# Patient Record
Sex: FEMALE | Race: OTHER | HISPANIC OR LATINO | Employment: OTHER | URBAN - METROPOLITAN AREA
[De-identification: names, ages, dates, MRNs, and addresses within clinical notes are randomized per-mention and may not be internally consistent; named-entity substitution may affect disease eponyms.]

---

## 2023-06-09 ENCOUNTER — HOSPITAL ENCOUNTER (INPATIENT)
Facility: HOSPITAL | Age: 33
LOS: 39 days | Discharge: HOME | DRG: 750 | End: 2023-07-19
Attending: EMERGENCY MEDICINE | Admitting: PSYCHIATRY & NEUROLOGY
Payer: COMMERCIAL

## 2023-06-09 DIAGNOSIS — R00.0 TACHYCARDIA: ICD-10-CM

## 2023-06-09 DIAGNOSIS — K59.00 CONSTIPATION: ICD-10-CM

## 2023-06-09 DIAGNOSIS — F30.9 MANIC EPISODE (HCC): ICD-10-CM

## 2023-06-09 DIAGNOSIS — F29 PSYCHOSIS, UNSPECIFIED PSYCHOSIS TYPE (HCC): ICD-10-CM

## 2023-06-09 DIAGNOSIS — R45.1 AGITATION: ICD-10-CM

## 2023-06-09 DIAGNOSIS — G47.00 INSOMNIA: ICD-10-CM

## 2023-06-09 DIAGNOSIS — S30.814A VAGINAL ABRASION, INITIAL ENCOUNTER: ICD-10-CM

## 2023-06-09 DIAGNOSIS — Z00.8 MEDICAL CLEARANCE FOR PSYCHIATRIC ADMISSION: ICD-10-CM

## 2023-06-09 DIAGNOSIS — F39 UNSPECIFIED MOOD (AFFECTIVE) DISORDER (HCC): Primary | ICD-10-CM

## 2023-06-09 LAB
AMPHETAMINES SERPL QL SCN: NEGATIVE
BACTERIA UR QL AUTO: NORMAL /HPF
BARBITURATES UR QL: NEGATIVE
BENZODIAZ UR QL: POSITIVE
BILIRUB UR QL STRIP: NEGATIVE
CLARITY UR: CLEAR
COCAINE UR QL: NEGATIVE
COLOR UR: YELLOW
EXT PREGNANCY TEST URINE: NEGATIVE
EXT. CONTROL: NORMAL
GLUCOSE UR STRIP-MCNC: NEGATIVE MG/DL
HGB UR QL STRIP.AUTO: NEGATIVE
KETONES UR STRIP-MCNC: NEGATIVE MG/DL
LEUKOCYTE ESTERASE UR QL STRIP: 25
METHADONE UR QL: NEGATIVE
NITRITE UR QL STRIP: NEGATIVE
NON-SQ EPI CELLS URNS QL MICRO: NORMAL /HPF
OPIATES UR QL SCN: NEGATIVE
OXYCODONE+OXYMORPHONE UR QL SCN: NEGATIVE
PCP UR QL: NEGATIVE
PH UR STRIP.AUTO: 7 [PH]
PROT UR STRIP-MCNC: NEGATIVE MG/DL
RBC #/AREA URNS AUTO: NORMAL /HPF
SP GR UR STRIP.AUTO: 1 (ref 1–1.04)
THC UR QL: POSITIVE
UROBILINOGEN UA: NEGATIVE MG/DL
WBC #/AREA URNS AUTO: NORMAL /HPF

## 2023-06-09 PROCEDURE — 99285 EMERGENCY DEPT VISIT HI MDM: CPT | Performed by: EMERGENCY MEDICINE

## 2023-06-09 PROCEDURE — 81025 URINE PREGNANCY TEST: CPT | Performed by: EMERGENCY MEDICINE

## 2023-06-09 PROCEDURE — 80307 DRUG TEST PRSMV CHEM ANLYZR: CPT | Performed by: EMERGENCY MEDICINE

## 2023-06-09 PROCEDURE — 81001 URINALYSIS AUTO W/SCOPE: CPT | Performed by: EMERGENCY MEDICINE

## 2023-06-09 PROCEDURE — 99285 EMERGENCY DEPT VISIT HI MDM: CPT

## 2023-06-09 PROCEDURE — 96372 THER/PROPH/DIAG INJ SC/IM: CPT

## 2023-06-09 PROCEDURE — 82075 ASSAY OF BREATH ETHANOL: CPT | Performed by: EMERGENCY MEDICINE

## 2023-06-09 RX ORDER — IBUPROFEN 600 MG/1
600 TABLET ORAL ONCE
Status: COMPLETED | OUTPATIENT
Start: 2023-06-09 | End: 2023-06-09

## 2023-06-09 RX ORDER — BENZTROPINE MESYLATE 1 MG/ML
2 INJECTION INTRAMUSCULAR; INTRAVENOUS ONCE
Status: COMPLETED | OUTPATIENT
Start: 2023-06-09 | End: 2023-06-09

## 2023-06-09 RX ORDER — LORAZEPAM 2 MG/ML
2 INJECTION INTRAMUSCULAR ONCE
Status: COMPLETED | OUTPATIENT
Start: 2023-06-09 | End: 2023-06-09

## 2023-06-09 RX ORDER — MIDAZOLAM HYDROCHLORIDE 2 MG/2ML
2 INJECTION, SOLUTION INTRAMUSCULAR; INTRAVENOUS ONCE
Status: COMPLETED | OUTPATIENT
Start: 2023-06-09 | End: 2023-06-09

## 2023-06-09 RX ORDER — BENZTROPINE MESYLATE 1 MG/ML
1 INJECTION INTRAMUSCULAR; INTRAVENOUS ONCE
Status: COMPLETED | OUTPATIENT
Start: 2023-06-09 | End: 2023-06-09

## 2023-06-09 RX ORDER — DIVALPROEX SODIUM 500 MG/1
1000 TABLET, DELAYED RELEASE ORAL 2 TIMES DAILY
COMMUNITY
Start: 2023-06-08 | End: 2023-07-19

## 2023-06-09 RX ORDER — LITHIUM CARBONATE 300 MG/1
300 TABLET, FILM COATED, EXTENDED RELEASE ORAL 2 TIMES DAILY
COMMUNITY
Start: 2023-06-08 | End: 2023-07-19

## 2023-06-09 RX ORDER — HALOPERIDOL 5 MG/ML
5 INJECTION INTRAMUSCULAR ONCE
Status: COMPLETED | OUTPATIENT
Start: 2023-06-09 | End: 2023-06-09

## 2023-06-09 RX ORDER — RISPERIDONE 3 MG/1
3 TABLET ORAL 2 TIMES DAILY
COMMUNITY
Start: 2023-06-08 | End: 2023-07-19

## 2023-06-09 RX ORDER — CLONAZEPAM 0.5 MG/1
0.5 TABLET ORAL 3 TIMES DAILY
COMMUNITY
End: 2023-07-19

## 2023-06-09 RX ADMIN — MIDAZOLAM 2 MG: 1 INJECTION INTRAMUSCULAR; INTRAVENOUS at 15:10

## 2023-06-09 RX ADMIN — HALOPERIDOL LACTATE 5 MG: 5 INJECTION, SOLUTION INTRAMUSCULAR at 22:10

## 2023-06-09 RX ADMIN — LORAZEPAM 2 MG: 2 INJECTION INTRAMUSCULAR; INTRAVENOUS at 14:24

## 2023-06-09 RX ADMIN — HALOPERIDOL LACTATE 5 MG: 5 INJECTION, SOLUTION INTRAMUSCULAR at 14:24

## 2023-06-09 RX ADMIN — BENZTROPINE MESYLATE 1 MG: 1 INJECTION INTRAMUSCULAR; INTRAVENOUS at 14:24

## 2023-06-09 RX ADMIN — BENZTROPINE MESYLATE 2 MG: 1 INJECTION INTRAMUSCULAR; INTRAVENOUS at 22:10

## 2023-06-09 RX ADMIN — MIDAZOLAM 2 MG: 1 INJECTION INTRAMUSCULAR; INTRAVENOUS at 17:17

## 2023-06-09 RX ADMIN — LORAZEPAM 2 MG: 2 INJECTION INTRAMUSCULAR; INTRAVENOUS at 22:10

## 2023-06-09 RX ADMIN — IBUPROFEN 600 MG: 600 TABLET, FILM COATED ORAL at 17:14

## 2023-06-09 NOTE — ED NOTES
Insurance   EVS (Eligibility Verification System) called - [phone].   Automated system indicates:[employer] ID # [id number]

## 2023-06-09 NOTE — ED NOTES
Patient is a 28 yr old female brought to the ED by sister after she was increasingly agitated at home, not sleeping, screaming, and hearing voices believing that some one is choking her and wants to kill her. and seeing people that are under her bed. She is up all night, very restless, up and down the steps, flushing toilets and turning lights on and off and writing on walls (sometimes with feces). .   Patient was recently seen in the [facility] AT THE [facility] ED (was there 2 days) given medication changes , however it was not effective. In the ED, patient was screaming and agitated,  her sister was not able to calm her. Patient did require a 36, sister was the petitioner. Patient was hospitalized several times when she lived in [place] at [facility].  The family then moved to [place], and she was in treatment with [facility] Mariscal  at [phone] Joanne Sanders). She does not have a local provider, and still does virtual meetings with Elina. (she has been in [facility] for about 1 yr)    Zechariah SANDHU

## 2023-06-09 NOTE — ED NOTES
Pt ambulated to bathroom with one assist from sister and Andres fleming. Hat placed on toilet to catch urine specimen. Pt, DENA fleming, and her sister returned to pt room. Pt's sister remains at bedside. Pt under 1:1 observation with Poncho fleming observing pt. Pt remains restless and yells out in [facility] and [place]. Pt's sister reports that she keeps changing subjects. Pt given warm blankets and pillow. This nurse closed the panel that covers suction equipment, cardiac monitor, and cords. Pt's room prepared for care of [address] [place] [address] pt.       Gely Lal RN  06/09/23 0471

## 2023-06-09 NOTE — ED CARE HANDOFF
Emergency Department Sign Out Note        Sign out and transfer of care from Dr. Oel Centeno. See Separate Emergency Department note. The patient, Hilaria Olmos, was evaluated by the previous provider for [facility]. Workup Completed:  36    ED Course / Workup Pending (followup):  Pending placement. Procedures  MDM        Disposition  Final diagnoses:   Agitation   Manic episode ([address])     Time reflects when diagnosis was documented in both MDM as applicable and the Disposition within this note     Time User Action Codes Description Comment    6/9/2023  2:24 PM Svetlana Larkin Add [R45.1] Agitation     6/9/2023  3:08 PM Svetlana Larkin Add [F30.9] Manic episode [facility])       ED Disposition     ED Disposition   Transfer to [address] [place] [address]   --    Date/Time   Fri Jun 9, 2023  3:08 PM    Comment   Hilaria Olmos should be transferred out to D and has been medically cleared. MD Ari Stack Most Recent Value   Sending MD Dr. Anupama Russell    None       Patient's Medications   Discharge Prescriptions    No medications on file     No discharge procedures on file.        ED Provider  Electronically Signed by     Milo Fenton MD  06/09/23 7495

## 2023-06-09 NOTE — ED PROVIDER NOTES
History  Chief Complaint   Patient presents with   • Psychiatric Evaluation     Pt arrives with sister who states " she has bipolar schizophrenia and ID and I took her to [facility] and they kept her in the ER for 2 day and adjusted some medications and then discharged her yesterday and she is not better, she is hearing voices that someone is going to kill her, she is trying to hurt herself "  pt yelling and kicking at times on arrival        27-year-old female presented emerged department with sister for agitation, manic episode likely. Patient has intellectual disability as well as schizoaffective disorder. Patient has not slept in 5 days. Was taken to outside hospital where medication changes were made and discharged from the emergency department. She has been kicking and screaming, hearing voices, difficult to redirect. She has been paranoid saying hearing voices that someone is going to kill her. In the ED patient making sexual gestures and having pressured speech and talking in [place] of [place] quickly. None       Past Medical History:   Diagnosis Date   • Psychiatric disorder        History reviewed. No pertinent surgical history. History reviewed. No pertinent family history. I have reviewed and agree with the history as documented. E-Cigarette/Vaping     E-Cigarette/Vaping Substances     Social History     Tobacco Use   • Smoking status: Never   • Smokeless tobacco: Never   Substance Use Topics   • Alcohol use: Not Currently   • Drug use: Not Currently       Review of Systems   Constitutional: Negative for chills and fever. HENT: Negative for ear pain and sore throat. Eyes: Negative for pain and visual disturbance. Respiratory: Negative for cough and shortness of breath. Cardiovascular: Negative for chest pain and palpitations. Gastrointestinal: Negative for abdominal pain and vomiting. Genitourinary: Negative for dysuria and hematuria.    Musculoskeletal: Negative for arthralgias and back pain. Skin: Negative for color change and rash. Neurological: Negative for seizures and syncope. Psychiatric/Behavioral: Positive for confusion and hallucinations. The patient is nervous/anxious and is hyperactive. All other systems reviewed and are negative. Physical Exam  Physical Exam  Vitals and nursing note reviewed. Constitutional:       General: She is not in acute distress. Appearance: She is well-developed. HENT:      Head: Normocephalic and atraumatic. Eyes:      Conjunctiva/sclera: Conjunctivae normal.   Cardiovascular:      Rate and Rhythm: Normal rate and regular rhythm. Heart sounds: No murmur heard. Pulmonary:      Effort: Pulmonary effort is normal. No respiratory distress. Breath sounds: Normal breath sounds. Abdominal:      Palpations: Abdomen is soft. Tenderness: There is no abdominal tenderness. Musculoskeletal:         General: No swelling. Cervical back: Neck supple. Skin:     General: Skin is warm and dry. Capillary Refill: Capillary refill takes less than 2 seconds. Neurological:      Mental Status: She is alert. Psychiatric:      Comments: Nervous, pressured speech, having hallucinations, hyperactive.          Vital Signs  ED Triage Vitals [06/09/23 1425]   Temp Pulse Respirations Blood Pressure SpO2   -- -- 20 130/92 --      Temp src Heart Rate Source Patient Position - Orthostatic VS BP Location FiO2 (%)   -- -- Sitting Left arm --      Pain Score       --           Vitals:    06/09/23 1425   BP: 130/92   Patient Position - Orthostatic VS: Sitting         Visual Acuity      ED Medications  Medications   haloperidol lactate (HALDOL) injection 5 mg (5 mg Intramuscular Given 6/9/23 1424)   LORazepam (ATIVAN) injection 2 mg (2 mg Intramuscular Given 6/9/23 1424)   benztropine (COGENTIN) injection 1 mg (1 mg Intramuscular Given 6/9/23 1424)   midazolam (VERSED) injection 2 mg (2 mg Intramuscular Given 6/9/23 1510) ibuprofen (MOTRIN) tablet 600 mg (600 mg Oral Given 6/9/23 1714)   midazolam (VERSED) injection 2 mg (2 mg Intramuscular Given 6/9/23 1717)       Diagnostic Studies  Results Reviewed     Procedure Component Value Units Date/Time    Rapid drug screen, urine [[id number]]  (Abnormal) Collected: 06/09/23 1607    Lab Status: Final result Specimen: Urine, Clean Catch Updated: 06/09/23 1628     Amph/Meth UR Negative     Barbiturate Ur Negative     Benzodiazepine Urine Positive     Cocaine Urine Negative     Methadone Urine Negative     Opiate Urine Negative     PCP Ur Negative     THC Urine Positive     Oxycodone Urine Negative    Narrative:      Presumptive report. If requested, specimen will be sent to reference lab for confirmation. FOR MEDICAL PURPOSES ONLY. IF CONFIRMATION NEEDED PLEASE CONTACT THE LAB WITHIN 5 DAYS.     Drug Screen Cutoff Levels:  AMPHETAMINE/METHAMPHETAMINES  1000 ng/mL  BARBITURATES     200 ng/mL  BENZODIAZEPINES     200 ng/mL  COCAINE      300 ng/mL  METHADONE      300 ng/mL  OPIATES      300 ng/mL  PHENCYCLIDINE     25 ng/mL  THC       50 ng/mL  OXYCODONE      100 ng/mL    Urine Microscopic [[id number]]  (Normal) Collected: 06/09/23 1607    Lab Status: Final result Specimen: Urine, Clean Catch Updated: 06/09/23 1625     RBC, UA None Seen /hpf      WBC, UA 2-4 /hpf      Epithelial Cells Occasional /hpf      Bacteria, UA Occasional /hpf     UA (URINE) with reflex to Scope [[id number]]  (Abnormal) Collected: 06/09/23 1607    Lab Status: Final result Specimen: Urine, Clean Catch Updated: 06/09/23 1616     Color, UA Yellow     Clarity, UA Clear     Specific Gravity, UA 1.005     pH, UA 7.0     Leukocytes, UA 25.0     Nitrite, UA Negative     Protein, UA Negative mg/dl      Glucose, UA Negative mg/dl      Ketones, UA Negative mg/dl      Bilirubin, UA Negative     Occult Blood, UA Negative     UROBILINOGEN UA Negative mg/dL     POCT pregnancy, urine [[id number]]  (Normal) Resulted: 06/09/23 1610    Lab Status: Final result Updated: 06/09/23 1610     EXT Preg Test, Ur Negative     Control Valid    POCT alcohol breath test [[id number]]     Lab Status: No result                  No orders to display              Procedures  Procedures         ED Course  ED Course as of 06/09/23 1742   Fri Jun 09, 2023   1707 302 petitioned by sister, upheld. SBIRT 20yo+    Flowsheet Row Most Recent Value   Initial Alcohol Screen: US AUDIT-C     1. How often do you have a drink containing alcohol? 0 Filed at: 06/09/2023 1427   2. How many drinks containing alcohol do you have on a typical day you are drinking? 0 Filed at: 06/09/2023 1427   3b. FEMALE Any Age, or MALE 65+: How often do you have 4 or more drinks on one occassion? 0 Filed at: 06/09/2023 1427   Audit-C Score 0 Filed at: 06/09/2023 1427   NISA: How many times in the past year have you. .. Used an illegal drug or used a prescription medication for non-medical reasons? Never Filed at: 06/09/2023 1427                    Medical Decision Making  29 yo female with hyperactivity, confusion, has not slept in 5 days, likely having a manic episode. Haldol Ativan Cogentin given on arrival due to agitation. Despite this patient did not have improvement of symptoms, 2 mg IM Versed given after which patient was more calm but still not able to fall asleep. 2 more milligrams of IM Versed was given after which patient sleeping comfortably. [id number] petition by sister, upheld by me. Medically clear for psych admission. Amount and/or Complexity of Data Reviewed  Labs: ordered. Risk  Prescription drug management. Decision regarding hospitalization.           Disposition  Final diagnoses:   Agitation   Manic episode ([address])     Time reflects when diagnosis was documented in both MDM as applicable and the Disposition within this note     Time User Action Codes Description Comment    6/9/2023  2:24 PM Svetlana Larkin Add [R45.1] Agitation     6/9/2023  3:08 PM Wenceslao Justice Add [F30.9] Manic episode [facility])       ED Disposition     ED Disposition   Transfer to Behavioral Health    Condition   --    Date/Time   Fri Jun 9, 2023  3:08 PM    Comment   Giovanny Gramajo should be transferred out to TBD and has been medically cleared. MD Nitza Art    None         Patient's Medications    No medications on file       No discharge procedures on file.     PDMP Review     None          ED Provider  Electronically Signed by           Amparo Zarate MD  06/09/23 7274

## 2023-06-09 NOTE — ED NOTES
This nurse called sister's phone and left voicemail message to call back so medications can be reconciled.       Josi Aguilar RN  06/09/23 6680

## 2023-06-10 LAB
ATRIAL RATE: 74 BPM
P AXIS: 39 DEGREES
PR INTERVAL: 150 MS
QRS AXIS: 7 DEGREES
QRSD INTERVAL: 78 MS
QT INTERVAL: 380 MS
QTC INTERVAL: 421 MS
T WAVE AXIS: 26 DEGREES
VENTRICULAR RATE: 74 BPM

## 2023-06-10 PROCEDURE — 93005 ELECTROCARDIOGRAM TRACING: CPT

## 2023-06-10 PROCEDURE — 99255 IP/OBS CONSLTJ NEW/EST HI 80: CPT | Performed by: PSYCHIATRY & NEUROLOGY

## 2023-06-10 PROCEDURE — 93010 ELECTROCARDIOGRAM REPORT: CPT

## 2023-06-10 RX ORDER — LORAZEPAM 2 MG/ML
1 INJECTION INTRAMUSCULAR
Status: DISCONTINUED | OUTPATIENT
Start: 2023-06-10 | End: 2023-06-21

## 2023-06-10 RX ORDER — BENZTROPINE MESYLATE 2 MG/1
2 TABLET ORAL 2 TIMES DAILY
COMMUNITY
Start: 2023-05-30 | End: 2023-07-19

## 2023-06-10 RX ORDER — CLONAZEPAM 0.5 MG/1
0.5 TABLET ORAL 2 TIMES DAILY
Status: DISCONTINUED | OUTPATIENT
Start: 2023-06-10 | End: 2023-06-13

## 2023-06-10 RX ORDER — RISPERIDONE 2 MG/1
2 TABLET ORAL
Status: DISCONTINUED | OUTPATIENT
Start: 2023-06-10 | End: 2023-06-22

## 2023-06-10 RX ORDER — HYDROXYZINE 50 MG/1
100 TABLET, FILM COATED ORAL
Status: DISCONTINUED | OUTPATIENT
Start: 2023-06-10 | End: 2023-07-19 | Stop reason: HOSPADM

## 2023-06-10 RX ORDER — BENZTROPINE MESYLATE 1 MG/ML
0.5 INJECTION INTRAMUSCULAR; INTRAVENOUS
Status: DISCONTINUED | OUTPATIENT
Start: 2023-06-10 | End: 2023-06-22

## 2023-06-10 RX ORDER — RISPERIDONE 0.5 MG/1
0.5 TABLET ORAL
Status: DISCONTINUED | OUTPATIENT
Start: 2023-06-10 | End: 2023-06-22

## 2023-06-10 RX ORDER — HALOPERIDOL 5 MG/ML
5 INJECTION INTRAMUSCULAR
Status: DISCONTINUED | OUTPATIENT
Start: 2023-06-10 | End: 2023-06-22

## 2023-06-10 RX ORDER — RISPERIDONE 0.5 MG/1
TABLET ORAL
COMMUNITY
Start: 2023-06-02 | End: 2023-07-19

## 2023-06-10 RX ORDER — ACETAMINOPHEN 325 MG/1
650 TABLET ORAL EVERY 6 HOURS PRN
Status: DISCONTINUED | OUTPATIENT
Start: 2023-06-10 | End: 2023-07-19 | Stop reason: HOSPADM

## 2023-06-10 RX ORDER — LORAZEPAM 1 MG/1
2 TABLET ORAL ONCE
Status: COMPLETED | OUTPATIENT
Start: 2023-06-10 | End: 2023-06-10

## 2023-06-10 RX ORDER — HYDROXYZINE 50 MG/1
50 TABLET, FILM COATED ORAL
Status: DISCONTINUED | OUTPATIENT
Start: 2023-06-10 | End: 2023-07-19 | Stop reason: HOSPADM

## 2023-06-10 RX ORDER — AMOXICILLIN 250 MG
1 CAPSULE ORAL DAILY PRN
Status: DISCONTINUED | OUTPATIENT
Start: 2023-06-10 | End: 2023-06-13

## 2023-06-10 RX ORDER — LITHIUM CARBONATE 300 MG/1
300 TABLET, FILM COATED, EXTENDED RELEASE ORAL EVERY 12 HOURS SCHEDULED
Status: DISCONTINUED | OUTPATIENT
Start: 2023-06-10 | End: 2023-06-10

## 2023-06-10 RX ORDER — LORAZEPAM 2 MG/ML
2 INJECTION INTRAMUSCULAR EVERY 6 HOURS PRN
Status: DISCONTINUED | OUTPATIENT
Start: 2023-06-10 | End: 2023-07-19 | Stop reason: HOSPADM

## 2023-06-10 RX ORDER — DIVALPROEX SODIUM 500 MG/1
1000 TABLET, DELAYED RELEASE ORAL EVERY 12 HOURS SCHEDULED
Status: DISCONTINUED | OUTPATIENT
Start: 2023-06-10 | End: 2023-07-19 | Stop reason: HOSPADM

## 2023-06-10 RX ORDER — RISPERIDONE 0.5 MG/1
0.5 TABLET, ORALLY DISINTEGRATING ORAL 2 TIMES DAILY PRN
Status: DISCONTINUED | OUTPATIENT
Start: 2023-06-10 | End: 2023-06-22

## 2023-06-10 RX ORDER — BENZTROPINE MESYLATE 1 MG/ML
1 INJECTION INTRAMUSCULAR; INTRAVENOUS
Status: DISCONTINUED | OUTPATIENT
Start: 2023-06-10 | End: 2023-07-19 | Stop reason: HOSPADM

## 2023-06-10 RX ORDER — BENZTROPINE MESYLATE 1 MG/ML
1 INJECTION INTRAMUSCULAR; INTRAVENOUS
Status: DISCONTINUED | OUTPATIENT
Start: 2023-06-10 | End: 2023-06-22

## 2023-06-10 RX ORDER — LORAZEPAM 2 MG/ML
2 INJECTION INTRAMUSCULAR
Status: DISCONTINUED | OUTPATIENT
Start: 2023-06-10 | End: 2023-06-21

## 2023-06-10 RX ORDER — MAGNESIUM HYDROXIDE/ALUMINUM HYDROXICE/SIMETHICONE 120; 1200; 1200 MG/30ML; MG/30ML; MG/30ML
30 SUSPENSION ORAL EVERY 4 HOURS PRN
Status: DISCONTINUED | OUTPATIENT
Start: 2023-06-10 | End: 2023-07-19 | Stop reason: HOSPADM

## 2023-06-10 RX ORDER — LANOLIN ALCOHOL/MO/W.PET/CERES
3 CREAM (GRAM) TOPICAL
Status: DISCONTINUED | OUTPATIENT
Start: 2023-06-10 | End: 2023-07-19 | Stop reason: HOSPADM

## 2023-06-10 RX ORDER — LITHIUM CARBONATE 300 MG/1
300 TABLET, FILM COATED, EXTENDED RELEASE ORAL 2 TIMES DAILY
Status: DISCONTINUED | OUTPATIENT
Start: 2023-06-10 | End: 2023-06-11

## 2023-06-10 RX ORDER — POLYETHYLENE GLYCOL 3350 17 G/17G
17 POWDER, FOR SOLUTION ORAL DAILY PRN
Status: DISCONTINUED | OUTPATIENT
Start: 2023-06-10 | End: 2023-07-19 | Stop reason: HOSPADM

## 2023-06-10 RX ORDER — RISPERIDONE 1 MG/1
1 TABLET ORAL
Status: DISCONTINUED | OUTPATIENT
Start: 2023-06-10 | End: 2023-06-22

## 2023-06-10 RX ORDER — BENZTROPINE MESYLATE 1 MG/1
1 TABLET ORAL 2 TIMES DAILY PRN
Status: DISCONTINUED | OUTPATIENT
Start: 2023-06-10 | End: 2023-07-19 | Stop reason: HOSPADM

## 2023-06-10 RX ORDER — ACETAMINOPHEN 325 MG/1
650 TABLET ORAL EVERY 4 HOURS PRN
Status: DISCONTINUED | OUTPATIENT
Start: 2023-06-10 | End: 2023-07-19 | Stop reason: HOSPADM

## 2023-06-10 RX ORDER — DIPHENHYDRAMINE HYDROCHLORIDE 50 MG/ML
50 INJECTION INTRAMUSCULAR; INTRAVENOUS EVERY 6 HOURS PRN
Status: DISCONTINUED | OUTPATIENT
Start: 2023-06-10 | End: 2023-07-19 | Stop reason: HOSPADM

## 2023-06-10 RX ORDER — PROPRANOLOL HYDROCHLORIDE 10 MG/1
10 TABLET ORAL EVERY 8 HOURS PRN
Status: DISCONTINUED | OUTPATIENT
Start: 2023-06-10 | End: 2023-07-19 | Stop reason: HOSPADM

## 2023-06-10 RX ORDER — CLONAZEPAM 0.5 MG/1
0.5 TABLET ORAL 2 TIMES DAILY PRN
Status: DISCONTINUED | OUTPATIENT
Start: 2023-06-10 | End: 2023-07-19 | Stop reason: HOSPADM

## 2023-06-10 RX ORDER — CLONIDINE HYDROCHLORIDE 0.1 MG/1
0.1 TABLET ORAL ONCE
Status: COMPLETED | OUTPATIENT
Start: 2023-06-10 | End: 2023-06-10

## 2023-06-10 RX ORDER — RISPERIDONE 2 MG/1
2 TABLET, ORALLY DISINTEGRATING ORAL
Status: DISCONTINUED | OUTPATIENT
Start: 2023-06-10 | End: 2023-06-11

## 2023-06-10 RX ORDER — BENZTROPINE MESYLATE 2 MG/1
2 TABLET ORAL 2 TIMES DAILY
Status: DISCONTINUED | OUTPATIENT
Start: 2023-06-10 | End: 2023-06-12

## 2023-06-10 RX ORDER — HYDROXYZINE HYDROCHLORIDE 25 MG/1
25 TABLET, FILM COATED ORAL
Status: DISCONTINUED | OUTPATIENT
Start: 2023-06-10 | End: 2023-07-19 | Stop reason: HOSPADM

## 2023-06-10 RX ORDER — TRAZODONE HYDROCHLORIDE 100 MG/1
100 TABLET ORAL
Status: DISCONTINUED | OUTPATIENT
Start: 2023-06-10 | End: 2023-06-14

## 2023-06-10 RX ORDER — HALOPERIDOL 5 MG/ML
2.5 INJECTION INTRAMUSCULAR
Status: DISCONTINUED | OUTPATIENT
Start: 2023-06-10 | End: 2023-06-22

## 2023-06-10 RX ORDER — RISPERIDONE 2 MG/1
2 TABLET, ORALLY DISINTEGRATING ORAL 2 TIMES DAILY
Status: DISCONTINUED | OUTPATIENT
Start: 2023-06-11 | End: 2023-06-11

## 2023-06-10 RX ORDER — BENZTROPINE MESYLATE 1 MG/1
1 TABLET ORAL
Status: DISCONTINUED | OUTPATIENT
Start: 2023-06-10 | End: 2023-07-14

## 2023-06-10 RX ORDER — TRAZODONE HYDROCHLORIDE 100 MG/1
100 TABLET ORAL
COMMUNITY
Start: 2023-05-28 | End: 2023-07-19

## 2023-06-10 RX ORDER — DIVALPROEX SODIUM 500 MG/1
1000 TABLET, DELAYED RELEASE ORAL EVERY 12 HOURS SCHEDULED
Status: DISCONTINUED | OUTPATIENT
Start: 2023-06-10 | End: 2023-06-10

## 2023-06-10 RX ORDER — SERTRALINE HYDROCHLORIDE 100 MG/1
TABLET, FILM COATED ORAL
COMMUNITY
Start: 2023-05-30 | End: 2023-07-19

## 2023-06-10 RX ORDER — ACETAMINOPHEN 325 MG/1
975 TABLET ORAL EVERY 6 HOURS PRN
Status: DISCONTINUED | OUTPATIENT
Start: 2023-06-10 | End: 2023-07-19 | Stop reason: HOSPADM

## 2023-06-10 RX ORDER — RISPERIDONE 1 MG/1
3 TABLET ORAL 2 TIMES DAILY
Status: DISCONTINUED | OUTPATIENT
Start: 2023-06-10 | End: 2023-06-10

## 2023-06-10 RX ADMIN — CLONAZEPAM 0.5 MG: 0.5 TABLET ORAL at 08:55

## 2023-06-10 RX ADMIN — LORAZEPAM 2 MG: 1 TABLET ORAL at 06:40

## 2023-06-10 RX ADMIN — CLONIDINE HYDROCHLORIDE 0.1 MG: 0.1 TABLET ORAL at 10:44

## 2023-06-10 RX ADMIN — RISPERIDONE 3 MG: 2 TABLET ORAL at 18:52

## 2023-06-10 RX ADMIN — CLONAZEPAM 0.5 MG: 0.5 TABLET ORAL at 18:52

## 2023-06-10 RX ADMIN — DIVALPROEX SODIUM 1000 MG: 500 TABLET, DELAYED RELEASE ORAL at 18:53

## 2023-06-10 RX ADMIN — TRAZODONE HYDROCHLORIDE 100 MG: 100 TABLET ORAL at 22:00

## 2023-06-10 RX ADMIN — RISPERIDONE 2 MG: 2 TABLET, ORALLY DISINTEGRATING ORAL at 21:00

## 2023-06-10 RX ADMIN — BENZTROPINE MESYLATE 1 MG: 1 TABLET ORAL at 12:39

## 2023-06-10 RX ADMIN — LITHIUM CARBONATE 300 MG: 300 TABLET, EXTENDED RELEASE ORAL at 08:55

## 2023-06-10 RX ADMIN — RISPERIDONE 3 MG: 1 TABLET ORAL at 08:55

## 2023-06-10 RX ADMIN — LITHIUM CARBONATE 300 MG: 300 TABLET, EXTENDED RELEASE ORAL at 18:53

## 2023-06-10 RX ADMIN — DIVALPROEX SODIUM 1000 MG: 500 TABLET, DELAYED RELEASE ORAL at 08:54

## 2023-06-10 RX ADMIN — BENZTROPINE MESYLATE 2 MG: 2 TABLET ORAL at 18:52

## 2023-06-10 NOTE — ED NOTES
Patient is awake - slept until now, once she fell asleep - through [employer] and [place] interpretor, patient asking about a dog. Blew kisses to our male attendant when he was leaving the room.       Stanislaw Gonzalez RN  06/10/23 2640

## 2023-06-10 NOTE — PLAN OF CARE
Problem: Risk for Self Injury/Neglect  Goal: Treatment Goal: Remain safe during length of stay, learn and adopt new coping skills, and be free of self-injurious ideation, impulses and acts at the time of discharge  Outcome: Not Progressing  Goal: Verbalize thoughts and feelings  Description: Interventions:  - Assess and re-assess patient's lethality and potential for self-injury  - Engage patient in 1:1 interactions, daily, for a minimum of 15 minutes  - Encourage patient to express feelings, fears, frustrations, hopes  - Establish rapport/trust with patient   Outcome: Not Progressing  Goal: Refrain from harming self  Description: Interventions:  - Monitor patient closely, per order  - Develop a trusting relationship  - Supervise medication ingestion, monitor effects and side effects   Outcome: Not Progressing  Goal: Attend and participate in unit activities, including therapeutic, recreational, and educational groups  Description: Interventions:  - Provide therapeutic and educational activities daily, encourage attendance and participation, and document same in the medical record  - Obtain collateral information, encourage visitation and family involvement in care   Outcome: Not Progressing  Goal: Recognize maladaptive responses and adopt new coping mechanisms  Outcome: Not Progressing  Goal: Complete daily ADLs, including personal hygiene independently, as able  Description: Interventions:  - Observe, teach, and assist patient with ADLS  - Monitor and promote a balance of rest/activity, with adequate nutrition and elimination  Outcome: Not Progressing     Problem: Risk for Violence/Aggression Toward Others  Goal: Treatment Goal: Refrain from acts of violence/aggression during length of stay, and demonstrate improved impulse control at the time of discharge  Outcome: Not Progressing  Goal: Verbalize thoughts and feelings  Description: Interventions:  - Assess and re-assess patient's level of risk, every waking shift  - Engage patient in 1:1 interactions, daily, for a minimum of 15 minutes   - Allow patient to express feelings and frustrations in a safe and non-threatening manner   - Establish rapport/trust with patient   Outcome: Not Progressing  Goal: Refrain from harming others  Outcome: Not Progressing  Goal: Refrain from destructive acts on the environment or property  Outcome: Not Progressing  Goal: Control angry outbursts  Description: Interventions:  - Monitor patient closely, per order  - Ensure early verbal de-escalation  - Monitor prn medication needs  - Set reasonable/therapeutic limits, outline behavioral expectations, and consequences   - Provide a non-threatening milieu, utilizing the least restrictive interventions   Outcome: Not Progressing  Goal: Attend and participate in unit activities, including therapeutic, recreational, and educational groups  Description: Interventions:  - Provide therapeutic and educational activities daily, encourage attendance and participation, and document same in the medical record   Outcome: Not Progressing  Goal: Identify appropriate positive anger management techniques  Description: Interventions:  - Offer anger management and coping skills groups   - Staff will provide positive feedback for appropriate anger control  Outcome: Not Progressing     Problem: JENAE  Goal: Will exhibit normal sleep and speech and no impulsivity  Description: INTERVENTIONS:  - Administer medication as ordered  - Set limits on impulsive behavior  - Make attempts to decrease external stimuli as possible  Outcome: Not Progressing     Problem: INVOLUNTARY ADMIT  Goal: Will cooperate with staff recommendations and doctor's orders and will demonstrate appropriate behavior  Description: INTERVENTIONS:  - Treat underlying conditions and offer medication as ordered  - Educate regarding involuntary admission procedures and rules  - Utilize positive consistent limit setting strategies to support patient and staff safety  Outcome: Not Progressing     Problem: SELF CARE DEFICIT  Goal: Return ADL status to a safe level of function  Description: INTERVENTIONS:  - Administer medication as ordered  - Assess ADL deficits and provide assistive devices as needed  - Obtain PT/OT consults as needed  - Assist and instruct patient to increase activity and self care as tolerated  Outcome: Not Progressing

## 2023-06-10 NOTE — ED NOTES
Patient requesting to speak with her sister, attempted to contact her sister and phone went to voicemail. Will attempt later. 1:1 remains at the bedside.      Gabriele Oconnor RN  06/10/23 8818

## 2023-06-10 NOTE — NURSING NOTE
WON signed for the following:  Huey Churchill (sister) [phone]  Stromy Calabrese (mom) [phone]    Patient sister speaks fluent English and [place] is the first point of contact.

## 2023-06-10 NOTE — ED NOTES
Bed search was initiated for Pt. No appropriate out of network beds found at this time. Due to Pt diagnosis of MR, anticipate that she will require an in in-network bed.  Bed search to continue in AM.

## 2023-06-10 NOTE — CONSULTS
Consultation - [address] 28 y.o. female MRN: [id number]  Unit/Bed#: ED 10 Encounter: [id number]    Physician Requesting Consult: Becca Yepez MD  Reason for Consult / Principal Problem: acute agitation and psychosis    Assessment and Plan     Assessment/Plan   Jeanette Diaz is a 28 y.o. female with past psychiatric history of intellectual disability, schizoaffective disorder, no known past medical history, recently brought to ED by sister stating that she was in the ED two days ago at [facility] and they discharged her instead of admitting her after adjusting some medications. Her sister stated per records that she was having AVH, patient was yelling, was hard to redirect, and kicking. She was given chemical restraint x3 in the ED over the past 24 hours and is currently on a 1:1. Patient is disorganized in thought process and speech with loose association and not answering questions but instead talking about the "devil" and birthdays, and stating that various staff members are her "man" [place] [address]. She appears labile and acutely psychotic with sexual preocupation. Attempted to reach sister who submitted [phone] but went to voicemail - unable to leave voicemail. Patient continues to meet criteria for admission psychiatrically and 302 was upheld. Diagnosis: unspecified psychotic disorder with executive dysfunction. Recommended Treatment:   • Patient currently does meet criteria for inpatient psychiatric hospitalization: patient is currently Patient is currently at potential risk of harming self or others  • 302 Signed  • Recommend 1-1 continual observation for patient and staff safety  • At this time, defer new medication recommendations until patient is accepted to inpatient psychiatric unit as patient has demonstrated low potential for aggression  • Psychiatry will sign off.    • If contacting or consulting after hours, please call or TigerText the on-call team (ZHANG: [phone]) with any questions or concerns. Diagnoses were discussed with the patient. Available treatment options were discussed with the patient. Prior records were reviewed in [address] [place] [address]. The assessment and plan was discussed with the primary team.     Risks, benefits and possible side effects of Medications:   No new medications added        PI  History of Present Illness   Chief Complaint: patient 302'd by sister for aggressive and bizarre behavior    Enrrique Angelo is a 28 y.o. female with schizoaffective disorder bipolar type per records, admitted for acute agitation and psychosis. Prior records were reviewed. Today, patient is disorganized in speech, thought process, actively responding to internal stimuli,  is having a hard time understanding due to disorganization. Patient is talking about various birthdays, has fresh wounds on her knee and her head, labile and crying when asking about calling her sister, talking about the "devil", and stating that the 1:1 staff member is her "man" in a sexual sense. Patient is clearly a poor historian and displays continued need for inpatient psychiatric care. UDS was positive for THC and Benzos: patient is prescribed klonopin and received ativan in ED at [facility] approximately 4 days ago. There was discussion of IM haldol, none initiated. Per record on 6/8/23 there are the following contact points: Misha Ramires [phone] who states patient's mother, Sol speaks Slovenian and will need interpretor. Charo provides contact number for patient's mother, [phone].        Per records at [address] [place] [address] as well as coordination with nursing staff, sister was reached and medications were confirmed as listed:    Klonopin 0.5mg TID for agitation  Trazodone 100mg for sleep  Depakote 1000mg Q12 for mood stabilization  Risperidone 3mg BID for psychotic symptoms  Lithium 300mg BID for mood adjunct  Cogentin 2mg daily for EPS    See ED documentation per Bart Cole MD on 6/9/23:  Chief Complaint   Patient presents with   • Psychiatric Evaluation       Pt arrives with sister who states " she has bipolar schizophrenia and ID and I took her to [facility] and they kept her in the ER for 2 day and adjusted some medications and then discharged her yesterday and she is not better, she is hearing voices that someone is going to kill her, she is trying to hurt herself "  pt yelling and kicking at times on arrival         69-year-old female presented emerged department with sister for agitation, manic episode likely. Patient has intellectual disability as well as schizoaffective disorder. Patient has not slept in 5 days. Was taken to outside hospital where medication changes were made and discharged from the emergency department. She has been kicking and screaming, hearing voices, difficult to redirect. She has been paranoid saying hearing voices that someone is going to kill her. In the ED patient making sexual gestures and having pressured speech and talking in Estonian quickly. See note by crisis worker Sage Carlton on 6/9/23:  Patient is a 28 yr old female brought to the ED by sister after she was increasingly agitated at home, not sleeping, screaming, and hearing voices believing that some one is choking her and wants to kill her. and seeing people that are under her bed. She is up all night, very restless, up and down the steps, flushing toilets and turning lights on and off and writing on walls (sometimes with feces). .   Patient was recently seen in the [address] ED (was there 2 days) given medication changes , however it was not effective. In the ED, patient was screaming and agitated,  her sister was not able to calm her.   Patient did require a 36, sister was the petitioner.     Patient was hospitalized several times when she lived in [place] at [facility].  The family then moved to [place], and she was in treatment with [facility]  at [phone] Lucia Cheng). She does not have a local provider, and still does virtual meetings with Elina. (she has been in [facility] for about 1 yr)     See note from [address] 6/6/23 Yasmani Harrison during patient ED stay at [facility]:  History of the Present Illness:  Patient is a 28 y.o. female with established diagnosis of Schizophrenia, Mild ID, and Bipolar Affective Disorder referred to ED by family transported via private vehicle by her sister Johanne Frank due to decreased functioning. PES met with Pt and introduced self using a Estonian interpretor (Coy # [id number]). Pt was unable to actively participate during the interview due to being incoherent and being unable to follow instructions. Pt was tangential, screaming and euphoric during the evaluation. PES discontinued the interview and obtained Pt's clinical information from her sister. Collateral Information Kiley Bush Haider [phone]  PES met with Pt's sister Lyndsay Wilson introduced self identified role in the ED. Charo states Pt is "having a mental crisis". Charo stated Pt appears uncomfortable in her own body, not sleeping at all, when she does sleep Pt wakes up frequently throughout the night. Charo stated Pt has been experiencing these symptoms for the past three months, they brought her into the hospital today looking for help as they are unable to continue the way they have been. The family has been attempting to manage her symptoms at home due to problems that occurred during last AIP in 2011 where Pt was over medicated causing her to have a seizure. Pt is also reportedly writing on walls and playing with her feces. Charo stated Pt experiences symptoms related to depression where she cries and sleeps a lot, self isolates, is quick to anger and will pick a verbal altercation with those around her. Pt requires assistance with completing ADL's.  Charo endorses Pt expressing passive SI stating she cannot live like this and wants to die. Pt has never attempted suicide and has not identified a plan. Charo shared that when Pt is not well she engages in SIB where she picks her skin and hits herself on her head and body, she further experiences anxiety and basilio which is marked with racing, incoherent thoughts, fidgeting, crying, laughing and screaming, and sexually inappropriate behaviors. Charo admits that Pt can be verbally aggressive and will "pick fights" when she does not get her way. She stated Pt "does not currently have the strength to be physically aggressive, but she has been in the past. Charo denies Pt is destructive to property. Charo stated Pt has experienced symptoms related to paranoia and experiences rambling, intrusive thoughts, is incoherent, and, crying, stating "they're going to kill me". Johanne denies Pt expressing HI, she expressed knowing that the Pt experiences psychosis but is unable to say with certainty if the Pt experiences AH or VH    Following summary is provided from [facility] Stay 6/6/23:  Summary: Maria Eugenia Humphrey is a 28 year woman with established diagnoses of intellectual disability and schizoaffective disorder who was brought to the Emergency Department by family who was concerned about poor sleep and erratic behavior. She was agitated upon arriving to the Emergency Department and was medicated with 10 mg of Zyprexa, 15 mg of Versed and 5 mg of Haldol. She was medically cleared and psychiatry was consulted. Discussed care with ED provider who reports no identifiable medical cause for symptoms. Today Maria T Melo took oral medications. She is disorganized and restless during interview. Her thoughts were incoherent and at times unable to be translated. Maria T Melo spoke about eating dog food and asked  to take his penis out. She was able to be redirected but yells out at times. No suicidal or homicidal ideations are reported.  At times she speaks to herself as if internally preoccupied. Contact was made with Evelin's outpatient provider KATIE [phone]. Anna Morejon reports that Brandt's last visit was 5/1/23. A plan was made to start Thorazine however it was not covered so Zoloft was increased to 100 mg and Risperdal was increased to 3 mg daily and 3.5 mg at night to target "outbursts." Contact was made with sister Lalita Pradhan [phone]. Charo reports that Karla Nelson has been incoherent at times, rambling, pacing, digging in trash and smearing feces. Charo reports that Karla Nelson has not been sleeping, spends much of the day pacing and "looks uncomfortable." Charo does not recall all past medication trials but has been on Zyprexa, Risperdal and Haldol. No history of treatment with [facility]. [facility]    Psychiatric Review of Systems and PHx     Problems with sleep: unable to obtain due to patient factors. poor sleep per documentation  Appetite changes: unable to obtain due to patient factors. Weight changes: unable to obtain due to patient factors. Low energy/anergy: unable to obtain due to patient factors. Low interest/pleasure/anhedonia: unable to obtain due to patient factors. Somatic symptoms: unable to obtain due to patient factors. Anxiety/panic: unable to obtain due to patient factors. Kristi: unable to obtain due to patient factors. Appears labile, talkative but not pressured  Guilt/hopeless: unable to obtain due to patient factors. Self injurious behavior/risky behavior: unable to obtain due to patient factors.       Historical Information   Prior psychiatric diagnoses: per chart, schizophrenia, intellectual developmental disorder, mood disorder  Inpatient hospitalizations: per chart, multiple in [address] attempts: unknown  Self-harm behaviors: unknown  Outpatient treatment: per chart, Sees ana at [address] [facility]  Psychiatric medication trial: Multiple, patient is unsure which medications have been trialed previously    Substance Abuse History:  Social History     Tobacco Use   • Smoking status: Never   • Smokeless tobacco: Never   Substance Use Topics   • Alcohol use: Not Currently   • Drug use: Not Currently        I am unable to assess the patient for substance use within the past 12 months as they are unable or unwilling to answer    Family Psychiatric History:   Unable to obtain from reliable source    Social History  Marital history: Single  Children: unknown  Living arrangement: Lives in a home with her mother (primary caretaker), father, sister.   Functioning Relationships: good support system  Education: unknown  Occupational History: unable to obtain due to patient factors  Other Pertinent History:  unknown    Traumatic History:   Abuse: none reported  Other Traumatic Events: none reported    Past Medical History:   Diagnosis Date   • Psychiatric disorder        Medical Review of Systems and MSE   tion and Medical ROS  Medical Review Of Systems:  Review of Systems - Negative except as noted in HPI    Meds/Allergies   all current active meds have been reviewed and current meds:   Current Facility-Administered Medications   Medication Dose Route Frequency   • acetaminophen (TYLENOL) tablet 650 mg  650 mg Oral Q6H PRN   • acetaminophen (TYLENOL) tablet 650 mg  650 mg Oral Q4H PRN   • acetaminophen (TYLENOL) tablet 975 mg  975 mg Oral Q6H PRN   • aluminum-magnesium hydroxide-simethicone (MYLANTA) oral suspension 30 mL  30 mL Oral Q4H PRN   • haloperidol lactate (HALDOL) injection 2.5 mg  2.5 mg Intramuscular Q4H PRN Max 6/day    And   • LORazepam (ATIVAN) injection 1 mg  1 mg Intramuscular Q4H PRN Max 6/day    And   • benztropine (COGENTIN) injection 0.5 mg  0.5 mg Intramuscular Q4H PRN Max 6/day   • haloperidol lactate (HALDOL) injection 5 mg  5 mg Intramuscular Q4H PRN Max 4/day    And   • LORazepam (ATIVAN) injection 2 mg  2 mg Intramuscular Q4H PRN Max 4/day    And   • benztropine (COGENTIN) injection 1 mg  1 mg Intramuscular Q4H PRN Max 4/day   • benztropine (COGENTIN) injection 1 mg  1 mg Intramuscular Q4H PRN Max 6/day   • benztropine (COGENTIN) tablet 1 mg  1 mg Oral BID PRN   • benztropine (COGENTIN) tablet 1 mg  1 mg Oral Q4H PRN Max 6/day   • benztropine (COGENTIN) tablet 2 mg  2 mg Oral BID   • clonazePAM (KlonoPIN) tablet 0.5 mg  0.5 mg Oral BID   • clonazePAM (KlonoPIN) tablet 0.5 mg  0.5 mg Oral BID PRN   • hydrOXYzine HCL (ATARAX) tablet 50 mg  50 mg Oral Q6H PRN Max 4/day    Or   • diphenhydrAMINE (BENADRYL) injection 50 mg  50 mg Intramuscular Q6H PRN   • divalproex sodium (DEPAKOTE) DR tablet 1,000 mg  1,000 mg Oral Q12H COLT   • hydrOXYzine HCL (ATARAX) tablet 100 mg  100 mg Oral Q6H PRN Max 4/day    Or   • LORazepam (ATIVAN) injection 2 mg  2 mg Intramuscular Q6H PRN   • hydrOXYzine HCL (ATARAX) tablet 25 mg  25 mg Oral Q6H PRN Max 4/day   • lithium carbonate (LITHOBID) CR tablet 300 mg  300 mg Oral BID   • melatonin tablet 3 mg  3 mg Oral HS PRN   • polyethylene glycol (MIRALAX) packet 17 g  17 g Oral Daily PRN   • propranolol (INDERAL) tablet 10 mg  10 mg Oral Q8H PRN   • risperiDONE (RisperDAL M-TAB) disintegrating tablet 0.5 mg  0.5 mg Oral BID PRN   • risperiDONE (RisperDAL M-TAB) disintegrating tablet 2 mg  2 mg Oral HS   • [START ON 6/11/2023] risperiDONE (RisperDAL M-TAB) disintegrating tablet 2 mg  2 mg Oral BID   • risperiDONE (RisperDAL) tablet 0.5 mg  0.5 mg Oral Q4H PRN Max 6/day   • risperiDONE (RisperDAL) tablet 1 mg  1 mg Oral Q4H PRN Max 3/day   • risperiDONE (RisperDAL) tablet 2 mg  2 mg Oral Q4H PRN Max 3/day   • risperiDONE (RisperDAL) tablet 3 mg  3 mg Oral BID   • senna-docusate sodium (SENOKOT S) 8.6-50 mg per tablet 1 tablet  1 tablet Oral Daily PRN   • traZODone (DESYREL) tablet 100 mg  100 mg Oral HS     No Known Allergies    Objective   Vital signs in last 24 hours:  Temp:  [97.8 °F (36.6 °C)-98 °F (36.7 °C)] 98 °F (36.7 °C)  HR:  [] 110  Resp:  [18-20] 20  BP: (107-132)/(64-92) 131/91    Mental Status Exam:  Appearance:  alert, overtly appearing  female, in no acute distress, intermittent eye contact eye contact, disheveled, poor dentition and dressed in paper scrubs   Behavior:  limited cooperativity, guarded and sitting comfortably, child like at times, drawing stick figures with crayons   Motor: no abnormal movements and restless and fidgety   Speech:  spontaneous, increased rate and incoherent   Mood:  "Twan"   Affect:  labile, anxious and tearful at times   Thought Process:  disorganized   Thought Content: paranoid ideation, Mormon preoccupation, sexual preoccupation   Perceptual disturbances: appears to be responding to internal stimuli   Risk Potential: No active suicidal ideation, No active homicidal ideation   Cognition: oriented to self and situation, memory grossly intact, appears to be below average intelligence, cognitive deficit, impaired abstract reasoning, attention span appeared shorter than expected for age and cognition not formally tested   Insight:  Poor   Judgment: Poor     Laboratory results:  I have personally reviewed all pertinent laboratory/tests results. This note was not shared with the patient due to reasonable likelihood of causing patient harm  This note was created using dictation system. There may be translation, syntax,  or grammatical errors. If you have any questions, please contact the dictating provider.     Michoacano Clifton, DO [facility], PGY-II

## 2023-06-10 NOTE — ED NOTES
Patient remains awake, appears hyper vigilant - as soon as door opens, patient peers out and focuses on security guards and keeps staring. She has been coloring, she turned on the tv, was given apple juice. Is staying in bed, cross legged and sitting at the edge of the bed with the 1:1 sitter/observer sitting at the bottom of her bed. She does not seem to understand orientation questions when asked in [facility] and [place], but did ask for coffee and a phone. Patient was instructed, that hot liquids are not allowed and there is no phone use at this time but that perhaps in the morning she could use the phone. Patient has been encouraged to try and lay down and try to sleep but as of this time she remains awake. Provider was made aware and no further medications at this time given.      Joanne Nash RN  06/10/23 0001

## 2023-06-10 NOTE — ED NOTES
Patient noted to be sleeping with easy non labored respirations with 1:1  observer continuing at bedside. No apparent discomfort or distress noted.      Michael Romero RN  06/10/23 9429

## 2023-06-10 NOTE — ED NOTES
Pt stormed out of room yelling in Mauritian attempting to leave. Escorted back to room by staff and security.       Elle Ohara RN  06/09/23 9673

## 2023-06-10 NOTE — ED NOTES
Insurance Authorization for admission:   Phone call placed to [employer] number: [phone]  Spoke to [place]      5 days approved. Level of care: inpatient mental health  Review on 6/14 Wed   Authorization # [id number].

## 2023-06-10 NOTE — NURSING NOTE
Patient ordered on 1:1 continual observation as patient is making inappropriate sexual gestures to other patients and staff despite redirection. Patient only Malian speaking. Patient requiring frequent as she keeps trying to grab other belongings such as wanting another nurse's watch, and becomes agitated when told no. Patient became upset  The patient when introduced the milieu began attempting to hug staff and patients, calling patients cute, and blowing kisses to them. The patient was told a firmly strong no, which then required redirection almost immediately as patient began doing this again. The patient was given a magazine to occupy herself in her room with a sitter present, and began kissing the magazine as there was a pepe on the cover. The patient began referring to this writer as the "Protagonist in a soap opera" and referring to his physical appearance prior to the 1:1.      The patient when she entered her bathroom required a female to stand by as the patient began crying and screaming stating it was "too big."

## 2023-06-10 NOTE — ED NOTES
Patient was medicated with ativan as ordered - patient took it without difficulty - did state to sitter that the "nurse was crazy" as she was smiling.      Briana Rivera RN  06/10/23 7936

## 2023-06-10 NOTE — ED NOTES
Patient found sitting on the stretcher coloring with crayons, 1:1 remains at the bedside.      Lokesh Murray RN  06/10/23 6643

## 2023-06-10 NOTE — ED NOTES
Insurance Authorization for admission:   Phone call placed to [employer]. Phone number: [phone]. Spoke to [employer].     5 days approved. Level of care: Inpatient Psych 302. Review on TBD based on date of admission. Authorization # upon arrival to treating facility.

## 2023-06-10 NOTE — NURSING NOTE
Patient is a 36 from [facility] ED. Unable to obtain any history from patient as she is delusional and a poor historian. The patient does actively deny SI/HI/AH/VH and states she never has had tried to hurt herself. She is grabbing at objects in the exam room, and becomes tearful and angry when she cannot have objects. The patient has ID, with very pressured speech on exam.       Per sister, Bella Davila, who is pimary contact, although whole family is involved in patient's care. Family states that when patient has an "episode" IE. The patient is manic such as now the patient does not attend to her ADL's and will at times stop eating, is incontinent, wears a brief at home; of both bowel and bladder, and forgets to attend to ADL's. The patient at baseline, is able to have a conversation per sister and attends to ADL's. The patient is sexually inappropriate at baseline and has been per the sister. Bruising is from patients frequent falls. Charo states that the patient is "clumsy" and when she will not get her way she will throw herself on the ground in a tantrum. Patient requires strong boundary setting, and if someone promises her something, she will remember. The sister states she picks scabs, and bleeds. The patient has a scab on her forehead. The sister states the patient has never had a seizure. The sister said she can reiterate to mom as mom understands both Burund[place] and Tanzanian but will require an interperter for mom as her English is limited. Sister can speak [place]. Sister will reiterate to mom.

## 2023-06-10 NOTE — ED NOTES
The patient asked in English, where was the doll with the penis. The pt asked two more times. But when attempted to redirect the patient will then start with a new conversation. Pt will look at back wall and states "he is going to kill me". Pt began yelling out her sister's name saying " please come and get me, because "he is going to kill me". Pt affect can change quickly from smiling, to serious, to scared.         Gianna Morrissey RN  06/10/23 9243

## 2023-06-10 NOTE — ED NOTES
Charge nurse who speaks Cymraes has been attending and communicating with patient. Patient recently has started yelling out at times and according to the charge nurse, patient is seeing someone and feels that they are trying to choke her. Provider is aware of same and medication was ordered at this time.      Jaya Lagos RN  06/10/23 8779

## 2023-06-10 NOTE — ED NOTES
Patient asking for her tooth - Tuvaluan speaking charge nurse in to speak with patient to help encourage her to try to go to sleep.      Nasir Rodriguez RN  06/10/23 3596

## 2023-06-10 NOTE — ED NOTES
Patient was able to talk to her sister at this time. This RN was also able to obtain patients med list at this time.       Tyrone Saba RN  06/10/23 0931

## 2023-06-10 NOTE — ED NOTES
Patient noted to be sleeping with easy non labored respirations. No apparent discomfort or distress noted. Continual observation continues at patient's bedside. Patient not awakened for vital signs as patient had been sedated for sleep and apparently had not slept for 5 days at home as reported by her sister.      Olvin Gallegos RN  06/10/23 0793

## 2023-06-10 NOTE — ED NOTES
Patient is accepted at [facility]  Patient is accepted by Dr. Madden Even      Patient may go to the floor at 1500. Nurse report is to be called to [phone]  prior to patient transfer.

## 2023-06-11 PROBLEM — F79 INTELLECTUAL DISABILITY: Status: ACTIVE | Noted: 2023-06-11

## 2023-06-11 PROBLEM — F29 PSYCHOSIS (HCC): Status: ACTIVE | Noted: 2023-06-11

## 2023-06-11 PROBLEM — Z00.8 MEDICAL CLEARANCE FOR PSYCHIATRIC ADMISSION: Status: ACTIVE | Noted: 2023-06-11

## 2023-06-11 PROBLEM — F39 UNSPECIFIED MOOD (AFFECTIVE) DISORDER (HCC): Status: ACTIVE | Noted: 2023-06-11

## 2023-06-11 LAB
25(OH)D3 SERPL-MCNC: 45.7 NG/ML (ref 30–100)
ALBUMIN SERPL BCP-MCNC: 4.3 G/DL (ref 3.5–5)
ALP SERPL-CCNC: 35 U/L (ref 34–104)
ALT SERPL W P-5'-P-CCNC: 22 U/L (ref 7–52)
ANION GAP SERPL CALCULATED.3IONS-SCNC: 8 MMOL/L (ref 4–13)
AST SERPL W P-5'-P-CCNC: 34 U/L (ref 13–39)
BASOPHILS # BLD AUTO: 0.02 THOUSANDS/ÂΜL (ref 0–0.1)
BASOPHILS NFR BLD AUTO: 0 % (ref 0–1)
BILIRUB SERPL-MCNC: 0.43 MG/DL (ref 0.2–1)
BUN SERPL-MCNC: 10 MG/DL (ref 5–25)
CALCIUM SERPL-MCNC: 9.8 MG/DL (ref 8.4–10.2)
CHLORIDE SERPL-SCNC: 101 MMOL/L (ref 96–108)
CHOLEST SERPL-MCNC: 181 MG/DL
CO2 SERPL-SCNC: 31 MMOL/L (ref 21–32)
CREAT SERPL-MCNC: 0.36 MG/DL (ref 0.6–1.3)
EOSINOPHIL # BLD AUTO: 0.08 THOUSAND/ÂΜL (ref 0–0.61)
EOSINOPHIL NFR BLD AUTO: 2 % (ref 0–6)
ERYTHROCYTE [DISTWIDTH] IN BLOOD BY AUTOMATED COUNT: 13.4 % (ref 11.6–15.1)
GFR SERPL CREATININE-BSD FRML MDRD: 141 ML/MIN/1.73SQ M
GLUCOSE P FAST SERPL-MCNC: 77 MG/DL (ref 65–99)
GLUCOSE SERPL-MCNC: 77 MG/DL (ref 65–140)
HCG SERPL QL: NEGATIVE
HCT VFR BLD AUTO: 43 % (ref 34.8–46.1)
HDLC SERPL-MCNC: 64 MG/DL
HGB BLD-MCNC: 14 G/DL (ref 11.5–15.4)
IMM GRANULOCYTES # BLD AUTO: 0.01 THOUSAND/UL (ref 0–0.2)
IMM GRANULOCYTES NFR BLD AUTO: 0 % (ref 0–2)
LDLC SERPL CALC-MCNC: 96 MG/DL (ref 0–100)
LITHIUM SERPL-SCNC: 0.4 MMOL/L (ref 0.6–1.2)
LYMPHOCYTES # BLD AUTO: 2.03 THOUSANDS/ÂΜL (ref 0.6–4.47)
LYMPHOCYTES NFR BLD AUTO: 39 % (ref 14–44)
MCH RBC QN AUTO: 30 PG (ref 26.8–34.3)
MCHC RBC AUTO-ENTMCNC: 32.6 G/DL (ref 31.4–37.4)
MCV RBC AUTO: 92 FL (ref 82–98)
MONOCYTES # BLD AUTO: 0.53 THOUSAND/ÂΜL (ref 0.17–1.22)
MONOCYTES NFR BLD AUTO: 10 % (ref 4–12)
NEUTROPHILS # BLD AUTO: 2.58 THOUSANDS/ÂΜL (ref 1.85–7.62)
NEUTS SEG NFR BLD AUTO: 49 % (ref 43–75)
NONHDLC SERPL-MCNC: 117 MG/DL
NRBC BLD AUTO-RTO: 0 /100 WBCS
PLATELET # BLD AUTO: 156 THOUSANDS/UL (ref 149–390)
PMV BLD AUTO: 9.6 FL (ref 8.9–12.7)
POTASSIUM SERPL-SCNC: 3.7 MMOL/L (ref 3.5–5.3)
PROT SERPL-MCNC: 6.8 G/DL (ref 6.4–8.4)
RBC # BLD AUTO: 4.67 MILLION/UL (ref 3.81–5.12)
SODIUM SERPL-SCNC: 140 MMOL/L (ref 135–147)
TRIGL SERPL-MCNC: 106 MG/DL
TSH SERPL DL<=0.05 MIU/L-ACNC: 3.27 UIU/ML (ref 0.45–4.5)
VALPROATE SERPL-MCNC: 87 UG/ML (ref 50–100)
VIT B12 SERPL-MCNC: 555 PG/ML (ref 180–914)
WBC # BLD AUTO: 5.25 THOUSAND/UL (ref 4.31–10.16)

## 2023-06-11 PROCEDURE — 80061 LIPID PANEL: CPT

## 2023-06-11 PROCEDURE — 82306 VITAMIN D 25 HYDROXY: CPT

## 2023-06-11 PROCEDURE — 82607 VITAMIN B-12: CPT

## 2023-06-11 PROCEDURE — 85025 COMPLETE CBC W/AUTO DIFF WBC: CPT

## 2023-06-11 PROCEDURE — 84703 CHORIONIC GONADOTROPIN ASSAY: CPT

## 2023-06-11 PROCEDURE — 80053 COMPREHEN METABOLIC PANEL: CPT

## 2023-06-11 PROCEDURE — 99253 IP/OBS CNSLTJ NEW/EST LOW 45: CPT | Performed by: NURSE PRACTITIONER

## 2023-06-11 PROCEDURE — 99233 SBSQ HOSP IP/OBS HIGH 50: CPT | Performed by: PSYCHIATRY & NEUROLOGY

## 2023-06-11 PROCEDURE — 80178 ASSAY OF LITHIUM: CPT

## 2023-06-11 PROCEDURE — 80164 ASSAY DIPROPYLACETIC ACD TOT: CPT

## 2023-06-11 PROCEDURE — 84443 ASSAY THYROID STIM HORMONE: CPT

## 2023-06-11 RX ORDER — LITHIUM CARBONATE 450 MG
450 TABLET, EXTENDED RELEASE ORAL 2 TIMES DAILY
Status: DISCONTINUED | OUTPATIENT
Start: 2023-06-11 | End: 2023-06-16

## 2023-06-11 RX ADMIN — BENZTROPINE MESYLATE 2 MG: 2 TABLET ORAL at 17:48

## 2023-06-11 RX ADMIN — LITHIUM CARBONATE 300 MG: 300 TABLET, EXTENDED RELEASE ORAL at 08:08

## 2023-06-11 RX ADMIN — RISPERIDONE 3 MG: 2 TABLET ORAL at 08:09

## 2023-06-11 RX ADMIN — LITHIUM CARBONATE 450 MG: 450 TABLET, EXTENDED RELEASE ORAL at 17:48

## 2023-06-11 RX ADMIN — DIVALPROEX SODIUM 1000 MG: 500 TABLET, DELAYED RELEASE ORAL at 08:08

## 2023-06-11 RX ADMIN — CLONAZEPAM 0.5 MG: 0.5 TABLET ORAL at 17:48

## 2023-06-11 RX ADMIN — RISPERIDONE 3 MG: 2 TABLET ORAL at 17:48

## 2023-06-11 RX ADMIN — ACETAMINOPHEN 975 MG: 325 TABLET ORAL at 14:02

## 2023-06-11 RX ADMIN — TRAZODONE HYDROCHLORIDE 100 MG: 100 TABLET ORAL at 21:21

## 2023-06-11 RX ADMIN — BENZTROPINE MESYLATE 2 MG: 2 TABLET ORAL at 08:09

## 2023-06-11 RX ADMIN — RISPERIDONE 2 MG: 2 TABLET, ORALLY DISINTEGRATING ORAL at 08:08

## 2023-06-11 RX ADMIN — DIVALPROEX SODIUM 1000 MG: 500 TABLET, DELAYED RELEASE ORAL at 21:21

## 2023-06-11 RX ADMIN — CLONAZEPAM 0.5 MG: 0.5 TABLET ORAL at 08:08

## 2023-06-11 RX ADMIN — HYDROXYZINE HYDROCHLORIDE 100 MG: 50 TABLET, FILM COATED ORAL at 00:42

## 2023-06-11 NOTE — NURSING NOTE
Patient was given 975 mg of tylenol, PO PRN for 9/10 headache. Patient has been coloring in her room. She remains on constant observation. Patient remains impulsive, yelling, disorganized, and requires constant redirection. She has been medication compliant.

## 2023-06-11 NOTE — ASSESSMENT & PLAN NOTE
· Patient has ID  · A special amount of time and consideration will need to be taken with this patient

## 2023-06-11 NOTE — TREATMENT PLAN
TREATMENT PLAN REVIEW - [id number] 28 y.o. 1990 female MRN: [id number]    [address] Room / Bed: [facility] [address] [address] Encounter: [id number]        Admit Date/Time:  6/9/2023  2:08 PM    Treatment Team: Attending Provider: Sahil Bellamy MD; Resident: Josue Hernandez DO; Consulting Physician: Maia Kruger DO; Patient Care Assistant: Melissa Lamb; Registered Nurse: Ava Pretty RN; Patient Care Technician: Erika Daigle;  Patient Care Assistant: Jennifer Christian; Nursing Student: Destinee Leo    Diagnosis: Active Problems:    Medical clearance for psychiatric admission    Intellectual disability      Patient Strengths/Assets: compliant with medication, good past treatment response, negotiates basic needs      Patient Barriers/Limitations: difficulty adapting, intellectual disability, lack of financial means, limited education, poor insight, poor reasoning ability, poor self-care    Short Term Goals: decrease in manic symptoms, decrease in psychotic symptoms, ability to stay free from restraints, improvement in ability to express basic needs, improvement in reasoning ability, improvement in self care, improvement in impulse control, sleep improvement, improvement in appetite, mood stabilization    Long Term Goals: resolution of manic symptoms, stabilization of mood, improved impulse control, no agitation on the unit, no aggressive behavior on the unit, able to express basic needs, acceptance of need for psychiatric medications, acceptance of need for psychiatric treatment, acceptance of need for psychiatric follow up after discharge, adequate self care, adequate sleep, adequate appetite, adequate oral intake, appropriate interaction with peers, stable living arrangements upon discharge    Progress Towards Goals: continue psychiatric medications as prescribed, mood is stabilizing, less agitated, less anxious, more appropriate, reality testing remains poor, still has psychotic symptoms    Recommended Treatment: medication management, patient medication education, group therapy, milieu therapy, continued Behavioral Health psychiatric evaluation/assessment process     Treatment Frequency: daily medication monitoring, group and milieu therapy daily, monitoring through interdisciplinary rounds, monitoring through weekly patient care conferences    Expected Discharge Date: 7 days - 6/18/2023    Discharge Plan: discharge to family care, discharge to home, referral for case management services, referrals as indicated    Treatment Plan Created/Updated By: Toshia Browning DO

## 2023-06-11 NOTE — NURSING NOTE
Patient has been maintained on close proximity level of observation overnight. She went to sleep following prn medication at approx 1.25 am and was awake approx 2 hours later. She has been sporadically laughing loudly and shouting. Redirection needed often.

## 2023-06-11 NOTE — PLAN OF CARE
Problem: Risk for Self Injury/Neglect  Goal: Treatment Goal: Remain safe during length of stay, learn and adopt new coping skills, and be free of self-injurious ideation, impulses and acts at the time of discharge  6/10/2023 2008 by Zenaida Harvey  Outcome: Not Progressing  6/10/2023 1911 by Zenaida Harvey  Outcome: Not Progressing  Goal: Verbalize thoughts and feelings  Description: Interventions:  - Assess and re-assess patient's lethality and potential for self-injury  - Engage patient in 1:1 interactions, daily, for a minimum of 15 minutes  - Encourage patient to express feelings, fears, frustrations, hopes  - Establish rapport/trust with patient   6/10/2023 2008 by Zenaida Harvey  Outcome: Not Progressing  6/10/2023 1911 by Zenaida Harvey  Outcome: Not Progressing  Goal: Refrain from harming self  Description: Interventions:  - Monitor patient closely, per order  - Develop a trusting relationship  - Supervise medication ingestion, monitor effects and side effects   6/10/2023 2008 by Zenaida Harvey  Outcome: Not Progressing  6/10/2023 1911 by Zenaida Harvey  Outcome: Not Progressing  Goal: Attend and participate in unit activities, including therapeutic, recreational, and educational groups  Description: Interventions:  - Provide therapeutic and educational activities daily, encourage attendance and participation, and document same in the medical record  - Obtain collateral information, encourage visitation and family involvement in care   6/10/2023 2008 by Zenaida Harvey  Outcome: Not Progressing  6/10/2023 1911 by Zenaida Harvey  Outcome: Not Progressing  Goal: Recognize maladaptive responses and adopt new coping mechanisms  6/10/2023 2008 by Zenaida Harvey  Outcome: Not Progressing  6/10/2023 1911 by Zenaida Harvey  Outcome: Not Progressing  Goal: Complete daily ADLs, including personal hygiene independently, as able  Description: Interventions:  - Observe, teach, and assist patient with ADLS  - Monitor and promote a balance of rest/activity, with adequate nutrition and elimination  6/10/2023 2008 by Devi Castaneda  Outcome: Not Progressing  6/10/2023 1911 by Devi Castaneda  Outcome: Not Progressing     Problem: Risk for Violence/Aggression Toward Others  Goal: Treatment Goal: Refrain from acts of violence/aggression during length of stay, and demonstrate improved impulse control at the time of discharge  6/10/2023 2008 by Devi Castaneda  Outcome: Not Progressing  6/10/2023 1911 by Devi Castaneda  Outcome: Not Progressing  Goal: Verbalize thoughts and feelings  Description: Interventions:  - Assess and re-assess patient's level of risk, every waking shift  - Engage patient in 1:1 interactions, daily, for a minimum of 15 minutes   - Allow patient to express feelings and frustrations in a safe and non-threatening manner   - Establish rapport/trust with patient   6/10/2023 2008 by Devi Castaneda  Outcome: Not Progressing  6/10/2023 1911 by Devi Castaneda  Outcome: Not Progressing  Goal: Refrain from harming others  6/10/2023 2008 by Devi Castaneda  Outcome: Not Progressing  6/10/2023 1911 by Devi Castaneda  Outcome: Not Progressing  Goal: Refrain from destructive acts on the environment or property  6/10/2023 2008 by Devi Castaneda  Outcome: Not Progressing  6/10/2023 1911 by Devi Castaneda  Outcome: Not Progressing  Goal: Control angry outbursts  Description: Interventions:  - Monitor patient closely, per order  - Ensure early verbal de-escalation  - Monitor prn medication needs  - Set reasonable/therapeutic limits, outline behavioral expectations, and consequences   - Provide a non-threatening milieu, utilizing the least restrictive interventions   6/10/2023 2008 by Devi Castaneda  Outcome: Not Progressing  6/10/2023 1911 by Devi Castaneda  Outcome: Not Progressing  Goal: Attend and participate in unit activities, including therapeutic, recreational, and educational groups  Description: Interventions:  - Provide therapeutic and educational activities daily, encourage attendance and participation, and document same in the medical record   6/10/2023 2008 by Oscar Castro  Outcome: Not Progressing  6/10/2023 1911 by Oscar Castro  Outcome: Not Progressing  Goal: Identify appropriate positive anger management techniques  Description: Interventions:  - Offer anger management and coping skills groups   - Staff will provide positive feedback for appropriate anger control  6/10/2023 2008 by Oscar Castro  Outcome: Not Progressing  6/10/2023 1911 by Oscar Castro  Outcome: Not Progressing     Problem: JENAE  Goal: Will exhibit normal sleep and speech and no impulsivity  Description: INTERVENTIONS:  - Administer medication as ordered  - Set limits on impulsive behavior  - Make attempts to decrease external stimuli as possible  6/10/2023 2008 by Oscar Castro  Outcome: Not Progressing  6/10/2023 1911 by Oscar Castro  Outcome: Not Progressing     Problem: INVOLUNTARY ADMIT  Goal: Will cooperate with staff recommendations and doctor's orders and will demonstrate appropriate behavior  Description: INTERVENTIONS:  - Treat underlying conditions and offer medication as ordered  - Educate regarding involuntary admission procedures and rules  - Utilize positive consistent limit setting strategies to support patient and staff safety  6/10/2023 2008 by Oscar Castro  Outcome: Not Progressing  6/10/2023 1911 by Oscar Castro  Outcome: Not Progressing     Problem: SELF CARE DEFICIT  Goal: Return ADL status to a safe level of function  Description: INTERVENTIONS:  - Administer medication as ordered  - Assess ADL deficits and provide assistive devices as needed  - Obtain PT/OT consults as needed  - Assist and instruct patient to increase activity and self care as tolerated  6/10/2023 2008 by Oscar Castro  Outcome: Not Progressing  6/10/2023 1911 by Oscar Castro  Outcome: Not Progressing

## 2023-06-11 NOTE — ASSESSMENT & PLAN NOTE
· Vital signs stable afebrile patient seen and examined by myself Labs reviewed vitamin B12 and vitamin D still pending  · Patient cleared for admission  · SL IM will sign off please call with any questions or concerns

## 2023-06-11 NOTE — NURSING NOTE
Patient is now communicating in [facility] [employer] as well as [place] and she is following direction most of the time. She remains disorganized and impulsive.

## 2023-06-11 NOTE — H&P
Psychiatric Evaluation - [address] 28 y.o. female MRN: [id number]  Unit/Bed#: [facility] 349-01 Encounter: [id number]    Assessment   Active Problems:    Medical clearance for psychiatric admission    Intellectual disability    Plan    Admission labs evaluated, see detailed labs below. Collaborate with collaterals for baseline assessment and disposition. • See medication changes below  • Order prolactin level  • Awaiting depakote level  • Lithium 0.4  • EKG WNL QTC ~420   • Continue attempting to reach sister. Keep getting voicemail    Promote patient participation in therapeutic milieu, group therapy, and occupational therapy. Encourage Maria Eugenia Humphrey to participate in nonverbal forms of therapy including journaling and art/music therapy. Medical management by medical team.  Continue frequent safety checks and vitals per unit protocol. The following interventions are recommended: behavioral checks every 7 minutes, continued hospitalization on locked unit     Risks, benefits and possible side effects of Medications:   Risks, benefits, and possible side effects of medications explained to patient and patient verbalizes understanding. Counseling / Coordination of Care:     Patient's presentation on admission and proposed treatment plan discussed with treatment team.  Diagnosis, medication changes and treatment plan reviewed with patient. Recent stressors discussed with patient. Events leading to admission reviewed with patient. Importance of medication and treatment compliance reviewed with patient. Chief Complaint: incomprehensible    History of Present Illness     Maria Eugenia Humphrey is a 28 y.o. single overtly appering  female, domiciled with mom, dad, sister, with a PPHx of schizoaffective disorder, bipolar type, intellectual developmental disorder, and PMHx of no known significance who presented to [facility] due to agitation and bizarre behavior.  Patient was admitted to the [facility] Unit on a involuntary 302 commitment basis due to bizarre behavior and aggressive behavior. Please see consultation by Luisana Diaz DO on 6/10/23:  Annalise Fleming is a 28 y.o. female with schizoaffective disorder bipolar type per records, admitted for acute agitation and psychosis. Prior records were reviewed.      Today, patient is disorganized in speech, thought process, actively responding to internal stimuli,  is having a hard time understanding due to disorganization. Patient is talking about various birthdays, has fresh wounds on her knee and her head, labile and crying when asking about calling her sister, talking about the "devil", and stating that the 1:1 staff member is her "man" in a sexual sense. Patient is clearly a poor historian and displays continued need for inpatient psychiatric care.      UDS was positive for THC and Benzos: patient is prescribed klonopin and received ativan in ED at [facility] approximately 4 days ago. There was discussion of IM haldol, none initiated. Per record on 6/8/23 there are the following contact points: Vadim Romero [phone] who states patient's mother, Sol speaks British and will need interpretor.  Charo provides contact number for patient's mother, [phone].         Per records at [address] [place] [address] of [facility] as well as coordination with nursing staff, sister was reached and medications were confirmed as listed:     Klonopin 0.5mg TID for agitation  Trazodone 100mg for sleep  Depakote 1000mg Q12 for mood stabilization  Risperidone 3mg BID for psychotic symptoms  Lithium 300mg BID for mood adjunct  Cogentin 2mg daily for EPS     See ED documentation per Margarette Garcia MD on 6/9/23:       Chief Complaint   Patient presents with   • Psychiatric Evaluation       Pt arrives with sister who states " she has bipolar schizophrenia and ID and I took her to [facility] and they kept her in the ER for 2 day and adjusted some medications and then discharged her yesterday and she is not better, she is hearing voices that someone is going to kill her, she is trying to hurt herself "  pt yelling and kicking at times on arrival         58-year-old female presented emerged department with sister for agitation, manic episode likely. Christiano Duong has intellectual disability as well as schizoaffective disorder. Christiano Duong has not slept in 5 days.  Was taken to outside hospital where medication changes were made and discharged from the emergency department. Jay Casarez has been kicking and screaming, hearing voices, difficult to redirect. Jay Casarez has been paranoid saying hearing voices that someone is going to kill her.  In the ED patient making sexual gestures and having pressured speech and talking in [place]ar[place] quickly.     See note by crisis worker Angelica Carbajal on 6/9/23:  Patient is a 28 yr old female brought to the ED by sister after she was increasingly agitated at home, not sleeping, screaming, and hearing voices believing that some one is choking her and wants to kill her. and seeing people that are under her bed.  She is up all night, very restless, up and down the steps, flushing toilets and turning lights on and off and writing on walls (sometimes with feces). .   Patient was recently seen [facility] (was there 2 days) given medication changes , however it was not effective. In the ED, patient was screaming and agitated,  her sister was not able to calm her. Christiano Duong did require a 36, sister was the petitioner.     Patient was hospitalized several times when she lived in [place] at [address] [place] [address] family then moved to [place], and she was in treatment with [address] [facility] [phone] Leopoldo Butcher). Jay Casarez does not have a local provider, and still does virtual meetings with Elina. (she has been in [facility] for about 1 yr)     See note from [employer] 6/6/23 Doreen Maki during patient ED stay at [facility]:  History of the Present Illness:  Patient is a 28 y.o. female with established diagnosis of Schizophrenia, Mild ID, and Bipolar Affective Disorder referred to ED by family transported via private vehicle by her sister Johanne Cross due to decreased functioning. PES met with Pt and introduced self using a Kinyarwanda interpretor (Coy # [id number]). Pt was unable to actively participate during the interview due to being incoherent and being unable to follow instructions. Pt was tangential, screaming and euphoric during the evaluation. PES discontinued the interview and obtained Pt's clinical information from her sister. Collateral Information Nae Ricardo Haider [phone]  PES met with Pt's sister Olman Garay introduced self identified role in the ED. Charo states Pt is "having a mental crisis". Charo stated Pt appears uncomfortable in her own body, not sleeping at all, when she does sleep Pt wakes up frequently throughout the night. Charo stated Pt has been experiencing these symptoms for the past three months, they brought her into the hospital today looking for help as they are unable to continue the way they have been. The family has been attempting to manage her symptoms at home due to problems that occurred during last AIP in 2011 where Pt was over medicated causing her to have a seizure. Pt is also reportedly writing on walls and playing with her feces. Charo stated Pt experiences symptoms related to depression where she cries and sleeps a lot, self isolates, is quick to anger and will pick a verbal altercation with those around her. Pt requires assistance with completing ADL's. Charo endorses Pt expressing passive SI stating she cannot live like this and wants to die. Pt has never attempted suicide and has not identified a plan.  Charo shared that when Pt is not well she engages in SIB where she picks her skin and hits herself on her head and body, she further experiences anxiety and basilio which is marked with racing, incoherent thoughts, fidgeting, crying, laughing and screaming, and sexually inappropriate behaviors. Charo admits that Pt can be verbally aggressive and will "pick fights" when she does not get her way. She stated Pt "does not currently have the strength to be physically aggressive, but she has been in the past. Charo denies Pt is destructive to property. Charo stated Pt has experienced symptoms related to paranoia and experiences rambling, intrusive thoughts, is incoherent, and, crying, stating "they're going to kill me". Johanne denies Pt expressing HI, she expressed knowing that the Pt experiences psychosis but is unable to say with certainty if the Pt experiences AH or VH     Following summary is provided from [facility] Stay 6/6/23:  Summary: Marylu Ontiveros is a 28 year woman with established diagnoses of intellectual disability and schizoaffective disorder who was brought to the Emergency Department by family who was concerned about poor sleep and erratic behavior. She was agitated upon arriving to the Emergency Department and was medicated with 10 mg of Zyprexa, 15 mg of Versed and 5 mg of Haldol. She was medically cleared and psychiatry was consulted. Discussed care with ED provider who reports no identifiable medical cause for symptoms. Today Darren Avila took oral medications. She is disorganized and restless during interview. Her thoughts were incoherent and at times unable to be translated. Darren Avila spoke about eating dog food and asked  to take his penis out. She was able to be redirected but yells out at times. No suicidal or homicidal ideations are reported. At times she speaks to herself as if internally preoccupied. Contact was made with Evelin's outpatient provider KATIE [phone]. Zeenat reports that Brandt's last visit was 5/1/23.  A plan was made to start Thorazine however it was not covered so Zoloft was increased to 100 mg and Risperdal was increased to 3 mg daily and 3.5 mg at night to target "outbursts." Contact was made with sister Margarita Gray [phone]. Charo reports that Dannie Umanzor has been incoherent at times, rambling, pacing, digging in trash and smearing feces. Charo reports that Dannie Umanzor has not been sleeping, spends much of the day pacing and "looks uncomfortable." Charo does not recall all past medication trials but has been on Zyprexa, Risperdal and Haldol. No history of treatment with Lithium.            On initial evaluation after admission to the inpatient psychiatric unit, Dannie Umanzor was seen alongside  [id number].  unable understand patient, attempted to call sister again. Voicemail. Since being on the unit, patient has required the use of a 1:1 due to boundary difficulties attempting to blow kisses and touch patients as well as staff. Patient required IM Haldol x3 in ED, 100mg atarax overnight on the unit for anxiety, report states she did not sleep overnight and was screaming. Patient keeps talking in [address] [place] [address] [address] about a dog dying, is actively responding to stimuli and is visibly labile on evaluation.        Psychiatric Review Of Systems:  Unable to obtain due to acutity of patient condition      Historical Information     Past Psychiatric History:   See above     Substance Abuse History:  Social History     Tobacco History     Smoking Status  Every Day Smoking Frequency  0.50 packs/day for 10.00 years (5.00 ttl pk-yrs) Smoking Tobacco Type  Cigarettes    Smokeless Tobacco Use  Never          Alcohol History     Alcohol Use Status  Yes Drinks/Week  3 Glasses of wine per week Amount  3.0 standard drinks of alcohol/wk Comment  sips of wine          Drug Use     Drug Use Status  Yes Types  Marijuana Frequency   1 time/week          Sexual Activity     Sexually Active  Not Currently Partners  Female          Activities of Daily Living    Not Asked               Additional Substance Use Detail     Questions Responses    Problems Due to Past Use of Alcohol? No    Problems Due to Past Use of Substances? No    Alcohol Use Frequency 1 or 2 times/week    Cannabis frequency Never used    Comment:  Never used on 6/10/2023     Heroin Frequency Denies use in past 12 months    Cocaine frequency Never used    Comment:  Never used on 6/10/2023     Crack Cocaine Frequency Denies use in past 12 months    Methamphetamine Frequency Denies use in past 12 months    Narcotic Frequency Denies use in past 12 months    Benzodiazepine Frequency Denies use in past 12 months    Amphetamine frequency Denies use in past 12 months    Barbituate Frequency Denies use use in past 12 months    Inhalant frequency Never used    Comment:  Never used on 6/10/2023     Hallucinogen frequency Never used    Comment:  Never used on 6/10/2023     Ecstasy frequency Never used    Comment:  Never used on 6/10/2023     Other drug frequency Never used    Comment:  Never used on 6/10/2023     Opiate frequency Denies use in past 12 months    Last reviewed by Devi Castaneda on 6/10/2023        I am unable to assess the patient for substance use within the past 12 months as they are unable or unwilling to answer      Family Psychiatric History:   Unable to obtain, see above    Social History:  Please see my consult from yesterday      Past Medical History:   Diagnosis Date   • Psychiatric disorder        History reviewed. No pertinent surgical history. Medical Review Of Systems:  Pertinent items are noted in HPI.     Meds/Allergies   all current active meds have been reviewed and current meds:   Current Facility-Administered Medications   Medication Dose Route Frequency   • acetaminophen (TYLENOL) tablet 650 mg  650 mg Oral Q6H PRN   • acetaminophen (TYLENOL) tablet 650 mg  650 mg Oral Q4H PRN   • acetaminophen (TYLENOL) tablet 975 mg  975 mg Oral Q6H PRN   • aluminum-magnesium hydroxide-simethicone (MYLANTA) oral suspension 30 mL  30 mL Oral Q4H PRN   • haloperidol lactate (HALDOL) injection 2.5 mg  2.5 mg Intramuscular Q4H PRN Max 6/day    And   • LORazepam (ATIVAN) injection 1 mg  1 mg Intramuscular Q4H PRN Max 6/day    And   • benztropine (COGENTIN) injection 0.5 mg  0.5 mg Intramuscular Q4H PRN Max 6/day   • haloperidol lactate (HALDOL) injection 5 mg  5 mg Intramuscular Q4H PRN Max 4/day    And   • LORazepam (ATIVAN) injection 2 mg  2 mg Intramuscular Q4H PRN Max 4/day    And   • benztropine (COGENTIN) injection 1 mg  1 mg Intramuscular Q4H PRN Max 4/day   • benztropine (COGENTIN) injection 1 mg  1 mg Intramuscular Q4H PRN Max 6/day   • benztropine (COGENTIN) tablet 1 mg  1 mg Oral BID PRN   • benztropine (COGENTIN) tablet 1 mg  1 mg Oral Q4H PRN Max 6/day   • benztropine (COGENTIN) tablet 2 mg  2 mg Oral BID   • clonazePAM (KlonoPIN) tablet 0.5 mg  0.5 mg Oral BID   • clonazePAM (KlonoPIN) tablet 0.5 mg  0.5 mg Oral BID PRN   • hydrOXYzine HCL (ATARAX) tablet 50 mg  50 mg Oral Q6H PRN Max 4/day    Or   • diphenhydrAMINE (BENADRYL) injection 50 mg  50 mg Intramuscular Q6H PRN   • divalproex sodium (DEPAKOTE) DR tablet 1,000 mg  1,000 mg Oral Q12H COLT   • hydrOXYzine HCL (ATARAX) tablet 100 mg  100 mg Oral Q6H PRN Max 4/day    Or   • LORazepam (ATIVAN) injection 2 mg  2 mg Intramuscular Q6H PRN   • hydrOXYzine HCL (ATARAX) tablet 25 mg  25 mg Oral Q6H PRN Max 4/day   • lithium carbonate (LITHOBID) CR tablet 450 mg  450 mg Oral BID   • melatonin tablet 3 mg  3 mg Oral HS PRN   • polyethylene glycol (MIRALAX) packet 17 g  17 g Oral Daily PRN   • propranolol (INDERAL) tablet 10 mg  10 mg Oral Q8H PRN   • risperiDONE (RisperDAL M-TAB) disintegrating tablet 0.5 mg  0.5 mg Oral BID PRN   • risperiDONE (RisperDAL) tablet 0.5 mg  0.5 mg Oral Q4H PRN Max 6/day   • risperiDONE (RisperDAL) tablet 1 mg  1 mg Oral Q4H PRN Max 3/day   • risperiDONE (RisperDAL) tablet 2 mg  2 mg Oral Q4H PRN Max 3/day   • risperiDONE (RisperDAL) tablet 3 mg  3 mg Oral BID   • senna-docusate sodium (SENOKOT S) 8.6-50 mg per tablet 1 tablet  1 tablet Oral Daily PRN   • traZODone (DESYREL) tablet 100 mg  100 mg Oral HS     No Known Allergies    Objective   Vital signs in last 24 hours:  Temp:  [97.1 °F (36.2 °C)-97.8 °F (36.6 °C)] 97.8 °F (36.6 °C)  HR:  [] 108  Resp:  [15-20] 15  BP: (105-133)/(67-91) 133/84    No intake or output data in the 24 hours ending 06/11/23 0857    Mental Status Evaluation:  Appearance:  dressed appropriately, marginal hygiene, looks stated age, overtly appearing  female, intermitent eye contact   Behavior:  uncooperative   Speech:  hypertalkative, increased latency of response, disorganized   Mood:  "remi"   Affect:  labile   Language:  WNL   Thought Process:  disorganized, incoherent   Associations: loose associations   Thought Content:  Alevism preoccupation, preoccupation with death   Perceptual Disturbances: appears responding to internal stimuli   Risk Potential: Suicidal ideation - None at present  Homicidal ideation - None at present  Potential for aggression - Yes, due to agitation   Sensorium:  non-verbal   Memory:  recent and remote memory: unable to assess due to lack of cooperation   Consciousness:  alert and awake   Attention/Concentration: attention span and concentration appear shorter than expected for age   Intellect: below average   Fund of Knowledge: awareness of current events: no   Insight:  poor   Judgment: poor   Muscle Strength Muscle Tone: normal  normal   Gait/Station: normal gait/station   Motor Activity: no abnormal movements     Laboratory Results: I have personally reviewed all pertinent laboratory/tests results    Results from the past 24 hours:   Recent Results (from the past 24 hour(s))   Lithium level    Collection Time: 06/11/23  4:58 AM   Result Value Ref Range    Lithium Lvl 0.4 (L) 0.6 - 1.2 mmol/L   Valproic acid level, total    Collection Time: 06/11/23  4:58 AM   Result Value Ref Range    Valproic Acid, Total 87 50 - 100 ug/mL   TSH, 3rd generation with Free T4 reflex    Collection Time: 06/11/23  4:58 AM   Result Value Ref Range    TSH 3RD GENERATON 3.269 0.450 - 4.500 uIU/mL   Vitamin D 25 hydroxy    Collection Time: 06/11/23  4:58 AM   Result Value Ref Range    Vit D, 25-Hydroxy 45.7 30.0 - 100.0 ng/mL   hCG, serum, qualitative    Collection Time: 06/11/23  4:58 AM   Result Value Ref Range    Preg, Serum Negative Negative   Comprehensive metabolic panel    Collection Time: 06/11/23  4:58 AM   Result Value Ref Range    Sodium 140 135 - 147 mmol/L    Potassium 3.7 3.5 - 5.3 mmol/L    Chloride 101 96 - 108 mmol/L    CO2 31 21 - 32 mmol/L    ANION GAP 8 4 - 13 mmol/L    BUN 10 5 - 25 mg/dL    Creatinine 0.36 (L) 0.60 - 1.30 mg/dL    Glucose 77 65 - 140 mg/dL    Glucose, Fasting 77 65 - 99 mg/dL    Calcium 9.8 8.4 - 10.2 mg/dL    AST 34 13 - 39 U/L    ALT 22 7 - 52 U/L    Alkaline Phosphatase 35 34 - 104 U/L    Total Protein 6.8 6.4 - 8.4 g/dL    Albumin 4.3 3.5 - 5.0 g/dL    Total Bilirubin 0.43 0.20 - 1.00 mg/dL    eGFR 141 ml/min/1.73sq m   CBC and differential    Collection Time: 06/11/23  4:58 AM   Result Value Ref Range    WBC 5.25 4.31 - 10.16 Thousand/uL    RBC 4.67 3.81 - 5.12 Million/uL    Hemoglobin 14.0 11.5 - 15.4 g/dL    Hematocrit 43.0 34.8 - 46.1 %    MCV 92 82 - 98 fL    MCH 30.0 26.8 - 34.3 pg    MCHC 32.6 31.4 - 37.4 g/dL    RDW 13.4 11.6 - 15.1 %    MPV 9.6 8.9 - 12.7 fL    Platelets 187 202 - 509 Thousands/uL    nRBC 0 /100 WBCs    Neutrophils Relative 49 43 - 75 %    Immat GRANS % 0 0 - 2 %    Lymphocytes Relative 39 14 - 44 %    Monocytes Relative 10 4 - 12 %    Eosinophils Relative 2 0 - 6 %    Basophils Relative 0 0 - 1 %    Neutrophils Absolute 2.58 1.85 - 7.62 Thousands/µL    Immature Grans Absolute 0.01 0.00 - 0.20 Thousand/uL    Lymphocytes Absolute 2.03 0.60 - 4.47 Thousands/µL    Monocytes Absolute 0.53 0.17 - 1.22 Thousand/µL    Eosinophils Absolute 0.08 0.00 - 0.61 Thousand/µL Basophils Absolute 0.02 0.00 - 0.10 Thousands/µL   Lipid panel    Collection Time: 06/11/23  4:58 AM   Result Value Ref Range    Cholesterol 181 See Comment mg/dL    Triglycerides 106 See Comment mg/dL    HDL, Direct 64 >=50 mg/dL    LDL Calculated 96 0 - 100 mg/dL    Non-HDL-Chol (CHOL-HDL) 117 mg/dl     Most Recent Labs:   Lab Results   Component Value Date    ALB 4.3 06/11/2023    ALKPHOS 35 06/11/2023    ALT 22 06/11/2023    AST 34 06/11/2023    BUN 10 06/11/2023    CALCIUM 9.8 06/11/2023    CHOLESTEROL 181 06/11/2023     06/11/2023    CO2 31 06/11/2023    CREATININE 0.36 (L) 06/11/2023     03/20/2023    GLUC 77 06/11/2023    HCT 43.0 06/11/2023    HDL 64 06/11/2023    HGB 14.0 06/11/2023    HGBA1C 5.1 03/20/2023    K 3.7 06/11/2023    LDLCALC 96 06/11/2023    LITHIUM 0.4 (L) 06/11/2023    NEUTROABS 2.58 06/11/2023    NONHDLC 117 06/11/2023     06/11/2023    PREGSERUM Negative 06/11/2023    RBC 4.67 06/11/2023    RDW 13.4 06/11/2023    SODIUM 140 06/11/2023    TBILI 0.43 06/11/2023    TP 6.8 06/11/2023    TRIG 106 06/11/2023    [id number] 3.269 06/11/2023    VALPROICTOT 87 06/11/2023    WBC 5.25 06/11/2023       Imaging Studies: No results found.     Code Status: Level 1 - Full Code  Advance Directive and Living Will: <no information>    Suicide/Homicide Risk Assessment:  Risk of Harm to Self:   • The following ratings are based on assessment at the time of the interview  • Nursing Suicide Risk Assessment Last 24 hours: C-SSRS Risk (Since Last Contact)  • Calculated C-SSRS Risk Score (Since Last Contact): No Risk Indicated  • Demographic risk factors include: none  • Historical Risk Factors include: chronic psychiatric problems, chronic mood disorder, chronic psychotic symptoms  • Current Specific Risk Factors include: current psychotic symptoms, impaired cognition, poor self care, poor reasoning, poor impulse control  • Protective Factors: no current suicidal ideation, taking medications as ordered on the unit  • Weapons/Firearms: none. The following steps have been taken to ensure weapons are properly secured: not applicable  • Based on today's assessment, Deshaun Trejo presents the following risk of harm to self: high    Risk of Harm to Others:  • The following ratings are based on assessment at the time of the interview  • Nursing Homicide Risk Assessment: Violence Risk to Others: Denies within past 6 months  • Demographic Risk Factors include: under age 36, lower intelligence . • Historical Risk Factors include: history of aggressive behavior. • Current Specific Risk Factors include: poor insight, social difficulties  • Protective Factors: no current homicidal ideation, able to communicate with staff on the unit  • Based on today's assessment, Deshaun Trejo presents the following risk of harm to others: moderate      Treatment Plan:     Planned Treatment and Medication Changes: All current active medications have been reviewed  Encourage group therapy, milieu therapy and occupational therapy  Behavioral Health checks every 7 minutes  Increase lithium due to 0.4 level, and clinical lability. Increase to 450mg BID, repeat level in 5 days  Continue risperidone at 3mg BID for psychosis, check prolactin level, consider change due to seeming lack of efficacy  Continue Depakote 1000 mg BID for mood  Continue Klonopin 0.5mg BID for anxiety  Continue cogentin 2mg BID for EPS    Inpatient Psychiatric Certification:     Certification: Based upon physical, mental and social evaluations, I certify that inpatient psychiatric services are medically necessary for this patient for a duration of 7 midnights for the treatment of <principal problem not specified>    Available alternative community resources do not meet the patient's mental health care needs. I further attest that an established written individualized plan of care has been implemented and is outlined in the patient's medical records.     Meri Fisher DO 06/11/23  Psychiatry Resident, PGY-II    This note was completed in part utilizing Dragon dictation Software. Grammatical, translation, syntax errors, random word insertions, spelling mistakes, and incomplete sentences may be an occasional consequence of this system secondary to software limitations with voice recognition, ambient noise, and hardware issues. If you have any questions or concerns about the content, text, or information contained within the body of this dictation, please contact the provider for clarification.

## 2023-06-11 NOTE — NURSING NOTE
Patient has been screaming loudly from her room when she hears a roommate close by being loud- she is easily stimulated and agitated. She has received scheduled Risperdal and Trazadone.

## 2023-06-11 NOTE — CONSULTS
[address] [place] [address]  Consult  Name: Enrrique Angelo 28 y.o. female I MRN: [id number]  Unit/Bed#: [facility] 349-01 I Date of Admission: 6/9/2023   Date of Service: 6/11/2023 I Hospital Day: 1    Inpatient consult for Medical Clearance for [address] [facility] patient  Consult performed by: PERLA Logan  Consult ordered by: Rachana Cochran, DO          Assessment/Plan   Medical clearance for psychiatric admission  Assessment & Plan  · Vital signs stable afebrile patient seen and examined by myself Labs reviewed vitamin B12 and vitamin D still pending  · Patient cleared for admission  · [facility] will sign off please call with any questions or concerns    Intellectual disability  Assessment & Plan  · Patient has ID  · A special amount of time and consideration will need to be taken with this patient         Counseling / Coordination of Care Time: 30 minutes. Greater than 50% of total time spent on patient counseling and coordination of care. Collaboration of Care: Were Recommendations Directly Discussed with Primary Treatment Team? - No     History of Present Illness:    Enrrique Angelo is a 28 y.o. female who is originally admitted to the psychiatry service due to agitation, manic episode. We are consulted for medical clearance for admission to [address] [place] [address] and treatment of underlying psychiatric illness. On 6 9 patient presents to [address] [place] [address]. Patient has intellectual disability as well as schizoaffective disorder. Patient has not slept in 5 days. Was taken to outside hospital where medication changes were made and discharged from the emergency department. She has been kicking and screaming, hearing voices, difficult to redirect. She has been paranoid saying hearing voices that someone is going to kill her. In the ED patient making sexual gestures and having pressured speech and talking in [place]ic quickly.   On evaluation patient denies any physical complaints such as headache, dizziness, chest pain, shortness of breath, nausea/vomiting/diarrhea at this time. Inpatient admission for psychiatric evaluation. Labs ECG were reviewed. Review of Systems: pt very poor historian    Review of Systems   Constitutional: Negative for chills and fever. HENT: Negative for ear pain and sore throat. Eyes: Negative for pain and visual disturbance. Respiratory: Negative for cough and shortness of breath. Cardiovascular: Negative for chest pain and palpitations. Gastrointestinal: Negative for abdominal pain and vomiting. Genitourinary: Negative for dysuria and hematuria. Musculoskeletal: Negative for arthralgias and back pain. Skin: Negative for color change and rash. Neurological: Negative for seizures and syncope. All other systems reviewed and are negative. Past Medical and Surgical History:     Past Medical History:   Diagnosis Date   • Psychiatric disorder        History reviewed. No pertinent surgical history.     Meds/Allergies:    all medications and allergies reviewed    Allergies: No Known Allergies    Social History:     Marital Status: Single    Substance Use History:   Social History     Substance and Sexual Activity   Alcohol Use Yes   • Alcohol/week: 3.0 standard drinks of alcohol   • Types: 3 Glasses of wine per week    Comment: sips of wine     Social History     Tobacco Use   Smoking Status Every Day   • Packs/day: 0.50   • Years: 10.00   • Total pack years: 5.00   • Types: Cigarettes   Smokeless Tobacco Never     Social History     Substance and Sexual Activity   Drug Use Yes   • Frequency: 1.0 times per week   • Types: Marijuana       Family History:    non-contributory    Physical Exam:     Vitals:   Blood Pressure: 133/84 (06/11/23 0801)  Pulse: (!) 108 (06/11/23 0801)  Temperature: 97.8 °F (36.6 °C) (06/11/23 0801)  Temp Source: Temporal (06/11/23 0801)  Respirations: 15 (06/11/23 0801)  Height: 5' 4.5" (163.8 cm) (06/10/23 1534)  Weight - Scale: 54.3 kg (119 lb 9.6 oz) (06/10/23 1534)  SpO2: 96 % (06/11/23 0801)    Physical Exam  Constitutional:       General: She is not in acute distress. Appearance: Normal appearance. She is not ill-appearing. HENT:      Head: Normocephalic and atraumatic. Nose: Nose normal.      Mouth/Throat:      Mouth: Mucous membranes are moist.   Eyes:      Extraocular Movements: Extraocular movements intact. Cardiovascular:      Rate and Rhythm: Normal rate and regular rhythm. Heart sounds: Normal heart sounds. Pulmonary:      Breath sounds: Normal breath sounds. No wheezing. Abdominal:      General: Abdomen is flat. Bowel sounds are normal.      Palpations: Abdomen is soft. Skin:     General: Skin is warm and dry. Neurological:      Mental Status: She is alert. Comments: She is pleasant and ccoperative         Additional Data:     Lab Results: I have personally reviewed pertinent reports. Results from last 7 days   Lab Units 06/11/23  0458   EOS PCT % 2   HEMATOCRIT % 43.0   HEMOGLOBIN g/dL 14.0   LYMPHS PCT % 39   MONOS PCT % 10   NEUTROS PCT % 49   PLATELETS Thousands/uL 156   WBC Thousand/uL 5.25     Results from last 7 days   Lab Units 06/11/23  0458   ANION GAP mmol/L 8   ALBUMIN g/dL 4.3   ALK PHOS U/L 35   ALT U/L 22   AST U/L 34   BUN mg/dL 10   CALCIUM mg/dL 9.8   CHLORIDE mmol/L 101   CO2 mmol/L 31   CREATININE mg/dL 0.36*   GLUCOSE RANDOM mg/dL 77   POTASSIUM mmol/L 3.7   SODIUM mmol/L 140   TOTAL BILIRUBIN mg/dL 0.43             Lab Results   Component Value Date/Time    HGBA1C 5.1 03/20/2023 09:10 AM    HGBA1C 5.1 08/24/2022 12:20 PM    HGBA1C 5.3 12/08/2021 10:17 AM           EKG, Pathology, and Other Studies Reviewed on Admission:   · EKG 6/10/2023 ventricular rate 74 QTc 421 normal sinus rhythm normal EKG no new acute EKG findings    ** Please Note: This note has been constructed using a voice recognition system.  **

## 2023-06-12 LAB — PROLACTIN SERPL-MCNC: 22.66 NG/ML

## 2023-06-12 PROCEDURE — 84146 ASSAY OF PROLACTIN: CPT | Performed by: PSYCHIATRY & NEUROLOGY

## 2023-06-12 PROCEDURE — 99232 SBSQ HOSP IP/OBS MODERATE 35: CPT | Performed by: PSYCHIATRY & NEUROLOGY

## 2023-06-12 RX ORDER — BENZTROPINE MESYLATE 1 MG/1
1 TABLET ORAL 2 TIMES DAILY
Status: DISCONTINUED | OUTPATIENT
Start: 2023-06-12 | End: 2023-06-24

## 2023-06-12 RX ADMIN — HYDROXYZINE HYDROCHLORIDE 100 MG: 50 TABLET, FILM COATED ORAL at 21:36

## 2023-06-12 RX ADMIN — RISPERIDONE 1 MG: 1 TABLET ORAL at 15:34

## 2023-06-12 RX ADMIN — BENZTROPINE MESYLATE 1 MG: 1 TABLET ORAL at 17:25

## 2023-06-12 RX ADMIN — DIVALPROEX SODIUM 1000 MG: 500 TABLET, DELAYED RELEASE ORAL at 08:25

## 2023-06-12 RX ADMIN — LITHIUM CARBONATE 450 MG: 450 TABLET, EXTENDED RELEASE ORAL at 17:23

## 2023-06-12 RX ADMIN — CLONAZEPAM 0.5 MG: 0.5 TABLET ORAL at 17:22

## 2023-06-12 RX ADMIN — RISPERIDONE 1 MG: 1 TABLET ORAL at 06:11

## 2023-06-12 RX ADMIN — BENZTROPINE MESYLATE 2 MG: 2 TABLET ORAL at 08:27

## 2023-06-12 RX ADMIN — CLONAZEPAM 0.5 MG: 0.5 TABLET ORAL at 08:26

## 2023-06-12 RX ADMIN — SENNOSIDES AND DOCUSATE SODIUM 1 TABLET: 50; 8.6 TABLET ORAL at 22:29

## 2023-06-12 RX ADMIN — LITHIUM CARBONATE 450 MG: 450 TABLET, EXTENDED RELEASE ORAL at 08:25

## 2023-06-12 RX ADMIN — RISPERIDONE 3 MG: 2 TABLET ORAL at 17:22

## 2023-06-12 RX ADMIN — HYDROXYZINE HYDROCHLORIDE 100 MG: 50 TABLET, FILM COATED ORAL at 06:11

## 2023-06-12 RX ADMIN — ACETAMINOPHEN 975 MG: 325 TABLET ORAL at 15:33

## 2023-06-12 RX ADMIN — TRAZODONE HYDROCHLORIDE 100 MG: 100 TABLET ORAL at 21:37

## 2023-06-12 RX ADMIN — DIVALPROEX SODIUM 1000 MG: 500 TABLET, DELAYED RELEASE ORAL at 21:35

## 2023-06-12 RX ADMIN — Medication 3 MG: at 22:29

## 2023-06-12 RX ADMIN — RISPERIDONE 3 MG: 2 TABLET ORAL at 08:25

## 2023-06-12 NOTE — NURSING NOTE
Pt woke up multiple times throughout the night talking to themself and putting the light on. Pt redirected by assigned staff member observing them. Pt remained on continual observation throughout the night without any incidents.

## 2023-06-12 NOTE — PROGRESS NOTES
06/12/23 0843   Team Meeting   Meeting Type Daily Rounds   Team Members Present   Team Members Present Physician;Nurse;   Physician Team Member [facility]   Care Management Team Member Cathi   Patient/Family Present   Patient Present No   Patient's Family Present No   Readmit score 18.  Pt new 302 admission with hx of ID brought in by sister due to not tending to ADLs, increased agitation, AH, disorganization, tangential.

## 2023-06-12 NOTE — PROGRESS NOTES
Most information obtained through chart due to pt's disorganization. 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 CM will contact pt's sister for further information    Patient Intake   Living Arrangement With parents and sister   Can patient return home Yes   Address to discharge to [address] [place] [address], [address] [place] [address]   Patient's Telephone Number No phone   Access to firearms TBD   Type of work TBD   School grade/year TBD   Marital Status/Children Pt currently single, never .  Unknown if pt has children   Admission Status    Status of admission [address] [place] [address]   Patient History   Stressor/Trigger Decompensation   Treatment History Hx of inpatient psych admission in [facility] at [address]  Johnna Araiza [phone])   Suicide Attempts Unknown   Family History of Mental Health Unknown   ACT/ICM None   Legal Issues Unknown   Substance Abuse UDS + Benzos (prescribed and given in ED, [facility]   Trauma/Losses Unknown       Releases of Information

## 2023-06-12 NOTE — PLAN OF CARE
Problem: Risk for Self Injury/Neglect  Goal: Treatment Goal: Remain safe during length of stay, learn and adopt new coping skills, and be free of self-injurious ideation, impulses and acts at the time of discharge  Outcome: Progressing  Goal: Verbalize thoughts and feelings  Description: Interventions:  - Assess and re-assess patient's lethality and potential for self-injury  - Engage patient in 1:1 interactions, daily, for a minimum of 15 minutes  - Encourage patient to express feelings, fears, frustrations, hopes  - Establish rapport/trust with patient   Outcome: Progressing  Goal: Refrain from harming self  Description: Interventions:  - Monitor patient closely, per order  - Develop a trusting relationship  - Supervise medication ingestion, monitor effects and side effects   Outcome: Progressing  Goal: Attend and participate in unit activities, including therapeutic, recreational, and educational groups  Description: Interventions:  - Provide therapeutic and educational activities daily, encourage attendance and participation, and document same in the medical record  - Obtain collateral information, encourage visitation and family involvement in care   Outcome: Progressing  Goal: Recognize maladaptive responses and adopt new coping mechanisms  Outcome: Progressing  Goal: Complete daily ADLs, including personal hygiene independently, as able  Description: Interventions:  - Observe, teach, and assist patient with ADLS  - Monitor and promote a balance of rest/activity, with adequate nutrition and elimination  Outcome: Progressing     Problem: Risk for Violence/Aggression Toward Others  Goal: Treatment Goal: Refrain from acts of violence/aggression during length of stay, and demonstrate improved impulse control at the time of discharge  Outcome: Progressing  Goal: Verbalize thoughts and feelings  Description: Interventions:  - Assess and re-assess patient's level of risk, every waking shift  - Engage patient in 1:1 interactions, daily, for a minimum of 15 minutes   - Allow patient to express feelings and frustrations in a safe and non-threatening manner   - Establish rapport/trust with patient   Outcome: Progressing  Goal: Refrain from harming others  Outcome: Progressing  Goal: Refrain from destructive acts on the environment or property  Outcome: Progressing  Goal: Control angry outbursts  Description: Interventions:  - Monitor patient closely, per order  - Ensure early verbal de-escalation  - Monitor prn medication needs  - Set reasonable/therapeutic limits, outline behavioral expectations, and consequences   - Provide a non-threatening milieu, utilizing the least restrictive interventions   Outcome: Progressing  Goal: Attend and participate in unit activities, including therapeutic, recreational, and educational groups  Description: Interventions:  - Provide therapeutic and educational activities daily, encourage attendance and participation, and document same in the medical record   Outcome: Progressing  Goal: Identify appropriate positive anger management techniques  Description: Interventions:  - Offer anger management and coping skills groups   - Staff will provide positive feedback for appropriate anger control  Outcome: Progressing     Problem: JENAE  Goal: Will exhibit normal sleep and speech and no impulsivity  Description: INTERVENTIONS:  - Administer medication as ordered  - Set limits on impulsive behavior  - Make attempts to decrease external stimuli as possible  Outcome: Progressing     Problem: INVOLUNTARY ADMIT  Goal: Will cooperate with staff recommendations and doctor's orders and will demonstrate appropriate behavior  Description: INTERVENTIONS:  - Treat underlying conditions and offer medication as ordered  - Educate regarding involuntary admission procedures and rules  - Utilize positive consistent limit setting strategies to support patient and staff safety  Outcome: Progressing     Problem: SELF CARE DEFICIT  Goal: Return ADL status to a safe level of function  Description: INTERVENTIONS:  - Administer medication as ordered  - Assess ADL deficits and provide assistive devices as needed  - Obtain PT/OT consults as needed  - Assist and instruct patient to increase activity and self care as tolerated  Outcome: Progressing

## 2023-06-12 NOTE — NURSING NOTE
Pt no longer shouting in the hallway listening to music in bedroom. Risperdal intervention effective for moderate agitation. Pt says tylenol effective, no longer has headache.

## 2023-06-12 NOTE — NURSING NOTE
Patient attempted to elope. She ran off her 1-1 and away from 1-1 staff despite calling her to stop. She headed for a door she had seen a staff member leave by and when she reached the door she was pushing on it. She wants to go home. She was escorted back to her room by 1-1 staff. She was offered and accepted Risperdal 1mg po prn and Atarax 100mgs po prn.
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
## 2023-06-12 NOTE — NURSING NOTE
Pt remains on 1:1 for behaviors. Medication compliant with lots of encouragement. Pt very focused on going home. Remains sexually preoccupied with male staff. Yelling out loudly throughout the morning. Running from 1:1 staff at times. Denies any unmet needs or complaints.

## 2023-06-12 NOTE — PLAN OF CARE
303 completed and faxed to [facility]. 303 hearing will be tomorrow at 0800.
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
## 2023-06-12 NOTE — NURSING NOTE
Patient continued impulsive and calling out and screaming frequently. She has not however tried to leave her room or leave the 1-1.

## 2023-06-12 NOTE — PROGRESS NOTES
06/12/23 1536   Team Meeting   Meeting Type Tx Team Meeting   Team Members Present   Team Members Present Physician;Nurse;   Physician Team Member [id number] [facility] Team Member [facility] Team Member Cathi   Patient/Family Present   Patient Present Yes   Patient's Family Present No     Pt seen for tx team meeting. Pt educated on med management, case management and group therapy. Tx plan reviewed and signed.

## 2023-06-12 NOTE — NURSING NOTE
Pt given 1mg of risperdal for moderate agitation at 1534. Pt is confused shouting out in the andrew. 975mg ofTylenol also given for severe headache.

## 2023-06-13 PROCEDURE — 99232 SBSQ HOSP IP/OBS MODERATE 35: CPT | Performed by: PSYCHIATRY & NEUROLOGY

## 2023-06-13 RX ORDER — CLONAZEPAM 0.5 MG/1
0.5 TABLET ORAL DAILY
Status: COMPLETED | OUTPATIENT
Start: 2023-06-14 | End: 2023-06-16

## 2023-06-13 RX ORDER — CLONIDINE HYDROCHLORIDE 0.1 MG/1
0.1 TABLET ORAL EVERY 12 HOURS SCHEDULED
Status: DISCONTINUED | OUTPATIENT
Start: 2023-06-13 | End: 2023-07-18

## 2023-06-13 RX ORDER — AMOXICILLIN 250 MG
1 CAPSULE ORAL 2 TIMES DAILY
Status: DISCONTINUED | OUTPATIENT
Start: 2023-06-13 | End: 2023-07-10

## 2023-06-13 RX ADMIN — SENNOSIDES AND DOCUSATE SODIUM 1 TABLET: 50; 8.6 TABLET ORAL at 17:20

## 2023-06-13 RX ADMIN — DIVALPROEX SODIUM 1000 MG: 500 TABLET, DELAYED RELEASE ORAL at 09:26

## 2023-06-13 RX ADMIN — POLYETHYLENE GLYCOL 3350 17 G: 17 POWDER, FOR SOLUTION ORAL at 09:24

## 2023-06-13 RX ADMIN — BENZTROPINE MESYLATE 1 MG: 1 INJECTION INTRAMUSCULAR; INTRAVENOUS at 00:38

## 2023-06-13 RX ADMIN — LITHIUM CARBONATE 450 MG: 450 TABLET, EXTENDED RELEASE ORAL at 17:19

## 2023-06-13 RX ADMIN — BENZTROPINE MESYLATE 1 MG: 1 TABLET ORAL at 09:26

## 2023-06-13 RX ADMIN — LITHIUM CARBONATE 450 MG: 450 TABLET, EXTENDED RELEASE ORAL at 09:26

## 2023-06-13 RX ADMIN — CLONAZEPAM 0.5 MG: 0.5 TABLET ORAL at 09:26

## 2023-06-13 RX ADMIN — TRAZODONE HYDROCHLORIDE 100 MG: 100 TABLET ORAL at 21:25

## 2023-06-13 RX ADMIN — BENZTROPINE MESYLATE 1 MG: 1 TABLET ORAL at 17:20

## 2023-06-13 RX ADMIN — RISPERIDONE 3 MG: 2 TABLET ORAL at 09:26

## 2023-06-13 RX ADMIN — HALOPERIDOL LACTATE 5 MG: 5 INJECTION, SOLUTION INTRAMUSCULAR at 00:38

## 2023-06-13 RX ADMIN — LORAZEPAM 1 MG: 2 INJECTION INTRAMUSCULAR; INTRAVENOUS at 00:38

## 2023-06-13 RX ADMIN — CLONIDINE HYDROCHLORIDE 0.1 MG: 0.1 TABLET ORAL at 21:27

## 2023-06-13 RX ADMIN — RISPERIDONE 3 MG: 2 TABLET ORAL at 17:20

## 2023-06-13 RX ADMIN — CLONIDINE HYDROCHLORIDE 0.1 MG: 0.1 TABLET ORAL at 12:13

## 2023-06-13 RX ADMIN — DIVALPROEX SODIUM 1000 MG: 500 TABLET, DELAYED RELEASE ORAL at 21:25

## 2023-06-13 NOTE — NURSING NOTE
1:1 observations maintained. Patient remained visible on the unit throughout the evening. While on the phone, patient tearful, anxious. Atarax 100 mg po prn at 2136. Patient HS medication compliant. Patient reported constipation senokot prn given. Melatonin prn given. At 2230, patient continues to shout periodically, however redirectable.

## 2023-06-13 NOTE — NURSING NOTE
Pt remains on continual observation for safety and redirection. Pt is visible on the unit, appears child-like, speaking Green[place]ic only. Per pt's observer, content of speech is predominantly nonsensical and difficult to follow for a native speaker, but will often reference "soap opera characters" and used to express basic needs. Pt has also been reported to make sexually inappropriate comments, touching, and blowing kisses at people on the unit. directed at staff member, and   This writer, with assistance of [place] speaking staff member, is unable get objective confirmation from the pt on whether or not she is experiencing hallucinations, due to the disorganized nature of her speech, although derealization seems to be present, as the pt enjoys making comparisons between the "soap opera characters" she talks about and folks she meets on the unit. Pt has been medication and meal compliant, and is dressed in her own personal attire. Pt appears to be in good spirits at most times, but was also observed grimacing while making strange noises as she spoke on the phone, and will occasionally make short, loud, vocal outbursts while moving about the unit.

## 2023-06-13 NOTE — PROGRESS NOTES
06/13/23 0844   Team Meeting   Meeting Type Daily Rounds   Team Members Present   Team Members Present Physician;Nurse;   Physician Team Member [id number] [facility]   Care Management Team Member Cathi   Patient/Family Present   Patient Present No   Patient's Family Present No     1:1. PRN risperdal and atarax last evening due to anxiety, tearfulness, disorganized with some agitation. PRN IM haldol, ativan, cogentin for hitting self in the middle of the night. Med/meal compliant. 303 hearing this morning. Lithium level on Friday. Decrease cogentin. Prolactin level WNL. DC tbd.

## 2023-06-13 NOTE — PLAN OF CARE
Problem: Risk for Self Injury/Neglect  Goal: Refrain from harming self  Description: Interventions:  - Monitor patient closely, per order  - Develop a trusting relationship  - Supervise medication ingestion, monitor effects and side effects   Outcome: Progressing     Problem: Risk for Violence/Aggression Toward Others  Goal: Treatment Goal: Refrain from acts of violence/aggression during length of stay, and demonstrate improved impulse control at the time of discharge  Outcome: Progressing  Goal: Refrain from harming others  Outcome: Progressing  Goal: Refrain from destructive acts on the environment or property  Outcome: Progressing     Problem: JENAE  Goal: Will exhibit normal sleep and speech and no impulsivity  Description: INTERVENTIONS:  - Administer medication as ordered  - Set limits on impulsive behavior  - Make attempts to decrease external stimuli as possible  Outcome: Progressing     Problem: INVOLUNTARY ADMIT  Goal: Will cooperate with staff recommendations and doctor's orders and will demonstrate appropriate behavior  Description: INTERVENTIONS:  - Treat underlying conditions and offer medication as ordered  - Educate regarding involuntary admission procedures and rules  - Utilize positive consistent limit setting strategies to support patient and staff safety  Outcome: Progressing     Problem: Risk for Self Injury/Neglect  Goal: Treatment Goal: Remain safe during length of stay, learn and adopt new coping skills, and be free of self-injurious ideation, impulses and acts at the time of discharge  Outcome: Not Progressing  Goal: Verbalize thoughts and feelings  Description: Interventions:  - Assess and re-assess patient's lethality and potential for self-injury  - Engage patient in 1:1 interactions, daily, for a minimum of 15 minutes  - Encourage patient to express feelings, fears, frustrations, hopes  - Establish rapport/trust with patient   Outcome: Not Progressing  Goal: Recognize maladaptive responses and adopt new coping mechanisms  Outcome: Not Progressing  Goal: Complete daily ADLs, including personal hygiene independently, as able  Description: Interventions:  - Observe, teach, and assist patient with ADLS  - Monitor and promote a balance of rest/activity, with adequate nutrition and elimination  Outcome: Not Progressing     Problem: Risk for Violence/Aggression Toward Others  Goal: Verbalize thoughts and feelings  Description: Interventions:  - Assess and re-assess patient's level of risk, every waking shift  - Engage patient in 1:1 interactions, daily, for a minimum of 15 minutes   - Allow patient to express feelings and frustrations in a safe and non-threatening manner   - Establish rapport/trust with patient   Outcome: Not Progressing  Goal: Control angry outbursts  Description: Interventions:  - Monitor patient closely, per order  - Ensure early verbal de-escalation  - Monitor prn medication needs  - Set reasonable/therapeutic limits, outline behavioral expectations, and consequences   - Provide a non-threatening milieu, utilizing the least restrictive interventions   Outcome: Not Progressing  Goal: Identify appropriate positive anger management techniques  Description: Interventions:  - Offer anger management and coping skills groups   - Staff will provide positive feedback for appropriate anger control  Outcome: Not Progressing     Problem: SELF CARE DEFICIT  Goal: Return ADL status to a safe level of function  Description: INTERVENTIONS:  - Administer medication as ordered  - Assess ADL deficits and provide assistive devices as needed  - Obtain PT/OT consults as needed  - Assist and instruct patient to increase activity and self care as tolerated  Outcome: Not Progressing

## 2023-06-13 NOTE — NURSING NOTE
Patient remains awake. Hitting self, tearful. Haldol 5 mg IM, ativan 1 mg IM, and Cogentin 0.5 mg IM given without difficulty.

## 2023-06-13 NOTE — PROGRESS NOTES
Progress Note - Behavioral Health   Gustavo Gonzalez 28 y.o. female MRN: [id number]  Unit/Bed#: [facility] 349-01 Encounter: [id number]    Assessment/Plan   Principal Problem:    Unspecified mood (affective) disorder ([address])  Active Problems:    Medical clearance for psychiatric admission    Intellectual disability    Psychosis ([address])      Recommended Treatment:   Taper Klonopin as this may be reducing inhibition further in the setting of executive dysfunction: Will get Klonopin 0.5mg daily today for three more days, last dose 6/16  Start: Clonidine for impulse control at 0.1 mg BID. Hold parameter is systolic 294. Can be reduced to 105 if needed. Continue depakote 1000 BID for mood  Continue benztropine 1mg BID for EPS  Continue lithium 450mg BID for mood, level will be drawn with other labs 6/16  Continue Trazodone 100mg HS for sleep  Continue Risperidone 3mg BID for psychotic symptoms. Prolactin came back at 22 WNL   Consider changing as this does not seem to be working for patient and per records higher doses caused EPS    Attempted to call sister again, straight to voicemail. No voicemail left as it was not personalized. Continue with pharmacotherapy, group therapy, milieu therapy and occupational therapy. Continue to assess for adverse medication side effects. Encourage Gustavo Gonzalez to participate in nonverbal forms of therapy including journaling and art/music therapy. Continue frequent safety checks and vitals per unit protocol. Continue to engage CM/SW to assist with collateral, disposition planning, and the implementation of an individualized, patient-centered plan of care. Continue medical management by medical team.  Case discussed with treatment team.    Legal Status: 303  As of 6/13/23  ------------------------------------------------------------    Subjective: All documentation including nursing notes, medication history to ensure medication adherence on the unit, labs, and vitals were reviewed. Ryley Tobias was evaluated this morning for continuity of care and no acute distress noted throughout the evaluation. Over the past 24 hours per nursing report, Ryley Tobias has been cooperative on the unit and compliant with medications. She was hitting herself overnight requiring haldol IM, sleeping well after that. Today, Ryley Tobias is consenting for safety on the unit. Rylye Tobias reports feeling "remi." Ryley Tobias notes having good sleep. Ryley Tobias states having a good appetite. Ryley Tobias has been taking the medications as prescribed and reporting no side effects. Patient was seen and examined today at bedside. We discussed her hallucinations of people trying to poison her at [employer]. When asked if she has spoken to her sister she states she's at work, and then ignores all further questioning. She gets up and goes to used the restroom, begins straining on the toilet, terminates interview. Ryley Tobias refuses to answer regarding suicidal ideations. Ryley Tobias refuses to answer regarding homicidal ideations. Regarding hallucinations, Ryley Tobias is actively responding. PRNs overnight: sennakot for constipation, melatonin for sleep, tylenol for a headache, risperidol for agitation, all effective   VS: Reviewed, within normal limits    Progress Toward Goals: slow improvement    Psychiatric Review of Systems:  Behavior over the last 24 hours:  improved  Sleep: continues to be reduced, but is improving overall   Appetite: normal  Medication side effects: No   ROS: all other systems are negative    Vital signs in last 24 hours:  Temp:  [97.7 °F (36.5 °C)-97.9 °F (36.6 °C)] 97.7 °F (36.5 °C)  HR:  [119] 119  Resp:  [16] 16  BP: (128-139)/(78-94) 139/78    Laboratory results:  I have personally reviewed all pertinent laboratory/tests results.   Recent Results (from the past 48 hour(s))   Prolactin    Collection Time: 06/12/23  6:04 AM   Result Value Ref Range    Prolactin 22.66 See Comment ng/mL         Mental Status Evaluation:    Appearance:  disheveled, wearing hospital clothes, looks younger than stated age, overtly appearing  female, intermittent intense eye contact, in no apparent acute distress   Behavior:  limited cooperation, child like   Speech:  disorganized   Mood:  "remi"   Affect:  constricted, odd   Thought Process:  disorganized, concrete   Associations: concrete associations   Thought Content:  some paranoia, preoccupied with having boyfriends and girlfriends on the unit   Perceptual Disturbances: Appears to be responding to internal stimuli   Risk Potential: Suicidal ideation - does not answer  Homicidal ideation - does not answer  Potential for aggression - Not at present   Sensorium:  oriented to person and place   Memory:  recent and remote memory grossly intact   Consciousness:  alert and awake   Attention/Concentration: attention span and concentration appear shorter than expected for age   Insight:  poor   Judgment: poor   Gait/Station: normal gait/station   Motor Activity: no abnormal movements       Current Medications:  Current Facility-Administered Medications   Medication Dose Route Frequency Provider Last Rate   • acetaminophen  650 mg Oral Q6H PRN Philemon Decant, DO     • acetaminophen  650 mg Oral Q4H PRN Philemon Decant, DO     • acetaminophen  975 mg Oral Q6H PRN Philemon Decant, DO     • aluminum-magnesium hydroxide-simethicone  30 mL Oral Q4H PRN Philemon Decant, DO     • haloperidol lactate  2.5 mg Intramuscular Q4H PRN Max 6/day Philemon Decant, DO      And   • LORazepam  1 mg Intramuscular Q4H PRN Max 6/day Philemon Decant, DO      And   • benztropine  0.5 mg Intramuscular Q4H PRN Max 6/day Philemon Decant, DO     • haloperidol lactate  5 mg Intramuscular Q4H PRN Max 4/day Philemon Decant, DO      And   • LORazepam  2 mg Intramuscular Q4H PRN Max 4/day Philemon Decant, DO      And   • benztropine  1 mg Intramuscular Q4H PRN Max 4/day Philemon Decant, DO     • benztropine  1 mg Intramuscular Q4H PRN Max 6/day Shiva Sarver, DO     • benztropine  1 mg Oral BID PRN Shiva Sarver, DO     • benztropine  1 mg Oral Q4H PRN Max 6/day Shiva Sarver, DO     • benztropine  1 mg Oral BID Shiva Sarver, DO     • clonazePAM  0.5 mg Oral BID Yonis Coleman MD     • clonazePAM  0.5 mg Oral BID PRN Shiva Sarver, DO     • hydrOXYzine HCL  50 mg Oral Q6H PRN Max 4/day Shiva Sarver, DO      Or   • diphenhydrAMINE  50 mg Intramuscular Q6H PRN Shiva Sarver, DO     • divalproex sodium  1,000 mg Oral Q12H [address] Shiva Sarver, DO     • hydrOXYzine HCL  100 mg Oral Q6H PRN Max 4/day Shiva Sarver, DO      Or   • LORazepam  2 mg Intramuscular Q6H PRN Shiva Sarver, DO     • hydrOXYzine HCL  25 mg Oral Q6H PRN Max 4/day Shiva Sarver, DO     • lithium carbonate  450 mg Oral BID Shiva Sarver, DO     • melatonin  3 mg Oral HS PRN Shiva Sarver, DO     • polyethylene glycol  17 g Oral Daily PRN Shiva Sarver, DO     • propranolol  10 mg Oral Q8H PRN Shiva Sarver, DO     • risperiDONE  0.5 mg Oral BID PRN Shiva Sarver, DO     • risperiDONE  0.5 mg Oral Q4H PRN Max 6/day Shiva Sarver, DO     • risperiDONE  1 mg Oral Q4H PRN Max 3/day Shiva Sarver, DO     • risperiDONE  2 mg Oral Q4H PRN Max 3/day Shiva Sarver, DO     • risperiDONE  3 mg Oral BID Shiva Sarver, DO     • senna-docusate sodium  1 tablet Oral Daily PRN Shiva Sarver, DO     • traZODone  100 mg Oral HS Shiva Sarver, DO         Shiva Sarver, DO 06/13/23  Psychiatry Resident, PGY-II    This note was completed in part utilizing [employer] Software. Grammatical, translation, syntax errors, random word insertions, spelling mistakes, and incomplete sentences may be an occasional consequence of this system secondary to software limitations with voice recognition, ambient noise, and hardware issues.  If you have any questions or concerns about the content, text, or information contained within the body of this dictation, please contact the provider for clarification.

## 2023-06-13 NOTE — PLAN OF CARE
303 hearing held this morning with [facility]. Pt did not attend. 303 upheld for up to 20 days.  303 as of 6/13, exp 7/3

## 2023-06-14 PROCEDURE — 99232 SBSQ HOSP IP/OBS MODERATE 35: CPT

## 2023-06-14 RX ORDER — TRAZODONE HYDROCHLORIDE 100 MG/1
100 TABLET ORAL
Status: DISCONTINUED | OUTPATIENT
Start: 2023-06-14 | End: 2023-07-17

## 2023-06-14 RX ORDER — OLANZAPINE 5 MG/1
5 TABLET ORAL
Status: DISCONTINUED | OUTPATIENT
Start: 2023-06-14 | End: 2023-06-21

## 2023-06-14 RX ADMIN — LITHIUM CARBONATE 450 MG: 450 TABLET, EXTENDED RELEASE ORAL at 17:20

## 2023-06-14 RX ADMIN — BENZTROPINE MESYLATE 1 MG: 1 INJECTION INTRAMUSCULAR; INTRAVENOUS at 09:27

## 2023-06-14 RX ADMIN — DIVALPROEX SODIUM 1000 MG: 500 TABLET, DELAYED RELEASE ORAL at 21:26

## 2023-06-14 RX ADMIN — LITHIUM CARBONATE 450 MG: 450 TABLET, EXTENDED RELEASE ORAL at 08:13

## 2023-06-14 RX ADMIN — HALOPERIDOL LACTATE 5 MG: 5 INJECTION, SOLUTION INTRAMUSCULAR at 09:27

## 2023-06-14 RX ADMIN — LORAZEPAM 2 MG: 2 INJECTION INTRAMUSCULAR; INTRAVENOUS at 09:26

## 2023-06-14 RX ADMIN — RISPERIDONE 3 MG: 2 TABLET ORAL at 17:20

## 2023-06-14 RX ADMIN — RISPERIDONE 2 MG: 2 TABLET ORAL at 00:00

## 2023-06-14 RX ADMIN — OLANZAPINE 5 MG: 5 TABLET, FILM COATED ORAL at 21:26

## 2023-06-14 RX ADMIN — CLONAZEPAM 0.5 MG: 0.5 TABLET ORAL at 08:13

## 2023-06-14 RX ADMIN — HYDROXYZINE HYDROCHLORIDE 100 MG: 50 TABLET, FILM COATED ORAL at 00:00

## 2023-06-14 RX ADMIN — SENNOSIDES AND DOCUSATE SODIUM 1 TABLET: 50; 8.6 TABLET ORAL at 17:20

## 2023-06-14 RX ADMIN — SENNOSIDES AND DOCUSATE SODIUM 1 TABLET: 50; 8.6 TABLET ORAL at 08:13

## 2023-06-14 RX ADMIN — RISPERIDONE 3 MG: 2 TABLET ORAL at 08:12

## 2023-06-14 RX ADMIN — BENZTROPINE MESYLATE 1 MG: 1 TABLET ORAL at 17:42

## 2023-06-14 RX ADMIN — DIVALPROEX SODIUM 1000 MG: 500 TABLET, DELAYED RELEASE ORAL at 08:12

## 2023-06-14 RX ADMIN — CLONIDINE HYDROCHLORIDE 0.1 MG: 0.1 TABLET ORAL at 08:13

## 2023-06-14 RX ADMIN — TRAZODONE HYDROCHLORIDE 100 MG: 100 TABLET ORAL at 21:26

## 2023-06-14 RX ADMIN — Medication 3 MG: at 00:00

## 2023-06-14 RX ADMIN — BENZTROPINE MESYLATE 1 MG: 1 TABLET ORAL at 08:13

## 2023-06-14 RX ADMIN — CLONIDINE HYDROCHLORIDE 0.1 MG: 0.1 TABLET ORAL at 21:26

## 2023-06-14 NOTE — PLAN OF CARE
Problem: Risk for Self Injury/Neglect  Goal: Treatment Goal: Remain safe during length of stay, learn and adopt new coping skills, and be free of self-injurious ideation, impulses and acts at the time of discharge  Outcome: Progressing  Goal: Verbalize thoughts and feelings  Description: Interventions:  - Assess and re-assess patient's lethality and potential for self-injury  - Engage patient in 1:1 interactions, daily, for a minimum of 15 minutes  - Encourage patient to express feelings, fears, frustrations, hopes  - Establish rapport/trust with patient   Outcome: Progressing  Goal: Refrain from harming self  Description: Interventions:  - Monitor patient closely, per order  - Develop a trusting relationship  - Supervise medication ingestion, monitor effects and side effects   Outcome: Progressing  Goal: Attend and participate in unit activities, including therapeutic, recreational, and educational groups  Description: Interventions:  - Provide therapeutic and educational activities daily, encourage attendance and participation, and document same in the medical record  - Obtain collateral information, encourage visitation and family involvement in care   Outcome: Progressing  Goal: Recognize maladaptive responses and adopt new coping mechanisms  Outcome: Progressing  Goal: Complete daily ADLs, including personal hygiene independently, as able  Description: Interventions:  - Observe, teach, and assist patient with ADLS  - Monitor and promote a balance of rest/activity, with adequate nutrition and elimination  Outcome: Progressing     Problem: Risk for Violence/Aggression Toward Others  Goal: Treatment Goal: Refrain from acts of violence/aggression during length of stay, and demonstrate improved impulse control at the time of discharge  Outcome: Progressing  Goal: Verbalize thoughts and feelings  Description: Interventions:  - Assess and re-assess patient's level of risk, every waking shift  - Engage patient in 1:1 interactions, daily, for a minimum of 15 minutes   - Allow patient to express feelings and frustrations in a safe and non-threatening manner   - Establish rapport/trust with patient   Outcome: Progressing  Goal: Refrain from harming others  Outcome: Progressing  Goal: Refrain from destructive acts on the environment or property  Outcome: Progressing  Goal: Control angry outbursts  Description: Interventions:  - Monitor patient closely, per order  - Ensure early verbal de-escalation  - Monitor prn medication needs  - Set reasonable/therapeutic limits, outline behavioral expectations, and consequences   - Provide a non-threatening milieu, utilizing the least restrictive interventions   Outcome: Progressing  Goal: Attend and participate in unit activities, including therapeutic, recreational, and educational groups  Description: Interventions:  - Provide therapeutic and educational activities daily, encourage attendance and participation, and document same in the medical record   Outcome: Progressing  Goal: Identify appropriate positive anger management techniques  Description: Interventions:  - Offer anger management and coping skills groups   - Staff will provide positive feedback for appropriate anger control  Outcome: Progressing     Problem: JENAE  Goal: Will exhibit normal sleep and speech and no impulsivity  Description: INTERVENTIONS:  - Administer medication as ordered  - Set limits on impulsive behavior  - Make attempts to decrease external stimuli as possible  Outcome: Progressing     Problem: INVOLUNTARY ADMIT  Goal: Will cooperate with staff recommendations and doctor's orders and will demonstrate appropriate behavior  Description: INTERVENTIONS:  - Treat underlying conditions and offer medication as ordered  - Educate regarding involuntary admission procedures and rules  - Utilize positive consistent limit setting strategies to support patient and staff safety  Outcome: Progressing     Problem: SELF CARE DEFICIT  Goal: Return ADL status to a safe level of function  Description: INTERVENTIONS:  - Administer medication as ordered  - Assess ADL deficits and provide assistive devices as needed  - Obtain PT/OT consults as needed  - Assist and instruct patient to increase activity and self care as tolerated  Outcome: Progressing     Problem: DISCHARGE PLANNING - CARE MANAGEMENT  Goal: Discharge to post-acute care or home with appropriate resources  Description: INTERVENTIONS:  - Conduct assessment to determine patient/family and health care team treatment goals, and need for post-acute services based on payer coverage, community resources, and patient preferences, and barriers to discharge  - Address psychosocial, clinical, and financial barriers to discharge as identified in assessment in conjunction with the patient/family and health care team  - Arrange appropriate level of post-acute services according to patient’s   needs and preference and payer coverage in collaboration with the physician and health care team  - Communicate with and update the patient/family, physician, and health care team regarding progress on the discharge plan  - Arrange appropriate transportation to post-acute venues  Outcome: Progressing

## 2023-06-14 NOTE — NURSING NOTE
Pt is disorganized, bizarre, behaving inappropriately at times. Pt shredding papers all over bed, picking at walls, yelling out. Pt requires verbal redirection and reassurance. Pt appears paranoid about peers and stares inappropriately at people passing pt's bedroom. Pt is medication adherent. Pt remains on continual observation, close proximity.

## 2023-06-14 NOTE — PROGRESS NOTES
Progress Note - Behavioral Health   Moshe Whitman 28 y.o. female MRN: [id number]  Unit/Bed#: [facility] 349-01 Encounter: [id number]    Patient was seen today for continuation of care, records reviewed and patient was discussed with the morning case review team.  Toña Gibbons remains disorganized, inapropriate and is hard to redirect. She received Risperdal and Atarax last night after destroying materials in her room which were not effective. She received melatonin for insomnia which was also ineffective and patient was a awake for most of the night. Patient is on 18 as of 6/13, exp 7/3. Toña Gibbons was seen today for psychiatric follow-up. On assessment today, Toña Gibbons was seen in her room, security was present and Toña Gibbons was receiving Haldol, Ativan and Cogentin for severe agitation. Toña Gibbons was screaming throughout the morning and was tearing up materials in her room. After injections were given Evelin requested to use the phone and then fell asleep. Previous observation of this patient as well as discussion with nursing indicates that Toña Gibbons remains disorganized, bizarre, sexually inappropriate and has had minimal sleep since she has been on the unit. Records review shows that when patient was hospitalized in 2021 she required both Zyprexa 15mg BID and Risperdal 3mg BID to stabilize basilio/schizophrenia. Toña Gibbons was unable to answers regarding SI/HI but nursing has not reported any self harming behaviors. Patient also will remain on 1:1 for safety. Toña Gibbons remains highly agitated and anxious throughout the day. She does not answer questions related to [address] but appears paranoid. Toña Gibbons remains adherent to her current psychotropic medication regimen and no side effects observed at this time. I am going to initiate Zyprexa 5mg PO at HS for continued psychosis/mood lability and insomnia. Per records Toña Gibbons has required the use of 2 antipsychotics for basilio and schizophrenia.   We will continue Klonopin taper 0.5 mg daily for now for agitation until 6/16/2023 and then we will discontinue, continue Depakote DR tablet 1000 mg every 12 hours for mood (last level 6/11/2023 at 87). Continue Lithobid 450 mg twice daily for mood/agitation (last level 6/11/2023 0.4 and next draw scheduled for Friday 6/16/2023), continue Risperdal 3 mg twice daily for psychosis, continue Cogentin to 1 mg twice daily, we will continue trazodone 100 mg at bedtime for continued insomnia, and continue 1:1 monitoring for safety. Vitals:  Vitals:    06/14/23 0700   BP: 128/75   Pulse: (!) 125   Resp: 18   Temp: (!) 96.9 °F (36.1 °C)   SpO2: 98%       Laboratory Results:    I have personally reviewed all pertinent laboratory/tests results. Most Recent Labs:   Lab Results   Component Value Date    ALB 4.3 06/11/2023    ALKPHOS 35 06/11/2023    ALT 22 06/11/2023    AST 34 06/11/2023    BUN 10 06/11/2023    CALCIUM 9.8 06/11/2023    CHOLESTEROL 181 06/11/2023     06/11/2023    CO2 31 06/11/2023    CREATININE 0.36 (L) 06/11/2023     03/20/2023    GLUC 77 06/11/2023    GLUF 77 06/11/2023    HCT 43.0 06/11/2023    HDL 64 06/11/2023    HGB 14.0 06/11/2023    HGBA1C 5.1 03/20/2023    K 3.7 06/11/2023    LDLCALC 96 06/11/2023    LITHIUM 0.4 (L) 06/11/2023    NEUTROABS 2.58 06/11/2023    NONHDLC 117 06/11/2023     06/11/2023    PREGSERUM Negative 06/11/2023    RBC 4.67 06/11/2023    RDW 13.4 06/11/2023    SODIUM 140 06/11/2023    TBILI 0.43 06/11/2023    TP 6.8 06/11/2023    TRIG 106 06/11/2023    JRG7MEFYIPBR 3.269 06/11/2023    VALPROICTOT 87 06/11/2023    WBC 5.25 06/11/2023       Psychiatric Review of Systems:  Behavior over the last 24 hours:  unchanged.    Sleep:  poor  Appetite: fair  Medication side effects:   ROS: no complaints, denies shortness of breath or chest pain and all other systems are negative for acute changes    Mental Status Evaluation:    Appearance:  disheveled, marginal hygiene, looks older than stated age   Behavior:  agitated, bizarre, restless   Speech:  loud, disorganized   Mood:  labile, irritable, angry   Affect:  increased in intensity   Thought Process:  disorganized, illogical, increased rate of thoughts   Associations: tangential associations   Thought Content:  paranoid ideation   Perceptual Disturbances: appears distracted   Risk Potential: Suicidal ideation - does not answer  Homicidal ideation - does not answer  Potential for aggression - Yes, due to agitation   Sensorium:  does not answer   Memory:  patient does not answer   Consciousness:  alert and awake   Attention/Concentration: poor concentration and poor attention span   Insight:  significantly impaired   Judgment: significantly impaired   Gait/Station: normal gait/station   Motor Activity: no abnormal movements     Progress Toward Goals:   Karla Nelson has made minimal progress towards goals of inpatient psychiatric treatment. She continues medication compliance however is not attending therapeutic modalities on the milieu. She continues to require inpatient psychiatric hospitalization for continued medication management and titration to optimize symptom reduction, improve sleep hygiene, and demonstrate adequate self-care.     Risk of Harm to Self:   Nursing Suicide Risk Assessment Last 24 hours: C-SSRS Risk (Since Last Contact)  Calculated C-SSRS Risk Score (Since Last Contact): No Risk Indicated    Risk of Harm to Others:  Nursing Homicide Risk Assessment: Violence Risk to Others: Denies within past 6 months    The following interventions are recommended: continued hospitalization on locked unit, continual observation close proximity      Assessment/Plan   Principal Problem:    Unspecified mood (affective) disorder ([address])  Active Problems:    Medical clearance for psychiatric admission    Intellectual disability    Psychosis ([address])      Recommended Treatment: Treatment plan and medication changes discussed and per the attending physician the plan is: 1. Continue with group therapy, milieu therapy and occupational therapy  2. Behavioral Health checks every 7 minutes  3. Continue frequent safety checks and vitals per unit protocol  4. Continue with SLIM medical management as indicated  5. Continue with current medication regimen  6. Will review labs in the a.m. 7.Disposition Planning: Discharge planning and efforts remain ongoing    Behavioral Health Medications: planned medication changes: Initiate Zyprexa 5mg PO at HS.   Current Facility-Administered Medications   Medication Dose Route Frequency Provider Last Rate   • acetaminophen  650 mg Oral Q6H PRN Ahmad Mare, DO     • acetaminophen  650 mg Oral Q4H PRN Ahmad Mare, DO     • acetaminophen  975 mg Oral Q6H PRN Ahmad Mare, DO     • aluminum-magnesium hydroxide-simethicone  30 mL Oral Q4H PRN Ahmad Mare, DO     • haloperidol lactate  2.5 mg Intramuscular Q4H PRN Max 6/day Ahmad Mare, DO      And   • LORazepam  1 mg Intramuscular Q4H PRN Max 6/day Ahmad Mare, DO      And   • benztropine  0.5 mg Intramuscular Q4H PRN Max 6/day Ahmad Mare, DO     • haloperidol lactate  5 mg Intramuscular Q4H PRN Max 4/day Ahmad Mare, DO      And   • LORazepam  2 mg Intramuscular Q4H PRN Max 4/day Ahmad Mare, DO      And   • benztropine  1 mg Intramuscular Q4H PRN Max 4/day Ahmad Mare, DO     • benztropine  1 mg Intramuscular Q4H PRN Max 6/day Ahmad Mare, DO     • benztropine  1 mg Oral BID PRN Ahmad Mare, DO     • benztropine  1 mg Oral Q4H PRN Max 6/day Ahmad Mare, DO     • benztropine  1 mg Oral BID Ahmad Mare, DO     • clonazePAM  0.5 mg Oral BID PRN Ahmad Mare, DO     • clonazePAM  0.5 mg Oral Daily Ahmad Mare, DO     • cloNIDine  0.1 mg Oral Q12H [facility] Ahmad Mare, DO     • hydrOXYzine HCL  50 mg Oral Q6H PRN Max 4/day Ahmad Mare, DO      Or   • diphenhydrAMINE  50 mg Intramuscular Q6H PRN Ahmad Mare, DO     • divalproex sodium  1,000 mg Oral Q12H [address] Cleatus Roots, DO     • hydrOXYzine HCL  100 mg Oral Q6H PRN Max 4/day Cleatus Roots, DO      Or   • LORazepam  2 mg Intramuscular Q6H PRN Cleatus Roots, DO     • hydrOXYzine HCL  25 mg Oral Q6H PRN Max 4/day Cleatus Roots, DO     • lithium carbonate  450 mg Oral BID Cleatus Roots, DO     • melatonin  3 mg Oral HS PRN Cleatus Roots, DO     • polyethylene glycol  17 g Oral Daily PRN Cleatus Roots, DO     • propranolol  10 mg Oral Q8H PRN Cleatus Roots, DO     • risperiDONE  0.5 mg Oral BID PRN Cleatus Roots, DO     • risperiDONE  0.5 mg Oral Q4H PRN Max 6/day Cleatus Roots, DO     • risperiDONE  1 mg Oral Q4H PRN Max 3/day Cleatus Roots, DO     • risperiDONE  2 mg Oral Q4H PRN Max 3/day Cleatus Roots, DO     • risperiDONE  3 mg Oral BID Cleatus Roots, DO     • senna-docusate sodium  1 tablet Oral BID Cleatus Roots, DO     • traZODone  100 mg Oral HS Cleatus Roots, DO         Risks / Benefits of Treatment:  Risks, benefits, and possible side effects of medications explained to patient. Patient does not verbalize understanding of benefits or risks of treatment refusal at this time and needs ongoing explanation of treatment benefits and treatment plan. Counseling / Coordination of Care:  Patient's progress reviewed with nursing staff. Medications, treatment progress and treatment plan reviewed with patient. Supportive counseling provided to the patient. Total floor/unit time spent today 25 minutes. Greater than 50% of total time was spent with the patient and / or family counseling and / or coordination of care. A description of the counseling / coordination of care: medication education, treatment plan, supportive therapy.

## 2023-06-14 NOTE — PROGRESS NOTES
06/14/23 0844   Team Meeting   Meeting Type Daily Rounds   Team Members Present   Team Members Present Physician;Nurse;   Physician Team Member Sylvia   [facility]   Care Management Team Member Cathi   Patient/Family Present   Patient Present No   Patient's Family Present No   1:1. Pt remains disorganized, inappropriate at times and needs redirection. Pt destroying materials in room in the middle of night. PRN atarax, risperdal, melatonin-ineffective. Pt up most of the night. Titrate Klonpin to Clonidine. DC tbd.

## 2023-06-15 PROCEDURE — 99232 SBSQ HOSP IP/OBS MODERATE 35: CPT | Performed by: PSYCHIATRY & NEUROLOGY

## 2023-06-15 RX ADMIN — SENNOSIDES AND DOCUSATE SODIUM 1 TABLET: 50; 8.6 TABLET ORAL at 08:19

## 2023-06-15 RX ADMIN — BENZTROPINE MESYLATE 1 MG: 1 TABLET ORAL at 08:18

## 2023-06-15 RX ADMIN — RISPERIDONE 3 MG: 2 TABLET ORAL at 08:18

## 2023-06-15 RX ADMIN — CLONAZEPAM 0.5 MG: 0.5 TABLET ORAL at 08:19

## 2023-06-15 RX ADMIN — BENZTROPINE MESYLATE 1 MG: 1 TABLET ORAL at 17:05

## 2023-06-15 RX ADMIN — TRAZODONE HYDROCHLORIDE 100 MG: 100 TABLET ORAL at 21:32

## 2023-06-15 RX ADMIN — OLANZAPINE 5 MG: 5 TABLET, FILM COATED ORAL at 21:32

## 2023-06-15 RX ADMIN — DIVALPROEX SODIUM 1000 MG: 500 TABLET, DELAYED RELEASE ORAL at 21:32

## 2023-06-15 RX ADMIN — DIVALPROEX SODIUM 1000 MG: 500 TABLET, DELAYED RELEASE ORAL at 08:18

## 2023-06-15 RX ADMIN — LITHIUM CARBONATE 450 MG: 450 TABLET, EXTENDED RELEASE ORAL at 17:05

## 2023-06-15 RX ADMIN — RISPERIDONE 3 MG: 2 TABLET ORAL at 17:05

## 2023-06-15 RX ADMIN — RISPERIDONE 2 MG: 2 TABLET ORAL at 10:59

## 2023-06-15 RX ADMIN — SENNOSIDES AND DOCUSATE SODIUM 1 TABLET: 50; 8.6 TABLET ORAL at 17:05

## 2023-06-15 RX ADMIN — LITHIUM CARBONATE 450 MG: 450 TABLET, EXTENDED RELEASE ORAL at 08:19

## 2023-06-15 RX ADMIN — CLONIDINE HYDROCHLORIDE 0.1 MG: 0.1 TABLET ORAL at 21:32

## 2023-06-15 RX ADMIN — HYDROXYZINE HYDROCHLORIDE 100 MG: 50 TABLET, FILM COATED ORAL at 08:18

## 2023-06-15 NOTE — NURSING NOTE
Pt remains on continual observation for behaviors. Medication and meal compliant with encouragement. Continues to appear suspicious of medications, examining one at a time. Pt blowing on pills prior to taking them. Pt frequently washing her hands, sometimes using inappropriate items instead of soap such as lotion or toothpaste. Pt yelling out periodically calling her 1:1 staff inappropriate names in [address] [facility] [address]. Verbally redirectable. Pt received prn atarax 100mg @ 0818 for c/o severe anxiety, reassessed @ 8331 and somewhat effective.

## 2023-06-15 NOTE — PROGRESS NOTES
Progress Note - [facility]. 28 y.o. female MRN: [id number]   Unit/Bed#: [facility] 349-01 Encounter: [id number]    Behavior over the last 24 hours: unchanged. Hien Navarro is a 28 y.o. female diagnosed with unspecified mood disorder, psychosis, intellectual disability seen today in follow-up. Per staff, remains on 1:1 supervision still disorganized and requiring redirection. Did require haldol, cogentin and ativan IM for increased agitation yesterday. On presentation she is seen in room with one to one observer. She remains disorganized, bizarre and restless. She states that she is currently at [facility] and that today's date is her birthday. Has been bringing over items during interview and renaming them. States that the police stuck needles in her and that they are, "bastards." Boundaries still remain poor.      Sleep: slept better  Appetite: normal  Medication side effects: No   ROS: no complaints, all other systems are negative       Mental Status Evaluation:    Appearance:  disheveled   Behavior:  restless   Speech:  increased rate   Mood:  "good"   Affect:  labile   Thought Process:  disorganized, illogical   Associations: loose associations   Thought Content:  paranoid ideation   Perceptual Disturbances: appears preoccupied   Risk Potential: Suicidal ideation - None at present  Homicidal ideation - None at present  Potential for aggression - Not at present   Sensorium:  disoriented to place and year   Memory:  recent and remote memory: unable to assess due to lack of cooperation   Consciousness:  alert and awake   Attention/Concentration: decreased concentration and decreased attention span   Insight:  poor   Judgment: poor   Gait/Station: normal gait/station   Motor Activity: no abnormal movements     Vital signs in last 24 hours:    Temp:  [97.2 °F (36.2 °C)-97.6 °F (36.4 °C)] 97.6 °F (36.4 °C)  HR:  [] 89  Resp:  [16-18] 18  BP: ()/(68-79) 91/69    Laboratory results: I have personally reviewed all pertinent laboratory/tests results    Results from the past 24 hours: No results found for this or any previous visit (from the past 24 hour(s)). Most Recent Labs:   Lab Results   Component Value Date    ALB 4.3 06/11/2023    ALKPHOS 35 06/11/2023    ALT 22 06/11/2023    AST 34 06/11/2023    BUN 10 06/11/2023    CALCIUM 9.8 06/11/2023    CHOLESTEROL 181 06/11/2023     06/11/2023    CO2 31 06/11/2023    CREATININE 0.36 (L) 06/11/2023     03/20/2023    GLUC 77 06/11/2023    HCT 43.0 06/11/2023    HDL 64 06/11/2023    HGB 14.0 06/11/2023    HGBA1C 5.1 03/20/2023    K 3.7 06/11/2023    LDLCALC 96 06/11/2023    LITHIUM 0.4 (L) 06/11/2023    NEUTROABS 2.58 06/11/2023    NONHDLC 117 06/11/2023     06/11/2023    PREGSERUM Negative 06/11/2023    RBC 4.67 06/11/2023    RDW 13.4 06/11/2023    SODIUM 140 06/11/2023    TBILI 0.43 06/11/2023    TP 6.8 06/11/2023    TRIG 106 06/11/2023    UBY6HJBJPACU 3.269 06/11/2023    VALPROICTOT 87 06/11/2023    WBC 5.25 06/11/2023       Progress Toward Goals: making slow improvement    Assessment/Plan   Principal Problem:    Unspecified mood (affective) disorder (HCC)  Active Problems:    Medical clearance for psychiatric admission    Intellectual disability    Psychosis ([address])      Recommended Treatment:     Planned medication and treatment changes:     All current active medications have been reviewed  Encourage group therapy, milieu therapy and occupational therapy  Behavioral Health checks every 7 minutes  Lithium level for tomorrow  Continue current medications:    Current Facility-Administered Medications   Medication Dose Route Frequency Provider Last Rate   • acetaminophen  650 mg Oral Q6H PRN Stover Gutting, DO     • acetaminophen  650 mg Oral Q4H PRN Stover Gutting, DO     • acetaminophen  975 mg Oral Q6H PRN Stover Gutting, DO     • aluminum-magnesium hydroxide-simethicone  30 mL Oral Q4H PRN Stover Gutting, DO     • haloperidol lactate 2.5 mg Intramuscular Q4H PRN Max 6/day Luisana Scrape, DO      And   • LORazepam  1 mg Intramuscular Q4H PRN Max 6/day Luisana Scrape, DO      And   • benztropine  0.5 mg Intramuscular Q4H PRN Max 6/day Luisana Scrape, DO     • haloperidol lactate  5 mg Intramuscular Q4H PRN Max 4/day Luisana Scrape, DO      And   • LORazepam  2 mg Intramuscular Q4H PRN Max 4/day Luisana Scrape, DO      And   • benztropine  1 mg Intramuscular Q4H PRN Max 4/day Luisana Scrape, DO     • benztropine  1 mg Intramuscular Q4H PRN Max 6/day Luisana Scrape, DO     • benztropine  1 mg Oral BID PRN Luisana Scrape, DO     • benztropine  1 mg Oral Q4H PRN Max 6/day Luisana Scrape, DO     • benztropine  1 mg Oral BID Luisana Scrape, DO     • clonazePAM  0.5 mg Oral BID PRN Luisana Scrape, DO     • clonazePAM  0.5 mg Oral Daily Luisana Scrape, DO     • cloNIDine  0.1 mg Oral Q12H [address] Luisana Scrape, DO     • hydrOXYzine HCL  50 mg Oral Q6H PRN Max 4/day Luisana Scrape, DO      Or   • diphenhydrAMINE  50 mg Intramuscular Q6H PRN Luisana Scrape, DO     • divalproex sodium  1,000 mg Oral Q12H [address] Luisana Scrape, DO     • hydrOXYzine HCL  100 mg Oral Q6H PRN Max 4/day Luisana Scrape, DO      Or   • LORazepam  2 mg Intramuscular Q6H PRN Luisana Scrape, DO     • hydrOXYzine HCL  25 mg Oral Q6H PRN Max 4/day Luisana Scrape, DO     • lithium carbonate  450 mg Oral BID Luisana Scrape, DO     • melatonin  3 mg Oral HS PRN Liusana Scrape, DO     • OLANZapine  5 mg Oral HS Missy Ethelene Mate, CRNP     • polyethylene glycol  17 g Oral Daily PRN Luisana Scrape, DO     • propranolol  10 mg Oral Q8H PRN Luisana Scrape, DO     • risperiDONE  0.5 mg Oral BID PRN Luisana Scrape, DO     • risperiDONE  0.5 mg Oral Q4H PRN Max 6/day Luisana Scrape, DO     • risperiDONE  1 mg Oral Q4H PRN Max 3/day Luisana Scrape, DO     • risperiDONE  2 mg Oral Q4H PRN Max 3/day Luisana Scrape, DO     • risperiDONE  3 mg Oral BID Luisana Scrape, DO     • senna-docusate sodium  1 tablet Oral BID Sourav Patten DO     • traZODone  100 mg Oral HS PERLA Ashby       Risks / Benefits of Treatment:    Risks, benefits, and possible side effects of medications explained to patient and patient verbalizes understanding and agreement for treatment. Counseling / Coordination of Care: Total floor / unit time spent today 20 minutes. Greater than 50% of total time was spent with the patient and / or family counseling and / or coordination of care. A description of counseling / coordination of care:  Patient's progress discussed with staff in treatment team meeting. Medications, treatment progress and treatment plan reviewed with patient.     Claudia Escalera PA-C 06/15/23

## 2023-06-15 NOTE — NURSING NOTE
Pt reporting voices to harm kill herself visibly upset and agitated by them. Accepting of PRN risperdal 2mg po @ 3629.

## 2023-06-15 NOTE — PROGRESS NOTES
Progress Note - Behavioral Health   Eyad Stack 28 y.o. female MRN: [id number]  Unit/Bed#: [facility] 349-01 Encounter: [id number]    Assessment/Plan   Principal Problem:    Unspecified mood (affective) disorder ([address])  Active Problems:    Medical clearance for psychiatric admission    Intellectual disability    Psychosis ([address])      Behavior over the last 24 hours:  unchanged  Sleep: insomnia  Appetite: normal  Medication side effects: No  ROS: no complaints    Subjective: Pt crying in room per 1:1 due to wanted to call family, attempted to use  device for interview but  was unable to understand her and patient tried to turn off tablet. Per Rn report she was admitted after AMS at home, hx of intellectual disability. Very sexually inappropriate with staff and needs 1:1 for safety. Mental Status Evaluation:  Appearance:  casually dressed   Behavior:  needed redirection   Speech:  Greenlandic speaking, normal volume   Mood:  "good"   Affect:  labile   Thought Process:  disorganized   Associations: loose associations   Thought Content:  no overt delusions expressed   Perceptual Disturbances: internally preoccupied   Risk Potential: Suicidal Ideations not expressed     Sensorium:  person   Memory:  recent and remote memory: unable to assess due to lack of cooperation   Consciousness:  alert and awake    Attention: attention span appeared shorter than expected for age   Insight:  limited   Judgment: limited   Gait/Station: normal gait/station   Motor Activity: no abnormal movements     Progress Toward Goals: cont current medications, lithium level planned for 6/20    Recommended Treatment: Continue with group therapy, milieu therapy and occupational therapy. Risks, benefits and possible side effects of Medications:   Patient does not verbalize understanding at this time and will require further explanation.       Medications:   all current active meds have been reviewed and current meds: Current Facility-Administered Medications   Medication Dose Route Frequency   • acetaminophen (TYLENOL) tablet 650 mg  650 mg Oral Q6H PRN   • acetaminophen (TYLENOL) tablet 650 mg  650 mg Oral Q4H PRN   • acetaminophen (TYLENOL) tablet 975 mg  975 mg Oral Q6H PRN   • aluminum-magnesium hydroxide-simethicone (MYLANTA) oral suspension 30 mL  30 mL Oral Q4H PRN   • haloperidol lactate (HALDOL) injection 2.5 mg  2.5 mg Intramuscular Q4H PRN Max 6/day    And   • LORazepam (ATIVAN) injection 1 mg  1 mg Intramuscular Q4H PRN Max 6/day    And   • benztropine (COGENTIN) injection 0.5 mg  0.5 mg Intramuscular Q4H PRN Max 6/day   • haloperidol lactate (HALDOL) injection 5 mg  5 mg Intramuscular Q4H PRN Max 4/day    And   • LORazepam (ATIVAN) injection 2 mg  2 mg Intramuscular Q4H PRN Max 4/day    And   • benztropine (COGENTIN) injection 1 mg  1 mg Intramuscular Q4H PRN Max 4/day   • benztropine (COGENTIN) injection 1 mg  1 mg Intramuscular Q4H PRN Max 6/day   • benztropine (COGENTIN) tablet 1 mg  1 mg Oral BID PRN   • benztropine (COGENTIN) tablet 1 mg  1 mg Oral Q4H PRN Max 6/day   • benztropine (COGENTIN) tablet 1 mg  1 mg Oral BID   • clonazePAM (KlonoPIN) tablet 0.5 mg  0.5 mg Oral BID PRN   • cloNIDine (CATAPRES) tablet 0.1 mg  0.1 mg Oral Q12H COLT   • hydrOXYzine HCL (ATARAX) tablet 50 mg  50 mg Oral Q6H PRN Max 4/day    Or   • diphenhydrAMINE (BENADRYL) injection 50 mg  50 mg Intramuscular Q6H PRN   • divalproex sodium (DEPAKOTE) DR tablet 1,000 mg  1,000 mg Oral Q12H COLT   • hydrOXYzine HCL (ATARAX) tablet 100 mg  100 mg Oral Q6H PRN Max 4/day    Or   • LORazepam (ATIVAN) injection 2 mg  2 mg Intramuscular Q6H PRN   • hydrOXYzine HCL (ATARAX) tablet 25 mg  25 mg Oral Q6H PRN Max 4/day   • lithium carbonate (LITHOBID) CR tablet 600 mg  600 mg Oral BID   • melatonin tablet 3 mg  3 mg Oral HS PRN   • OLANZapine (ZyPREXA) tablet 5 mg  5 mg Oral HS   • polyethylene glycol (MIRALAX) packet 17 g  17 g Oral Daily PRN   • propranolol (INDERAL) tablet 10 mg  10 mg Oral Q8H PRN   • risperiDONE (RisperDAL M-TAB) disintegrating tablet 0.5 mg  0.5 mg Oral BID PRN   • risperiDONE (RisperDAL) tablet 0.5 mg  0.5 mg Oral Q4H PRN Max 6/day   • risperiDONE (RisperDAL) tablet 1 mg  1 mg Oral Q4H PRN Max 3/day   • risperiDONE (RisperDAL) tablet 2 mg  2 mg Oral Q4H PRN Max 3/day   • risperiDONE (RisperDAL) tablet 3 mg  3 mg Oral BID   • senna-docusate sodium (SENOKOT S) 8.6-50 mg per tablet 1 tablet  1 tablet Oral BID   • traZODone (DESYREL) tablet 100 mg  100 mg Oral HS   . Labs: I have personally reviewed all pertinent laboratory/tests results. Most Recent Labs:   Lab Results   Component Value Date    WBC 5.25 06/11/2023    RBC 4.67 06/11/2023    HGB 14.0 06/11/2023    HCT 43.0 06/11/2023     06/11/2023    RDW 13.4 06/11/2023    NEUTROABS 2.58 06/11/2023    SODIUM 141 06/16/2023    K 4.0 06/16/2023     06/16/2023    CO2 30 06/16/2023    BUN 11 06/16/2023    CREATININE 0.53 (L) 06/16/2023    GLUC 73 06/16/2023    GLUF 73 06/16/2023    CALCIUM 10.1 06/16/2023    AST 19 06/16/2023    ALT 13 06/16/2023    ALKPHOS 32 (L) 06/16/2023    TP 6.8 06/16/2023    ALB 4.3 06/16/2023    TBILI 0.43 06/16/2023    CHOLESTEROL 181 06/11/2023    HDL 64 06/11/2023    TRIG 106 06/11/2023    LDLCALC 96 06/11/2023    NONHDLC 117 06/11/2023    VALPROICTOT 87 06/11/2023    LITHIUM 0.7 06/16/2023    [id number] 3.269 06/11/2023    PREGSERUM Negative 06/11/2023    HGBA1C 5.1 03/20/2023     03/20/2023     Wt Readings from Last 3 Encounters:   06/10/23 54.3 kg (119 lb 9.6 oz)     Temp Readings from Last 3 Encounters:   06/17/23 98 °F (36.7 °C) (Temporal)     BP Readings from Last 3 Encounters:   06/17/23 125/87     Pulse Readings from Last 3 Encounters:   06/17/23 (!) 122       Counseling / Coordination of Care  Total floor / unit time spent today 15 minutes.  Greater than 50% of total time was spent with the patient and / or family counseling and / or coordination of care.  A description of the counseling / coordination of care: supportive care
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
## 2023-06-15 NOTE — PROGRESS NOTES
06/15/23 0839   Team Meeting   Meeting Type Daily Rounds   Team Members Present   Team Members Present Physician;Nurse;   Physician Team Member [id number] Member Cathi   Patient/Family Present   Patient Present No   Patient's Family Present No   1:1. Pt has been cooperative, still remains disorganized and needs redirection at times. DC tbd.

## 2023-06-15 NOTE — NURSING NOTE
Pt continues to have poor boundaries and is sexually inappropriate with staff. She continues to blow kisses and attempt to touch staff arms and hair. She requires frequent redirection. Pt observed to be loud and disruptive in the milieu. Pt endorses AH - when asked what she is hear pt gestures an object slitting her throat. Pt endorses VH of "people around the candle" but does not elaborate any further. Pt compliant with meals and medications. Pt remains on 1:1 at this time.

## 2023-06-15 NOTE — NURSING NOTE
Pt restless, bright, labile, tearful at times. Pt remains bizarre in behavior and requires redirection by staff. Pt is largely cooperative and follows direction. Pt denies SI/HI/AH/VH. Medication adherent. Pt has 1x episode of urinary incontinence; pt mild assist with pericare and changed into clean clothing. Pt remains on continual observation, close proximity for safety.

## 2023-06-16 LAB
ALBUMIN SERPL BCP-MCNC: 4.3 G/DL (ref 3.5–5)
ALP SERPL-CCNC: 32 U/L (ref 34–104)
ALT SERPL W P-5'-P-CCNC: 13 U/L (ref 7–52)
ANION GAP SERPL CALCULATED.3IONS-SCNC: 8 MMOL/L (ref 4–13)
AST SERPL W P-5'-P-CCNC: 19 U/L (ref 13–39)
BILIRUB SERPL-MCNC: 0.43 MG/DL (ref 0.2–1)
BUN SERPL-MCNC: 11 MG/DL (ref 5–25)
CALCIUM SERPL-MCNC: 10.1 MG/DL (ref 8.4–10.2)
CHLORIDE SERPL-SCNC: 103 MMOL/L (ref 96–108)
CO2 SERPL-SCNC: 30 MMOL/L (ref 21–32)
CREAT SERPL-MCNC: 0.53 MG/DL (ref 0.6–1.3)
GFR SERPL CREATININE-BSD FRML MDRD: 124 ML/MIN/1.73SQ M
GLUCOSE P FAST SERPL-MCNC: 73 MG/DL (ref 65–99)
GLUCOSE SERPL-MCNC: 73 MG/DL (ref 65–140)
LITHIUM SERPL-SCNC: 0.7 MMOL/L (ref 0.6–1.2)
POTASSIUM SERPL-SCNC: 4 MMOL/L (ref 3.5–5.3)
PROT SERPL-MCNC: 6.8 G/DL (ref 6.4–8.4)
SODIUM SERPL-SCNC: 141 MMOL/L (ref 135–147)

## 2023-06-16 PROCEDURE — 80053 COMPREHEN METABOLIC PANEL: CPT

## 2023-06-16 PROCEDURE — 99232 SBSQ HOSP IP/OBS MODERATE 35: CPT | Performed by: PSYCHIATRY & NEUROLOGY

## 2023-06-16 PROCEDURE — 80178 ASSAY OF LITHIUM: CPT

## 2023-06-16 RX ORDER — LITHIUM CARBONATE 300 MG/1
600 TABLET, FILM COATED, EXTENDED RELEASE ORAL 2 TIMES DAILY
Status: DISCONTINUED | OUTPATIENT
Start: 2023-06-16 | End: 2023-06-21

## 2023-06-16 RX ADMIN — RISPERIDONE 3 MG: 2 TABLET ORAL at 09:48

## 2023-06-16 RX ADMIN — LITHIUM CARBONATE 600 MG: 450 TABLET, EXTENDED RELEASE ORAL at 17:12

## 2023-06-16 RX ADMIN — TRAZODONE HYDROCHLORIDE 100 MG: 100 TABLET ORAL at 21:27

## 2023-06-16 RX ADMIN — SENNOSIDES AND DOCUSATE SODIUM 1 TABLET: 50; 8.6 TABLET ORAL at 17:12

## 2023-06-16 RX ADMIN — BENZTROPINE MESYLATE 1 MG: 1 TABLET ORAL at 17:12

## 2023-06-16 RX ADMIN — CLONIDINE HYDROCHLORIDE 0.1 MG: 0.1 TABLET ORAL at 09:49

## 2023-06-16 RX ADMIN — BENZTROPINE MESYLATE 1 MG: 1 TABLET ORAL at 09:49

## 2023-06-16 RX ADMIN — LITHIUM CARBONATE 600 MG: 450 TABLET, EXTENDED RELEASE ORAL at 09:48

## 2023-06-16 RX ADMIN — CLONAZEPAM 0.5 MG: 0.5 TABLET ORAL at 09:49

## 2023-06-16 RX ADMIN — RISPERIDONE 3 MG: 2 TABLET ORAL at 17:12

## 2023-06-16 RX ADMIN — DIVALPROEX SODIUM 1000 MG: 500 TABLET, DELAYED RELEASE ORAL at 21:27

## 2023-06-16 RX ADMIN — ACETAMINOPHEN 650 MG: 325 TABLET, FILM COATED ORAL at 06:11

## 2023-06-16 RX ADMIN — DIVALPROEX SODIUM 1000 MG: 500 TABLET, DELAYED RELEASE ORAL at 09:48

## 2023-06-16 RX ADMIN — SENNOSIDES AND DOCUSATE SODIUM 1 TABLET: 50; 8.6 TABLET ORAL at 09:48

## 2023-06-16 RX ADMIN — OLANZAPINE 5 MG: 5 TABLET, FILM COATED ORAL at 21:26

## 2023-06-16 NOTE — PROGRESS NOTES
Progress Note - Behavioral Health   Roby Clark 28 y.o. female MRN: [id number]  Unit/Bed#: -01 Encounter: [id number]    Assessment/Plan   Principal Problem:    Unspecified mood (affective) disorder ([address])  Active Problems:    Medical clearance for psychiatric admission    Intellectual disability    Psychosis ([address])      Recommended Treatment:   No psychopharmacologic changes necessary at this moment with the exception of increasing lithium to 600 BID for mood; Will continue to assess daily for further optimization. Lithium level for next week. 1:1 observation due to poor boundaries  Continue with pharmacotherapy, group therapy, milieu therapy and occupational therapy. Continue to assess for adverse medication side effects. Encourage Roby Clark to participate in nonverbal forms of therapy including journaling and art/music therapy. Continue frequent safety checks and vitals per unit protocol. Continue to engage CM/SW to assist with collateral, disposition planning, and the implementation of an individualized, patient-centered plan of care. Continue medical management by medical team.  Case discussed with treatment team.    Legal Status: 303 as of 6/13  ------------------------------------------------------------    Subjective: All documentation including nursing notes, medication history to ensure medication adherence on the unit, labs, and vitals were reviewed. Osman Snider was evaluated this morning for continuity of care and no acute distress noted throughout the evaluation. Over the past 24 hours per nursing report, Osman Snider has been cooperative on the unit and compliant with medications. Today, Osman Snider is consenting for safety on the unit. Osman Snider reports feeling "remi." Osman Snider notes having good sleep. Per report patient only slept a few hours but improved from admission. Osman Snider states having a decent appetite.  Osman Snider has been taking the medications as prescribed and reporting no side effects. Patient remains labile and tangential but is overall stabilizing in mood and does not appear to be internally stimulated to the degree she was when she first arrived. She is still discussing having boyfriends and girlfriends on the unit but this is her baseline per notes. Cynthia Brand denies suicidal ideations. Cynthia Brand denies homicidal ideations. Regarding hallucinations, Cynthia Brand denies and does not appear internally stimulated. PRNs overnight: haldol   VS: Reviewed, within normal limits    Progress Toward Goals: slow improvement    Psychiatric Review of Systems:  Behavior over the last 24 hours:  improved  Sleep: normal  Appetite: normal  Medication side effects: No   ROS: all other systems are negative    Vital signs in last 24 hours:  Temp:  [97.5 °F (36.4 °C)-98.3 °F (36.8 °C)] 97.5 °F (36.4 °C)  HR:  [101-119] 101  Resp:  [16-18] 16  BP: (127-141)/(60-74) 141/74    Laboratory results:  I have personally reviewed all pertinent laboratory/tests results. No results found for this or any previous visit (from the past 48 hour(s)). Mental Status Evaluation:    Appearance:  dressed appropriately, improved grooming, overtly appearing  female, intermittent improved eye contact, in no apparent acute distres   Behavior:  cooperative   Speech:  normal rate and volume   Mood:  "remi"   Affect:  brighter,.  Less labile than previous   Thought Process:  disorganized   Associations: loose associations   Thought Content:  no overt delusions   Perceptual Disturbances: Denies auditory or visual hallucinations and Does not appear to be responding to internal stimuli   Risk Potential: Suicidal ideation - None at present  Homicidal ideation - None at present  Potential for aggression - Not at present   Sensorium:  oriented to person and place   Memory:  recent and remote memory grossly intact   Consciousness:  alert and awake   Attention/Concentration: attention span and concentration appear shorter than expected for age   Insight:  poor   Judgment: poor   Gait/Station: normal gait/station   Motor Activity: no abnormal movements       Current Medications:  Current Facility-Administered Medications   Medication Dose Route Frequency Provider Last Rate   • acetaminophen  650 mg Oral Q6H PRN Meagan Villalobos, DO     • acetaminophen  650 mg Oral Q4H PRN Meagan Villalobos, DO     • acetaminophen  975 mg Oral Q6H PRN Meagan Villalobos, DO     • aluminum-magnesium hydroxide-simethicone  30 mL Oral Q4H PRN Meagan Villalobos, DO     • haloperidol lactate  2.5 mg Intramuscular Q4H PRN Max 6/day Meagan Villalobos, DO      And   • LORazepam  1 mg Intramuscular Q4H PRN Max 6/day Meagan Villalobos, DO      And   • benztropine  0.5 mg Intramuscular Q4H PRN Max 6/day Meagan Villalobos, DO     • haloperidol lactate  5 mg Intramuscular Q4H PRN Max 4/day Meagan Villalobos, DO      And   • LORazepam  2 mg Intramuscular Q4H PRN Max 4/day Meagan Villalobos, DO      And   • benztropine  1 mg Intramuscular Q4H PRN Max 4/day Meagan Villalobos, DO     • benztropine  1 mg Intramuscular Q4H PRN Max 6/day Meagan Villalobos, DO     • benztropine  1 mg Oral BID PRN Meagan Villalobos, DO     • benztropine  1 mg Oral Q4H PRN Max 6/day Meagan Villalobos, DO     • benztropine  1 mg Oral BID Meagan Villalobos, DO     • clonazePAM  0.5 mg Oral BID PRN Meagan Villalobos, DO     • clonazePAM  0.5 mg Oral Daily Meagan Villalobos, DO     • cloNIDine  0.1 mg Oral Q12H [address] Meagan Villalobos, DO     • hydrOXYzine HCL  50 mg Oral Q6H PRN Max 4/day Meagan Villalobos, DO      Or   • diphenhydrAMINE  50 mg Intramuscular Q6H PRN Meagan Villalobos, DO     • divalproex sodium  1,000 mg Oral Q12H [address] Meagan Villalobos, DO     • hydrOXYzine HCL  100 mg Oral Q6H PRN Max 4/day Meagan Villalobos, DO      Or   • LORazepam  2 mg Intramuscular Q6H PRN Meagan Villalobos, DO     • hydrOXYzine HCL  25 mg Oral Q6H PRN Max 4/day Meagan Villalobos, DO     • lithium carbonate  450 mg Oral BID Meagan Villalobos, DO     • melatonin  3 mg Oral HS PRN Cherie Perkins, DO     • OLANZapine  5 mg Oral HS Missyeulalia Villasenor, CRNP     • polyethylene glycol  17 g Oral Daily PRN Cherie Perkins, DO     • propranolol  10 mg Oral Q8H PRN Cherie Perkins, DO     • risperiDONE  0.5 mg Oral BID PRN Cherie Burnettn, DO     • risperiDONE  0.5 mg Oral Q4H PRN Max 6/day Cherie Skillselina, DO     • risperiDONE  1 mg Oral Q4H PRN Max 3/day Cherie Perkins, DO     • risperiDONE  2 mg Oral Q4H PRN Max 3/day Cherie Burnettn, DO     • risperiDONE  3 mg Oral BID Cherie Perkins, DO     • senna-docusate sodium  1 tablet Oral BID Cherie Perkins, DO     • traZODone  100 mg Oral HS Luana Ismael Ward, [address] [place] [address],  06/16/23  Psychiatry Resident, PGY-II    This note was completed in part utilizing [employer] Direct Software. Grammatical, translation, syntax errors, random word insertions, spelling mistakes, and incomplete sentences may be an occasional consequence of this system secondary to software limitations with voice recognition, ambient noise, and hardware issues. If you have any questions or concerns about the content, text, or information contained within the body of this dictation, please contact the provider for clarification.

## 2023-06-16 NOTE — NURSING NOTE
Pt has been maintained on continual observation, close proximity throughout the night without incident. Doxycycline Counseling:  Patient counseled regarding possible photosensitivity and increased risk for sunburn.  Patient instructed to avoid sunlight, if possible.  When exposed to sunlight, patients should wear protective clothing, sunglasses, and sunscreen.  The patient was instructed to call the office immediately if the following severe adverse effects occur:  hearing changes, easy bruising/bleeding, severe headache, or vision changes.  The patient verbalized understanding of the proper use and possible adverse effects of doxycycline.  All of the patient's questions and concerns were addressed.

## 2023-06-16 NOTE — NURSING NOTE
Patient is visible on unit and attended group. Patient remains loud and disruptive at times but is receptive to redirection. Patient observed responding to internal stimuli with labile mood. She is compliant with meal and medications. Patient denies any unmet needs at this time and remains on 1:1 safety observation.

## 2023-06-16 NOTE — NURSING NOTE
Patient continues requiring 1:1 for safety and redirection. Patient displaying inappropriate behavior such as calling this writer "Mi miguel" and other verbal statements, as well as other male staff/patients. Patient is able to be redirected from yelling, but requires redirection. Patient will occasionally get frustrated when taking medications and cry; slapping her head. When asked patient stated "Because they are drugs, and drugs are bad." Patient has been cooperative and with medication has also been compliant. She is pleasant, and conversational with staff. Patient is labile. Patient denies AVH/SI/HI.

## 2023-06-16 NOTE — QUICK NOTE
Was able to reach sister, sister works 8am-4:30 but states if we call her twice in a row she will know to , or if we leave VM she will call number back. She states that 3 months ago Carlee Bates stopped sleeping, and the family is usually able to manage her "episodes" but given that she regressed and she has been not oriented to the year etc the family was worried, and had attempted to change meds with the outpatient psychiatrist but this had not been working so it was determined they needed to take her to the hospital.     Approximately 4 or 5 months ago, Carlee Bates had learned that an aunt of hers she is close to was diagnosed with cancer.     Plan to call sister to get detailed medication list, WON for outpatient psychiatrist.

## 2023-06-16 NOTE — PROGRESS NOTES
06/16/23 0823   Team Meeting   Meeting Type Daily Rounds   Team Members Present   Team Members Present Physician;Nurse;   Phys[facility] Team [facility]   Care Management Team Member Truong Putnam   Patient/Family Present   Patient Present No   Patient's Family Present No   1:1. Lithium level 0.7. Pt sexually inappropriate with staff, needs redirection. Pt reports AH, gesturing slitting throat, reports VH of people around a candle. Pt slept slightly more. Lithium to increase. Dc tbd.

## 2023-06-16 NOTE — SOCIAL WORK
CM contacted ROSITA as pt's sister reports pt has services through [facility].    Pt has SC through [facility]. Pt's SC and his supervisor are out the of the office. Vaibhav Masters ([phone]) will not be in the office until the 26th, Flavio's supervisor [address] [place] [address] ([phone]) will be back in office on 6/20. CM to try back next week to speak with Mercedez.

## 2023-06-17 PROCEDURE — 99232 SBSQ HOSP IP/OBS MODERATE 35: CPT | Performed by: PSYCHIATRY & NEUROLOGY

## 2023-06-17 RX ADMIN — CLONIDINE HYDROCHLORIDE 0.1 MG: 0.1 TABLET ORAL at 21:33

## 2023-06-17 RX ADMIN — ACETAMINOPHEN 975 MG: 325 TABLET ORAL at 10:19

## 2023-06-17 RX ADMIN — SENNOSIDES AND DOCUSATE SODIUM 1 TABLET: 50; 8.6 TABLET ORAL at 10:00

## 2023-06-17 RX ADMIN — LITHIUM CARBONATE 600 MG: 450 TABLET, EXTENDED RELEASE ORAL at 17:54

## 2023-06-17 RX ADMIN — LITHIUM CARBONATE 600 MG: 450 TABLET, EXTENDED RELEASE ORAL at 09:56

## 2023-06-17 RX ADMIN — BENZTROPINE MESYLATE 1 MG: 1 TABLET ORAL at 17:55

## 2023-06-17 RX ADMIN — HALOPERIDOL LACTATE 5 MG: 5 INJECTION, SOLUTION INTRAMUSCULAR at 16:19

## 2023-06-17 RX ADMIN — SENNOSIDES AND DOCUSATE SODIUM 1 TABLET: 50; 8.6 TABLET ORAL at 17:55

## 2023-06-17 RX ADMIN — DIVALPROEX SODIUM 1000 MG: 500 TABLET, DELAYED RELEASE ORAL at 09:59

## 2023-06-17 RX ADMIN — LORAZEPAM 2 MG: 2 INJECTION INTRAMUSCULAR; INTRAVENOUS at 16:19

## 2023-06-17 RX ADMIN — CLONIDINE HYDROCHLORIDE 0.1 MG: 0.1 TABLET ORAL at 09:57

## 2023-06-17 RX ADMIN — BENZTROPINE MESYLATE 1 MG: 1 TABLET ORAL at 09:59

## 2023-06-17 RX ADMIN — BENZTROPINE MESYLATE 1 MG: 1 TABLET ORAL at 09:57

## 2023-06-17 RX ADMIN — Medication 3 MG: at 00:50

## 2023-06-17 RX ADMIN — OLANZAPINE 5 MG: 5 TABLET, FILM COATED ORAL at 21:32

## 2023-06-17 RX ADMIN — RISPERIDONE 3 MG: 2 TABLET ORAL at 09:56

## 2023-06-17 RX ADMIN — DIVALPROEX SODIUM 1000 MG: 500 TABLET, DELAYED RELEASE ORAL at 21:32

## 2023-06-17 RX ADMIN — RISPERIDONE 1 MG: 1 TABLET ORAL at 00:50

## 2023-06-17 RX ADMIN — TRAZODONE HYDROCHLORIDE 100 MG: 100 TABLET ORAL at 21:32

## 2023-06-17 RX ADMIN — BENZTROPINE MESYLATE 1 MG: 1 INJECTION INTRAMUSCULAR; INTRAVENOUS at 16:19
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
## 2023-06-17 NOTE — NURSING NOTE
Patient observed sleeping. Patient taken out of restraints, awoken, and  debriefed about why restraint event occurred and behavioral expectations of the unit and to not assault staff. Patient verbalized understanding.

## 2023-06-17 NOTE — RESTRAINT FACE TO FACE
Restraint Face to Face   Allison Chatterjee 28 y.o. female MRN: [id number]  Unit/Bed#: [facility] 349-01 Encounter: [id number]      Physical Evaluation : Patient is good physical health  Purpose for Restraints/ Seclusion : Harmful to others  Patient's reaction to the intervention - tolerated well   Patient's medical condition - no medical conditions  Patient's Behavioral condition - screaming, yelling, combative, assaulting, cursing  Restraints to be - continued

## 2023-06-17 NOTE — PLAN OF CARE
53M s/p alessio zapata POD#1    - regular diet  - d/c planning with outpt f/u Problem: Risk for Self Injury/Neglect  Goal: Treatment Goal: Remain safe during length of stay, learn and adopt new coping skills, and be free of self-injurious ideation, impulses and acts at the time of discharge  Outcome: Progressing  Goal: Verbalize thoughts and feelings  Description: Interventions:  - Assess and re-assess patient's lethality and potential for self-injury  - Engage patient in 1:1 interactions, daily, for a minimum of 15 minutes  - Encourage patient to express feelings, fears, frustrations, hopes  - Establish rapport/trust with patient   Outcome: Progressing  Goal: Refrain from harming self  Description: Interventions:  - Monitor patient closely, per order  - Develop a trusting relationship  - Supervise medication ingestion, monitor effects and side effects   Outcome: Progressing  Goal: Attend and participate in unit activities, including therapeutic, recreational, and educational groups  Description: Interventions:  - Provide therapeutic and educational activities daily, encourage attendance and participation, and document same in the medical record  - Obtain collateral information, encourage visitation and family involvement in care   Outcome: Progressing  Goal: Recognize maladaptive responses and adopt new coping mechanisms  Outcome: Progressing  Goal: Complete daily ADLs, including personal hygiene independently, as able  Description: Interventions:  - Observe, teach, and assist patient with ADLS  - Monitor and promote a balance of rest/activity, with adequate nutrition and elimination  Outcome: Progressing     Problem: Risk for Violence/Aggression Toward Others  Goal: Treatment Goal: Refrain from acts of violence/aggression during length of stay, and demonstrate improved impulse control at the time of discharge  Outcome: Progressing  Goal: Verbalize thoughts and feelings  Description: Interventions:  - Assess and re-assess patient's level of risk, every waking shift  - Engage patient in 1:1 interactions, daily, for a minimum of 15 minutes   - Allow patient to express feelings and frustrations in a safe and non-threatening manner   - Establish rapport/trust with patient   Outcome: Progressing  Goal: Refrain from harming others  Outcome: Progressing  Goal: Refrain from destructive acts on the environment or property  Outcome: Progressing  Goal: Control angry outbursts  Description: Interventions:  - Monitor patient closely, per order  - Ensure early verbal de-escalation  - Monitor prn medication needs  - Set reasonable/therapeutic limits, outline behavioral expectations, and consequences   - Provide a non-threatening milieu, utilizing the least restrictive interventions   Outcome: Progressing  Goal: Attend and participate in unit activities, including therapeutic, recreational, and educational groups  Description: Interventions:  - Provide therapeutic and educational activities daily, encourage attendance and participation, and document same in the medical record   Outcome: Progressing  Goal: Identify appropriate positive anger management techniques  Description: Interventions:  - Offer anger management and coping skills groups   - Staff will provide positive feedback for appropriate anger control  Outcome: Progressing     Problem: JENAE  Goal: Will exhibit normal sleep and speech and no impulsivity  Description: INTERVENTIONS:  - Administer medication as ordered  - Set limits on impulsive behavior  - Make attempts to decrease external stimuli as possible  Outcome: Progressing     Problem: INVOLUNTARY ADMIT  Goal: Will cooperate with staff recommendations and doctor's orders and will demonstrate appropriate behavior  Description: INTERVENTIONS:  - Treat underlying conditions and offer medication as ordered  - Educate regarding involuntary admission procedures and rules  - Utilize positive consistent limit setting strategies to support patient and staff safety  Outcome: Progressing     Problem: SELF CARE DEFICIT  Goal: Return ADL status to a safe level of function  Description: INTERVENTIONS:  - Administer medication as ordered  - Assess ADL deficits and provide assistive devices as needed  - Obtain PT/OT consults as needed  - Assist and instruct patient to increase activity and self care as tolerated  Outcome: Progressing

## 2023-06-17 NOTE — NURSING NOTE
Pt was out of their room during the evening shift. Visible in the dayroom coloring. Interested in brief conversation with RN. Compliant with scheduled night time medications. Did not received scheduled dose of catapres due to not meeting order parameters. Retreated to bed without any issues. Remained on continual observation throughout the evening without any issues.

## 2023-06-17 NOTE — NURSING NOTE
Patient is pleasant and cooperative, but is very labile, and requires constant redirection. Patient continues on 1:1 for safety with BHT without incident. Patient is sexually inappropriate to staff. Patient has pressured speech, and disorganized. She denies AVH/SI/HI. Patient complaining of 7/10 pain from menstrual cramps. Given 975mg of tylenol for severe pain. 11:19:  Patient no longer holding stomach, or complaining of pain. 975mg of tylenol effective for severe pain.

## 2023-06-17 NOTE — NURSING NOTE
No further agitation. Patient has followed staff direction and has remained with 1-1 staff in her room.

## 2023-06-17 NOTE — NURSING NOTE
1800: Patient became very agitated during medication administration, tearful. Patient refusing to be redirected but was staying in room. As this writer began to leave room to allow patient chance to deescalate. Patient ran into the bathroom upset and slammed door. This RN asked female 1:1 sitter to go in and check on her to prevent potential self-harm. When door was opened, patient began swinging at [facility] who came into maintain visual observation. [facility] reports no injury. Patient placed into 4 point locked restraints due to violence towards staff and refusing redirection. 1818: Provider made aware. Order obtained at 18:18. Patient explained criteria about why restraints were initiated, and appropriate behavioral expectations for release of restraints. Patient refused education and is tearful and screaming.

## 2023-06-17 NOTE — NURSING NOTE
Patient running out of her room and away from the 1-1 staff. With encouragement and redirection returned to her room but she remains impulsive, agitated and fully alert. Patient was offered and accepted Risperdal 1mg po prn for moderate agitation and Melatonin po prn insomnia.

## 2023-06-17 NOTE — PLAN OF CARE
Problem: Risk for Self Injury/Neglect  Goal: Treatment Goal: Remain safe during length of stay, learn and adopt new coping skills, and be free of self-injurious ideation, impulses and acts at the time of discharge  6/17/2023 1812 by Amaris Orta  Outcome: Not Progressing  6/17/2023 1158 by Amaris Orta  Outcome: Progressing  Goal: Verbalize thoughts and feelings  Description: Interventions:  - Assess and re-assess patient's lethality and potential for self-injury  - Engage patient in 1:1 interactions, daily, for a minimum of 15 minutes  - Encourage patient to express feelings, fears, frustrations, hopes  - Establish rapport/trust with patient   6/17/2023 1812 by Amaris Orta  Outcome: Not Progressing  6/17/2023 1158 by Amaris Orta  Outcome: Progressing  Goal: Refrain from harming self  Description: Interventions:  - Monitor patient closely, per order  - Develop a trusting relationship  - Supervise medication ingestion, monitor effects and side effects   6/17/2023 1812 by Amaris Orta  Outcome: Not Progressing  6/17/2023 1158 by Amaris Orta  Outcome: Progressing  Goal: Attend and participate in unit activities, including therapeutic, recreational, and educational groups  Description: Interventions:  - Provide therapeutic and educational activities daily, encourage attendance and participation, and document same in the medical record  - Obtain collateral information, encourage visitation and family involvement in care   6/17/2023 1812 by Amaris Orta  Outcome: Not Progressing  6/17/2023 1158 by Amaris Orta  Outcome: Progressing  Goal: Recognize maladaptive responses and adopt new coping mechanisms  6/17/2023 1812 by Amaris Orta  Outcome: Not Progressing  6/17/2023 1158 by Amaris Orta  Outcome: Progressing  Goal: Complete daily ADLs, including personal hygiene independently, as able  Description: Interventions:  - Observe, teach, and assist patient with ADLS  - Monitor and promote a balance of rest/activity, with adequate nutrition and elimination  6/17/2023 1812 by Gene Bottoms  Outcome: Not Progressing  6/17/2023 1158 by Gene Bottoms  Outcome: Progressing     Problem: Risk for Violence/Aggression Toward Others  Goal: Treatment Goal: Refrain from acts of violence/aggression during length of stay, and demonstrate improved impulse control at the time of discharge  6/17/2023 1812 by Gene Bottoms  Outcome: Not Progressing  6/17/2023 1811 by Gene Bottoms  Outcome: Not Progressing  6/17/2023 1158 by Gene Bottoms  Outcome: Progressing  Goal: Verbalize thoughts and feelings  Description: Interventions:  - Assess and re-assess patient's level of risk, every waking shift  - Engage patient in 1:1 interactions, daily, for a minimum of 15 minutes   - Allow patient to express feelings and frustrations in a safe and non-threatening manner   - Establish rapport/trust with patient   6/17/2023 1812 by Gene Bottoms  Outcome: Not Progressing  6/17/2023 1811 by Gene Bottoms  Outcome: Not Progressing  6/17/2023 1158 by Gene Bottoms  Outcome: Progressing  Goal: Refrain from harming others  6/17/2023 1812 by Gene Bottoms  Outcome: Not Progressing  6/17/2023 1811 by Gene Bottoms  Outcome: Not Progressing  6/17/2023 1158 by Gene Bottoms  Outcome: Progressing  Goal: Refrain from destructive acts on the environment or property  6/17/2023 1812 by Gene Bottoms  Outcome: Not Progressing  6/17/2023 1811 by Gene Bottoms  Outcome: Not Progressing  6/17/2023 1158 by Gene Bottoms  Outcome: Progressing  Goal: Control angry outbursts  Description: Interventions:  - Monitor patient closely, per order  - Ensure early verbal de-escalation  - Monitor prn medication needs  - Set reasonable/therapeutic limits, outline behavioral expectations, and consequences   - Provide a non-threatening milieu, utilizing the least restrictive interventions   6/17/2023 1812 by Gene Bottoms  Outcome: Not Progressing  6/17/2023 1811 by Omar Flores Cesar  Outcome: Not Progressing  6/17/2023 1158 by Brittany Christian  Outcome: Progressing  Goal: Attend and participate in unit activities, including therapeutic, recreational, and educational groups  Description: Interventions:  - Provide therapeutic and educational activities daily, encourage attendance and participation, and document same in the medical record   6/17/2023 1812 by Brittany Christian  Outcome: Not Progressing  6/17/2023 1811 by Brittany Christian  Outcome: Not Progressing  6/17/2023 1158 by Brittany Christian  Outcome: Progressing  Goal: Identify appropriate positive anger management techniques  Description: Interventions:  - Offer anger management and coping skills groups   - Staff will provide positive feedback for appropriate anger control  6/17/2023 1812 by Brittany Christian  Outcome: Not Progressing  6/17/2023 1811 by Brittany Christian  Outcome: Not Progressing  6/17/2023 1158 by Brittany Christian  Outcome: Progressing     Problem: JENAE  Goal: Will exhibit normal sleep and speech and no impulsivity  Description: INTERVENTIONS:  - Administer medication as ordered  - Set limits on impulsive behavior  - Make attempts to decrease external stimuli as possible  6/17/2023 1812 by Brittany Christian  Outcome: Not Progressing  6/17/2023 1158 by Brittany Christian  Outcome: Progressing     Problem: INVOLUNTARY ADMIT  Goal: Will cooperate with staff recommendations and doctor's orders and will demonstrate appropriate behavior  Description: INTERVENTIONS:  - Treat underlying conditions and offer medication as ordered  - Educate regarding involuntary admission procedures and rules  - Utilize positive consistent limit setting strategies to support patient and staff safety  6/17/2023 1812 by Brittany Christian  Outcome: Not Progressing  6/17/2023 1158 by Brittany Christian  Outcome: Progressing     Problem: SELF CARE DEFICIT  Goal: Return ADL status to a safe level of function  Description: INTERVENTIONS:  - Administer medication as ordered  - Assess ADL deficits and provide assistive devices as needed  - Obtain PT/OT consults as needed  - Assist and instruct patient to increase activity and self care as tolerated  6/17/2023 1812 by Oscar Castro  Outcome: Not Progressing  6/17/2023 1158 by Oscar Castro  Outcome: Progressing     Problem: SAFETY, RESTRAINT - VIOLENT/SELF-DESTRUCTIVE  Goal: Remains free of harm/injury from restraints (Restraint for Violent/Self-Destructive Behavior)  Description: INTERVENTIONS:  - Instruct patient/family regarding restraint use   - Assess and monitor physiologic and psychological status   - Provide interventions and comfort measures to meet assessed patient needs   - Ensure continuous in person monitoring is provided   - Identify and implement measures to help patient regain control  - Assess readiness for release of restraint  Outcome: Not Progressing  Goal: Returns to optimal restraint-free functioning  Description: INTERVENTIONS:  - Assess the patient's behavior and symptoms that indicate continued need for restraint  - Identify and implement measures to help patient regain control  - Assess readiness for release of restraint   Outcome: Not Progressing

## 2023-06-17 NOTE — NURSING NOTE
Patient has been unable to demonstrate appropriate phone use this evening. Patient becomes very triggered when talking to mother. Patient redirected to her room. Patient came running out of room screaming, attempting to push past 1:1 sitter insisting on using the phone. Patient tearful, severely agitated, and severely anxious. Patient given  5mg Haldol for severe agitation       2mg cogentin               1mg ativan for severe anxiety  IM route    17:19: Haldol, cogentin and ativan IM effective for severe agitation and anxiety. Patient is appearing drowsy and in behavioral control at meal time.

## 2023-06-18 PROCEDURE — 99232 SBSQ HOSP IP/OBS MODERATE 35: CPT | Performed by: PSYCHIATRY & NEUROLOGY

## 2023-06-18 RX ADMIN — OLANZAPINE 5 MG: 5 TABLET, FILM COATED ORAL at 21:04

## 2023-06-18 RX ADMIN — SENNOSIDES AND DOCUSATE SODIUM 1 TABLET: 50; 8.6 TABLET ORAL at 09:45

## 2023-06-18 RX ADMIN — CLONIDINE HYDROCHLORIDE 0.1 MG: 0.1 TABLET ORAL at 09:44

## 2023-06-18 RX ADMIN — SENNOSIDES AND DOCUSATE SODIUM 1 TABLET: 50; 8.6 TABLET ORAL at 18:30

## 2023-06-18 RX ADMIN — BENZTROPINE MESYLATE 1 MG: 1 TABLET ORAL at 09:43

## 2023-06-18 RX ADMIN — HALOPERIDOL LACTATE 5 MG: 5 INJECTION, SOLUTION INTRAMUSCULAR at 19:42

## 2023-06-18 RX ADMIN — RISPERIDONE 3 MG: 2 TABLET ORAL at 18:30

## 2023-06-18 RX ADMIN — ACETAMINOPHEN 975 MG: 325 TABLET ORAL at 13:02

## 2023-06-18 RX ADMIN — LITHIUM CARBONATE 600 MG: 450 TABLET, EXTENDED RELEASE ORAL at 18:30

## 2023-06-18 RX ADMIN — LITHIUM CARBONATE 600 MG: 450 TABLET, EXTENDED RELEASE ORAL at 09:43

## 2023-06-18 RX ADMIN — DIVALPROEX SODIUM 1000 MG: 500 TABLET, DELAYED RELEASE ORAL at 09:44

## 2023-06-18 RX ADMIN — DIVALPROEX SODIUM 1000 MG: 500 TABLET, DELAYED RELEASE ORAL at 21:04

## 2023-06-18 RX ADMIN — TRAZODONE HYDROCHLORIDE 100 MG: 100 TABLET ORAL at 21:05

## 2023-06-18 RX ADMIN — RISPERIDONE 3 MG: 2 TABLET ORAL at 09:44

## 2023-06-18 RX ADMIN — CLONIDINE HYDROCHLORIDE 0.1 MG: 0.1 TABLET ORAL at 21:04

## 2023-06-18 RX ADMIN — LORAZEPAM 2 MG: 2 INJECTION INTRAMUSCULAR; INTRAVENOUS at 19:42

## 2023-06-18 RX ADMIN — BENZTROPINE MESYLATE 1 MG: 1 INJECTION INTRAMUSCULAR; INTRAVENOUS at 19:42

## 2023-06-18 RX ADMIN — BENZTROPINE MESYLATE 1 MG: 1 TABLET ORAL at 18:30

## 2023-06-18 NOTE — NURSING NOTE
Patient was out of their room during the evening shift playing with dominos. Became tearful for a moment when scheduled night time medications were being administered, reassurance provided and patient became calm. Pt then stated that their favorite candy is chicklet gum. Compliant with scheduled night time medications. Needed a reminder to drink water after putting the pills into their mouth. Retreated to their room without any issues. Pt remained on continual observation close proximity throughout the evening without any incidents.

## 2023-06-18 NOTE — NURSING NOTE
BHT on close proximity assisting with general hygiene. 1:1 for safety/non-suicidal maintained without incident.

## 2023-06-18 NOTE — NURSING NOTE
13:02: Patient complaining of 7/10 severe pain in abdomen related to menstrual cramps. Given 975mg of tylenol. 14:02: Medication for severe abdomen pain related to menstrual cramps effective. Patient reports no pain.

## 2023-06-18 NOTE — PLAN OF CARE
Problem: Risk for Self Injury/Neglect  Goal: Treatment Goal: Remain safe during length of stay, learn and adopt new coping skills, and be free of self-injurious ideation, impulses and acts at the time of discharge  6/18/2023 0811 by Dania López  Outcome: Progressing  6/17/2023 1812 by Dania López  Outcome: Not Progressing  Goal: Verbalize thoughts and feelings  Description: Interventions:  - Assess and re-assess patient's lethality and potential for self-injury  - Engage patient in 1:1 interactions, daily, for a minimum of 15 minutes  - Encourage patient to express feelings, fears, frustrations, hopes  - Establish rapport/trust with patient   6/18/2023 0811 by Dania López  Outcome: Progressing  6/17/2023 1812 by Dania López  Outcome: Not Progressing  Goal: Refrain from harming self  Description: Interventions:  - Monitor patient closely, per order  - Develop a trusting relationship  - Supervise medication ingestion, monitor effects and side effects   6/18/2023 0811 by Dania López  Outcome: Progressing  6/17/2023 1812 by Dania López  Outcome: Not Progressing  Goal: Attend and participate in unit activities, including therapeutic, recreational, and educational groups  Description: Interventions:  - Provide therapeutic and educational activities daily, encourage attendance and participation, and document same in the medical record  - Obtain collateral information, encourage visitation and family involvement in care   6/18/2023 0811 by Dania López  Outcome: Progressing  6/17/2023 1812 by Dania López  Outcome: Not Progressing  Goal: Recognize maladaptive responses and adopt new coping mechanisms  6/18/2023 0811 by Dania López  Outcome: Progressing  6/17/2023 1812 by Dania López  Outcome: Not Progressing  Goal: Complete daily ADLs, including personal hygiene independently, as able  Description: Interventions:  - Observe, teach, and assist patient with ADLS  - Monitor and promote a balance of rest/activity, with adequate nutrition and elimination  6/18/2023 0811 by Edyta Butler  Outcome: Progressing  6/17/2023 1812 by Edyta Butler  Outcome: Not Progressing     Problem: JENAE  Goal: Will exhibit normal sleep and speech and no impulsivity  Description: INTERVENTIONS:  - Administer medication as ordered  - Set limits on impulsive behavior  - Make attempts to decrease external stimuli as possible  6/18/2023 0811 by Edyta Butler  Outcome: Progressing  6/17/2023 1812 by Edyta Butler  Outcome: Not Progressing     Problem: SELF CARE DEFICIT  Goal: Return ADL status to a safe level of function  Description: INTERVENTIONS:  - Administer medication as ordered  - Assess ADL deficits and provide assistive devices as needed  - Obtain PT/OT consults as needed  - Assist and instruct patient to increase activity and self care as tolerated  6/18/2023 0811 by Edyta Butler  Outcome: Progressing  6/17/2023 1812 by Edyta Butler  Outcome: Not Progressing

## 2023-06-19 PROBLEM — N89.8 VAGINAL LESION: Status: ACTIVE | Noted: 2023-06-19

## 2023-06-19 PROBLEM — R30.0 DYSURIA: Status: ACTIVE | Noted: 2023-06-19

## 2023-06-19 LAB
BACTERIA UR QL AUTO: ABNORMAL /HPF
BILIRUB UR QL STRIP: NEGATIVE
CLARITY UR: ABNORMAL
COLOR UR: ABNORMAL
GLUCOSE UR STRIP-MCNC: NEGATIVE MG/DL
HGB UR QL STRIP.AUTO: 250
KETONES UR STRIP-MCNC: NEGATIVE MG/DL
LEUKOCYTE ESTERASE UR QL STRIP: 25
MUCOUS THREADS UR QL AUTO: ABNORMAL
NITRITE UR QL STRIP: NEGATIVE
NON-SQ EPI CELLS URNS QL MICRO: ABNORMAL /HPF
PH UR STRIP.AUTO: 8 [PH]
PROT UR STRIP-MCNC: ABNORMAL MG/DL
RBC #/AREA URNS AUTO: ABNORMAL /HPF
SP GR UR STRIP.AUTO: 1.01 (ref 1–1.04)
UROBILINOGEN UA: NEGATIVE MG/DL
WBC #/AREA URNS AUTO: ABNORMAL /HPF

## 2023-06-19 PROCEDURE — 99232 SBSQ HOSP IP/OBS MODERATE 35: CPT | Performed by: PSYCHIATRY & NEUROLOGY

## 2023-06-19 PROCEDURE — 81003 URINALYSIS AUTO W/O SCOPE: CPT

## 2023-06-19 PROCEDURE — 86780 TREPONEMA PALLIDUM: CPT

## 2023-06-19 PROCEDURE — 87491 CHLMYD TRACH DNA AMP PROBE: CPT

## 2023-06-19 PROCEDURE — 87591 N.GONORRHOEAE DNA AMP PROB: CPT

## 2023-06-19 PROCEDURE — 81001 URINALYSIS AUTO W/SCOPE: CPT

## 2023-06-19 PROCEDURE — 87529 HSV DNA AMP PROBE: CPT

## 2023-06-19 RX ADMIN — SENNOSIDES AND DOCUSATE SODIUM 1 TABLET: 50; 8.6 TABLET ORAL at 09:02

## 2023-06-19 RX ADMIN — ACETAMINOPHEN 975 MG: 325 TABLET ORAL at 06:46

## 2023-06-19 RX ADMIN — RISPERIDONE 2 MG: 2 TABLET ORAL at 01:10

## 2023-06-19 RX ADMIN — HYDROXYZINE HYDROCHLORIDE 100 MG: 50 TABLET, FILM COATED ORAL at 01:10

## 2023-06-19 RX ADMIN — RISPERIDONE 3 MG: 2 TABLET ORAL at 17:11

## 2023-06-19 RX ADMIN — CLONIDINE HYDROCHLORIDE 0.1 MG: 0.1 TABLET ORAL at 21:06

## 2023-06-19 RX ADMIN — LITHIUM CARBONATE 600 MG: 450 TABLET, EXTENDED RELEASE ORAL at 09:02

## 2023-06-19 RX ADMIN — RISPERIDONE 3 MG: 2 TABLET ORAL at 09:02

## 2023-06-19 RX ADMIN — DIVALPROEX SODIUM 1000 MG: 500 TABLET, DELAYED RELEASE ORAL at 21:06

## 2023-06-19 RX ADMIN — LITHIUM CARBONATE 600 MG: 450 TABLET, EXTENDED RELEASE ORAL at 17:11

## 2023-06-19 RX ADMIN — OLANZAPINE 5 MG: 5 TABLET, FILM COATED ORAL at 21:06

## 2023-06-19 RX ADMIN — BENZTROPINE MESYLATE 1 MG: 1 TABLET ORAL at 09:02

## 2023-06-19 RX ADMIN — Medication 3 MG: at 01:10

## 2023-06-19 RX ADMIN — BENZTROPINE MESYLATE 1 MG: 1 TABLET ORAL at 17:11

## 2023-06-19 RX ADMIN — SENNOSIDES AND DOCUSATE SODIUM 1 TABLET: 50; 8.6 TABLET ORAL at 17:11

## 2023-06-19 RX ADMIN — DIVALPROEX SODIUM 1000 MG: 500 TABLET, DELAYED RELEASE ORAL at 09:02

## 2023-06-19 RX ADMIN — TRAZODONE HYDROCHLORIDE 100 MG: 100 TABLET ORAL at 21:06

## 2023-06-19 NOTE — PROGRESS NOTES
06/19/23 0821   Team Meeting   Meeting Type Daily Rounds   Team Members Present   Team Members Present Physician;Nurse;   Physician Team Member [id number] Team Member Lucia   Care Management Team Member Cathi   Patient/Family Present   Patient Present No   Patient's Family Present No   1:1. Saturday-needed constant redirection, sexually inappropriate with staff, running in andrew screaming, pushing past 1:1, tearful, agitated due to poor boundaries with phone use. PRN Haldol, ativan, cogentin-effective. Saturday night pt in restraints due to swinging at staff. Sunday night- agitated, running through halls, screaming due to poor boundaries with phone. Pt reports vaginal itching and pt showed nurse and appears to have a scratch on labia. Poor sleep last night, PRN atarax, risperdal, melatonin-effective. DC tbd.

## 2023-06-19 NOTE — NURSING NOTE
Patient remains awake. Restless, agitated, and anxious. Unable to redirect or relax. Atarax 100 mg po prn given. Risperdal 2 mg po prn and melatonin 3 mg po prn. Will monitor.

## 2023-06-19 NOTE — NURSING NOTE
Patient maintained on close proximity observation overnight. PRN medication received earlier was effective with decreased agitation and patient slept from 2.15 am and is still asleep at this time.

## 2023-06-19 NOTE — NURSING NOTE
1:1 observations maintained. At start of shift, patient observed trying to use phone and getting very worked up. Increased agitation noted. Patient began running through andrew, unable to redirect, screaming, tearful. Haldol 5 mg IM, Ativan 2 mg IM and Cogentin 2 mg IM given at 1942 without issue. Patient reported her vagina is itchy. Patient proceeded to pull her pants down and showed this writer a small  abrasion on her labia. Patient encouraged not to touch with her fingers. Will report to SLIM. HS medication compliant at 2100. Haldol 5 mg IM, Ativan 2 mg IM, and Cogentin 2 mg IM effective when reassessed at 2045.

## 2023-06-19 NOTE — NUTRITION
Patient remains on 1:1 continual observation. Is labile and impulsive. Intrusive and redirectable at times. Medication compliant with multiple redirection attempts. Denies SI, HI, A/V hallucinations on assessment. Disorganized and rapid speech noted. This writer updated mom and sister on patient progress. Order received for patient consult for c/o vaginal itching and abrasion/lesion. Family medicine notified and patient seen. Specimen swab collected and sent to lab.

## 2023-06-19 NOTE — ASSESSMENT & PLAN NOTE
- Noted dysuria per patient report.    - Denied other  symptoms but history limited by disorganization and patient been tangential.   - Pelvic exam prematurely interrupted by patient     PLAN:  - UA  - GC/Chlamydia

## 2023-06-19 NOTE — CONSULTS
Inpatient Consultation - [address] [place] [address]    Patient Information: Janett Perdue 28 y.o. female MRN: [id number]  Unit/Bed#: [facility] 349-01 Encounter: [id number]  PCP: No primary care provider on file. Date of Admission:  6/9/2023  Date of Consultation: 06/19/23  Requesting Physician: Jose Cruz Jon MD    Inpatient consult to [facility]- Resident Service  Consult performed by: Anna Salazar MD  Consult ordered by: Jose Cruz Jon MD          Reason For Consultation:   Vaginal Itching and Abrasion/lesion    Assessment/Plan:    Vaginal lesion  Assessment & Plan  -Vaginal lesion noted next to right labial fold. Initially noticed on 6/18 by floor team.   - Patient had been scratching this area on multiple occassions per nursing team.   - Unsure if lesion was present on admission   - Pelvic exam prematurely interrupted by patient     Possible differentials include HSV, Syphilis. Possible abrasion from constant itching. HIV test form 8/2022 negative    PLAN:  - RPR  - HSV test    Dysuria  Assessment & Plan  - Noted dysuria per patient report. - Denied other  symptoms but history limited by disorganization and patient been tangential.   - Pelvic exam prematurely interrupted by patient     PLAN:  - UA  - GC/Chlamydia      VTE Prophylaxis: Reason for no pharmacologic prophylaxis Patient Ambulating  / reason for no mechanical VTE prophylaxis Patient ambulating     Recommendations for Discharge:  · Per Psych team    Counseling / Coordination of Care Time: 30 minutes. Greater than 50% of total time spent on patient counseling and coordination of care. Collaboration of Care: Were Recommendations Directly Discussed with Primary Treatment Team? - Yes     History of Present Illness:    Janett Perdue is a 28 y.o. female who is originally admitted to the Psychiatry service on 6/9/2023 due to a involuntary 302. We are consulted for Vaginal itching and lesion.  Patient noted Dysuria that began 1 day ago. Previously non-itchy but now pt rubbing on vaginal area due to itchiness. No discharge noted. History difficult to obtain as patient very disorganized and tangential.   During pelvic exam, patient moved too quickly up the exam bed, likely due to discomfort from the speculum. Speculum was still in place when patient forcefully pulled her self away causing speculum to come out of vaginal orifice. Examination was immediately discontinued and patient noted to have some bleeding after. Nurse and tech in room during examination. Review of Systems:    Review of Systems   Constitutional: Negative for fever. Genitourinary: Positive for dysuria. Negative for difficulty urinating, flank pain, frequency, hematuria, menstrual problem, pelvic pain, vaginal bleeding, vaginal discharge and vaginal pain. Past Medical and Surgical History:   Past Medical History:   Diagnosis Date   • Psychiatric disorder      History reviewed. No pertinent surgical history. Meds/Allergies: Allergies: No Known Allergies  Prior to Admission Medications   Prescriptions Last Dose Informant Patient Reported? Taking? Diclofenac Sodium (VOLTAREN) 1 % Not Taking  Yes No   Sig: Apply 1 Application topically Three times daily as needed   Patient not taking: Reported on 6/10/2023   benztropine (COGENTIN) 2 mg tablet Past Week  Yes Yes   Sig: Take 2 mg by mouth 2 (two) times a day   clonazePAM (KlonoPIN) 0.5 mg tablet Not Taking  Yes No   Sig: Take 0.5 mg by mouth Three times a day   Patient not taking: Reported on 6/10/2023   divalproex sodium (DEPAKOTE) 500 mg DR tablet Past Week  Yes Yes   Sig: Take 1,000 mg by mouth 2 (two) times a day   lithium carbonate (LITHOBID) 300 mg CR tablet Past Week  Yes Yes   Sig: Take 300 mg by mouth 2 (two) times a day   risperiDONE (RisperDAL) 0.5 mg tablet Not Taking  Yes No   Si (EACH) TWICE A DAY GIVE ONE IN THE MORNING AND ONE AT BEDTIME.    Patient not taking: Reported on 6/10/2023 risperiDONE (RisperDAL) 3 mg tablet Past Week  Yes Yes   Sig: Take 3 mg by mouth 2 (two) times a day   sertraline (ZOLOFT) 100 mg tablet Not Taking  Yes No   Sig: TAKE 1 TABLET BY MOUTH DAILY TO HELP WITH ANXIETY, DEPRESSION AND SOME OF THE OBSESSIONS. Patient not taking: Reported on 6/10/2023   sertraline (ZOLOFT) 50 mg tablet Not Taking  Yes No   Sig: TAKE 1 TABLET BY MOUTH DAILY TO HELP WITH ANXIETY, DEPRESSION AND SOME OF THE OBSESSIONS. Patient not taking: Reported on 6/10/2023   traZODone (DESYREL) 100 mg tablet Past Week  Yes Yes   Sig: Take 100 mg by mouth daily at bedtime      Facility-Administered Medications: None     Social History:     Social History     Socioeconomic History   • Marital status: Single     Spouse name: Not on file   • Number of children: Not on file   • Years of education: Not on file   • Highest education level: Not on file   Occupational History   • Not on file   Tobacco Use   • Smoking status: Every Day     Packs/day: 0.50     Years: 10.00     Total pack years: 5.00     Types: Cigarettes   • Smokeless tobacco: Never   Vaping Use   • Vaping Use: Never used   Substance and Sexual Activity   • Alcohol use: Yes     Alcohol/week: 3.0 standard drinks of alcohol     Types: 3 Glasses of wine per week     Comment: sips of wine   • Drug use: Yes     Frequency: 1.0 times per week     Types: Marijuana   • Sexual activity: Not Currently     Partners: Female   Other Topics Concern   • Not on file   Social History Narrative   • Not on file     Social Determinants of Health     Financial Resource Strain: Not on file   Food Insecurity: Not on file   Transportation Needs: Not on file   Physical Activity: Not on file   Stress: Not on file   Social Connections: Not on file   Intimate Partner Violence: Not on file   Housing Stability: Not on file       Family History:  History reviewed. No pertinent family history.     Physical Exam:     Vitals:   Blood Pressure: 102/60 (06/19/23 0747)  Pulse: 86 (06/19/23 0747)  Temperature: (!) 97.3 °F (36.3 °C) (06/19/23 0747)  Temp Source: Temporal (06/19/23 0747)  Respirations: 16 (06/19/23 0747)  Height: 5' 4.5" (163.8 cm) (06/10/23 1534)  Weight - Scale: 54.3 kg (119 lb 9.6 oz) (06/10/23 1534)  SpO2: 98 % (06/19/23 0747)    Physical Exam  Vitals and nursing note reviewed. Exam conducted with a chaperone present. Constitutional:       General: She is not in acute distress. Appearance: She is not ill-appearing. HENT:      Head: Normocephalic and atraumatic. Eyes:      Extraocular Movements: Extraocular movements intact. Cardiovascular:      Rate and Rhythm: Normal rate and regular rhythm. Pulmonary:      Effort: Pulmonary effort is normal. No respiratory distress. Breath sounds: No wheezing. Genitourinary:     General: Normal vulva. Exam position: Lithotomy position. Vagina: No vaginal discharge. Musculoskeletal:         General: Normal range of motion. Neurological:      Mental Status: She is alert. Psychiatric:         Attention and Perception: She is inattentive. Speech: Speech is rapid and pressured and tangential.         Behavior: Behavior is hyperactive. Additional Data:     Lab Results: I have personally reviewed pertinent reports. Results from last 7 days   Lab Units 06/16/23  0643   POTASSIUM mmol/L 4.0   CHLORIDE mmol/L 103   CO2 mmol/L 30   BUN mg/dL 11   CREATININE mg/dL 0.53*   CALCIUM mg/dL 10.1   ALK PHOS U/L 32*   ALT U/L 13   AST U/L 19           Imaging: I have personally reviewed pertinent reports. No results found. ** Please Note: This note has been constructed using a voice recognition system.  **    Leigh Ann Carlson MD  06/19/23  3:06 PM

## 2023-06-19 NOTE — PROGRESS NOTES
Problem: Patient Care Overview  Goal: Plan of Care Review  Outcome: Ongoing (interventions implemented as appropriate)  Plan of care discussed with patient. Patient is free of fall/trauma/injury. Denies CP, SOB, or pain/discomfort. All questions addressed. Will continue to monitor.       Progress Note - [address] 28 y.o. female MRN: [id number]   Unit/Bed#: [facility] 349-01 Encounter: [id number]    Patient was seen and evaluated for continuity of care on 6/19/2023. Patient per nursing report on 6/17/2023 became agitated during medication administration and reportedly went into no her bathroom upset and slammed the door. When one-to-one staff member checked on the patient, patient began sleeping tach. She was placed into four-point locked restraints for violence and refusing redirection at approximately 6:18 PM.  She was taken out of restraints approximately 1 hour later after improvement in being able to stay in behavioral control. Per week and psychiatry team on 6/18/2023, patient remained labile in her mood and affect but was able to follow redirection by staff. Per nursing report yesterday afternoon, patient received Tylenol for 7 out of 10 pain due to menstrual cramps which was noted to be effective. Per nighttime nursing report last night, patient was observed trying to use the phone and became agitated. She began running through the hallway and was unable to be redirected. She was given Haldol 5 mg IM, Ativan 2 mg IM, and Cogentin 1 mg IM at 7:42 PM.  She reported to nurse that her vagina was itchy and nursing noted that there is a small abrasion on her labia and patient was encouraged not to touch with fingers. This writer placed a formal consult to family medicine for evaluation of abrasion. Overnight, patient remained restless and agitated and was given Risperdal 2 mg p.o. for agitation and melatonin 3 mg for insomnia. During the interview today, patient describes her mood as "good."  She remains restless and fidgety during the interview and required multiple instances to follow requests of this writer to sit down in her bed. She was noted to be labile in affect and mood and was noted to be crying to herself abruptly at times in interview.   She states that she has been doing better with her sleep and states that she slept 10 hours last night. Staff reported that patient slept approximately 4 hours per documentation she notes doing well with her energy level and appetite. She denies SI/HI/AVH and denies any plan or intent to harm herself or others. She was able to hold some of the conversation in [facility] and was interviewed with Tunisian-speaking staff. She denies any medication side effects. Patient last had a bowel movement on 6/18/2023 per staff report. Patient was noted to be sexually inappropriate with staff per treatment team report.     Sleep: slept 4 hours per staff report; per patient report, reports sleeping 10 hours  Appetite: normal  Medication side effects: No   ROS: reports Vaginal itching and vaginal abrasion, all other systems are negative    Mental Status Evaluation:    Appearance:   female, unkempt, dressed in T-shirt and scrub pants, poor dentition, does not appear to be in acute distress, age-appropriate   Behavior:  minimally cooperative, restless and fidgety, mild psychomotorr agitation, brief eye contact   Speech:  increased rate, articulation error, at times loud   Mood:  "good"   Affect:  labile, abruptly begins crying in session   Thought Process:  disorganized, tangential   Associations: tangential associations   Thought Content:  no overt delusions, negative thinking   Perceptual Disturbances: no auditory hallucinations, no visual hallucinations, appears distracted, appears preoccupied   Risk Potential: Suicidal ideation - None  Homicidal ideation - None  Potential for aggression - Yes, due to poor impulse control   Sensorium:  unable to assess   Memory:  recent and remote memory: unable to assess due to lack of cooperation   Consciousness:  alert and awake   Attention/Concentration: poor concentration and poor attention span   Insight:  poor   Judgment: poor   Gait/Station: normal gait/station   Motor Activity: no abnormal movements     Vital signs in last 24 hours:    Temp:  [97.3 °F (36.3 °C)-97.4 °F (36.3 °C)] 97.3 °F (36.3 °C)  HR:  [] 86  Resp:  [14-16] 16  BP: (101-111)/(53-60) 102/60    Laboratory results: I have personally reviewed all pertinent laboratory/tests results    Results from the past 24 hours: No results found for this or any previous visit (from the past 24 hour(s)). Most Recent Labs:   Lab Results   Component Value Date    WBC 5.25 06/11/2023    RBC 4.67 06/11/2023    HGB 14.0 06/11/2023    HCT 43.0 06/11/2023     06/11/2023    RDW 13.4 06/11/2023    NEUTROABS 2.58 06/11/2023    SODIUM 141 06/16/2023    K 4.0 06/16/2023     06/16/2023    CO2 30 06/16/2023    BUN 11 06/16/2023    CREATININE 0.53 (L) 06/16/2023    GLUC 73 06/16/2023    CALCIUM 10.1 06/16/2023    AST 19 06/16/2023    ALT 13 06/16/2023    ALKPHOS 32 (L) 06/16/2023    TP 6.8 06/16/2023    ALB 4.3 06/16/2023    TBILI 0.43 06/16/2023    CHOLESTEROL 181 06/11/2023    HDL 64 06/11/2023    TRIG 106 06/11/2023    LDLCALC 96 06/11/2023    NONHDLC 117 06/11/2023    VALPROICTOT 87 06/11/2023    LITHIUM 0.7 06/16/2023    SNF0KOGWNSVF 3.269 06/11/2023    PREGSERUM Negative 06/11/2023    HGBA1C 5.1 03/20/2023     03/20/2023     Progress Toward Goals: insight remains poor, reality testing remains poor, continues to be labile, mostly unchanged    Assessment/Plan   Principal Problem:    Unspecified mood (affective) disorder (HCC)  Active Problems:    Medical clearance for psychiatric admission    Intellectual disability    Psychosis ([address])      Recommended Treatment:     Planned medication and treatment changes:     All current active medications have been reviewed  Encourage group therapy, milieu therapy and occupational therapy  Continue close observation for safety    1) Continue Zyprexa 5mg daily at bedtime for psychosis/agitation (consider dose titration if patient continues to have episodes of agitation)  2) Continue Depakote DR tablet 1000 mg every 12 hours for mood/agitation  3) Continue Lithobid 600 mg twice daily for mood/agitation  4) Continue Risperdal 3 mg twice daily for mood/psychosis  5) Continue Cogentin 1 mg twice daily to reduce anticholinergic burden  6) Continue trazodone 100 mg at bedtime for insomnia  7) Continue patient and staff safety with continued one-to-one monitoring  8) CM to continue care coordination for patient   9) Continue SLIM medical management    Current Facility-Administered Medications   Medication Dose Route Frequency Provider Last Rate   • acetaminophen  650 mg Oral Q6H PRN Philemon Decant, DO     • acetaminophen  650 mg Oral Q4H PRN Philemon Decant, DO     • acetaminophen  975 mg Oral Q6H PRN Philemon Decant, DO     • aluminum-magnesium hydroxide-simethicone  30 mL Oral Q4H PRN Philemon Decant, DO     • haloperidol lactate  2.5 mg Intramuscular Q4H PRN Max 6/day Philemon Decant, DO      And   • LORazepam  1 mg Intramuscular Q4H PRN Max 6/day Philemon Decant, DO      And   • benztropine  0.5 mg Intramuscular Q4H PRN Max 6/day Philemon Decant, DO     • haloperidol lactate  5 mg Intramuscular Q4H PRN Max 4/day Philemon Decant, DO      And   • LORazepam  2 mg Intramuscular Q4H PRN Max 4/day Philemon Decant, DO      And   • benztropine  1 mg Intramuscular Q4H PRN Max 4/day Philemon Decant, DO     • benztropine  1 mg Intramuscular Q4H PRN Max 6/day Philemon Decant, DO     • benztropine  1 mg Oral BID PRN Philemon Decant, DO     • benztropine  1 mg Oral Q4H PRN Max 6/day Philemon Decant, DO     • benztropine  1 mg Oral BID Philemon Decant, DO     • clonazePAM  0.5 mg Oral BID PRN Philemon Decant, DO     • cloNIDine  0.1 mg Oral Q12H [address] Philemon Decant, DO     • hydrOXYzine HCL  50 mg Oral Q6H PRN Max 4/day Philemon Decant, DO      Or   • diphenhydrAMINE  50 mg Intramuscular Q6H PRN Philemon Decant, DO     • divalproex sodium  1,000 mg Oral Q12H [address] DO Rebecca     • hydrOXYzine HCL  100 mg Oral Q6H PRN Max 4/day Katty Kovacs Wilfredo Santos, DO      Or   • LORazepam  2 mg Intramuscular Q6H PRN Serena Quiver, DO     • hydrOXYzine HCL  25 mg Oral Q6H PRN Max 4/day Serena Quiver, DO     • lithium carbonate  600 mg Oral BID Serena Quiver, DO     • melatonin  3 mg Oral HS PRN Serena Quiver, DO     • OLANZapine  5 mg Oral HS Shelli [place] Sylvia, CRNP     • polyethylene glycol  17 g Oral Daily PRN Serena Quiver, DO     • propranolol  10 mg Oral Q8H PRN Serena Quiver, DO     • risperiDONE  0.5 mg Oral BID PRN Serena Quiver, DO     • risperiDONE  0.5 mg Oral Q4H PRN Max 6/day Serena Quiver, DO     • risperiDONE  1 mg Oral Q4H PRN Max 3/day Serena Quiver, DO     • risperiDONE  2 mg Oral Q4H PRN Max 3/day Serena Quiver, DO     • risperiDONE  3 mg Oral BID Serena Quiver, DO     • senna-docusate sodium  1 tablet Oral BID Serena Quiver, DO     • traZODone  100 mg Oral HS [place], CRNP       Risks / Benefits of Treatment:    Risks, benefits, and possible side effects of medications explained to patient. Patient has limited understanding of risks and benefits of treatment at this time, but agrees to take medications as prescribed. Counseling / Coordination of Care: Total floor / unit time spent today 20 minutes. Greater than 50% of total time was spent with the patient and / or family counseling and / or coordination of care. A description of counseling / coordination of care:  Patient's progress discussed with staff in treatment team meeting. Medications, treatment progress and treatment plan reviewed with patient. Importance of medication and treatment compliance reviewed with patient. Reassurance and supportive therapy provided. Encouraged participation in milieu and group therapy on the unit.     Vadim Vegas MD 06/19/23

## 2023-06-19 NOTE — ASSESSMENT & PLAN NOTE
-Vaginal lesion noted next to right labial fold. Initially noticed on 6/18 by floor team.   - Patient had been scratching this area on multiple occassions per nursing team.   - Unsure if lesion was present on admission   - Pelvic exam prematurely interrupted by patient     Possible differentials include HSV, Syphilis. Possible abrasion from constant itching.    HIV test form 8/2022 negative    PLAN:  - RPR  - HSV test

## 2023-06-19 NOTE — PLAN OF CARE
Problem: Risk for Self Injury/Neglect  Goal: Treatment Goal: Remain safe during length of stay, learn and adopt new coping skills, and be free of self-injurious ideation, impulses and acts at the time of discharge  Outcome: Progressing  Goal: Verbalize thoughts and feelings  Description: Interventions:  - Assess and re-assess patient's lethality and potential for self-injury  - Engage patient in 1:1 interactions, daily, for a minimum of 15 minutes  - Encourage patient to express feelings, fears, frustrations, hopes  - Establish rapport/trust with patient   Outcome: Progressing  Goal: Refrain from harming self  Description: Interventions:  - Monitor patient closely, per order  - Develop a trusting relationship  - Supervise medication ingestion, monitor effects and side effects   Outcome: Progressing  Goal: Attend and participate in unit activities, including therapeutic, recreational, and educational groups  Description: Interventions:  - Provide therapeutic and educational activities daily, encourage attendance and participation, and document same in the medical record  - Obtain collateral information, encourage visitation and family involvement in care   Outcome: Progressing  Goal: Recognize maladaptive responses and adopt new coping mechanisms  Outcome: Progressing  Goal: Complete daily ADLs, including personal hygiene independently, as able  Description: Interventions:  - Observe, teach, and assist patient with ADLS  - Monitor and promote a balance of rest/activity, with adequate nutrition and elimination  Outcome: Progressing     Problem: Risk for Violence/Aggression Toward Others  Goal: Treatment Goal: Refrain from acts of violence/aggression during length of stay, and demonstrate improved impulse control at the time of discharge  Outcome: Progressing  Goal: Verbalize thoughts and feelings  Description: Interventions:  - Assess and re-assess patient's level of risk, every waking shift  - Engage patient in 1:1 interactions, daily, for a minimum of 15 minutes   - Allow patient to express feelings and frustrations in a safe and non-threatening manner   - Establish rapport/trust with patient   Outcome: Progressing  Goal: Refrain from harming others  Outcome: Progressing  Goal: Refrain from destructive acts on the environment or property  Outcome: Progressing  Goal: Control angry outbursts  Description: Interventions:  - Monitor patient closely, per order  - Ensure early verbal de-escalation  - Monitor prn medication needs  - Set reasonable/therapeutic limits, outline behavioral expectations, and consequences   - Provide a non-threatening milieu, utilizing the least restrictive interventions   Outcome: Progressing  Goal: Attend and participate in unit activities, including therapeutic, recreational, and educational groups  Description: Interventions:  - Provide therapeutic and educational activities daily, encourage attendance and participation, and document same in the medical record   Outcome: Progressing  Goal: Identify appropriate positive anger management techniques  Description: Interventions:  - Offer anger management and coping skills groups   - Staff will provide positive feedback for appropriate anger control  Outcome: Progressing     Problem: JENAE  Goal: Will exhibit normal sleep and speech and no impulsivity  Description: INTERVENTIONS:  - Administer medication as ordered  - Set limits on impulsive behavior  - Make attempts to decrease external stimuli as possible  Outcome: Progressing     Problem: INVOLUNTARY ADMIT  Goal: Will cooperate with staff recommendations and doctor's orders and will demonstrate appropriate behavior  Description: INTERVENTIONS:  - Treat underlying conditions and offer medication as ordered  - Educate regarding involuntary admission procedures and rules  - Utilize positive consistent limit setting strategies to support patient and staff safety  Outcome: Progressing     Problem: SELF CARE DEFICIT  Goal: Return ADL status to a safe level of function  Description: INTERVENTIONS:  - Administer medication as ordered  - Assess ADL deficits and provide assistive devices as needed  - Obtain PT/OT consults as needed  - Assist and instruct patient to increase activity and self care as tolerated  Outcome: Progressing     Problem: DISCHARGE PLANNING - CARE MANAGEMENT  Goal: Discharge to post-acute care or home with appropriate resources  Description: INTERVENTIONS:  - Conduct assessment to determine patient/family and health care team treatment goals, and need for post-acute services based on payer coverage, community resources, and patient preferences, and barriers to discharge  - Address psychosocial, clinical, and financial barriers to discharge as identified in assessment in conjunction with the patient/family and health care team  - Arrange appropriate level of post-acute services according to patient’s   needs and preference and payer coverage in collaboration with the physician and health care team  - Communicate with and update the patient/family, physician, and health care team regarding progress on the discharge plan  - Arrange appropriate transportation to post-acute venues  Outcome: Progressing     Problem: SAFETY, RESTRAINT - VIOLENT/SELF-DESTRUCTIVE  Goal: Remains free of harm/injury from restraints (Restraint for Violent/Self-Destructive Behavior)  Description: INTERVENTIONS:  - Instruct patient/family regarding restraint use   - Assess and monitor physiologic and psychological status   - Provide interventions and comfort measures to meet assessed patient needs   - Ensure continuous in person monitoring is provided   - Identify and implement measures to help patient regain control  - Assess readiness for release of restraint  Outcome: Progressing  Goal: Returns to optimal restraint-free functioning  Description: INTERVENTIONS:  - Assess the patient's behavior and symptoms that indicate continued need for restraint  - Identify and implement measures to help patient regain control  - Assess readiness for release of restraint   Outcome: Progressing

## 2023-06-19 NOTE — NURSING NOTE
Patient is c/o discomfort from cut on labia. She is tearful on and off seemingly about this. She was given 975mgs po prn Tylenol.

## 2023-06-20 LAB
C TRACH DNA SPEC QL NAA+PROBE: NEGATIVE
LITHIUM SERPL-SCNC: 0.8 MMOL/L (ref 0.6–1.2)
N GONORRHOEA DNA SPEC QL NAA+PROBE: NEGATIVE

## 2023-06-20 PROCEDURE — 99232 SBSQ HOSP IP/OBS MODERATE 35: CPT | Performed by: PSYCHIATRY & NEUROLOGY

## 2023-06-20 PROCEDURE — 99221 1ST HOSP IP/OBS SF/LOW 40: CPT | Performed by: FAMILY MEDICINE

## 2023-06-20 PROCEDURE — 80178 ASSAY OF LITHIUM: CPT

## 2023-06-20 RX ORDER — OLANZAPINE 10 MG/1
10 TABLET, ORALLY DISINTEGRATING ORAL ONCE
Status: COMPLETED | OUTPATIENT
Start: 2023-06-20 | End: 2023-06-20

## 2023-06-20 RX ADMIN — Medication 3 MG: at 00:10

## 2023-06-20 RX ADMIN — DIVALPROEX SODIUM 1000 MG: 500 TABLET, DELAYED RELEASE ORAL at 20:00

## 2023-06-20 RX ADMIN — RISPERIDONE 3 MG: 2 TABLET ORAL at 17:10

## 2023-06-20 RX ADMIN — OLANZAPINE 10 MG: 10 TABLET, ORALLY DISINTEGRATING ORAL at 20:00

## 2023-06-20 RX ADMIN — HYDROXYZINE HYDROCHLORIDE 100 MG: 50 TABLET, FILM COATED ORAL at 18:18

## 2023-06-20 RX ADMIN — RISPERIDONE 2 MG: 2 TABLET ORAL at 00:10

## 2023-06-20 RX ADMIN — LORAZEPAM 2 MG: 2 INJECTION INTRAMUSCULAR; INTRAVENOUS at 21:25

## 2023-06-20 RX ADMIN — SENNOSIDES AND DOCUSATE SODIUM 1 TABLET: 50; 8.6 TABLET ORAL at 17:10

## 2023-06-20 RX ADMIN — LITHIUM CARBONATE 600 MG: 450 TABLET, EXTENDED RELEASE ORAL at 08:40

## 2023-06-20 RX ADMIN — HYDROXYZINE HYDROCHLORIDE 100 MG: 50 TABLET, FILM COATED ORAL at 00:10

## 2023-06-20 RX ADMIN — BENZTROPINE MESYLATE 1 MG: 1 TABLET ORAL at 08:40

## 2023-06-20 RX ADMIN — ACETAMINOPHEN 975 MG: 325 TABLET ORAL at 20:00

## 2023-06-20 RX ADMIN — SENNOSIDES AND DOCUSATE SODIUM 1 TABLET: 50; 8.6 TABLET ORAL at 08:39

## 2023-06-20 RX ADMIN — BENZTROPINE MESYLATE 1 MG: 1 INJECTION INTRAMUSCULAR; INTRAVENOUS at 21:25

## 2023-06-20 RX ADMIN — HALOPERIDOL LACTATE 5 MG: 5 INJECTION, SOLUTION INTRAMUSCULAR at 21:25

## 2023-06-20 RX ADMIN — POLYETHYLENE GLYCOL 3350 17 G: 17 POWDER, FOR SOLUTION ORAL at 17:11

## 2023-06-20 RX ADMIN — CLONIDINE HYDROCHLORIDE 0.1 MG: 0.1 TABLET ORAL at 20:00

## 2023-06-20 RX ADMIN — CLONIDINE HYDROCHLORIDE 0.1 MG: 0.1 TABLET ORAL at 08:39

## 2023-06-20 RX ADMIN — DIVALPROEX SODIUM 1000 MG: 500 TABLET, DELAYED RELEASE ORAL at 08:40

## 2023-06-20 RX ADMIN — BENZTROPINE MESYLATE 1 MG: 1 TABLET ORAL at 17:10

## 2023-06-20 RX ADMIN — RISPERIDONE 1 MG: 1 TABLET ORAL at 18:18

## 2023-06-20 RX ADMIN — TRAZODONE HYDROCHLORIDE 100 MG: 100 TABLET ORAL at 21:00

## 2023-06-20 RX ADMIN — RISPERIDONE 3 MG: 2 TABLET ORAL at 08:39

## 2023-06-20 RX ADMIN — LITHIUM CARBONATE 600 MG: 450 TABLET, EXTENDED RELEASE ORAL at 17:10
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
## 2023-06-20 NOTE — NURSING NOTE
Pt is labile and disorganized with loud and rapid speech at times. No outbursts heard so far this morning. Attention and concentration is poor. Cooperative with scheduled meds, no paranoia observed. Pt appears internally distracted talking to herself frequently. Denies AVH, SI/HI/, depression or anxiety. Pt on close proximity observation.

## 2023-06-20 NOTE — NURSING NOTE
Pt is restless, labile, disorganized, tearful at times. Pt counts and blows on medications and water while taking medications, with time and encouragement pt compliant with all scheduled medications. Bloodwork attempted, will attempt later when pt calms a bit. Pt educated on bloodwork importance by nurse and Wolof-speaking tech. Pt does not appear to understand. Pt remains on continual observation, close proximity for safety.

## 2023-06-20 NOTE — QUICK NOTE
I talked to Kiley, Evelin's mom, over the phone. She clarified that her at home medications were Trazodone 100mg, Divalproex 500mg, Sertraline 100mg, Benztropine 2mg. She also stated Cynthia Brand took a medicine she referred as "Rispidal" 5mg, and "Respidol" 3mg, which probably refers to the medicine Risperidone, with 2 distinct doses for Am and PM. After her ER visit from [facility], she was prescribed Lithium, but the mom says Cynthia Brand was never given that medicine because she was quickly admitted to [facility]. She stated that when Cynthia Brand was 14years old and lived in [facility] [place] [facility]), she was seen by various psychiatrist who prescribed her different medication, but she was unable to name these medications. She was never hospitalized while living there. She was seen by the psychiatrist for symptoms of paranoia, and thought that other people were out to kill her and burn her at [employer]. Around 2004/2005 Cynthia Brand moved to [place], where she was hospitalized a few times. Her psychiatrist in [facility] started Cynthia Brand on the at home meds listed above, with the exception of trazodone and sertraline, which the mother says were medications she started after moving to [address] [place] [address]. She also reported that her psychiatrist in [place] started her on clonazepam of 4mg, but that was changed to 3mg and .5mg, once she moved to [place] due to problems with Medicaid. The mom is adamant that her symptoms worsened significantly after her ER visit at [facility] and noted significant decline.

## 2023-06-20 NOTE — NURSING NOTE
Pt loud, disruptive, disorganized, paranoid, anxious, restless. Pt has not slept. Pt minimally receptive to reassurance and verbal redirection by continual observation staff. Pt given PRN atarax 100mg PO, rispirdal 2mg PO, and melatonin 3mg PO @ 00:10.

## 2023-06-20 NOTE — PROGRESS NOTES
06/20/23 0813   Team Meeting   Meeting Type Daily Rounds   Team Members Present   Team Members Present Physician;Nurse;   Physician Team Member [address] [facility] Team [facility] Team Member Cathi   Patient/Family Present   Patient Present No   Patient's Family Present No   1:1. Pelvic exam completed yesterday and was yelling during exam. Lithium level 0.8. Pt labile, disorganized, blows on medications and water before taking. Pt restless and anxious overnight PRN atarax, Risperdal and melatonin-effective. Consider clozaril. DC tbd.

## 2023-06-20 NOTE — PLAN OF CARE
Problem: Risk for Self Injury/Neglect  Goal: Treatment Goal: Remain safe during length of stay, learn and adopt new coping skills, and be free of self-injurious ideation, impulses and acts at the time of discharge  Outcome: Progressing  Goal: Verbalize thoughts and feelings  Description: Interventions:  - Assess and re-assess patient's lethality and potential for self-injury  - Engage patient in 1:1 interactions, daily, for a minimum of 15 minutes  - Encourage patient to express feelings, fears, frustrations, hopes  - Establish rapport/trust with patient   Outcome: Progressing  Goal: Refrain from harming self  Description: Interventions:  - Monitor patient closely, per order  - Develop a trusting relationship  - Supervise medication ingestion, monitor effects and side effects   Outcome: Progressing  Goal: Recognize maladaptive responses and adopt new coping mechanisms  Outcome: Progressing  Goal: Complete daily ADLs, including personal hygiene independently, as able  Description: Interventions:  - Observe, teach, and assist patient with ADLS  - Monitor and promote a balance of rest/activity, with adequate nutrition and elimination  Outcome: Progressing     Problem: Risk for Violence/Aggression Toward Others  Goal: Treatment Goal: Refrain from acts of violence/aggression during length of stay, and demonstrate improved impulse control at the time of discharge  Outcome: Progressing  Goal: Verbalize thoughts and feelings  Description: Interventions:  - Assess and re-assess patient's level of risk, every waking shift  - Engage patient in 1:1 interactions, daily, for a minimum of 15 minutes   - Allow patient to express feelings and frustrations in a safe and non-threatening manner   - Establish rapport/trust with patient   Outcome: Progressing  Goal: Refrain from harming others  Outcome: Progressing  Goal: Refrain from destructive acts on the environment or property  Outcome: Progressing  Goal: Control angry outbursts  Description: Interventions:  - Monitor patient closely, per order  - Ensure early verbal de-escalation  - Monitor prn medication needs  - Set reasonable/therapeutic limits, outline behavioral expectations, and consequences   - Provide a non-threatening milieu, utilizing the least restrictive interventions   Outcome: Progressing  Goal: Attend and participate in unit activities, including therapeutic, recreational, and educational groups  Description: Interventions:  - Provide therapeutic and educational activities daily, encourage attendance and participation, and document same in the medical record   Outcome: Progressing  Goal: Identify appropriate positive anger management techniques  Description: Interventions:  - Offer anger management and coping skills groups   - Staff will provide positive feedback for appropriate anger control  Outcome: Progressing     Problem: JENAE  Goal: Will exhibit normal sleep and speech and no impulsivity  Description: INTERVENTIONS:  - Administer medication as ordered  - Set limits on impulsive behavior  - Make attempts to decrease external stimuli as possible  Outcome: Progressing     Problem: INVOLUNTARY ADMIT  Goal: Will cooperate with staff recommendations and doctor's orders and will demonstrate appropriate behavior  Description: INTERVENTIONS:  - Treat underlying conditions and offer medication as ordered  - Educate regarding involuntary admission procedures and rules  - Utilize positive consistent limit setting strategies to support patient and staff safety  Outcome: Progressing     Problem: SELF CARE DEFICIT  Goal: Return ADL status to a safe level of function  Description: INTERVENTIONS:  - Administer medication as ordered  - Assess ADL deficits and provide assistive devices as needed  - Obtain PT/OT consults as needed  - Assist and instruct patient to increase activity and self care as tolerated  Outcome: Progressing     Problem: DISCHARGE PLANNING - CARE MANAGEMENT  Goal: Discharge to post-acute care or home with appropriate resources  Description: INTERVENTIONS:  - Conduct assessment to determine patient/family and health care team treatment goals, and need for post-acute services based on payer coverage, community resources, and patient preferences, and barriers to discharge  - Address psychosocial, clinical, and financial barriers to discharge as identified in assessment in conjunction with the patient/family and health care team  - Arrange appropriate level of post-acute services according to patient’s   needs and preference and payer coverage in collaboration with the physician and health care team  - Communicate with and update the patient/family, physician, and health care team regarding progress on the discharge plan  - Arrange appropriate transportation to post-acute venues  Outcome: Progressing     Problem: SAFETY, RESTRAINT - VIOLENT/SELF-DESTRUCTIVE  Goal: Remains free of harm/injury from restraints (Restraint for Violent/Self-Destructive Behavior)  Description: INTERVENTIONS:  - Instruct patient/family regarding restraint use   - Assess and monitor physiologic and psychological status   - Provide interventions and comfort measures to meet assessed patient needs   - Ensure continuous in person monitoring is provided   - Identify and implement measures to help patient regain control  - Assess readiness for release of restraint  Outcome: Progressing  Goal: Returns to optimal restraint-free functioning  Description: INTERVENTIONS:  - Assess the patient's behavior and symptoms that indicate continued need for restraint  - Identify and implement measures to help patient regain control  - Assess readiness for release of restraint   Outcome: Progressing

## 2023-06-20 NOTE — PROGRESS NOTES
[facility] Group Note     06/18/23 1300   Activity/Group Checklist   Group Life Skills  (Teamwork and Communication)   Attendance Attended   Attendance Duration (min) 31-45  (arrived late to group)   Interactions Other (Comment)  (did not participate in activity; utilized individual coping techniques and only interacted with staff)   Affect/Mood Calm   Goals Achieved Able to listen to others; Able to engage in interactions; Able to recieve feedback; Able to give feedback to another  (benefited from social presence of the group)

## 2023-06-20 NOTE — NURSING NOTE
Pt has been maintained on continual observation, close proximity throughout the night without incident.

## 2023-06-20 NOTE — SOCIAL WORK
CM left  for [place], [facility] Supervisor to discuss current services and any further potential services.

## 2023-06-20 NOTE — NURSING NOTE
PRN Risperdal, atarax and melatonin effective, pt asleep with no apparent anxiety or agitation present.

## 2023-06-20 NOTE — PROGRESS NOTES
Progress Note - Behavioral Health   Annalise Fleming 28 y.o. female MRN: [id number]  Unit/Bed#: [facility] 349-01 Encounter: [id number]    Assessment/Plan   Principal Problem:    Unspecified mood (affective) disorder ([address])  Active Problems:    Medical clearance for psychiatric admission    Intellectual disability    Psychosis ([address])    Dysuria    Vaginal lesion      Recommended Treatment:   No psychopharmacologic changes necessary at this moment; will continue to assess daily for further optimization. Medical student speaks fluent Portuguese to call mother  CBC in am. Considering starting clozaril due to patient failing two antipsychotics  Lithium level today 0.8  Awaiting gyn STI exams    Continue with pharmacotherapy, group therapy, milieu therapy and occupational therapy. Continue to assess for adverse medication side effects. Encourage Annalise Fleming to participate in nonverbal forms of therapy including journaling and art/music therapy. Continue frequent safety checks and vitals per unit protocol. Continue to engage CM/SW to assist with collateral, disposition planning, and the implementation of an individualized, patient-centered plan of care. Continue medical management by medical team.  Case discussed with treatment team.    Legal Status: 303  ------------------------------------------------------------    Subjective: All documentation including nursing notes, medication history to ensure medication adherence on the unit, labs, and vitals were reviewed. Jay Cuevas was evaluated this morning for continuity of care and no acute distress noted throughout the evaluation. Over the past 24 hours per nursing report, Jay Cuevas has been cooperative on the unit and compliant with medications. Today, Jay Cuevas is consenting for safety on the unit. Jay Cuevas reports feeling "remi." Jay Cuevas notes having good sleep however nursing notes only a few hours. Jay Cuevas states having a good appetite.  Jay Cuevas has been taking the medications as prescribed and reporting no side effects. Patient was interviewed with medical student who speaks fluent Faroese. Patient states that she is seeing visions of people who want to kill her, white and yellow lights, states that no one should "take my boyfriend" but cannot discuss further what she means by this, remains labile, crying, attempting to take pants off at times but with much effort was able to be redirected. Ryley Tobias refuses to answer in regard to suicidal ideations. Ryley Tobias denies homicidal ideations. Regarding hallucinations, Ryley Tobias denies and is internally stimulated    PRNs overnight: risperidone 2mg for agitation, 100 hydroxyzine for anxiety, melatonin 3mg for sleep. VS: Reviewed, within normal limits    Progress Toward Goals: slow improvement    Psychiatric Review of Systems:  Behavior over the last 24 hours:  improved  Sleep: decreased  Appetite: normal  Medication side effects: No   ROS: all other systems are negative, pelvic pain continued in part due to period, in part due to gyn exam yesterday    Vital signs in last 24 hours:  Temp:  [97.2 °F (36.2 °C)-98 °F (36.7 °C)] 97.2 °F (36.2 °C)  HR:  [114-136] 114  Resp:  [16] 16  BP: (129-138)/(76-78) 129/78    Laboratory results:  I have personally reviewed all pertinent laboratory/tests results.   Recent Results (from the past 48 hour(s))   UA w Reflex to Microscopic w Reflex to Culture    Collection Time: 06/19/23  3:48 PM    Specimen: Urine, Other   Result Value Ref Range    Color, UA Straw Straw, Yellow, Pale Yellow    Clarity, UA Slightly Cloudy (A) Clear, Other    Specific Gravity, UA 1.015 1.003 - 1.040    pH, UA 8.0 4.5, 5.0, 5.5, 6.0, 6.5, 7.0, 7.5, 8.0    Leukocytes, UA 25.0 (A) Negative    Nitrite, UA Negative Negative    Protein, UA 30 (1+) (A) Negative mg/dl    Glucose, UA Negative Negative mg/dl    Ketones, UA Negative Negative mg/dl    Bilirubin, UA Negative Negative    Occult Blood, .0 (A) Negative UROBILINOGEN UA Negative 1.0, Negative mg/dL   Urine Microscopic    Collection Time: 06/19/23  3:48 PM   Result Value Ref Range    RBC, UA 10-20 (A) None Seen, 0-1, 1-2, 2-4, 0-5 /hpf    WBC, UA 2-4 None Seen, 0-1, 1-2, 0-5, 2-4 /hpf    Epithelial Cells Occasional None Seen, Occasional /hpf    Bacteria, UA Occasional None Seen, Occasional /hpf    MUCUS THREADS Occasional (A) None Seen         Mental Status Evaluation:    Appearance:  dressed appropriately, marginal hygiene, overtly appearing  female, intermittent brief eye contact   Behavior:  limited coopertivity, child-like, psychomotor agitation, restless, fidgety    Speech:  increased rate, loud at times   Mood:  "remi"   Affect:  labile   Thought Process:  disorganized, increased rate of thoughts, impaired abstract reasoning   Associations: loose associations   Thought Content:  no overt delusions   Perceptual Disturbances: Denies auditory or visual hallucinations and Does not appear to be responding to internal stimuli   Risk Potential: Suicidal ideation - does not answer  Homicidal ideation - None at present  Potential for aggression - Not at present   Sensorium:  oriented to person and place   Memory:  recent and remote memory grossly intact   Consciousness:  alert and awake   Attention/Concentration: attention span and concentration appear shorter than expected for age   Insight:  impaired   Judgment: impaired   Gait/Station: normal gait/station   Motor Activity: no abnormal movements       Current Medications:  Current Facility-Administered Medications   Medication Dose Route Frequency Provider Last Rate   • acetaminophen  650 mg Oral Q6H PRN Lucina Malloy, DO     • acetaminophen  650 mg Oral Q4H PRN Jackya Gama, DO     • acetaminophen  975 mg Oral Q6H PRN Lucina Malloy, DO     • aluminum-magnesium hydroxide-simethicone  30 mL Oral Q4H PRN Lucina Malloy, DO     • haloperidol lactate  2.5 mg Intramuscular Q4H PRN Max 6/day Lucina Malloy, DO And   • LORazepam  1 mg Intramuscular Q4H PRN Max 6/day Toshia Nam, DO      And   • benztropine  0.5 mg Intramuscular Q4H PRN Max 6/day Toshia Nam, DO     • haloperidol lactate  5 mg Intramuscular Q4H PRN Max 4/day Toshia Nam, DO      And   • LORazepam  2 mg Intramuscular Q4H PRN Max 4/day Toshia Nam, DO      And   • benztropine  1 mg Intramuscular Q4H PRN Max 4/day Toshia Nam, DO     • benztropine  1 mg Intramuscular Q4H PRN Max 6/day Toshia Nam, DO     • benztropine  1 mg Oral BID PRN Toshia Nam, DO     • benztropine  1 mg Oral Q4H PRN Max 6/day Toshia Nam, DO     • benztropine  1 mg Oral BID Toshia Nam, DO     • clonazePAM  0.5 mg Oral BID PRN Toshia Nam, DO     • cloNIDine  0.1 mg Oral Q12H [facility], [place] HOME Toshia Nam, DO     • hydrOXYzine HCL  50 mg Oral Q6H PRN Max 4/day Toshia Nam, DO      Or   • diphenhydrAMINE  50 mg Intramuscular Q6H PRN Toshia Nam, DO     • divalproex sodium  1,000 mg Oral Q12H [facility], [place] HOME Toshia Nam, DO     • hydrOXYzine HCL  100 mg Oral Q6H PRN Max 4/day Toshia Nam, DO      Or   • LORazepam  2 mg Intramuscular Q6H PRN Toshia Nam, DO     • hydrOXYzine HCL  25 mg Oral Q6H PRN Max 4/day Toshia Nam, DO     • lithium carbonate  600 mg Oral BID Toshia Nam, DO     • melatonin  3 mg Oral HS PRN Toshia Nam, DO     • OLANZapine  5 mg Oral HS Missyricardo Maresvis Nolberto, CRNP     • polyethylene glycol  17 g Oral Daily PRN Toshia Nam, DO     • propranolol  10 mg Oral Q8H PRN Toshia Nam, DO     • risperiDONE  0.5 mg Oral BID PRN Toshia Nam, DO     • risperiDONE  0.5 mg Oral Q4H PRN Max 6/day Toshia Nam, DO     • risperiDONE  1 mg Oral Q4H PRN Max 3/day Toshia Nam, DO     • risperiDONE  2 mg Oral Q4H PRN Max 3/day Toshia Nam, DO     • risperiDONE  3 mg Oral BID Toshia Nam, DO     • senna-docusate sodium  1 tablet Oral BID Toshia Browning DO     • traZODone  100 mg Oral HS PERLA Chavis, DO 06/20/23  Psychiatry Resident, PGY-II    This note was completed in part utilizing [employer] Direct Software. Grammatical, translation, syntax errors, random word insertions, spelling mistakes, and incomplete sentences may be an occasional consequence of this system secondary to software limitations with voice recognition, ambient noise, and hardware issues. If you have any questions or concerns about the content, text, or information contained within the body of this dictation, please contact the provider for clarification.

## 2023-06-21 LAB
BASOPHILS # BLD AUTO: 0.01 THOUSANDS/ÂΜL (ref 0–0.1)
BASOPHILS NFR BLD AUTO: 0 % (ref 0–1)
EOSINOPHIL # BLD AUTO: 0.09 THOUSAND/ÂΜL (ref 0–0.61)
EOSINOPHIL NFR BLD AUTO: 1 % (ref 0–6)
ERYTHROCYTE [DISTWIDTH] IN BLOOD BY AUTOMATED COUNT: 13.6 % (ref 11.6–15.1)
HCT VFR BLD AUTO: 43.1 % (ref 34.8–46.1)
HGB BLD-MCNC: 13.8 G/DL (ref 11.5–15.4)
IMM GRANULOCYTES # BLD AUTO: 0.02 THOUSAND/UL (ref 0–0.2)
IMM GRANULOCYTES NFR BLD AUTO: 0 % (ref 0–2)
LYMPHOCYTES # BLD AUTO: 2.32 THOUSANDS/ÂΜL (ref 0.6–4.47)
LYMPHOCYTES NFR BLD AUTO: 28 % (ref 14–44)
MCH RBC QN AUTO: 30.1 PG (ref 26.8–34.3)
MCHC RBC AUTO-ENTMCNC: 32 G/DL (ref 31.4–37.4)
MCV RBC AUTO: 94 FL (ref 82–98)
MONOCYTES # BLD AUTO: 0.65 THOUSAND/ÂΜL (ref 0.17–1.22)
MONOCYTES NFR BLD AUTO: 8 % (ref 4–12)
NEUTROPHILS # BLD AUTO: 5.16 THOUSANDS/ÂΜL (ref 1.85–7.62)
NEUTS SEG NFR BLD AUTO: 63 % (ref 43–75)
NRBC BLD AUTO-RTO: 0 /100 WBCS
PLATELET # BLD AUTO: 224 THOUSANDS/UL (ref 149–390)
PMV BLD AUTO: 9.4 FL (ref 8.9–12.7)
RBC # BLD AUTO: 4.59 MILLION/UL (ref 3.81–5.12)
T PALLIDUM AB SER QL IF: NON REACTIVE
WBC # BLD AUTO: 8.25 THOUSAND/UL (ref 4.31–10.16)

## 2023-06-21 PROCEDURE — 85025 COMPLETE CBC W/AUTO DIFF WBC: CPT

## 2023-06-21 PROCEDURE — 99232 SBSQ HOSP IP/OBS MODERATE 35: CPT | Performed by: PSYCHIATRY & NEUROLOGY

## 2023-06-21 RX ORDER — RISPERIDONE 2 MG/1
2 TABLET ORAL 2 TIMES DAILY
Status: DISCONTINUED | OUTPATIENT
Start: 2023-06-22 | End: 2023-06-22

## 2023-06-21 RX ORDER — CLOZAPINE 25 MG/1
25 TABLET ORAL
Status: DISCONTINUED | OUTPATIENT
Start: 2023-06-21 | End: 2023-06-22

## 2023-06-21 RX ORDER — RISPERIDONE 1 MG/1
1 TABLET ORAL 2 TIMES DAILY
Status: DISCONTINUED | OUTPATIENT
Start: 2023-06-23 | End: 2023-06-22

## 2023-06-21 RX ORDER — DIPHENHYDRAMINE HYDROCHLORIDE 50 MG/ML
25 INJECTION INTRAMUSCULAR; INTRAVENOUS EVERY 6 HOURS PRN
Status: DISCONTINUED | OUTPATIENT
Start: 2023-06-21 | End: 2023-07-19 | Stop reason: HOSPADM

## 2023-06-21 RX ADMIN — DIVALPROEX SODIUM 1000 MG: 500 TABLET, DELAYED RELEASE ORAL at 21:31

## 2023-06-21 RX ADMIN — CLOZAPINE 25 MG: 25 TABLET ORAL at 21:33

## 2023-06-21 RX ADMIN — TRAZODONE HYDROCHLORIDE 100 MG: 100 TABLET ORAL at 21:31

## 2023-06-21 RX ADMIN — BENZTROPINE MESYLATE 1 MG: 1 TABLET ORAL at 17:01

## 2023-06-21 RX ADMIN — LITHIUM CARBONATE 600 MG: 450 TABLET, EXTENDED RELEASE ORAL at 08:23

## 2023-06-21 RX ADMIN — HYDROXYZINE HYDROCHLORIDE 100 MG: 50 TABLET, FILM COATED ORAL at 14:16

## 2023-06-21 RX ADMIN — RISPERIDONE 3 MG: 2 TABLET ORAL at 08:23

## 2023-06-21 RX ADMIN — ACETAMINOPHEN 650 MG: 325 TABLET ORAL at 17:30

## 2023-06-21 RX ADMIN — DIVALPROEX SODIUM 1000 MG: 500 TABLET, DELAYED RELEASE ORAL at 08:23

## 2023-06-21 RX ADMIN — SENNOSIDES AND DOCUSATE SODIUM 1 TABLET: 50; 8.6 TABLET ORAL at 08:23

## 2023-06-21 RX ADMIN — RISPERIDONE 3 MG: 2 TABLET ORAL at 17:02

## 2023-06-21 RX ADMIN — LITHIUM CARBONATE 750 MG: 450 TABLET, EXTENDED RELEASE ORAL at 17:01

## 2023-06-21 RX ADMIN — SENNOSIDES AND DOCUSATE SODIUM 1 TABLET: 50; 8.6 TABLET ORAL at 17:01

## 2023-06-21 RX ADMIN — CLONIDINE HYDROCHLORIDE 0.1 MG: 0.1 TABLET ORAL at 21:31

## 2023-06-21 RX ADMIN — BENZTROPINE MESYLATE 1 MG: 1 TABLET ORAL at 08:23

## 2023-06-21 RX ADMIN — CLONIDINE HYDROCHLORIDE 0.1 MG: 0.1 TABLET ORAL at 08:23

## 2023-06-21 RX ADMIN — RISPERIDONE 2 MG: 2 TABLET ORAL at 14:17

## 2023-06-21 NOTE — NURSING NOTE
Pt is disorganized in thought rambling in an incoherent way. Attention span is poor and pt does not answer questions when asked. Pt is labile and fixated on the legions on her private area running back and forth to her bathroom frequently during the morning. At 1410 pt became agitated and difficult to console yelling loudly. Pt was upset because her mother did not answer the phone. Pt began spitting in her room and slamming her stress ball on the floor in the hallway. 100mg of atarax given for severe anxiety and 2 mg of risperdal given for severe agitation at 1416. Name band;

## 2023-06-21 NOTE — NURSING NOTE
Pt less agitated and anxious remaining in bedroom listening to music upon reassessment at 1515. Pt yelling less frequently and is more redirectable. Interventions effective.

## 2023-06-21 NOTE — NURSING NOTE
PRN haldol, ativan and cogentin effective. Pt is resting comfortably.      Pt remains on continual observation, close proximity

## 2023-06-21 NOTE — PROGRESS NOTES
06/21/23 0843   Team Meeting   Meeting Type Daily Rounds   Team Members Present   Team Members Present Physician;Nurse;   Physician Team Member [id number] [facility] Member Cathi   Patient/Family Present   Patient Present No   Patient's Family Present No   1:1. C/O constipation, PRN miralax. Pt agitated during dinner due to peer reporting her feet smell. Pt needed to be redirected back to room, PRN atarax and risperdal- not effective. Running in [place], grabbing her head. PRN zyprexa - not effective. Remained agitated, distraught. Pt attempting to hit self, reports AH. PRN haldol, cogentin, ativan IM- effective. DC tbd.

## 2023-06-21 NOTE — PLAN OF CARE
Problem: Risk for Self Injury/Neglect  Goal: Treatment Goal: Remain safe during length of stay, learn and adopt new coping skills, and be free of self-injurious ideation, impulses and acts at the time of discharge  Outcome: Progressing  Goal: Verbalize thoughts and feelings  Description: Interventions:  - Assess and re-assess patient's lethality and potential for self-injury  - Engage patient in 1:1 interactions, daily, for a minimum of 15 minutes  - Encourage patient to express feelings, fears, frustrations, hopes  - Establish rapport/trust with patient   Outcome: Progressing  Goal: Refrain from harming self  Description: Interventions:  - Monitor patient closely, per order  - Develop a trusting relationship  - Supervise medication ingestion, monitor effects and side effects   Outcome: Progressing  Goal: Recognize maladaptive responses and adopt new coping mechanisms  Outcome: Progressing  Goal: Complete daily ADLs, including personal hygiene independently, as able  Description: Interventions:  - Observe, teach, and assist patient with ADLS  - Monitor and promote a balance of rest/activity, with adequate nutrition and elimination  Outcome: Progressing     Problem: Risk for Violence/Aggression Toward Others  Goal: Treatment Goal: Refrain from acts of violence/aggression during length of stay, and demonstrate improved impulse control at the time of discharge  Outcome: Progressing  Goal: Verbalize thoughts and feelings  Description: Interventions:  - Assess and re-assess patient's level of risk, every waking shift  - Engage patient in 1:1 interactions, daily, for a minimum of 15 minutes   - Allow patient to express feelings and frustrations in a safe and non-threatening manner   - Establish rapport/trust with patient   Outcome: Progressing  Goal: Refrain from harming others  Outcome: Progressing  Goal: Refrain from destructive acts on the environment or property  Outcome: Progressing  Goal: Control angry outbursts  Description: Interventions:  - Monitor patient closely, per order  - Ensure early verbal de-escalation  - Monitor prn medication needs  - Set reasonable/therapeutic limits, outline behavioral expectations, and consequences   - Provide a non-threatening milieu, utilizing the least restrictive interventions   Outcome: Progressing  Goal: Identify appropriate positive anger management techniques  Description: Interventions:  - Offer anger management and coping skills groups   - Staff will provide positive feedback for appropriate anger control  Outcome: Progressing     Problem: JENAE  Goal: Will exhibit normal sleep and speech and no impulsivity  Description: INTERVENTIONS:  - Administer medication as ordered  - Set limits on impulsive behavior  - Make attempts to decrease external stimuli as possible  Outcome: Progressing     Problem: INVOLUNTARY ADMIT  Goal: Will cooperate with staff recommendations and doctor's orders and will demonstrate appropriate behavior  Description: INTERVENTIONS:  - Treat underlying conditions and offer medication as ordered  - Educate regarding involuntary admission procedures and rules  - Utilize positive consistent limit setting strategies to support patient and staff safety  Outcome: Progressing     Problem: SELF CARE DEFICIT  Goal: Return ADL status to a safe level of function  Description: INTERVENTIONS:  - Administer medication as ordered  - Assess ADL deficits and provide assistive devices as needed  - Obtain PT/OT consults as needed  - Assist and instruct patient to increase activity and self care as tolerated  Outcome: Progressing     Problem: DISCHARGE PLANNING - CARE MANAGEMENT  Goal: Discharge to post-acute care or home with appropriate resources  Description: INTERVENTIONS:  - Conduct assessment to determine patient/family and health care team treatment goals, and need for post-acute services based on payer coverage, community resources, and patient preferences, and barriers to discharge  - Address psychosocial, clinical, and financial barriers to discharge as identified in assessment in conjunction with the patient/family and health care team  - Arrange appropriate level of post-acute services according to patient’s   needs and preference and payer coverage in collaboration with the physician and health care team  - Communicate with and update the patient/family, physician, and health care team regarding progress on the discharge plan  - Arrange appropriate transportation to post-acute venues  Outcome: Progressing     Problem: SAFETY, RESTRAINT - VIOLENT/SELF-DESTRUCTIVE  Goal: Remains free of harm/injury from restraints (Restraint for Violent/Self-Destructive Behavior)  Description: INTERVENTIONS:  - Instruct patient/family regarding restraint use   - Assess and monitor physiologic and psychological status   - Provide interventions and comfort measures to meet assessed patient needs   - Ensure continuous in person monitoring is provided   - Identify and implement measures to help patient regain control  - Assess readiness for release of restraint  Outcome: Progressing  Goal: Returns to optimal restraint-free functioning  Description: INTERVENTIONS:  - Assess the patient's behavior and symptoms that indicate continued need for restraint  - Identify and implement measures to help patient regain control  - Assess readiness for release of restraint   Outcome: Progressing

## 2023-06-21 NOTE — NURSING NOTE
Pt began running in halls, RTIS, grabbing head, very labile, tearful. [employer] provider contacted, 1x zyprexa 10mg PO given @ 20:00 instead of scheduled qhs dose of PO 5mg zyprexa. Pt also given  975mg tylenol for 10?10 headache @ 20:00. Upon reassessment at 21:00, tylenol effective but Zyprexa 10mg PO ineffective. Pt is just as agitated, delusional, disorganized, emotionally distraught. Pt is grabbing her head, attempting to hit herself, and and endorsing AH of voices to staff. Pt given IM haldol 5mg, ativan 2mg, and cogentin 1mg @ 21:25. Pt visibly fearful of needles, but cooperative, hugged and thanked nurse after injections.

## 2023-06-21 NOTE — NURSING NOTE
Spoke with Pt's sister over the phone and gave her an update on pt disposition. Sister confirmed that the pt is not usually disorganized or having AVH and usually does not speak to herself as she has been doing. The sister says even though the pt usually speaks fast, you can have a conversation with her. The pt spoke with her family over the phone and was loud and agitated at times but cooperative with redirection. Pt was calm when the phone call ended and is now quiet in her room. Remains on continual observation.
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
## 2023-06-22 PROCEDURE — 99232 SBSQ HOSP IP/OBS MODERATE 35: CPT | Performed by: PSYCHIATRY & NEUROLOGY

## 2023-06-22 RX ORDER — OLANZAPINE 10 MG/1
10 INJECTION, POWDER, LYOPHILIZED, FOR SOLUTION INTRAMUSCULAR
Status: DISCONTINUED | OUTPATIENT
Start: 2023-06-22 | End: 2023-06-24

## 2023-06-22 RX ORDER — OLANZAPINE 10 MG/1
5 INJECTION, POWDER, LYOPHILIZED, FOR SOLUTION INTRAMUSCULAR
Status: DISCONTINUED | OUTPATIENT
Start: 2023-06-22 | End: 2023-06-24

## 2023-06-22 RX ORDER — OLANZAPINE 2.5 MG/1
2.5 TABLET ORAL
Status: DISCONTINUED | OUTPATIENT
Start: 2023-06-22 | End: 2023-06-24

## 2023-06-22 RX ORDER — OLANZAPINE 10 MG/1
10 TABLET ORAL
Status: DISCONTINUED | OUTPATIENT
Start: 2023-06-22 | End: 2023-06-24

## 2023-06-22 RX ORDER — CLOZAPINE 25 MG/1
25 TABLET ORAL 2 TIMES DAILY
Status: DISCONTINUED | OUTPATIENT
Start: 2023-06-22 | End: 2023-06-23

## 2023-06-22 RX ORDER — OLANZAPINE 5 MG/1
5 TABLET ORAL
Status: DISCONTINUED | OUTPATIENT
Start: 2023-06-22 | End: 2023-06-24

## 2023-06-22 RX ADMIN — LITHIUM CARBONATE 750 MG: 450 TABLET, EXTENDED RELEASE ORAL at 17:43

## 2023-06-22 RX ADMIN — SENNOSIDES AND DOCUSATE SODIUM 1 TABLET: 50; 8.6 TABLET ORAL at 08:12

## 2023-06-22 RX ADMIN — OLANZAPINE 5 MG: 5 TABLET, FILM COATED ORAL at 23:56

## 2023-06-22 RX ADMIN — Medication 3 MG: at 23:57

## 2023-06-22 RX ADMIN — DIVALPROEX SODIUM 1000 MG: 500 TABLET, DELAYED RELEASE ORAL at 08:12

## 2023-06-22 RX ADMIN — TRAZODONE HYDROCHLORIDE 100 MG: 100 TABLET ORAL at 21:29

## 2023-06-22 RX ADMIN — LITHIUM CARBONATE 750 MG: 450 TABLET, EXTENDED RELEASE ORAL at 08:12

## 2023-06-22 RX ADMIN — BENZTROPINE MESYLATE 1 MG: 1 TABLET ORAL at 08:12

## 2023-06-22 RX ADMIN — HYDROXYZINE HYDROCHLORIDE 100 MG: 50 TABLET, FILM COATED ORAL at 19:31

## 2023-06-22 RX ADMIN — RISPERIDONE 2 MG: 2 TABLET ORAL at 08:12

## 2023-06-22 RX ADMIN — SENNOSIDES AND DOCUSATE SODIUM 1 TABLET: 50; 8.6 TABLET ORAL at 17:43

## 2023-06-22 RX ADMIN — BENZTROPINE MESYLATE 1 MG: 1 TABLET ORAL at 17:43

## 2023-06-22 RX ADMIN — CLOZAPINE 25 MG: 25 TABLET ORAL at 11:29

## 2023-06-22 RX ADMIN — DIVALPROEX SODIUM 1000 MG: 500 TABLET, DELAYED RELEASE ORAL at 21:31

## 2023-06-22 RX ADMIN — CLONIDINE HYDROCHLORIDE 0.1 MG: 0.1 TABLET ORAL at 08:16

## 2023-06-22 RX ADMIN — CLOZAPINE 25 MG: 25 TABLET ORAL at 19:31

## 2023-06-22 RX ADMIN — OLANZAPINE 10 MG: 10 TABLET, FILM COATED ORAL at 12:30

## 2023-06-22 RX ADMIN — CLONIDINE HYDROCHLORIDE 0.1 MG: 0.1 TABLET ORAL at 21:31

## 2023-06-22 NOTE — NURSING NOTE
Pt was complaining to staff of hearing voices and was screaming and yelling while hitting herself. 10mg of po zyprexa given at 1230 for severe agitation.

## 2023-06-22 NOTE — TREATMENT TEAM
06/22/23 0968   Activity/Group Checklist   Group Community meeting   Attendance Attended   Attendance Duration (min) 16-30   Interactions Did not interact   Affect/Mood Wide   Goals Achieved Other (Comment)  (patient is responding to internal stimuli and continue to talk over everyone else.  She is not appropriate for groups at this time.)

## 2023-06-22 NOTE — NURSING NOTE
Pt is restless but redirectable coloring this morning in the dayroom. Affect is labile and thinking is disorganized with poor attention. Pt does not answer assessment questions as she is internally preoccupied. Pt not observed responding to any internal stimuli. Took all of meds without counting them. No agitation seen this morning but pt did become loud and anxious for a minute while in the when asked about AH. No SI/HI observed. Remains on continual observation.

## 2023-06-22 NOTE — PROGRESS NOTES
06/22/23 0880   Team Meeting   Meeting Type Daily Rounds   Team Members Present   Team Members Present Physician;Nurse;   Physician Team Member [id number] Cathi   Patient/Family Present   Patient Present No   Patient's Family Present No   1:1. Pt restless, labile, loud at times, but better redirected. Psychomotor agitation. Med/meal compliant. Lithium increased, with level check in 5 days. Consider propranolol. DC tbd.

## 2023-06-22 NOTE — NURSING NOTE
Patient yelling at 1:1 staff and attempted to push through staff. Patient unable to express why they are upset. PRN atarax 100mg was administered for severe anxiety. Will monitor for effectiveness.

## 2023-06-22 NOTE — NURSING NOTE
Pt is restless, labile, loud at times but largely cooperative with prompting and reinforcement. Pt became upset when she saw an ice cube in her water and told staff it was "saliva." Pt is medication adherent with encouragement and prompting. Pt remains on continual observation, close proximity for safety.

## 2023-06-22 NOTE — PROGRESS NOTES
Progress Note - Behavioral Health   Jose Due 28 y.o. female MRN: [id number]  Unit/Bed#: [facility] 349-01 Encounter: [id number]    Assessment/Plan   Principal Problem:    Unspecified mood (affective) disorder ([facility])  Active Problems:    Medical clearance for psychiatric admission    Intellectual disability    Psychosis ([address])    Dysuria    Vaginal lesion      Recommended Treatment:   Increase clozapine to 25mg BID  DC risperidone due to increased psychomotor agitation  Continue lithium 750 BID for mood stabilization  Continue clonidine 0.1mg BID for agitation  Continue benztropine 1mg BID for EPS   Will continue to assess daily for further optimization. Continue with pharmacotherapy, group therapy, milieu therapy and occupational therapy. Continue to assess for adverse medication side effects. Encourage Jose Redmond to participate in nonverbal forms of therapy including journaling and art/music therapy. Continue frequent safety checks and vitals per unit protocol. Continue to engage CM/SW to assist with collateral, disposition planning, and the implementation of an individualized, patient-centered plan of care. Continue medical management by medical team.  Case discussed with treatment team.    Legal Status: 303  ------------------------------------------------------------    Subjective: All documentation including nursing notes, medication history to ensure medication adherence on the unit, labs, and vitals were reviewed. Maria L Iyer was evaluated this morning for continuity of care and no acute distress noted throughout the evaluation. Over the past 24 hours per nursing report, Maria L Iyer has been cooperative on the unit and compliant with medications. Today, Maria L Iyer is consenting for safety on the unit. Maria L Iyer reports feeling "remi." Maria L Iyer notes having good sleep. Maria L Iyer states having a good appetite. Maria L Iyer has been taking the medications as prescribed and reporting no side effects.     Patient appears less pressured, more able to focus, less loose association, more circumstantial, less lability to affect, asked this writer's name, attending's name, and states that she slept between 2 and 50 hours and then starts counting. She states she is still hearing things but will not answer beyond that. She also states she had mac and cheese for breakfast but this is not something the hospital offers. Ciara Stauffer denies suicidal ideations. Ciara Stauffer denies homicidal ideations. Regarding hallucinations, Evelin endorses auditory hallucinations of "things" and appears internally preoccupied. PRNs overnight: atarax 100mg, risperidone 2mg for agitation, received 5mg zyprexa orally today for agitation and acute psychotic symptoms. VS: Reviewed, within normal limits    Progress Toward Goals: slow improvement    Psychiatric Review of Systems:  Behavior over the last 24 hours:  improved  Sleep: improved  Appetite: normal  Medication side effects: No   ROS: all other systems are negative    Vital signs in last 24 hours:  Temp:  [96.8 °F (36 °C)-97.2 °F (36.2 °C)] 96.8 °F (36 °C)  HR:  [114-123] 118  Resp:  [16] 16  BP: (126-148)/(68-77) 126/68    Laboratory results:  I have personally reviewed all pertinent laboratory/tests results.   Recent Results (from the past 48 hour(s))   CBC and differential    Collection Time: 06/21/23  5:43 AM   Result Value Ref Range    WBC 8.25 4.31 - 10.16 Thousand/uL    RBC 4.59 3.81 - 5.12 Million/uL    Hemoglobin 13.8 11.5 - 15.4 g/dL    Hematocrit 43.1 34.8 - 46.1 %    MCV 94 82 - 98 fL    MCH 30.1 26.8 - 34.3 pg    MCHC 32.0 31.4 - 37.4 g/dL    RDW 13.6 11.6 - 15.1 %    MPV 9.4 8.9 - 12.7 fL    Platelets 514 231 - 600 Thousands/uL    nRBC 0 /100 WBCs    Neutrophils Relative 63 43 - 75 %    Immat GRANS % 0 0 - 2 %    Lymphocytes Relative 28 14 - 44 %    Monocytes Relative 8 4 - 12 %    Eosinophils Relative 1 0 - 6 %    Basophils Relative 0 0 - 1 %    Neutrophils Absolute 5.16 1.85 - 7.62 Thousands/µL    Immature Grans Absolute 0.02 0.00 - 0.20 Thousand/uL    Lymphocytes Absolute 2.32 0.60 - 4.47 Thousands/µL    Monocytes Absolute 0.65 0.17 - 1.22 Thousand/µL    Eosinophils Absolute 0.09 0.00 - 0.61 Thousand/µL    Basophils Absolute 0.01 0.00 - 0.10 Thousands/µL         Mental Status Evaluation:    Appearance:  dressed appropriately, marginal hygiene, overtly appearing  female, in no apparent acute distress, reduced eye contact   Behavior:  limited cooperativity   Speech:  normal volume, increased rate   Mood:  "remi"   Affect:  less labile   Thought Process:  less disorganized than previous   Associations: perseverative   Thought Content:  no overt delusions   Perceptual Disturbances: Reports auditory hallucinations and Appears to be responding to internal stimuli   Risk Potential: Suicidal ideation - None at present  Homicidal ideation - None at present  Potential for aggression - Not at present   Sensorium:  oriented to person and place   Memory:  recent and remote memory grossly intact   Consciousness:  alert and awake   Attention/Concentration: attention span and concentration are age appropriate   Insight:  limited   Judgment: limited   Gait/Station: normal gait/station   Motor Activity: no abnormal movements       Current Medications:  Current Facility-Administered Medications   Medication Dose Route Frequency Provider Last Rate   • acetaminophen  650 mg Oral Q6H PRN Sharl Arm, DO     • acetaminophen  650 mg Oral Q4H PRN Sharl Arm, DO     • acetaminophen  975 mg Oral Q6H PRN Sharl Arm, DO     • aluminum-magnesium hydroxide-simethicone  30 mL Oral Q4H PRN Sharl Arm, DO     • benztropine  1 mg Intramuscular Q4H PRN Max 6/day Sharl Arm, DO     • benztropine  1 mg Oral BID PRN Sharl Arm, DO     • benztropine  1 mg Oral Q4H PRN Max 6/day Sharl Arm, DO     • benztropine  1 mg Oral BID Sharl Arm, DO     • clonazePAM  0.5 mg Oral BID PRN Sharl Arm, DO     • cloNIDine  0.1 mg Oral Q12H [facility] Luisana Scrape, DO     • cloZAPine  25 mg Oral BID Luisana Scrape, DO     • diphenhydrAMINE  25 mg Intramuscular Q6H PRN Luisana Scrape, DO     • hydrOXYzine HCL  50 mg Oral Q6H PRN Max 4/day Luisana Scrape, DO      Or   • diphenhydrAMINE  50 mg Intramuscular Q6H PRN Luisana Scrape, DO     • divalproex sodium  1,000 mg Oral Q12H [facility] Luisana Scrape, DO     • hydrOXYzine HCL  100 mg Oral Q6H PRN Max 4/day Luisana Scrape, DO      Or   • LORazepam  2 mg Intramuscular Q6H PRN Luisana Scrape, DO     • hydrOXYzine HCL  25 mg Oral Q6H PRN Max 4/day Luisana Scrape, DO     • lithium carbonate  750 mg Oral BID Luisana Scrape, DO     • melatonin  3 mg Oral HS PRN Luisana Scrape, DO     • OLANZapine  10 mg Oral Q3H PRN Max 3/day Luisana Scrape, DO      Or   • OLANZapine  10 mg Intramuscular Q3H PRN Max 3/day Luisana Scrape, DO     • OLANZapine  5 mg Oral Q3H PRN Max 6/day Luisana Scrape, DO      Or   • OLANZapine  5 mg Intramuscular Q3H PRN Max 6/day Luisana Scrape, DO     • OLANZapine  2.5 mg Oral Q3H PRN Max 8/day Luisana Scrape, DO     • polyethylene glycol  17 g Oral Daily PRN Luisana Scrape, DO     • propranolol  10 mg Oral Q8H PRN Luisana Scrape, DO     • senna-docusate sodium  1 tablet Oral BID Luisana Scrape, DO     • traZODone  100 mg Oral HS [facility] Sylvia, [address] [place] [address], DO 06/22/23  Psychiatry Resident, PGY-II    This note was completed in part utilizing [employer] Software. Grammatical, translation, syntax errors, random word insertions, spelling mistakes, and incomplete sentences may be an occasional consequence of this system secondary to software limitations with voice recognition, ambient noise, and hardware issues. If you have any questions or concerns about the content, text, or information contained within the body of this dictation, please contact the provider for clarification.

## 2023-06-23 LAB
HSV1 DNA SPEC QL NAA+PROBE: NEGATIVE
HSV2 DNA SPEC QL NAA+PROBE: NEGATIVE

## 2023-06-23 PROCEDURE — 99232 SBSQ HOSP IP/OBS MODERATE 35: CPT | Performed by: PSYCHIATRY & NEUROLOGY

## 2023-06-23 RX ORDER — CLOZAPINE 25 MG/1
25 TABLET ORAL 2 TIMES DAILY
Status: COMPLETED | OUTPATIENT
Start: 2023-06-23 | End: 2023-06-23

## 2023-06-23 RX ORDER — CLOZAPINE 25 MG/1
50 TABLET ORAL 2 TIMES DAILY
Status: DISCONTINUED | OUTPATIENT
Start: 2023-06-24 | End: 2023-06-25

## 2023-06-23 RX ADMIN — DIVALPROEX SODIUM 1000 MG: 500 TABLET, DELAYED RELEASE ORAL at 09:53

## 2023-06-23 RX ADMIN — CLONIDINE HYDROCHLORIDE 0.1 MG: 0.1 TABLET ORAL at 20:43

## 2023-06-23 RX ADMIN — LITHIUM CARBONATE 750 MG: 450 TABLET, EXTENDED RELEASE ORAL at 18:27

## 2023-06-23 RX ADMIN — TRAZODONE HYDROCHLORIDE 100 MG: 100 TABLET ORAL at 21:27

## 2023-06-23 RX ADMIN — SENNOSIDES AND DOCUSATE SODIUM 1 TABLET: 50; 8.6 TABLET ORAL at 09:51

## 2023-06-23 RX ADMIN — BENZTROPINE MESYLATE 1 MG: 1 TABLET ORAL at 18:30

## 2023-06-23 RX ADMIN — CLOZAPINE 25 MG: 25 TABLET ORAL at 20:42

## 2023-06-23 RX ADMIN — BENZTROPINE MESYLATE 1 MG: 1 TABLET ORAL at 09:51

## 2023-06-23 RX ADMIN — CLOZAPINE 25 MG: 25 TABLET ORAL at 09:51

## 2023-06-23 RX ADMIN — DIVALPROEX SODIUM 1000 MG: 500 TABLET, DELAYED RELEASE ORAL at 20:42

## 2023-06-23 RX ADMIN — CLONIDINE HYDROCHLORIDE 0.1 MG: 0.1 TABLET ORAL at 09:52

## 2023-06-23 RX ADMIN — SENNOSIDES AND DOCUSATE SODIUM 1 TABLET: 50; 8.6 TABLET ORAL at 18:28

## 2023-06-23 RX ADMIN — LITHIUM CARBONATE 750 MG: 450 TABLET, EXTENDED RELEASE ORAL at 09:51

## 2023-06-23 NOTE — PROGRESS NOTES
06/23/23 0837   Team Meeting   Meeting Type Daily Rounds   Team Members Present   Team Members Present Physician;Nurse;   Physician Team Member [id number] Member Cathi   Patient/Family Present   Patient Present No   Patient's Family Present No   1:1. Herpes test still in process. Pt yelling at 1:1 and pushing at staff. PRN atarax-effective. Pt with poor sleep and agitated. PRN zyprexa and melatonin-effective. DC tbd.

## 2023-06-23 NOTE — PLAN OF CARE
Problem: Risk for Self Injury/Neglect  Goal: Treatment Goal: Remain safe during length of stay, learn and adopt new coping skills, and be free of self-injurious ideation, impulses and acts at the time of discharge  Outcome: Progressing  Goal: Verbalize thoughts and feelings  Description: Interventions:  - Assess and re-assess patient's lethality and potential for self-injury  - Engage patient in 1:1 interactions, daily, for a minimum of 15 minutes  - Encourage patient to express feelings, fears, frustrations, hopes  - Establish rapport/trust with patient   Outcome: Progressing  Goal: Refrain from harming self  Description: Interventions:  - Monitor patient closely, per order  - Develop a trusting relationship  - Supervise medication ingestion, monitor effects and side effects   Outcome: Progressing  Goal: Attend and participate in unit activities, including therapeutic, recreational, and educational groups  Description: Interventions:  - Provide therapeutic and educational activities daily, encourage attendance and participation, and document same in the medical record  - Obtain collateral information, encourage visitation and family involvement in care   Outcome: Progressing  Goal: Recognize maladaptive responses and adopt new coping mechanisms  Outcome: Progressing  Goal: Complete daily ADLs, including personal hygiene independently, as able  Description: Interventions:  - Observe, teach, and assist patient with ADLS  - Monitor and promote a balance of rest/activity, with adequate nutrition and elimination  Outcome: Progressing     Problem: Risk for Violence/Aggression Toward Others  Goal: Treatment Goal: Refrain from acts of violence/aggression during length of stay, and demonstrate improved impulse control at the time of discharge  Outcome: Progressing  Goal: Verbalize thoughts and feelings  Description: Interventions:  - Assess and re-assess patient's level of risk, every waking shift  - Engage patient in 1:1 interactions, daily, for a minimum of 15 minutes   - Allow patient to express feelings and frustrations in a safe and non-threatening manner   - Establish rapport/trust with patient   Outcome: Progressing  Goal: Refrain from harming others  Outcome: Progressing  Goal: Refrain from destructive acts on the environment or property  Outcome: Progressing  Goal: Control angry outbursts  Description: Interventions:  - Monitor patient closely, per order  - Ensure early verbal de-escalation  - Monitor prn medication needs  - Set reasonable/therapeutic limits, outline behavioral expectations, and consequences   - Provide a non-threatening milieu, utilizing the least restrictive interventions   Outcome: Progressing  Goal: Attend and participate in unit activities, including therapeutic, recreational, and educational groups  Description: Interventions:  - Provide therapeutic and educational activities daily, encourage attendance and participation, and document same in the medical record   Outcome: Progressing  Goal: Identify appropriate positive anger management techniques  Description: Interventions:  - Offer anger management and coping skills groups   - Staff will provide positive feedback for appropriate anger control  Outcome: Progressing     Problem: JENAE  Goal: Will exhibit normal sleep and speech and no impulsivity  Description: INTERVENTIONS:  - Administer medication as ordered  - Set limits on impulsive behavior  - Make attempts to decrease external stimuli as possible  Outcome: Progressing     Problem: INVOLUNTARY ADMIT  Goal: Will cooperate with staff recommendations and doctor's orders and will demonstrate appropriate behavior  Description: INTERVENTIONS:  - Treat underlying conditions and offer medication as ordered  - Educate regarding involuntary admission procedures and rules  - Utilize positive consistent limit setting strategies to support patient and staff safety  Outcome: Progressing     Problem: SELF CARE DEFICIT  Goal: Return ADL status to a safe level of function  Description: INTERVENTIONS:  - Administer medication as ordered  - Assess ADL deficits and provide assistive devices as needed  - Obtain PT/OT consults as needed  - Assist and instruct patient to increase activity and self care as tolerated  Outcome: Progressing     Problem: SAFETY, RESTRAINT - VIOLENT/SELF-DESTRUCTIVE  Goal: Remains free of harm/injury from restraints (Restraint for Violent/Self-Destructive Behavior)  Description: INTERVENTIONS:  - Instruct patient/family regarding restraint use   - Assess and monitor physiologic and psychological status   - Provide interventions and comfort measures to meet assessed patient needs   - Ensure continuous in person monitoring is provided   - Identify and implement measures to help patient regain control  - Assess readiness for release of restraint  Outcome: Progressing  Goal: Returns to optimal restraint-free functioning  Description: INTERVENTIONS:  - Assess the patient's behavior and symptoms that indicate continued need for restraint  - Identify and implement measures to help patient regain control  - Assess readiness for release of restraint   Outcome: Progressing

## 2023-06-23 NOTE — NURSING NOTE
Patient unable to sleep and loud in the hallways. Patient appears agitated and packing up papers on desk. PRN zyprexa 5mg was administered at 2356 for moderate agitation. PRN melatonin 3mg was administered at 2357 for difficulty sleeping. Both PRN medications were effective as patient is resting in bed.

## 2023-06-23 NOTE — SOCIAL WORK
CM spoke with pt's sister. Sister keeps up to date through [address] [place] [address] and wanted to provide information as she has seen STD testing and pelvic exam testing. Sister reports pt has never had sex before. Sister reports pt does pick at various parts of her body and pt's private parts is one place she will pick at from time to time until there becomes a lesion. Sister reports pt's mother shaves her and the family encourages pt to not pick. Sister reports pt has not had her period in about two years. Sister reports the lack of period is due to medications. CM provided current medication changes and plans. CM to remain in contact. Sister supportive of pt and pt's care.

## 2023-06-23 NOTE — NURSING NOTE
PRN atarax 100mg was effective. Patient appears calmer and was able to follow redirection easier. Patient needs frequent redirection to maintain healthy boundaries with staff. Patient can be loud and call staff derogatory names. Patient was able to maintain in behavioral control for the most part. Patient was compliant with scheduled medications. Patient denied SI/AVH att his time. Staff to maintain continuous rounding for safety and support.

## 2023-06-23 NOTE — NURSING NOTE
Patient is medication compliant, persisting on 1:1 close proximity for safety. Patient continues to be very impulsive, loud, and internally responding. Patient is pleasant, and redirectable. Labile with emotions, but denies AVH/SI/HI. Patient has been in behavioral control, although the phone with mother continues to be a trigger.

## 2023-06-23 NOTE — PROGRESS NOTES
Progress Note - Behavioral Health   Aydin Bloom 28 y.o. female MRN: [id number]  Unit/Bed#: [facility] 349-01 Encounter: [id number]    Assessment/Plan   Principal Problem:    Unspecified mood (affective) disorder ([address])  Active Problems:    Medical clearance for psychiatric admission    Intellectual disability    Psychosis ([address])    Dysuria    Vaginal lesion      Recommended Treatment:   No changes today, however plan is to titrate Clozaril and possibly discontinue lithium due to tremors; will continue to assess daily for further optimization. Continue with pharmacotherapy, group therapy, milieu therapy and occupational therapy. Continue to assess for adverse medication side effects. Encourage Aydin Bloom to participate in nonverbal forms of therapy including journaling and art/music therapy. Continue frequent safety checks and vitals per unit protocol. Continue to engage CM/SW to assist with collateral, disposition planning, and the implementation of an individualized, patient-centered plan of care. Continue medical management by medical team.  Case discussed with treatment team.    Legal Status: 303  ------------------------------------------------------------    Subjective: All documentation including nursing notes, medication history to ensure medication adherence on the unit, labs, and vitals were reviewed. Tricia Jim was evaluated this morning for continuity of care and no acute distress noted throughout the evaluation. Over the past 24 hours per nursing report, Tricia Jim has been cooperative on the unit and compliant with medications. Today, Tricia Jim is consenting for safety on the unit. Tricia Jim reports feeling "remi." Tricia Jim notes having good sleep. Tricia Jim states having a good appetite. Tricia Jim has been taking the medications as prescribed and reporting no side effects.     Patient is stating she slept 10 hours, fell asleep before snack and woke up before vitals this morning, and then starts talking about the bloodpressure cuff readings she has had at vitals, when asked if she has a good appetite she states she has to use the bathroom, but is redirectable to answer the rest of the interview questions, and then becomes tangential again. Ruthy Boswell denies suicidal ideations. Ruthy Boswell denies homicidal ideations. Regarding hallucinations, Ruthy Boswell denies current but states she is still hearingthings at times. Does not appear to be responding to internal stimuli. PRNs overnight: zyprexa 5mg, atarax 100mg    VS: Reviewed, within normal limits    Progress Toward Goals: slow improvement    Psychiatric Review of Systems:  Behavior over the last 24 hours:  improved  Sleep: improved  Appetite: normal  Medication side effects: No   ROS: all other systems are negative    Vital signs in last 24 hours:  Temp:  [97 °F (36.1 °C)] 97 °F (36.1 °C)  HR:  [103-126] 103  Resp:  [16] 16  BP: (125-154)/(84-94) 154/94    Laboratory results:  I have personally reviewed all pertinent laboratory/tests results. No results found for this or any previous visit (from the past 48 hour(s)).       Mental Status Evaluation:    Appearance:  casually dressed, marginal hygiene, overtly appearing  female, in no apparent acute distress, intermittent eye contact   Behavior:  cooperative   Speech:  increased rate, tangential at times   Mood:  "remi"   Affect:  brighter   Thought Process:  less disorganized   Associations: concrete associations, tangential   Thought Content:  no overt delusions   Perceptual Disturbances: Denies auditory or visual hallucinations and Does not appear to be responding to internal stimuli   Risk Potential: Suicidal ideation - None at present  Homicidal ideation - None at present  Potential for aggression - Not at present   Sensorium:  oriented to person and place   Memory:  recent and remote memory grossly intact   Consciousness:  alert and awake   Attention/Concentration: attention span and concentration appear shorter than expected for age   Insight:  limited   Judgment: limited   Gait/Station: normal gait/station   Motor Activity: no abnormal movements       Current Medications:  Current Facility-Administered Medications   Medication Dose Route Frequency Provider Last Rate   • acetaminophen  650 mg Oral Q6H PRN Luisana Scrape, DO     • acetaminophen  650 mg Oral Q4H PRN Luisana Scrape, DO     • acetaminophen  975 mg Oral Q6H PRN Luisana Scrape, DO     • aluminum-magnesium hydroxide-simethicone  30 mL Oral Q4H PRN Luisana Scrape, DO     • benztropine  1 mg Intramuscular Q4H PRN Max 6/day Luisana Scrape, DO     • benztropine  1 mg Oral BID PRN Luisana Scrape, DO     • benztropine  1 mg Oral Q4H PRN Max 6/day Luisana Scrape, DO     • benztropine  1 mg Oral BID Luisana Scrape, DO     • clonazePAM  0.5 mg Oral BID PRN Luisana Scrape, DO     • cloNIDine  0.1 mg Oral Q12H [facility] Luisana Scrape, DO     • cloZAPine  25 mg Oral BID Luisana Scrape, DO     • diphenhydrAMINE  25 mg Intramuscular Q6H PRN Luisana Scrape, DO     • hydrOXYzine HCL  50 mg Oral Q6H PRN Max 4/day Luisana Scrape, DO      Or   • diphenhydrAMINE  50 mg Intramuscular Q6H PRN Luisana Scrape, DO     • divalproex sodium  1,000 mg Oral Q12H [facility] Luisana Scrape, DO     • hydrOXYzine HCL  100 mg Oral Q6H PRN Max 4/day Luisana Scrape, DO      Or   • LORazepam  2 mg Intramuscular Q6H PRN Luisana Scrape, DO     • hydrOXYzine HCL  25 mg Oral Q6H PRN Max 4/day Luisana Scrape, DO     • lithium carbonate  750 mg Oral BID Luisana Scrape, DO     • melatonin  3 mg Oral HS PRN Luisana Scrape, DO     • OLANZapine  10 mg Oral Q3H PRN Max 3/day Luisana Scrape, DO      Or   • OLANZapine  10 mg Intramuscular Q3H PRN Max 3/day Luisana Scrape, DO     • OLANZapine  5 mg Oral Q3H PRN Max 6/day Luisana Scrape, DO      Or   • OLANZapine  5 mg Intramuscular Q3H PRN Max 6/day Luisana Scrape, DO     • OLANZapine  2.5 mg Oral Q3H PRN Max 8/day Luisana Diaz DO     • polyethylene glycol  17 g Oral Daily PRN Luisana Scrape, DO     • propranolol  10 mg Oral Q8H PRN Luisana Scrape, DO     • senna-docusate sodium  1 tablet Oral BID Luisana Scrape, DO     • traZODone  100 mg Oral HS Mera Ward, [address] [place] [address] 06/23/23  Psychiatry Resident, PGY-II    This note was completed in part utilizing [employer] Software. Grammatical, translation, syntax errors, random word insertions, spelling mistakes, and incomplete sentences may be an occasional consequence of this system secondary to software limitations with voice recognition, ambient noise, and hardware issues. If you have any questions or concerns about the content, text, or information contained within the body of this dictation, please contact the provider for clarification.

## 2023-06-24 PROCEDURE — 99232 SBSQ HOSP IP/OBS MODERATE 35: CPT | Performed by: STUDENT IN AN ORGANIZED HEALTH CARE EDUCATION/TRAINING PROGRAM

## 2023-06-24 RX ORDER — CHLORPROMAZINE HYDROCHLORIDE 50 MG/1
50 TABLET, FILM COATED ORAL EVERY 6 HOURS PRN
Status: DISCONTINUED | OUTPATIENT
Start: 2023-06-24 | End: 2023-07-14

## 2023-06-24 RX ORDER — BENZTROPINE MESYLATE 0.5 MG/1
0.5 TABLET ORAL 2 TIMES DAILY
Status: DISCONTINUED | OUTPATIENT
Start: 2023-06-24 | End: 2023-07-17

## 2023-06-24 RX ORDER — CHLORPROMAZINE HYDROCHLORIDE 25 MG/1
25 TABLET, FILM COATED ORAL EVERY 6 HOURS PRN
Status: DISCONTINUED | OUTPATIENT
Start: 2023-06-24 | End: 2023-07-14

## 2023-06-24 RX ORDER — CHLORPROMAZINE HYDROCHLORIDE 25 MG/ML
50 INJECTION INTRAMUSCULAR EVERY 6 HOURS PRN
Status: DISCONTINUED | OUTPATIENT
Start: 2023-06-24 | End: 2023-07-14

## 2023-06-24 RX ORDER — CHLORPROMAZINE HYDROCHLORIDE 25 MG/ML
25 INJECTION INTRAMUSCULAR EVERY 6 HOURS PRN
Status: DISCONTINUED | OUTPATIENT
Start: 2023-06-24 | End: 2023-07-14

## 2023-06-24 RX ADMIN — CLONIDINE HYDROCHLORIDE 0.1 MG: 0.1 TABLET ORAL at 09:45

## 2023-06-24 RX ADMIN — LITHIUM CARBONATE 750 MG: 450 TABLET, EXTENDED RELEASE ORAL at 19:08

## 2023-06-24 RX ADMIN — CLONIDINE HYDROCHLORIDE 0.1 MG: 0.1 TABLET ORAL at 21:34

## 2023-06-24 RX ADMIN — LITHIUM CARBONATE 750 MG: 450 TABLET, EXTENDED RELEASE ORAL at 10:00

## 2023-06-24 RX ADMIN — BENZTROPINE MESYLATE 1 MG: 1 TABLET ORAL at 09:45

## 2023-06-24 RX ADMIN — CLOZAPINE 50 MG: 25 TABLET ORAL at 19:08

## 2023-06-24 RX ADMIN — TRAZODONE HYDROCHLORIDE 100 MG: 100 TABLET ORAL at 21:34

## 2023-06-24 RX ADMIN — SENNOSIDES AND DOCUSATE SODIUM 1 TABLET: 50; 8.6 TABLET ORAL at 19:07

## 2023-06-24 RX ADMIN — BENZTROPINE MESYLATE 0.5 MG: 0.5 TABLET ORAL at 19:08

## 2023-06-24 RX ADMIN — CLOZAPINE 50 MG: 25 TABLET ORAL at 09:45

## 2023-06-24 RX ADMIN — SENNOSIDES AND DOCUSATE SODIUM 1 TABLET: 50; 8.6 TABLET ORAL at 10:00

## 2023-06-24 RX ADMIN — LORAZEPAM 2 MG: 2 INJECTION INTRAMUSCULAR; INTRAVENOUS at 14:21

## 2023-06-24 RX ADMIN — DIVALPROEX SODIUM 1000 MG: 500 TABLET, DELAYED RELEASE ORAL at 21:34

## 2023-06-24 RX ADMIN — DIVALPROEX SODIUM 1000 MG: 500 TABLET, DELAYED RELEASE ORAL at 09:45

## 2023-06-24 NOTE — PLAN OF CARE
Problem: JENAE  Goal: Will exhibit normal sleep and speech and no impulsivity  Description: INTERVENTIONS:  - Administer medication as ordered  - Set limits on impulsive behavior  - Make attempts to decrease external stimuli as possible  6/24/2023 0849 by Zari Poe RN  Outcome: Progressing  6/24/2023 0842 by Zari Poe RN  Outcome: Progressing     Problem: INVOLUNTARY ADMIT  Goal: Will cooperate with staff recommendations and doctor's orders and will demonstrate appropriate behavior  Description: INTERVENTIONS:  - Treat underlying conditions and offer medication as ordered  - Educate regarding involuntary admission procedures and rules  - Utilize positive consistent limit setting strategies to support patient and staff safety  6/24/2023 0849 by Zari Poe RN  Outcome: Progressing  6/24/2023 0842 by Zari Poe RN  Outcome: Progressing     Problem: SELF CARE DEFICIT  Goal: Return ADL status to a safe level of function  Description: INTERVENTIONS:  - Administer medication as ordered  - Assess ADL deficits and provide assistive devices as needed  - Obtain PT/OT consults as needed  - Assist and instruct patient to increase activity and self care as tolerated  6/24/2023 0849 by Zari Poe RN  Outcome: Progressing  6/24/2023 0842 by Zari Poe RN  Outcome: Progressing     Problem: SAFETY, RESTRAINT - VIOLENT/SELF-DESTRUCTIVE  Goal: Remains free of harm/injury from restraints (Restraint for Violent/Self-Destructive Behavior)  Description: INTERVENTIONS:  - Instruct patient/family regarding restraint use   - Assess and monitor physiologic and psychological status   - Provide interventions and comfort measures to meet assessed patient needs   - Ensure continuous in person monitoring is provided   - Identify and implement measures to help patient regain control  - Assess readiness for release of restraint  6/24/2023 0849 by Zari Poe RN  Outcome: Progressing  6/24/2023 0842 by Zari Poe RN  Outcome: Progressing  Goal: Returns to optimal restraint-free functioning  Description: INTERVENTIONS:  - Assess the patient's behavior and symptoms that indicate continued need for restraint  - Identify and implement measures to help patient regain control  - Assess readiness for release of restraint   6/24/2023 0849 by Daniele Dyer RN  Outcome: Progressing  6/24/2023 0842 by Daniele Dyer RN  Outcome: Progressing

## 2023-06-24 NOTE — NURSING NOTE
Patient denies AVH/SI/HI. Continues on 1:1 close proximity for safety/non suicide. She is cooperative, pressured in speech and behaviors, and labile. She is sexually inappropriate at times but has been in behavioral control.

## 2023-06-24 NOTE — NURSING NOTE
Patient has been maintained on close proximity level of observation and while she needs redirection she has been cooperative with staff, with routine and with HS medication.

## 2023-06-24 NOTE — NURSING NOTE
Patient is screaming and not following direction at 1:1. Patient screaming in hallway, tearful, labile. Disruptive to milieu. Patient refusing to stay in room, and attempting to push past 1:1 sitter. Patient appears to be severely anxious over seeing 3 male patients walking the andrew together per sitter. 14:21 Patient given 2mg of ativan at 14:21 for severe anxiety. 15:21: medication effective for severe anxiety. Patient is observed resting.

## 2023-06-25 LAB
BACTERIA UR QL AUTO: NORMAL /HPF
BILIRUB UR QL STRIP: NEGATIVE
CLARITY UR: CLEAR
COLOR UR: ABNORMAL
GLUCOSE UR STRIP-MCNC: NEGATIVE MG/DL
HGB UR QL STRIP.AUTO: NEGATIVE
KETONES UR STRIP-MCNC: NEGATIVE MG/DL
LEUKOCYTE ESTERASE UR QL STRIP: 500
NITRITE UR QL STRIP: NEGATIVE
NON-SQ EPI CELLS URNS QL MICRO: NORMAL /HPF
PH UR STRIP.AUTO: 7 [PH]
PROT UR STRIP-MCNC: NEGATIVE MG/DL
RBC #/AREA URNS AUTO: NORMAL /HPF
SP GR UR STRIP.AUTO: 1.01 (ref 1–1.04)
UROBILINOGEN UA: NEGATIVE MG/DL
WBC #/AREA URNS AUTO: NORMAL /HPF

## 2023-06-25 PROCEDURE — 81003 URINALYSIS AUTO W/O SCOPE: CPT

## 2023-06-25 PROCEDURE — 81001 URINALYSIS AUTO W/SCOPE: CPT

## 2023-06-25 PROCEDURE — 99232 SBSQ HOSP IP/OBS MODERATE 35: CPT | Performed by: STUDENT IN AN ORGANIZED HEALTH CARE EDUCATION/TRAINING PROGRAM

## 2023-06-25 RX ORDER — CLOZAPINE 25 MG/1
75 TABLET ORAL 2 TIMES DAILY
Status: DISCONTINUED | OUTPATIENT
Start: 2023-06-25 | End: 2023-06-29

## 2023-06-25 RX ADMIN — TRAZODONE HYDROCHLORIDE 100 MG: 100 TABLET ORAL at 21:25

## 2023-06-25 RX ADMIN — LITHIUM CARBONATE 750 MG: 450 TABLET, EXTENDED RELEASE ORAL at 18:17

## 2023-06-25 RX ADMIN — LITHIUM CARBONATE 750 MG: 450 TABLET, EXTENDED RELEASE ORAL at 09:10

## 2023-06-25 RX ADMIN — SENNOSIDES AND DOCUSATE SODIUM 1 TABLET: 50; 8.6 TABLET ORAL at 09:10

## 2023-06-25 RX ADMIN — DIVALPROEX SODIUM 1000 MG: 500 TABLET, DELAYED RELEASE ORAL at 09:10

## 2023-06-25 RX ADMIN — CLOZAPINE 75 MG: 25 TABLET ORAL at 18:17

## 2023-06-25 RX ADMIN — BENZTROPINE MESYLATE 0.5 MG: 0.5 TABLET ORAL at 09:10

## 2023-06-25 RX ADMIN — CLOZAPINE 50 MG: 25 TABLET ORAL at 09:10

## 2023-06-25 RX ADMIN — BENZTROPINE MESYLATE 0.5 MG: 0.5 TABLET ORAL at 18:18

## 2023-06-25 RX ADMIN — SENNOSIDES AND DOCUSATE SODIUM 1 TABLET: 50; 8.6 TABLET ORAL at 18:17

## 2023-06-25 RX ADMIN — DIVALPROEX SODIUM 1000 MG: 500 TABLET, DELAYED RELEASE ORAL at 21:24

## 2023-06-25 RX ADMIN — CLONIDINE HYDROCHLORIDE 0.1 MG: 0.1 TABLET ORAL at 21:25

## 2023-06-25 RX ADMIN — CLONIDINE HYDROCHLORIDE 0.1 MG: 0.1 TABLET ORAL at 09:33

## 2023-06-25 NOTE — NURSING NOTE
Patient was interviewed with Dr. Corrina Ahuja, patient denies SI/HI but gets easily agitated when asked if she sees or hears other things that others don't. Patient makes a "I'm crazy" finger motion with her hand, and becomes visibly upset. Patient reassured. Patient is medication compliant, labile and visible. She continues on 1:1 safety/non-suicidal without issue with BHT.

## 2023-06-25 NOTE — PROGRESS NOTES
Progress Note - Behavioral Health   Gordon Emerson 28 y.o. female MRN: [id number]  Unit/Bed#: [facility] 349-01 Encounter: [id number]    Assessment/Plan   Principal Problem:    Unspecified mood (affective) disorder (HCC)  Active Problems:    Medical clearance for psychiatric admission    Intellectual disability    Psychosis ([address])    Dysuria    Vaginal lesion    Recommended Treatment:   Increased clozapine to 50 mg BID to better control agitation and ongoing symptoms of psychosis  Reduced cogentin to 0.5 mg BID to reduce anticholinergic burden  Continue clonidine 0.1 mg twice daily for anxiety  Continue Depakote 1000 mg twice daily for mood stability  Most recent valproic acid level 87 on 6/11/2023  Continue lithium 750 mg twice daily for mood stability  Most recently of lithium level 0.8 on 6/20/2023  Continue trazodone 100 mg at bedtime for sleep  Continue Senokot 1 tablet twice daily for bowel regimen    Continue with group therapy, milieu therapy and occupational therapy. Continue frequent safety checks and vitals per unit protocol. Case discussed with treatment team.  Risks, benefits and possible side effects of Medications: Risks, benefits, and possible side effects of medications have been explained to the patient, who verbalizes understanding    ------------------------------------------------------------    Subjective:     Per nursing report, on the inpatient psychiatric unit Ciara Stauffer has been making slow progress. She has continued to be maintained on continual observation close proximity for safety and support, as she continues to remain with labile mood and continues to respond to internal stimuli. Per nursing report, yesterday Ciara Stauffer was noted to be visible on the unit accompanied by her one-to-one. At times throughout the day she was noted to be impulsive, and loud, however was able to be redirected and maintain behavior control.   Per nursing reports she has remained medication and meal compliant. Deshaun Trejo was seen today at bedside for a comprehensive psychiatric interview. At the time of interview she was seen resting in her bed. Upon awakening she was noted to be irritable and became easily agitated with questioning. At the time of the interview this writer attempted to communicate with her in [facility] [place]. She remained with pressured speech and at times verbalized unintelligible words. Today, Erlin Paulson states that she feels "good". She originally reports that she slept 7 hours, then states that she slept 4 hours. At this point in the conversation she began writing on her hand and she began picking at her skin. She continued to state "I itch". She then began to rummage under her pillow and showed this writer a piece of paper with the name written on it. Today Deshaun Trejo reports that she continues to experience visual and auditory hallucinations, but would not elaborate. She began shouting at her 1:1 "tell him, tell him" and continued to require redirections. With multiple prompts she denied suicidal ideation and homicidal ideation She appeared to be responding to internal stimuli and was clapping her hands on her ears multiple times. As she continued to escalate the interview was terminated and she was later seen eating breakfast in no acute distress. Progress Toward Goals: limited improvement overall, continues to remain labile and impulsive, continues to have intermittent behavioral outbursts requiring prn medication    Psychiatric Review of Systems:  Behavior over the last 24 hours: unchanged  Sleep: improving  Appetite: normal compared to baseline  Medication side effects: none verbalized  ROS: Complete review of systems is negative except as noted above.     Vital signs in last 24 hours:  Temp:  [97.1 °F (36.2 °C)] 97.1 °F (36.2 °C)  HR:  [93-97] 97  Resp:  [16] 16  BP: (118-135)/(78-95) 135/95    Mental Status Exam:  Appearance:  Patient is a 28year old overtly  female with a shaved head.   Alert, casually dressed, marginal hygiene, limited eye contact   Behavior:  Limited cooperation, patient became increasingly agitated with questioning, is noted to have self-harm behaviors and is seen smacking her head   Motor: restless and fidgety and normal gait and balance   Speech:  Pressured, unintelligible at times, tangential at times   Mood:  "good"   Affect:  angry and irritable   Thought Process:  disorganized   Thought Content: no verbalized delusions or overt paranoia   Perceptual disturbances: appears to be responding to internal stimuli, reports that she continues to experience visual and auditory hallucinations, but would not elaborate   Risk Potential: No active suicidal ideation, No active homicidal ideation   Cognition: oriented to self and situation, memory grossly intact, appears to be below average intelligence, impaired abstract reasoning, attention span appeared shorter than expected for age and cognition not formally tested   Insight:  Poor   Judgment: Limited     Current Medications:  Current Facility-Administered Medications   Medication Dose Route Frequency Provider Last Rate   • acetaminophen  650 mg Oral Q6H PRN Bernice Cooler, DO     • acetaminophen  650 mg Oral Q4H PRN Bernice Cooler, DO     • acetaminophen  975 mg Oral Q6H PRN Bernice Cooler, DO     • aluminum-magnesium hydroxide-simethicone  30 mL Oral Q4H PRN Bernice Cooler, DO     • benztropine  1 mg Intramuscular Q4H PRN Max 6/day Bernice Cooler, DO     • benztropine  0.5 mg Oral BID Peri Lujan MD     • benztropine  1 mg Oral BID PRN Bernice Cooler, DO     • benztropine  1 mg Oral Q4H PRN Max 6/day Bernice Cooler, DO     • chlorproMAZINE  25 mg Intramuscular Q6H PRN Harry Andres MD      Or   • chlorproMAZINE  25 mg Oral Q6H PRN Harry Andres MD     • chlorproMAZINE  50 mg Intramuscular Q6H PRN Harry Andres MD      Or   • chlorproMAZINE  50 mg Oral Q6H PRN Harry Andres MD     • clonazePAM  0.5 mg Oral BID PRN Lafrances Gold, DO     • cloNIDine  0.1 mg Oral Q12H [facility] Lafrances Gold, DO     • cloZAPine  50 mg Oral BID Jorge Morton MD     • diphenhydrAMINE  25 mg Intramuscular Q6H PRN Lafrances Gold, DO     • hydrOXYzine HCL  50 mg Oral Q6H PRN Max 4/day Lafrances Gold, DO      Or   • diphenhydrAMINE  50 mg Intramuscular Q6H [facility]N [facility]     • divalproex sodium  1,000 mg Oral Q12H [facility] Lafrances Gold, DO     • hydrOXYzine HCL  100 mg Oral Q6H PRN Max 4/day Lafrances Gold, DO      Or   • LORazepam  2 mg Intramuscular Q6H PRN Lafrances Gold, DO     • hydrOXYzine HCL  25 mg Oral Q6H PRN Max 4/day Lafrances Gold, DO     • lithium carbonate  750 mg Oral BID Lafrances Gold, DO     • melatonin  3 mg Oral HS PRN [facility]     • polyethylene glycol  17 g Oral Daily PRN Lafrances Gold, DO     • propranolol  10 mg Oral Q8H PRN Lafrances Gold, DO     • senna-docusate sodium  1 tablet Oral BID Lafrances Gold, DO     • traZODone  100 mg Oral HS PERLA Singh         Behavioral Health Medications: all current active meds have been reviewed. Changes as in plan section above. Laboratory results:  I have personally reviewed all pertinent laboratory/tests results. No results found for this or any previous visit (from the past 48 hour(s)).      Elisabeth Hatch MD

## 2023-06-25 NOTE — NURSING NOTE
Patient has been napping on and off this evening but easily awakened for medications and snack. She has maintained behavioral control and she has been maintained on close proximity level of observation.

## 2023-06-25 NOTE — PROGRESS NOTES
Progress Note - Behavioral Health   Quynh Jules 28 y.o. female MRN: [id number]  Unit/Bed#: [facility] 349-01 Encounter: [id number]    Assessment/Plan   Principal Problem:    Unspecified mood (affective) disorder (HCC)  Active Problems:    Medical clearance for psychiatric admission    Intellectual disability    Psychosis ([address])    Dysuria    Vaginal lesion    Recommended Treatment:   Increased Clozapine to 75 mg twice daily to better control agitation and ongoing symptoms of psychosis  Continue Cogentin 0.5 mg twice daily for EPS prophylaxis  Continue clonidine 0.1 mg twice daily for anxiety  Continue Depakote 1000 mg twice daily for mood stability  Most recent valproic acid level 87 on 6/11/2023  Continue lithium 750 mg twice daily for mood stability  Most recent lithium level 0.8 on 6/20/2023  Continue trazodone 100 mg at bedtime for sleep  Continue Senokot 1 tablet twice daily for bowel regimen    Continue with group therapy, milieu therapy and occupational therapy. Continue frequent safety checks and vitals per unit protocol. Case discussed with treatment team.  Risks, benefits and possible side effects of Medications: Risks, benefits, and possible side effects of medications have been explained to the patient, who verbalizes understanding    ------------------------------------------------------------    Subjective:     Per nursing report, on the inpatient psychiatric unit Bernadette Keyes continues to make slow progress. She remains on continual observation close proximity for safety and support, as she continues to remain with labile mood and continues to respond to internal stimuli. Per nursing report, yesterday Fabio Phipps was noted to be visible on the unit, accompanied by her one-to-one. In the morning she was noted to be sexually inappropriate at times, but remained in behavior control. Yesterday at around 2:15 PM she was observed by nursing screaming loudly, tearful, and labile.   She was refusing to stay in her room and appeared to be severely anxious after seeing 3 male peers walking in the hallway together. She received as needed of Ativan 2 mg IM at 2:21 PM for severe anxiety, which was effective. In the evening she was observed by nursing napping on and off, awakening for medications and for a snack. Per nursing reports she slept throughout the night without issue. Anna Meza was seen in her room for comprehensive psychiatric interview. This writer communicated with Anna Meza in [place]. At the time of interview Anna Meza continues to remain tangential, with a poor attention span, and at times with pressured speech, but she was more appropriate in behavior today and was more cooperative with the interview. Today, upon entering the room, Anna Meza is noted to be counting the lights on the ceiling. She approaches this writer and opens her mouth widely, reporting that she is experiencing some dental pain. There was no apparent dental pathology or gum pathology upon inspection and she was encouraged to brush her teeth carefully (per staff report she often brushes her teeth vigorously). Anna Meza then promptly went into the bathroom to inspect her teeth. She the returned and showed this writer some of the coloring that she has been doing on the inpatient unit. Today, Anna Meza reports she is doing good ("remi"). She reports that she slept well last night and reports that she has been eating well in the hospital. When Anna Meza is asked about the events yesterday afternoon, she makes multiple pressured statements that people are going to kill her. She then makes multiple tangential comments about her skin being on fire and about the police. She was difficult to follow and understand. Per chart review the patient expressed similar fears in the past and has expressed fears that individuals in the [facility] were out to kill her and burn her at [facility].     Today, Anna Meza continues to report visual and auditory hallucinations. She does not elaborate and becomes agitated with questioning, but is able to be directed. She denies suicidal ideation and homicidal ideation and consents for safety on the inpatient unit. Progress Toward Goals: slow improvement, is more appropriate and cooperative with conversation, however continues to remain labile and disorganized    Psychiatric Review of Systems:  Behavior over the last 24 hours: improved  Sleep: improving  Appetite: normal compared to baseline  Medication side effects: none verbalized  ROS: Complete review of systems is negative except as noted above. Vital signs in last 24 hours:  Temp:  [97.2 °F (36.2 °C)-97.7 °F (36.5 °C)] 97.7 °F (36.5 °C)  HR:  [] 113  Resp:  [16] 16  BP: (122-142)/(67-95) 142/78    Mental Status Exam:  Appearance:  Patient is a 28year old overtly  female with a shaved head.   Alert, casually dressed, marginal hygiene, limited eye contact   Behavior:  More cooperative, however continues to be easily agitated with questioning, continues at times to smack her head in frustration   Motor: restless and fidgety and normal gait and balance   Speech:  Remains pressured and unintelligible at times, tangential at times   Mood:  "remi"   Affect:  anxious and irritable   Thought Process:  Remains disorganized   Thought Content: paranoid ideation that people are going to kill her   Perceptual disturbances: appears to be responding to internal stimuli and continues to experience visual and auditory hallucinations, but would not elaborate   Risk Potential: No active suicidal ideation, No active homicidal ideation   Cognition: oriented to self and situation, memory grossly intact, appears to be below average intelligence, impaired abstract reasoning, attention span appeared shorter than expected for age and cognition not formally tested   Insight:  Poor   Judgment: Limited     Current Medications:  Current Facility-Administered Medications Medication Dose Route Frequency Provider Last Rate   • acetaminophen  650 mg Oral Q6H PRN Eneida Force, DO     • acetaminophen  650 mg Oral Q4H PRN Eneida Force, DO     • acetaminophen  975 mg Oral Q6H PRN Eneida Force, DO     • aluminum-magnesium hydroxide-simethicone  30 mL Oral Q4H PRN Eneida Force, DO     • benztropine  1 mg Intramuscular Q4H PRN Max 6/day Eneida Force, DO     • benztropine  0.5 mg Oral BID Kaylie León MD     • benztropine  1 mg Oral BID PRN Eneida Force, DO     • benztropine  1 mg Oral Q4H PRN Max 6/day Eneida Force, DO     • chlorproMAZINE  25 mg Intramuscular Q6H PRN Paresh Franklin MD      Or   • chlorproMAZINE  25 mg Oral Q6H PRN Paresh Franklin MD     • chlorproMAZINE  50 mg Intramuscular Q6H PRN Paresh Franklin MD      Or   • chlorproMAZINE  50 mg Oral Q6H PRN Paresh Franklin MD     • clonazePAM  0.5 mg Oral BID PRN Eneida Force, DO     • cloNIDine  0.1 mg Oral Q12H [facility] Eneida Force, DO     • cloZAPine  75 mg Oral BID Kaylie León MD     • diphenhydrAMINE  25 mg Intramuscular Q6H PRN Eneida Force, DO     • hydrOXYzine HCL  50 mg Oral Q6H PRN Max 4/day Eneida Force, DO      Or   • diphenhydrAMINE  50 mg Intramuscular Q6H PRN Eneida Force, DO     • divalproex sodium  1,000 mg Oral Q12H [facility] Eneida Force, DO     • hydrOXYzine HCL  100 mg Oral Q6H PRN Max 4/day Eneida Force, DO      Or   • LORazepam  2 mg Intramuscular Q6H PRN Eneida Force, DO     • hydrOXYzine HCL  25 mg Oral Q6H PRN Max 4/day Eneida Force, DO     • lithium carbonate  750 mg Oral BID Eneida Force, DO     • melatonin  3 mg Oral HS PRN Eneida Force, DO     • polyethylene glycol  17 g Oral Daily PRN Eneida Force, DO     • propranolol  10 mg Oral Q8H PRN Eneida Force, DO     • senna-docusate sodium  1 tablet Oral BID Eneida Force, DO     • traZODone  100 mg Oral HS PERLA Benitez         Behavioral Health Medications: all current active meds have been reviewed. Changes as in plan section above. Laboratory results:  I have personally reviewed all pertinent laboratory/tests results. No results found for this or any previous visit (from the past 48 hour(s)).      Shay Finnegan MD

## 2023-06-26 PROCEDURE — 99232 SBSQ HOSP IP/OBS MODERATE 35: CPT | Performed by: PSYCHIATRY & NEUROLOGY

## 2023-06-26 RX ADMIN — LITHIUM CARBONATE 750 MG: 450 TABLET, EXTENDED RELEASE ORAL at 08:34

## 2023-06-26 RX ADMIN — CLOZAPINE 75 MG: 25 TABLET ORAL at 08:35

## 2023-06-26 RX ADMIN — CLOZAPINE 75 MG: 25 TABLET ORAL at 17:45

## 2023-06-26 RX ADMIN — SENNOSIDES AND DOCUSATE SODIUM 1 TABLET: 50; 8.6 TABLET ORAL at 08:34

## 2023-06-26 RX ADMIN — CHLORPROMAZINE HYDROCHLORIDE 50 MG: 25 INJECTION INTRAMUSCULAR at 12:08

## 2023-06-26 RX ADMIN — DIVALPROEX SODIUM 1000 MG: 500 TABLET, DELAYED RELEASE ORAL at 20:36

## 2023-06-26 RX ADMIN — TRAZODONE HYDROCHLORIDE 100 MG: 100 TABLET ORAL at 22:35

## 2023-06-26 RX ADMIN — DIVALPROEX SODIUM 1000 MG: 500 TABLET, DELAYED RELEASE ORAL at 08:34

## 2023-06-26 RX ADMIN — DIPHENHYDRAMINE HYDROCHLORIDE 50 MG: 50 INJECTION, SOLUTION INTRAMUSCULAR; INTRAVENOUS at 12:08

## 2023-06-26 RX ADMIN — BENZTROPINE MESYLATE 0.5 MG: 0.5 TABLET ORAL at 08:34

## 2023-06-26 RX ADMIN — BENZTROPINE MESYLATE 0.5 MG: 0.5 TABLET ORAL at 17:45

## 2023-06-26 RX ADMIN — CLONIDINE HYDROCHLORIDE 0.1 MG: 0.1 TABLET ORAL at 20:45

## 2023-06-26 RX ADMIN — SENNOSIDES AND DOCUSATE SODIUM 1 TABLET: 50; 8.6 TABLET ORAL at 17:45

## 2023-06-26 RX ADMIN — LITHIUM CARBONATE 750 MG: 450 TABLET, EXTENDED RELEASE ORAL at 17:45

## 2023-06-26 RX ADMIN — CLONIDINE HYDROCHLORIDE 0.1 MG: 0.1 TABLET ORAL at 09:06

## 2023-06-26 NOTE — QUICK NOTE
I attempted to called the patient's sister, Yudi Murphy twice at 2:00 PM. Patient's sister did not answer, and call went straight to voicemail twice.

## 2023-06-26 NOTE — NURSING NOTE
Patient is maintained on close proximity level of observation. She has required verbal redirection often as she is loud and excitable in the milieu. It was reported that she had been c/o earlier today of discomfort on urination. Medical provider has ordered u/a with microscopy and it has been obtained. She was cooperative with HS scheduled medications and has not required prn medication this evening.

## 2023-06-26 NOTE — PROGRESS NOTES
Progress Note - Behavioral Health   Kira Goldman 28 y.o. female MRN: [id number]  Unit/Bed#: -01 Encounter: [id number]    Assessment/Plan   Principal Problem:    Unspecified mood (affective) disorder ([address])  Active Problems:    Medical clearance for psychiatric admission    Intellectual disability    Psychosis ([address])    Dysuria    Vaginal lesion      Recommended Treatment:   No psychopharmacologic changes necessary at this moment; will continue to assess daily for further optimization. Continue the following medications:  Benztropine 0.5mg BID for EPS from medications  Clonidine 0.1mg BID for impulsivity due to executive dysfunction  Depakote 1000 mg BID for mood lability  Lithium 750mg BID for mood lability; considering tapering,   Sennakot 1 tablet BID for bowel regimen  Trazodone 100mg HS for sleep    Continue with pharmacotherapy, group therapy, milieu therapy and occupational therapy. Continue to assess for adverse medication side effects. Encourage Kira Goldman to participate in nonverbal forms of therapy including journaling and art/music therapy. Continue frequent safety checks and vitals per unit protocol. Continue to engage CM/SW to assist with collateral, disposition planning, and the implementation of an individualized, patient-centered plan of care. Continue medical management by medical team.  Case discussed with treatment team.    Legal Status: 201  ------------------------------------------------------------    Subjective: All documentation including nursing notes, medication history to ensure medication adherence on the unit, labs, and vitals were reviewed. Isamar Moses was evaluated this morning for continuity of care and no acute distress noted throughout the evaluation. Over the past 24 hours per nursing report, Isamar Moses has been cooperative on the unit and compliant with medications.   Over the weekend, patient had an episode of extreme agitation and needed 2mg Ativan due to inability to be redirected. Today, patient required 50mg Thorazine and 50mg benadryl due to agitation. Spent time in seclusion room. No restraints, no self injury. Today, Nima Rodney is consenting for safety on the unit. Nima Rodney reports feeling "remi." Nima Rodney notes having 5-6 hours of sleep. Nima Rodney states having a good appetite. Nima Rodney has been taking the medications as prescribed and reporting no side effects. She states she has intermittently heard 3 voices calling her crazy and they will assassinate her. She also states sometimes she hears music    Nima Rodney denies suicidal ideations at first and then states she will kill herself, states she feels safe in the hospital. Becomes agitated about follow up questions. Nima Rodney denies homicidal ideations. Regarding hallucinations, Nima Rodney denies currently and does not appear to be responding to internal stimuli but is easily distractible and appears     PRNs overnight: ativan over the weekend 2mg, Thorazine 50mg today, Im and Benadryl 50mg IM  VS: Reviewed, within normal limits    Progress Toward Goals: slow improvement    Psychiatric Review of Systems:  Behavior over the last 24 hours:  unchanged  Sleep: improved  Appetite: normal  Medication side effects: No   ROS: all other systems are negative    Vital signs in last 24 hours:  Temp:  [97.6 °F (36.4 °C)-97.7 °F (36.5 °C)] 97.6 °F (36.4 °C)  HR:  [100-115] 111  Resp:  [16] 16  BP: (109-146)/(71-85) 119/71    Laboratory results:  I have personally reviewed all pertinent laboratory/tests results.   Recent Results (from the past 48 hour(s))   UA w Reflex to Microscopic w Reflex to Culture    Collection Time: 06/25/23 10:08 PM    Specimen: Urine, Clean Catch   Result Value Ref Range    Color, UA Straw Straw, Yellow, Pale Yellow    Clarity, UA Clear Clear, Other    Specific Gravity, UA 1.010 1.003 - 1.040    pH, UA 7.0 4.5, 5.0, 5.5, 6.0, 6.5, 7.0, 7.5, 8.0    Leukocytes, .0 (A) Negative    Nitrite, UA Negative Negative Protein, UA Negative Negative mg/dl    Glucose, UA Negative Negative mg/dl    Ketones, UA Negative Negative mg/dl    Bilirubin, UA Negative Negative    Occult Blood, UA Negative Negative    UROBILINOGEN UA Negative 1.0, Negative mg/dL   Urine Microscopic    Collection Time: 06/25/23 10:08 PM   Result Value Ref Range    RBC, UA None Seen None Seen, 0-1, 1-2, 2-4, 0-5 /hpf    WBC, UA 0-5 None Seen, 0-1, 1-2, 0-5, 2-4 /hpf    Epithelial Cells Occasional None Seen, Occasional /hpf    Bacteria, UA Occasional None Seen, Occasional /hpf         Mental Status Evaluation:    Appearance:  dressed appropriately, marginal hygiene, overtly appearing  female, in no apparent acute distress, intermittent eye contact   Behavior:  cooperative   Speech:  normal rate and volume   Mood:  "remi"   Affect:  less labile than previously   Thought Process:  more linear, less disorganized than previous   Associations: more concrete and less loose than before   Thought Content:  continues to have ruminating thoughts on getting blood drawn, discomfort around "feeling crazy"   Perceptual Disturbances: Reports still hearing voices but not as frequent. Currently, Denies auditory or visual hallucinations and Does not appear to be responding to internal stimuli   Risk Potential: Suicidal ideation - None at present, and then says she will, becomes agitated on further questioning.  States she feels safe on the unit and will be   Homicidal ideation - None at present  Potential for aggression - Not at present   Sensorium:  oriented to person and place   Memory:  recent and remote memory grossly intact   Consciousness:  alert and awake   Attention/Concentration: attention span and concentration appear shorter than expected for age   Insight:  limited   Judgment: limited   Gait/Station: normal gait/station   Motor Activity: no abnormal movements       Current Medications:  Current Facility-Administered Medications   Medication Dose Route Frequency Provider Last Rate   • acetaminophen  650 mg Oral Q6H PRN Sharl Arm, DO     • acetaminophen  650 mg Oral Q4H PRN Sharl Arm, DO     • acetaminophen  975 mg Oral Q6H PRN Sharl Arm, DO     • aluminum-magnesium hydroxide-simethicone  30 mL Oral Q4H PRN Sharl Arm, DO     • benztropine  1 mg Intramuscular Q4H PRN Max 6/day Sharl Arm, DO     • benztropine  0.5 mg Oral BID Rocio Pelletier MD     • benztropine  1 mg Oral BID PRN Sharl Arm, DO     • benztropine  1 mg Oral Q4H PRN Max 6/day Sharl Arm, DO     • chlorproMAZINE  25 mg Intramuscular Q6H PRN Renee Batista MD      Or   • chlorproMAZINE  25 mg Oral Q6H PRN Renee Batista MD     • chlorproMAZINE  50 mg Intramuscular Q6H PRN Renee Batista MD      Or   • chlorproMAZINE  50 mg Oral Q6H PRN Renee Batista MD     • clonazePAM  0.5 mg Oral BID PRN Sharl Arm, DO     • cloNIDine  0.1 mg Oral Q12H [address], DO     • cloZAPine  75 mg Oral BID Rocio Pelletier MD     • diphenhydrAMINE  25 mg Intramuscular Q6H PRN Sharl Arm, DO     • hydrOXYzine HCL  50 mg Oral Q6H PRN Max 4/day Sharl Arm, DO      Or   • diphenhydrAMINE  50 mg Intramuscular Q6H PRN Sharl Arm, DO     • divalproex sodium  1,000 mg Oral Q12H [address] Sharl Arm, DO     • hydrOXYzine HCL  100 mg Oral Q6H PRN Max 4/day Sharl Arm, DO      Or   • LORazepam  2 mg Intramuscular Q6H PRN Sharl Arm, DO     • hydrOXYzine HCL  25 mg Oral Q6H PRN Max 4/day Sharl Arm, DO     • lithium carbonate  750 mg Oral BID Sharl Arm, DO     • melatonin  3 mg Oral HS PRN Sharl Arm, DO     • polyethylene glycol  17 g Oral Daily PRN Sharl Arm, DO     • propranolol  10 mg Oral Q8H PRN Sharl Arm, DO     • senna-docusate sodium  1 tablet Oral BID Sharl Arm, DO     • traZODone  100 mg Oral HS Memo Graves Sylvia, [address] [facility] 06/26/23  Psychiatry Resident, PGY-II    This note was completed in part utilizing [employer] Direct Software. Grammatical, translation, syntax errors, random word insertions, spelling mistakes, and incomplete sentences may be an occasional consequence of this system secondary to software limitations with voice recognition, ambient noise, and hardware issues. If you have any questions or concerns about the content, text, or information contained within the body of this dictation, please contact the provider for clarification.

## 2023-06-26 NOTE — NURSING NOTE
Pt remains on close proximity observation. Pt denies SI/HI/AH/VH but at times appears internally preoccupied. Present in dayroom and milieu. Medication and meal compliant. Pt is pleasant but appears anxious/restless. Pt becoming increasingly anxious/agitated later this morning. Pt not able to be verbally redirected. Pt refused PO medication. Pt escorted back to room and received IM medication. 1308 PRN slightly effective. Pt was less agitated and was not yelling as frequently but still was having short outbursts of yelling through out the afternoon. One to one stated that Pt licked soap off of her own hand. Pt mouth was rinsed out. Pt educated to not eat soap but was dismissive towards staff. Will continue to monitor.

## 2023-06-26 NOTE — NURSING NOTE
Patient is visible intermittently and is out of room for meals. Continues on 1:1 close proximity observation. Compliant with medication administration. Appears calm on initial approach although patient observed with continued rapid speech and sudden outbursts. Denies SI, HI, AV and does endorse VH of "things with numbers 3,4, 5".

## 2023-06-26 NOTE — NURSING NOTE
1208 IM Thorazine 50 mg and IM Benadryl 50 mg in left deltoid. Pt continued to yell and was finally deescalated by listening to music.1308 PRN slightly effective.

## 2023-06-26 NOTE — PROGRESS NOTES
06/26/23 0843   Team Meeting   Meeting Type Daily Rounds   Team Members Present   Team Members Present Physician;Nurse;   Physician Team Member [id number] [facility] Team Member [facility] Management Team Member Cathi   Patient/Family Present   Patient Present No   Patient's Family Present No   1:1. Pt had a rough weekend. Pt agitated over the weekend. Saturday- pt placed in seclusion. Pt attempting to push past staff, severely anxious. PRNs Ativan-effective. CBC to be drawn this week. DC tbd.

## 2023-06-27 LAB
BASOPHILS # BLD AUTO: 0.03 THOUSANDS/ÂΜL (ref 0–0.1)
BASOPHILS NFR BLD AUTO: 0 % (ref 0–1)
CK MB SERPL-MCNC: 4.9 NG/ML (ref 0.6–6.3)
EOSINOPHIL # BLD AUTO: 0.09 THOUSAND/ÂΜL (ref 0–0.61)
EOSINOPHIL NFR BLD AUTO: 1 % (ref 0–6)
ERYTHROCYTE [DISTWIDTH] IN BLOOD BY AUTOMATED COUNT: 13.2 % (ref 11.6–15.1)
HCT VFR BLD AUTO: 43 % (ref 34.8–46.1)
HGB BLD-MCNC: 13.7 G/DL (ref 11.5–15.4)
IMM GRANULOCYTES # BLD AUTO: 0.02 THOUSAND/UL (ref 0–0.2)
IMM GRANULOCYTES NFR BLD AUTO: 0 % (ref 0–2)
LITHIUM SERPL-SCNC: 1.1 MMOL/L (ref 0.6–1.2)
LYMPHOCYTES # BLD AUTO: 2.5 THOUSANDS/ÂΜL (ref 0.6–4.47)
LYMPHOCYTES NFR BLD AUTO: 26 % (ref 14–44)
MCH RBC QN AUTO: 29.5 PG (ref 26.8–34.3)
MCHC RBC AUTO-ENTMCNC: 31.9 G/DL (ref 31.4–37.4)
MCV RBC AUTO: 93 FL (ref 82–98)
MONOCYTES # BLD AUTO: 0.92 THOUSAND/ÂΜL (ref 0.17–1.22)
MONOCYTES NFR BLD AUTO: 10 % (ref 4–12)
NEUTROPHILS # BLD AUTO: 6.16 THOUSANDS/ÂΜL (ref 1.85–7.62)
NEUTS SEG NFR BLD AUTO: 63 % (ref 43–75)
NRBC BLD AUTO-RTO: 0 /100 WBCS
PLATELET # BLD AUTO: 248 THOUSANDS/UL (ref 149–390)
PMV BLD AUTO: 9.3 FL (ref 8.9–12.7)
RBC # BLD AUTO: 4.65 MILLION/UL (ref 3.81–5.12)
WBC # BLD AUTO: 9.72 THOUSAND/UL (ref 4.31–10.16)

## 2023-06-27 PROCEDURE — 85025 COMPLETE CBC W/AUTO DIFF WBC: CPT

## 2023-06-27 PROCEDURE — 80178 ASSAY OF LITHIUM: CPT

## 2023-06-27 PROCEDURE — 99232 SBSQ HOSP IP/OBS MODERATE 35: CPT | Performed by: PSYCHIATRY & NEUROLOGY

## 2023-06-27 PROCEDURE — 82553 CREATINE MB FRACTION: CPT

## 2023-06-27 RX ADMIN — CLONIDINE HYDROCHLORIDE 0.1 MG: 0.1 TABLET ORAL at 08:25

## 2023-06-27 RX ADMIN — CHLORPROMAZINE HYDROCHLORIDE 50 MG: 50 TABLET, COATED ORAL at 14:18

## 2023-06-27 RX ADMIN — SENNOSIDES AND DOCUSATE SODIUM 1 TABLET: 50; 8.6 TABLET ORAL at 08:25

## 2023-06-27 RX ADMIN — DIVALPROEX SODIUM 1000 MG: 500 TABLET, DELAYED RELEASE ORAL at 21:19

## 2023-06-27 RX ADMIN — HYDROXYZINE HYDROCHLORIDE 100 MG: 50 TABLET, FILM COATED ORAL at 14:18

## 2023-06-27 RX ADMIN — CLONIDINE HYDROCHLORIDE 0.1 MG: 0.1 TABLET ORAL at 21:19

## 2023-06-27 RX ADMIN — BENZTROPINE MESYLATE 0.5 MG: 0.5 TABLET ORAL at 17:02

## 2023-06-27 RX ADMIN — TRAZODONE HYDROCHLORIDE 100 MG: 100 TABLET ORAL at 21:19

## 2023-06-27 RX ADMIN — DIVALPROEX SODIUM 1000 MG: 500 TABLET, DELAYED RELEASE ORAL at 08:25

## 2023-06-27 RX ADMIN — LITHIUM CARBONATE 750 MG: 450 TABLET, EXTENDED RELEASE ORAL at 17:02

## 2023-06-27 RX ADMIN — CLOZAPINE 75 MG: 25 TABLET ORAL at 17:02

## 2023-06-27 RX ADMIN — SENNOSIDES AND DOCUSATE SODIUM 1 TABLET: 50; 8.6 TABLET ORAL at 17:02

## 2023-06-27 RX ADMIN — BENZTROPINE MESYLATE 0.5 MG: 0.5 TABLET ORAL at 08:25

## 2023-06-27 RX ADMIN — LITHIUM CARBONATE 750 MG: 450 TABLET, EXTENDED RELEASE ORAL at 08:25

## 2023-06-27 RX ADMIN — CLOZAPINE 75 MG: 25 TABLET ORAL at 08:25

## 2023-06-27 NOTE — NURSING NOTE
Pt became upset while sitting at Pt lounge.  talked to Pt and PT began yelling. Pt continued to yell as she paced the milieu. Pt was not able to be verbally redirected. Pt offered PRN for agitation and anxiety. Pt initially refused and continued to yell. Pt informed that if she did not take PO medication an injection would need to be given due to current behavior. Pt agreed to take PO medication. 1418 PO Thorazine 50 mg and PO Hydroxyzine 100 mg PO given. 1518 Medication only appears slighlty effective. Pt yelling on phone when talking to Mother but was calm after phone call. Will continue to monitor.

## 2023-06-27 NOTE — PROGRESS NOTES
Progress Note - Behavioral Health   Allison Chatterjee 28 y.o. female MRN: [id number]  Unit/Bed#: [facility] 349-01 Encounter: [id number]    Assessment/Plan   Principal Problem:    Unspecified mood (affective) disorder ([address])  Active Problems:    Medical clearance for psychiatric admission    Intellectual disability    Psychosis ([address])    Dysuria    Vaginal lesion      Recommended Treatment:   No psychopharmacologic changes necessary at this moment; will continue to assess daily for further optimization. Lithium level is 1.1 today and coarse tremor is improved. Will continue to closely monitor. Discussed with medical, and they provided record review. No other foreseen cause outside of clozaril for tachycardia. Sterile pyuria on UA. Will continue to monitor. Continue with pharmacotherapy, group therapy, milieu therapy and occupational therapy. Continue to assess for adverse medication side effects. Encourage Allison Chatterjee to participate in nonverbal forms of therapy including journaling and art/music therapy. Continue frequent safety checks and vitals per unit protocol. Continue to engage CM/SW to assist with collateral, disposition planning, and the implementation of an individualized, patient-centered plan of care. Continue medical management by medical team.  Case discussed with treatment team.    Legal Status: 303  ------------------------------------------------------------    Subjective: All documentation including nursing notes, medication history to ensure medication adherence on the unit, labs, and vitals were reviewed. Heath Brewer was evaluated this morning for continuity of care and no acute distress noted throughout the evaluation. Over the past 24 hours per nursing report, Heath Brewer has been cooperative on the unit and compliant with medications.   Patient will yell out during the afternoon time, has a hard time being redirected however is overall more redirectable compared to beginning of treatment. Today, Antionette Wilkes is consenting for safety on the unit. Antionette Wilkes reports feeling "remi." Antionette Wilkes notes having 5 hours of sleep. Antionette Wilkes states having a good appetite. Antionette Wilkes has been taking the medications as prescribed and reporting no side effects. Patient expressed she does not like getting blood drawn but is understanding of why blood needs drawn. Patient states she doesn't want any more blood drawn today. Then begins to ask if this writer and a medical student are romantically involved and says she likes her nurse today. Antionette iWlkes denies suicidal ideations. Antionette Wilkes denies homicidal ideations. Regarding hallucinations, Antionette Wilkes denies and does not appear to be responding to internal stimuli. She states that she is overall hearing less hallucinations. PRNs overnight: 50mg Thorazine IM and 50mg Benadryl IM for agitation,    VS: Reviewed, tachycardia , otherwise within normal limits    Progress Toward Goals: slow improvement    Psychiatric Review of Systems:  Behavior over the last 24 hours:  improved  Sleep: improved  Appetite: normal  Medication side effects: No   ROS: all other systems are negative    Vital signs in last 24 hours:  Temp:  [97.3 °F (36.3 °C)-97.4 °F (36.3 °C)] 97.4 °F (36.3 °C)  HR:  [115-126] 122  Resp:  [16] 16  BP: (100-134)/(56-84) 134/84    Laboratory results:  I have personally reviewed all pertinent laboratory/tests results.   Recent Results (from the past 48 hour(s))   UA w Reflex to Microscopic w Reflex to Culture    Collection Time: 06/25/23 10:08 PM    Specimen: Urine, Clean Catch   Result Value Ref Range    Color, UA Straw Straw, Yellow, Pale Yellow    Clarity, UA Clear Clear, Other    Specific Gravity, UA 1.010 1.003 - 1.040    pH, UA 7.0 4.5, 5.0, 5.5, 6.0, 6.5, 7.0, 7.5, 8.0    Leukocytes, .0 (A) Negative    Nitrite, UA Negative Negative    Protein, UA Negative Negative mg/dl    Glucose, UA Negative Negative mg/dl    Ketones, UA Negative Negative mg/dl Bilirubin, UA Negative Negative    Occult Blood, UA Negative Negative    UROBILINOGEN UA Negative 1.0, Negative mg/dL   Urine Microscopic    Collection Time: 06/25/23 10:08 PM   Result Value Ref Range    RBC, UA None Seen None Seen, 0-1, 1-2, 2-4, 0-5 /hpf    WBC, UA 0-5 None Seen, 0-1, 1-2, 0-5, 2-4 /hpf    Epithelial Cells Occasional None Seen, Occasional /hpf    Bacteria, UA Occasional None Seen, Occasional /hpf   Lithium level    Collection Time: 06/27/23  6:12 AM   Result Value Ref Range    Lithium Lvl 1.1 0.6 - 1.2 mmol/L   CBC and differential    Collection Time: 06/27/23  6:12 AM   Result Value Ref Range    WBC 9.72 4.31 - 10.16 Thousand/uL    RBC 4.65 3.81 - 5.12 Million/uL    Hemoglobin 13.7 11.5 - 15.4 g/dL    Hematocrit 43.0 34.8 - 46.1 %    MCV 93 82 - 98 fL    MCH 29.5 26.8 - 34.3 pg    MCHC 31.9 31.4 - 37.4 g/dL    RDW 13.2 11.6 - 15.1 %    MPV 9.3 8.9 - 12.7 fL    Platelets 215 127 - 307 Thousands/uL    nRBC 0 /100 WBCs    Neutrophils Relative 63 43 - 75 %    Immat GRANS % 0 0 - 2 %    Lymphocytes Relative 26 14 - 44 %    Monocytes Relative 10 4 - 12 %    Eosinophils Relative 1 0 - 6 %    Basophils Relative 0 0 - 1 %    Neutrophils Absolute 6.16 1.85 - 7.62 Thousands/µL    Immature Grans Absolute 0.02 0.00 - 0.20 Thousand/uL    Lymphocytes Absolute 2.50 0.60 - 4.47 Thousands/µL    Monocytes Absolute 0.92 0.17 - 1.22 Thousand/µL    Eosinophils Absolute 0.09 0.00 - 0.61 Thousand/µL    Basophils Absolute 0.03 0.00 - 0.10 Thousands/µL   CKMB    Collection Time: 06/27/23  6:12 AM   Result Value Ref Range    CK-MB 4.9 0.6 - 6.3 ng/mL         Mental Status Evaluation:    Appearance:  dressed appropriately, marginal hygiene, overtly appearing  female, in no apparent acute distress, intermittent eye contact   Behavior:  cooperative,.  More redirectable than previous   Speech:  Mostly coherent, increased rate   Mood:  "remi"   Affect:  less labile than previous    Thought Process:  less disorganized as previous   Associations: concrete associations, less loose   Thought Content:  mild paranoia, negative thoughts   Perceptual Disturbances: Denies auditory or visual hallucinations and Does not appear to be responding to internal stimuli   Risk Potential: Suicidal ideation - None at present  Homicidal ideation - None at present  Potential for aggression - Not at present   Sensorium:  oriented to person and place   Memory:  recent and remote memory grossly intact   Consciousness:  alert and awake   Attention/Concentration: attention span and concentration appear shorter than expected for age   Insight:  limited   Judgment: limited   Gait/Station: normal gait/station   Motor Activity: no abnormal movements       Current Medications:  Current Facility-Administered Medications   Medication Dose Route Frequency Provider Last Rate   • acetaminophen  650 mg Oral Q6H PRN Louvenia Groom, DO     • acetaminophen  650 mg Oral Q4H PRN Louvenia Groom, DO     • acetaminophen  975 mg Oral Q6H PRN Louvenia Groom, DO     • aluminum-magnesium hydroxide-simethicone  30 mL Oral Q4H PRN Louvenia Groom, DO     • benztropine  1 mg Intramuscular Q4H PRN Max 6/day Louvenia Groom, DO     • benztropine  0.5 mg Oral BID Ava Bowling MD     • benztropine  1 mg Oral BID PRN Louvenia Groom, DO     • benztropine  1 mg Oral Q4H PRN Max 6/day Louvenia Groom, DO     • chlorproMAZINE  25 mg Intramuscular Q6H PRN Letty Reynoso MD      Or   • chlorproMAZINE  25 mg Oral Q6H PRN Letty Reynoso MD     • chlorproMAZINE  50 mg Intramuscular Q6H PRN Letty Reynoso MD      Or   • chlorproMAZINE  50 mg Oral Q6H PRN Letty Reynoso MD     • clonazePAM  0.5 mg Oral BID PRN Louvenia Groom, DO     • cloNIDine  0.1 mg Oral Q12H [facility] Louvenia Burkett, DO     • cloZAPine  75 mg Oral BID Ava Bowling MD     • diphenhydrAMINE  25 mg Intramuscular Q6H PRN Louvenia Groom, DO     • hydrOXYzine HCL  50 mg Oral Q6H PRN Max 4/day Louvenia Groom, DO      Or   • diphenhydrAMINE  50 mg Intramuscular Q6H PRN Elfrieda Gasman, DO     • divalproex sodium  1,000 mg Oral Q12H [address] Elfrieda Gasman, DO     • hydrOXYzine HCL  100 mg Oral Q6H PRN Max 4/day Elfrieda Gasman, DO      Or   • LORazepam  2 mg Intramuscular Q6H PRN Elfrieda Gasman, DO     • hydrOXYzine HCL  25 mg Oral Q6H PRN Max 4/day Elfrieda Gasman, DO     • lithium carbonate  750 mg Oral BID Elfrieda Gasman, DO     • melatonin  3 mg Oral HS PRN Elfrieda Gasman, DO     • polyethylene glycol  17 g Oral Daily PRN Elfrieda Gasman, DO     • propranolol  10 mg Oral Q8H PRN Elfrieda Gasman, DO     • senna-docusate sodium  1 tablet Oral BID Elfrieda Gasman, DO     • traZODone  100 mg Oral HS [facility], [address] [facility], DO 06/27/23  Psychiatry Resident, PGY-II    This note was completed in part utilizing [employer] Direct Software. Grammatical, translation, syntax errors, random word insertions, spelling mistakes, and incomplete sentences may be an occasional consequence of this system secondary to software limitations with voice recognition, ambient noise, and hardware issues. If you have any questions or concerns about the content, text, or information contained within the body of this dictation, please contact the provider for clarification.

## 2023-06-27 NOTE — PLAN OF CARE
Problem: Risk for Self Injury/Neglect  Goal: Treatment Goal: Remain safe during length of stay, learn and adopt new coping skills, and be free of self-injurious ideation, impulses and acts at the time of discharge  Outcome: Progressing  Goal: Verbalize thoughts and feelings  Description: Interventions:  - Assess and re-assess patient's lethality and potential for self-injury  - Engage patient in 1:1 interactions, daily, for a minimum of 15 minutes  - Encourage patient to express feelings, fears, frustrations, hopes  - Establish rapport/trust with patient   Outcome: Progressing  Goal: Refrain from harming self  Description: Interventions:  - Monitor patient closely, per order  - Develop a trusting relationship  - Supervise medication ingestion, monitor effects and side effects   Outcome: Progressing  Goal: Attend and participate in unit activities, including therapeutic, recreational, and educational groups  Description: Interventions:  - Provide therapeutic and educational activities daily, encourage attendance and participation, and document same in the medical record  - Obtain collateral information, encourage visitation and family involvement in care   Outcome: Progressing  Goal: Complete daily ADLs, including personal hygiene independently, as able  Description: Interventions:  - Observe, teach, and assist patient with ADLS  - Monitor and promote a balance of rest/activity, with adequate nutrition and elimination  Outcome: Progressing     Problem: Risk for Violence/Aggression Toward Others  Goal: Verbalize thoughts and feelings  Description: Interventions:  - Assess and re-assess patient's level of risk, every waking shift  - Engage patient in 1:1 interactions, daily, for a minimum of 15 minutes   - Allow patient to express feelings and frustrations in a safe and non-threatening manner   - Establish rapport/trust with patient   Outcome: Progressing  Goal: Refrain from harming others  Outcome: Progressing  Goal: Refrain from destructive acts on the environment or property  Outcome: Progressing  Goal: Attend and participate in unit activities, including therapeutic, recreational, and educational groups  Description: Interventions:  - Provide therapeutic and educational activities daily, encourage attendance and participation, and document same in the medical record   Outcome: Progressing     Problem: INVOLUNTARY ADMIT  Goal: Will cooperate with staff recommendations and doctor's orders and will demonstrate appropriate behavior  Description: INTERVENTIONS:  - Treat underlying conditions and offer medication as ordered  - Educate regarding involuntary admission procedures and rules  - Utilize positive consistent limit setting strategies to support patient and staff safety  Outcome: Progressing     Problem: SELF CARE DEFICIT  Goal: Return ADL status to a safe level of function  Description: INTERVENTIONS:  - Administer medication as ordered  - Assess ADL deficits and provide assistive devices as needed  - Obtain PT/OT consults as needed  - Assist and instruct patient to increase activity and self care as tolerated  Outcome: Progressing     Problem: SAFETY, RESTRAINT - VIOLENT/SELF-DESTRUCTIVE  Goal: Remains free of harm/injury from restraints (Restraint for Violent/Self-Destructive Behavior)  Description: INTERVENTIONS:  - Instruct patient/family regarding restraint use   - Assess and monitor physiologic and psychological status   - Provide interventions and comfort measures to meet assessed patient needs   - Ensure continuous in person monitoring is provided   - Identify and implement measures to help patient regain control  - Assess readiness for release of restraint  Outcome: Progressing  Goal: Returns to optimal restraint-free functioning  Description: INTERVENTIONS:  - Assess the patient's behavior and symptoms that indicate continued need for restraint  - Identify and implement measures to help patient regain control  - Assess readiness for release of restraint   Outcome: Progressing     Problem: Risk for Self Injury/Neglect  Goal: Recognize maladaptive responses and adopt new coping mechanisms  Outcome: Not Progressing     Problem: Risk for Violence/Aggression Toward Others  Goal: Treatment Goal: Refrain from acts of violence/aggression during length of stay, and demonstrate improved impulse control at the time of discharge  Outcome: Not Progressing  Goal: Control angry outbursts  Description: Interventions:  - Monitor patient closely, per order  - Ensure early verbal de-escalation  - Monitor prn medication needs  - Set reasonable/therapeutic limits, outline behavioral expectations, and consequences   - Provide a non-threatening milieu, utilizing the least restrictive interventions   Outcome: Not Progressing  Goal: Identify appropriate positive anger management techniques  Description: Interventions:  - Offer anger management and coping skills groups   - Staff will provide positive feedback for appropriate anger control  Outcome: Not Progressing     Problem: JENAE  Goal: Will exhibit normal sleep and speech and no impulsivity  Description: INTERVENTIONS:  - Administer medication as ordered  - Set limits on impulsive behavior  - Make attempts to decrease external stimuli as possible  Outcome: Not Progressing

## 2023-06-27 NOTE — NURSING NOTE
Pt remains on close proximity observation. Pt continues to be loud/agitated/tearful. Pt denies SI/AH. Pt reports VH but refused to say what they were of. Pt refused to answer if she had HI. Medication and meal compliant. Isolative to room. Pt continues to have loud outbursts in room. Pt tearful about having blood work done this morning. Pt given reassurance. Pt had minor yelling fits this morning but was redirected. Pt appeared more calm this afternoon and was pleasant upon approach. Will continue to monitor.

## 2023-06-27 NOTE — PROGRESS NOTES
06/27/23 0843   Team Meeting   Meeting Type Daily Rounds   Team Members Present   Team Members Present Physician;Nurse;   Physician Team Member [id number] Team Member [facility]   Care Management Team Member Cathi   Patient/Family Present   Patient Present No   Patient's Family Present No   1:1. Pt denies all, but appears internally preoccupied. Pt anxious and restless. Pt agitated at times and difficult to redirect. IM thorazine and benadryl PRN meds given. Pt licking soap off of her hand. Lithium level 1.1. Med/meal compliant. DC tbd.

## 2023-06-28 LAB
ATRIAL RATE: 99 BPM
CARDIAC TROPONIN I PNL SERPL HS: 2 NG/L (ref 8–18)
P AXIS: 66 DEGREES
PR INTERVAL: 152 MS
QRS AXIS: 13 DEGREES
QRSD INTERVAL: 76 MS
QT INTERVAL: 340 MS
QTC INTERVAL: 436 MS
T WAVE AXIS: -54 DEGREES
VENTRICULAR RATE: 99 BPM

## 2023-06-28 PROCEDURE — 84484 ASSAY OF TROPONIN QUANT: CPT | Performed by: PSYCHIATRY & NEUROLOGY

## 2023-06-28 PROCEDURE — 93005 ELECTROCARDIOGRAM TRACING: CPT

## 2023-06-28 PROCEDURE — 93010 ELECTROCARDIOGRAM REPORT: CPT | Performed by: INTERNAL MEDICINE

## 2023-06-28 PROCEDURE — 99232 SBSQ HOSP IP/OBS MODERATE 35: CPT | Performed by: PSYCHIATRY & NEUROLOGY

## 2023-06-28 RX ADMIN — Medication 3 MG: at 21:50

## 2023-06-28 RX ADMIN — CLONIDINE HYDROCHLORIDE 0.1 MG: 0.1 TABLET ORAL at 21:11

## 2023-06-28 RX ADMIN — SENNOSIDES AND DOCUSATE SODIUM 1 TABLET: 50; 8.6 TABLET ORAL at 17:20

## 2023-06-28 RX ADMIN — CLOZAPINE 75 MG: 25 TABLET ORAL at 17:19

## 2023-06-28 RX ADMIN — DIVALPROEX SODIUM 1000 MG: 500 TABLET, DELAYED RELEASE ORAL at 08:02

## 2023-06-28 RX ADMIN — CHLORPROMAZINE HYDROCHLORIDE 50 MG: 50 TABLET, COATED ORAL at 09:22

## 2023-06-28 RX ADMIN — LITHIUM CARBONATE 750 MG: 450 TABLET, EXTENDED RELEASE ORAL at 17:20

## 2023-06-28 RX ADMIN — TRAZODONE HYDROCHLORIDE 100 MG: 100 TABLET ORAL at 21:11

## 2023-06-28 RX ADMIN — LITHIUM CARBONATE 750 MG: 450 TABLET, EXTENDED RELEASE ORAL at 08:01

## 2023-06-28 RX ADMIN — CLOZAPINE 75 MG: 25 TABLET ORAL at 08:02

## 2023-06-28 RX ADMIN — CLONIDINE HYDROCHLORIDE 0.1 MG: 0.1 TABLET ORAL at 08:01

## 2023-06-28 RX ADMIN — BENZTROPINE MESYLATE 0.5 MG: 0.5 TABLET ORAL at 17:19

## 2023-06-28 RX ADMIN — DIVALPROEX SODIUM 1000 MG: 500 TABLET, DELAYED RELEASE ORAL at 21:11

## 2023-06-28 RX ADMIN — SENNOSIDES AND DOCUSATE SODIUM 1 TABLET: 50; 8.6 TABLET ORAL at 08:01

## 2023-06-28 RX ADMIN — HYDROXYZINE HYDROCHLORIDE 100 MG: 50 TABLET, FILM COATED ORAL at 09:22

## 2023-06-28 RX ADMIN — BENZTROPINE MESYLATE 0.5 MG: 0.5 TABLET ORAL at 08:01

## 2023-06-28 RX ADMIN — HYDROXYZINE HYDROCHLORIDE 100 MG: 50 TABLET, FILM COATED ORAL at 21:50

## 2023-06-28 RX ADMIN — CHLORPROMAZINE HYDROCHLORIDE 50 MG: 50 TABLET, COATED ORAL at 21:50

## 2023-06-28 NOTE — PROGRESS NOTES
06/28/23 0902   Team Meeting   Meeting Type Daily Rounds   Team Members Present   Team Members Present Physician;Nurse;   [id number] Team Member Cathi   Patient/Family Present   Patient Present No   Patient's Family Present No     PRN yesterday PO thorazine and atarax. Remains on 1:1. Plan to attempt Turkmen music prior to PRNs. Pt more redirectable. Medical aware of tachycardia. Tremors improving. Med/meal compliant. Dc tbd.

## 2023-06-28 NOTE — PROGRESS NOTES
Progress Note - Behavioral Health   Marylu Ontiveros 28 y.o. female MRN: [id number]  Unit/Bed#: [facility] 349-01 Encounter: [id number]    Assessment/Plan   Principal Problem:    Unspecified mood (affective) disorder ([address])  Active Problems:    Medical clearance for psychiatric admission    Intellectual disability    Psychosis ([address])    Dysuria    Vaginal lesion      Recommended Treatment:   No psychopharmacologic changes necessary at this moment; will continue to assess daily for further optimization. Troponin not collected, awaiting collection  CKMB WNL  Less tachycardic today    Continue with pharmacotherapy, group therapy, milieu therapy and occupational therapy. Continue to assess for adverse medication side effects. Encourage Marylu Ontiveros to participate in nonverbal forms of therapy including journaling and art/music therapy. Continue frequent safety checks and vitals per unit protocol. Continue to engage CM/SW to assist with collateral, disposition planning, and the implementation of an individualized, patient-centered plan of care. Continue medical management by medical team.  Case discussed with treatment team.    Legal Status: 303  ------------------------------------------------------------    Subjective: All documentation including nursing notes, medication history to ensure medication adherence on the unit, labs, and vitals were reviewed. Darren Avila was evaluated this morning for continuity of care and no acute distress noted throughout the evaluation. Over the past 24 hours per nursing report, Darren Avila has been cooperative on the unit and compliant with medications. Today, Darren Avila is consenting for safety on the unit. Darren Avila reports feeling "remi." Darren Avila notes having good sleep. Darren Avila states having a good appetite. Darren Avila has been taking the medications as prescribed and reporting no side effects. Patient was seen and examined today at bedside.   She showed me a game she's been playing kind of like toiy katie. She did cry about her injection and blood draw sites. She could correctly tell me what she had for breakfast, and her nurse is encouraging fluids. Maria L Iyer denies suicidal ideations. Maria L Iyer denies homicidal ideations. Regarding hallucinations, Maria L Iyer denies and does not appear to be responding to internal stimuli./    PRNs overnight: patient required oral Thoarazine 50mg and atarax 100mg for agitation and anxiety  VS: Reviewed, 110 tachycardic , otherwise within normal limits    Progress Toward Goals: slow improvement    Psychiatric Review of Systems:  Behavior over the last 24 hours:  improved  Sleep: normal  Appetite: normal  Medication side effects: No   ROS: all other systems are negative    Vital signs in last 24 hours:  Temp:  [97.1 °F (36.2 °C)-97.9 °F (36.6 °C)] 97.1 °F (36.2 °C)  HR:  [110-116] 110  Resp:  [16] 16  BP: (121-134)/(63-88) 124/63    Laboratory results:  I have personally reviewed all pertinent laboratory/tests results.   Recent Results (from the past 48 hour(s))   Lithium level    Collection Time: 06/27/23  6:12 AM   Result Value Ref Range    Lithium Lvl 1.1 0.6 - 1.2 mmol/L   CBC and differential    Collection Time: 06/27/23  6:12 AM   Result Value Ref Range    WBC 9.72 4.31 - 10.16 Thousand/uL    RBC 4.65 3.81 - 5.12 Million/uL    Hemoglobin 13.7 11.5 - 15.4 g/dL    Hematocrit 43.0 34.8 - 46.1 %    MCV 93 82 - 98 fL    MCH 29.5 26.8 - 34.3 pg    MCHC 31.9 31.4 - 37.4 g/dL    RDW 13.2 11.6 - 15.1 %    MPV 9.3 8.9 - 12.7 fL    Platelets 026 185 - 150 Thousands/uL    nRBC 0 /100 WBCs    Neutrophils Relative 63 43 - 75 %    Immat GRANS % 0 0 - 2 %    Lymphocytes Relative 26 14 - 44 %    Monocytes Relative 10 4 - 12 %    Eosinophils Relative 1 0 - 6 %    Basophils Relative 0 0 - 1 %    Neutrophils Absolute 6.16 1.85 - 7.62 Thousands/µL    Immature Grans Absolute 0.02 0.00 - 0.20 Thousand/uL    Lymphocytes Absolute 2.50 0.60 - 4.47 Thousands/µL    Monocytes Absolute 0.92 0.17 - 1.22 Thousand/µL    Eosinophils Absolute 0.09 0.00 - 0.61 Thousand/µL    Basophils Absolute 0.03 0.00 - 0.10 Thousands/µL   CKMB    Collection Time: 06/27/23  6:12 AM   Result Value Ref Range    CK-MB 4.9 0.6 - 6.3 ng/mL   ECG 12 lead    Collection Time: 06/28/23  9:05 AM   Result Value Ref Range    Ventricular Rate 99 BPM    Atrial Rate 99 BPM    NV Interval 152 ms    QRSD Interval 76 ms    QT Interval 340 ms    QTC Interval 436 ms    P Axis 66 degrees    QRS Axis 13 degrees    T Wave Axis -54 degrees         Mental Status Evaluation:    Appearance:  dressed appropriately, marginal hygiene, overtly appearing  female, in no apparent acute distress, intermittent eye contaact   Behavior:  cooperative, child like, more redirectable   Speech:  coherent, increased rate, hypertalkative   Mood:  "remi"   Affect:  less labile, constricted   Thought Process:  less disorganized   Associations: concrete associations   Thought Content:  mild paranoia, less intrusive thoughts   Perceptual Disturbances: Denies auditory or visual hallucinations and Does not appear to be responding to internal stimuli   Risk Potential: Suicidal ideation - None at present  Homicidal ideation - None at present  Potential for aggression - Not at present   Sensorium:  oriented to person and place   Memory:  recent and remote memory grossly intact   Consciousness:  alert and awake   Attention/Concentration: attention span and concentration appear shorter than expected for age   Insight:  limited   Judgment: limited   Gait/Station: normal gait/station   Motor Activity: no abnormal movements       Current Medications:  Current Facility-Administered Medications   Medication Dose Route Frequency Provider Last Rate   • acetaminophen  650 mg Oral Q6H PRN Demetrius Alvarado, DO     • acetaminophen  650 mg Oral Q4H PRN Demetrius Christiano, DO     • acetaminophen  975 mg Oral Q6H PRN Demetrius Alvarado, DO     • aluminum-magnesium hydroxide-simethicone  30 mL Oral Q4H PRN Eneida Force, DO     • benztropine  1 mg Intramuscular Q4H PRN Max 6/day Eneida Force, DO     • benztropine  0.5 mg Oral BID Kaylie León MD     • benztropine  1 mg Oral BID PRN Eneida Force, DO     • benztropine  1 mg Oral Q4H PRN Max 6/day Eneida Force, DO     • chlorproMAZINE  25 mg Intramuscular Q6H PRN Paresh Franklin MD      Or   • chlorproMAZINE  25 mg Oral Q6H PRN Paresh Franklin MD     • chlorproMAZINE  50 mg Intramuscular Q6H PRN Paresh Franklin MD      Or   • chlorproMAZINE  50 mg Oral Q6H PRN Paresh Franklin MD     • clonazePAM  0.5 mg Oral BID PRN Eneida Force, DO     • cloNIDine  0.1 mg Oral Q12H [address], DO     • cloZAPine  75 mg Oral BID Kaylie León MD     • diphenhydrAMINE  25 mg Intramuscular Q6H PRN Eneida Force, DO     • hydrOXYzine HCL  50 mg Oral Q6H PRN Max 4/day Eneida Force, DO      Or   • diphenhydrAMINE  50 mg Intramuscular Q6H PRN Eneida Force, DO     • divalproex sodium  1,000 mg Oral Q12H [facility] Eneida Force, DO     • hydrOXYzine HCL  100 mg Oral Q6H PRN Max 4/day Eneida Force, DO      Or   • LORazepam  2 mg Intramuscular Q6H PRN Eneida Force, DO     • hydrOXYzine HCL  25 mg Oral Q6H PRN Max 4/day Eneida Force, DO     • lithium carbonate  750 mg Oral BID Eneida Force, DO     • melatonin  3 mg Oral HS PRN Eneida Force, DO     • polyethylene glycol  17 g Oral Daily PRN Eneida Force, DO     • propranolol  10 mg Oral Q8H PRN Eneida Force, DO     • senna-docusate sodium  1 tablet Oral BID Eneida Force, DO     • traZODone  100 mg Oral HS Swetha Ward, [address] [place] [address],  06/28/23  Psychiatry Resident, PGY-II    This note was completed in part utilizing [employer] Software.  Grammatical, translation, syntax errors, random word insertions, spelling mistakes, and incomplete sentences may be an occasional consequence of this system secondary to software limitations with voice recognition, ambient noise, and hardware issues. If you have any questions or concerns about the content, text, or information contained within the body of this dictation, please contact the provider for clarification.

## 2023-06-28 NOTE — SOCIAL WORK
CM spoke with pt's [facility], Flavio. Melonie Macias reports they are limited in what new services can be provided as pt is currently pending her waiver. Once her waiver is processed she will be able to access day programming and some in-home services. Once waiver is received, Melonie Macias will then apply pt for a consolidated waiver which will allow for pt to access residential and group home services. CM to remain in contact.

## 2023-06-28 NOTE — NURSING NOTE
9740 PRN Hydroxyzine 100 mg PO and Thorazine 50 mg Po given. 1022 PRN's effective. Pt is calm and in behavioral control.

## 2023-06-28 NOTE — PROGRESS NOTES
06/28/23 1000   Activity/Group Checklist   Group Other (Comment)  (Group Art Therapy/Psychodynamic, Open Choice with Discussion)   Attendance Attended  (1:1)   Attendance Duration (min) Greater than 60   Interactions Disorganized interaction   Affect/Mood Appropriate   Goals Achieved Able to engage in interactions; Able to listen to others  (Able to engage materials; full participation)

## 2023-06-28 NOTE — NURSING NOTE
Pt visible throughout evening, inappropriately loud at times, labile, easily initiated, but able to be redirected easily with prompting. Pt suspicious of medications but did take them with with encouragement.  Pt remains on continual observation, close procimity

## 2023-06-28 NOTE — PLAN OF CARE
Problem: Risk for Self Injury/Neglect  Goal: Treatment Goal: Remain safe during length of stay, learn and adopt new coping skills, and be free of self-injurious ideation, impulses and acts at the time of discharge  Outcome: Progressing  Goal: Verbalize thoughts and feelings  Description: Interventions:  - Assess and re-assess patient's lethality and potential for self-injury  - Engage patient in 1:1 interactions, daily, for a minimum of 15 minutes  - Encourage patient to express feelings, fears, frustrations, hopes  - Establish rapport/trust with patient   Outcome: Progressing  Goal: Refrain from harming self  Description: Interventions:  - Monitor patient closely, per order  - Develop a trusting relationship  - Supervise medication ingestion, monitor effects and side effects   Outcome: Progressing  Goal: Attend and participate in unit activities, including therapeutic, recreational, and educational groups  Description: Interventions:  - Provide therapeutic and educational activities daily, encourage attendance and participation, and document same in the medical record  - Obtain collateral information, encourage visitation and family involvement in care   Outcome: Progressing  Goal: Recognize maladaptive responses and adopt new coping mechanisms  Outcome: Progressing  Goal: Complete daily ADLs, including personal hygiene independently, as able  Description: Interventions:  - Observe, teach, and assist patient with ADLS  - Monitor and promote a balance of rest/activity, with adequate nutrition and elimination  Outcome: Progressing     Problem: Risk for Violence/Aggression Toward Others  Goal: Treatment Goal: Refrain from acts of violence/aggression during length of stay, and demonstrate improved impulse control at the time of discharge  Outcome: Progressing  Goal: Verbalize thoughts and feelings  Description: Interventions:  - Assess and re-assess patient's level of risk, every waking shift  - Engage patient in 1:1 interactions, daily, for a minimum of 15 minutes   - Allow patient to express feelings and frustrations in a safe and non-threatening manner   - Establish rapport/trust with patient   Outcome: Progressing  Goal: Refrain from harming others  Outcome: Progressing  Goal: Refrain from destructive acts on the environment or property  Outcome: Progressing  Goal: Control angry outbursts  Description: Interventions:  - Monitor patient closely, per order  - Ensure early verbal de-escalation  - Monitor prn medication needs  - Set reasonable/therapeutic limits, outline behavioral expectations, and consequences   - Provide a non-threatening milieu, utilizing the least restrictive interventions   Outcome: Progressing  Goal: Attend and participate in unit activities, including therapeutic, recreational, and educational groups  Description: Interventions:  - Provide therapeutic and educational activities daily, encourage attendance and participation, and document same in the medical record   Outcome: Progressing  Goal: Identify appropriate positive anger management techniques  Description: Interventions:  - Offer anger management and coping skills groups   - Staff will provide positive feedback for appropriate anger control  Outcome: Progressing     Problem: INVOLUNTARY ADMIT  Goal: Will cooperate with staff recommendations and doctor's orders and will demonstrate appropriate behavior  Description: INTERVENTIONS:  - Treat underlying conditions and offer medication as ordered  - Educate regarding involuntary admission procedures and rules  - Utilize positive consistent limit setting strategies to support patient and staff safety  Outcome: Progressing     Problem: JENAE  Goal: Will exhibit normal sleep and speech and no impulsivity  Description: INTERVENTIONS:  - Administer medication as ordered  - Set limits on impulsive behavior  - Make attempts to decrease external stimuli as possible  Outcome: Not Progressing

## 2023-06-29 PROCEDURE — 99232 SBSQ HOSP IP/OBS MODERATE 35: CPT | Performed by: PSYCHIATRY & NEUROLOGY

## 2023-06-29 RX ORDER — CLOZAPINE 100 MG/1
100 TABLET ORAL 2 TIMES DAILY
Status: DISCONTINUED | OUTPATIENT
Start: 2023-06-29 | End: 2023-07-03

## 2023-06-29 RX ADMIN — CLOZAPINE 75 MG: 25 TABLET ORAL at 08:11

## 2023-06-29 RX ADMIN — CLOZAPINE 100 MG: 100 TABLET ORAL at 17:38

## 2023-06-29 RX ADMIN — LORAZEPAM 2 MG: 2 INJECTION INTRAMUSCULAR; INTRAVENOUS at 21:10

## 2023-06-29 RX ADMIN — BENZTROPINE MESYLATE 0.5 MG: 0.5 TABLET ORAL at 17:23

## 2023-06-29 RX ADMIN — HYDROXYZINE HYDROCHLORIDE 100 MG: 50 TABLET, FILM COATED ORAL at 11:51

## 2023-06-29 RX ADMIN — CLONIDINE HYDROCHLORIDE 0.1 MG: 0.1 TABLET ORAL at 08:11

## 2023-06-29 RX ADMIN — SENNOSIDES AND DOCUSATE SODIUM 1 TABLET: 50; 8.6 TABLET ORAL at 17:23

## 2023-06-29 RX ADMIN — CHLORPROMAZINE HYDROCHLORIDE 50 MG: 50 TABLET, COATED ORAL at 17:22

## 2023-06-29 RX ADMIN — LITHIUM CARBONATE 750 MG: 450 TABLET, EXTENDED RELEASE ORAL at 08:11

## 2023-06-29 RX ADMIN — BENZTROPINE MESYLATE 0.5 MG: 0.5 TABLET ORAL at 08:12

## 2023-06-29 RX ADMIN — CHLORPROMAZINE HYDROCHLORIDE 50 MG: 50 TABLET, COATED ORAL at 11:51

## 2023-06-29 RX ADMIN — DIVALPROEX SODIUM 1000 MG: 500 TABLET, DELAYED RELEASE ORAL at 08:12

## 2023-06-29 RX ADMIN — TRAZODONE HYDROCHLORIDE 100 MG: 100 TABLET ORAL at 22:03

## 2023-06-29 RX ADMIN — DIVALPROEX SODIUM 1000 MG: 500 TABLET, DELAYED RELEASE ORAL at 22:03

## 2023-06-29 RX ADMIN — CLONIDINE HYDROCHLORIDE 0.1 MG: 0.1 TABLET ORAL at 22:03

## 2023-06-29 RX ADMIN — SENNOSIDES AND DOCUSATE SODIUM 1 TABLET: 50; 8.6 TABLET ORAL at 08:11

## 2023-06-29 RX ADMIN — LITHIUM CARBONATE 750 MG: 450 TABLET, EXTENDED RELEASE ORAL at 17:23

## 2023-06-29 NOTE — PROGRESS NOTES
06/29/23 0846   Team Meeting   Meeting Type Daily Rounds   Team Members Present   Team Members Present Physician;Nurse;   Physician Team Member [id number] Team Member [facility] Management Team Member Cathi   Patient/Family Present   Patient Present No   Patient's Family Present No   Visible, anxious. Pt allowed blood draw, but pt became agitated and restless after, pinching place of blood draw. Pt needed redirection. Pt staff selective and able to agree to PO meds and PRNs from female staff. Med/meal compliant. Pt steadily improving. Titrate clozapine up. Tremors appear less apparent. Pt speaking in [address] [place] [address] Street more than previously. 304 tomorrow.

## 2023-06-29 NOTE — PLAN OF CARE
Problem: Risk for Self Injury/Neglect  Goal: Treatment Goal: Remain safe during length of stay, learn and adopt new coping skills, and be free of self-injurious ideation, impulses and acts at the time of discharge  Outcome: Progressing  Goal: Verbalize thoughts and feelings  Description: Interventions:  - Assess and re-assess patient's lethality and potential for self-injury  - Engage patient in 1:1 interactions, daily, for a minimum of 15 minutes  - Encourage patient to express feelings, fears, frustrations, hopes  - Establish rapport/trust with patient   Outcome: Progressing  Goal: Refrain from harming self  Description: Interventions:  - Monitor patient closely, per order  - Develop a trusting relationship  - Supervise medication ingestion, monitor effects and side effects   Outcome: Progressing  Goal: Attend and participate in unit activities, including therapeutic, recreational, and educational groups  Description: Interventions:  - Provide therapeutic and educational activities daily, encourage attendance and participation, and document same in the medical record  - Obtain collateral information, encourage visitation and family involvement in care   Outcome: Progressing  Goal: Recognize maladaptive responses and adopt new coping mechanisms  Outcome: Progressing  Goal: Complete daily ADLs, including personal hygiene independently, as able  Description: Interventions:  - Observe, teach, and assist patient with ADLS  - Monitor and promote a balance of rest/activity, with adequate nutrition and elimination  Outcome: Progressing     Problem: Risk for Violence/Aggression Toward Others  Goal: Treatment Goal: Refrain from acts of violence/aggression during length of stay, and demonstrate improved impulse control at the time of discharge  Outcome: Progressing  Goal: Verbalize thoughts and feelings  Description: Interventions:  - Assess and re-assess patient's level of risk, every waking shift  - Engage patient in 1:1 interactions, daily, for a minimum of 15 minutes   - Allow patient to express feelings and frustrations in a safe and non-threatening manner   - Establish rapport/trust with patient   Outcome: Progressing  Goal: Refrain from harming others  Outcome: Progressing  Goal: Refrain from destructive acts on the environment or property  Outcome: Progressing  Goal: Attend and participate in unit activities, including therapeutic, recreational, and educational groups  Description: Interventions:  - Provide therapeutic and educational activities daily, encourage attendance and participation, and document same in the medical record   Outcome: Progressing  Goal: Identify appropriate positive anger management techniques  Description: Interventions:  - Offer anger management and coping skills groups   - Staff will provide positive feedback for appropriate anger control  Outcome: Progressing     Problem: INVOLUNTARY ADMIT  Goal: Will cooperate with staff recommendations and doctor's orders and will demonstrate appropriate behavior  Description: INTERVENTIONS:  - Treat underlying conditions and offer medication as ordered  - Educate regarding involuntary admission procedures and rules  - Utilize positive consistent limit setting strategies to support patient and staff safety  Outcome: Progressing     Problem: SELF CARE DEFICIT  Goal: Return ADL status to a safe level of function  Description: INTERVENTIONS:  - Administer medication as ordered  - Assess ADL deficits and provide assistive devices as needed  - Obtain PT/OT consults as needed  - Assist and instruct patient to increase activity and self care as tolerated  Outcome: Progressing     Problem: SAFETY, RESTRAINT - VIOLENT/SELF-DESTRUCTIVE  Goal: Remains free of harm/injury from restraints (Restraint for Violent/Self-Destructive Behavior)  Description: INTERVENTIONS:  - Instruct patient/family regarding restraint use   - Assess and monitor physiologic and psychological status - Provide interventions and comfort measures to meet assessed patient needs   - Ensure continuous in person monitoring is provided   - Identify and implement measures to help patient regain control  - Assess readiness for release of restraint  Outcome: Progressing  Goal: Returns to optimal restraint-free functioning  Description: INTERVENTIONS:  - Assess the patient's behavior and symptoms that indicate continued need for restraint  - Identify and implement measures to help patient regain control  - Assess readiness for release of restraint   Outcome: Progressing     Problem: Risk for Violence/Aggression Toward Others  Goal: Control angry outbursts  Description: Interventions:  - Monitor patient closely, per order  - Ensure early verbal de-escalation  - Monitor prn medication needs  - Set reasonable/therapeutic limits, outline behavioral expectations, and consequences   - Provide a non-threatening milieu, utilizing the least restrictive interventions   Outcome: Not Progressing     Problem: JENAE  Goal: Will exhibit normal sleep and speech and no impulsivity  Description: INTERVENTIONS:  - Administer medication as ordered  - Set limits on impulsive behavior  - Make attempts to decrease external stimuli as possible  Outcome: Not Progressing

## 2023-06-29 NOTE — NURSING NOTE
Pt denies SI/HI/AH/VH but appears internally preoccupied. Pt observed responding to internal stimuli. Present in dayroom and milieu. Medication and meal compliant. Social with select peers. Pt continues to be restless and loud when speaking to others. Pt slept until awoken by  Pt began yelling and appeared anxious/agitated. Pt continued to yell and was encouraged to take PRN. Pt remained in behavioral control this afternoon.

## 2023-06-29 NOTE — NURSING NOTE
1151 PRN Hydroxyzine 100 mg PO and Thorazine 50 mg PO given for anxiety/agitation. 1251 Pt appears more relaxed and is calm/quiet in her room.

## 2023-06-29 NOTE — PROGRESS NOTES
[facility] 28 [facility] MRN: [id number]   Unit/Bed#: [facility] 349-01 Encounter: [id number]    Patient was seen and evaluated for continuity of care. Per nursing report yesterday morning at 9:22 AM, patient was given hydroxyzine 100 mg p.o. and Thorazine 50 mg p.o. for anxiety and agitation, respectively, as she was noted to have become upset about laundry and clothing and began yelling at staff and pacing the milieu. Per nighttime nursing report last night, patient was visible, restless, labile, and uncooperative at times. She allowed the staff to get lab work but then became very upset, yelling, and began aggressively and repeatedly pinching the vein. Staff intervened the patient was uncooperative. She initially refused to take her p.o. medications including scheduled medications and as needed medications. She was given Thorazine 50 mg p.o., Atarax 100 mg p.o., and melatonin 3 mg p.o. at 9:50 PM which was effective. During the interview today, patient was noted by nurse to be internally preoccupied and responding to internal stimuli. She began yelling after this writer's interview with the patient today. She was given hydroxyzine 100 mg p.o. and Thorazine 50 mg p.o. which were noted to be effective. Patient was noted to be internally preoccupied and distracted during the interview today. She describes her mood as "good."  She refused to tell this writer how many hours she slept last night or how she was doing in terms of her appetite. She did report that she was having thoughts of wanting to hurt herself but did not elaborate. She also reported having auditory hallucinations but would not disclose which she was experiencing to this writer. She began escalating towards the end of this writer's interview and began yelling. She was ultimately able to maintain behavioral control and received medications for agitation after this writer's interview.   At this time, will recommend increasing the patient's Clozapine to 100 mg twice daily to better control psychosis.     Sleep: Unable to assess as patient was not cooperative  Appetite: Unable to assess as patient was not cooperative, ate 100% of breakfast per staff documentation, last bowel movement reported was on 6/27/2023  Medication side effects: Unable to assess as patient was not cooperative   ROS: does not answer    Mental Status Evaluation:    Appearance:  Thin, Latin X female, no acute distress, intermittent eye contact, dressed casually, marginal hygiene, disheveled   Behavior:  uncooperative, childlike, restless, fidgety, mild to moderate psychomotor agitation   Speech:  increased rate, articulation error, loud at times   Mood:  "good"   Affect:  labile today   Thought Process:  tangential, increased rate of thoughts   Associations: concrete associations   Thought Content:  mild paranoia, negative thinking, intrusive thoughts   Perceptual Disturbances: auditory hallucinations, appears distracted, appears preoccupied, appears responding to internal stimuli   Risk Potential: Suicidal ideation - Yes, would not elaborate  Homicidal ideation - None at present  Potential for aggression - Yes, due to poor impulse control   Sensorium:  unable to assess   Memory:  recent and remote memory: unable to assess due to lack of cooperation   Consciousness:  alert and awake   Attention/Concentration: decreased concentration and decreased attention span   Insight:  poor   Judgment: poor   Gait/Station: normal gait/station   Motor Activity: no abnormal movements     Vital signs in last 24 hours:    Temp:  [97.1 °F (36.2 °C)-97.7 °F (36.5 °C)] 97.7 °F (36.5 °C)  HR:  [] 89  Resp:  [16-18] 18  BP: (122-133)/(72-77) 122/77    Laboratory results: I have personally reviewed all pertinent laboratory/tests results    Results from the past 24 hours:   Recent Results (from the past 24 hour(s))   High Sensitivity Troponin I Random Collection Time: 06/28/23  9:33 PM   Result Value Ref Range    HS TnI random 2 (L) 8 - 18 ng/L     Most Recent Labs:   Lab Results   Component Value Date    WBC 9.72 06/27/2023    RBC 4.65 06/27/2023    HGB 13.7 06/27/2023    HCT 43.0 06/27/2023     06/27/2023    RDW 13.2 06/27/2023    NEUTROABS 6.16 06/27/2023    SODIUM 141 06/16/2023    K 4.0 06/16/2023     06/16/2023    CO2 30 06/16/2023    BUN 11 06/16/2023    CREATININE 0.53 (L) 06/16/2023    GLUC 73 06/16/2023    CALCIUM 10.1 06/16/2023    AST 19 06/16/2023    ALT 13 06/16/2023    ALKPHOS 32 (L) 06/16/2023    TP 6.8 06/16/2023    ALB 4.3 06/16/2023    TBILI 0.43 06/16/2023    CHOLESTEROL 181 06/11/2023    HDL 64 06/11/2023    TRIG 106 06/11/2023    LDLCALC 96 06/11/2023    NONHDLC 117 06/11/2023    VALPROICTOT 87 06/11/2023    LITHIUM 1.1 06/27/2023    HAG4WUPHDQJY 3.269 06/11/2023    PREGSERUM Negative 06/11/2023    HGBA1C 5.1 03/20/2023     03/20/2023     Progress Toward Goals: Regressed, was reporting suicidal ideation, was reporting auditory hallucinations, required as needed medications shortly after this writer's interview, continues to have instances of self-harm with poking and picking in the veins    Assessment/Plan   Principal Problem:    Unspecified mood (affective) disorder (HCC)  Active Problems:    Medical clearance for psychiatric admission    Intellectual disability    Psychosis ([address])    Dysuria    Vaginal lesion      Recommended Treatment:     Planned medication and treatment changes:     All current active medications have been reviewed  Encourage group therapy, milieu therapy and occupational therapy  Continue close observation for safety    1) Increase Clozapine to 100mg BID for psychosis (patient's most recent CBC on 6/27/2023 showed a WBC of 9.72 and ANC of 6.16)  2) Continue Depakote DR tablet 1000 mg every 12 hours for mood/agitation (patient's most recent Depakote level on 6/11/2023 was noted to be 87)  3) Continue Lithobid 750 mg twice daily for mood/agitation (patient continues to appear less tremulous overall and her most recent lithium level on 6/27/2023 was noted to be 1.1)  4) Continue Cogentin 1 mg twice daily for EPS  5) Continue trazodone 100 mg at bedtime for insomnia  6) Continue 1-1 monitoring for patient and staff safety  8) CM to continue care coordination for patient  9) Continue SLIM medical management  10) Troponin was within normal limits  11) Continue Senokot 1 tablet BID for bowel regimen    Current Facility-Administered Medications   Medication Dose Route Frequency Provider Last Rate   • acetaminophen  650 mg Oral Q6H PRN Lucina Malloy DO     • acetaminophen  650 mg Oral Q4H PRN Lucina Malloy DO     • acetaminophen  975 mg Oral Q6H PRN Lucina Malloy DO     • aluminum-magnesium hydroxide-simethicone  30 mL Oral Q4H PRN Lucina Malloy DO     • benztropine  1 mg Intramuscular Q4H PRN Max 6/day Lucina Malloy DO     • benztropine  0.5 mg Oral BID Darwin Renner MD     • benztropine  1 mg Oral BID PRN Lucina Malloy DO     • benztropine  1 mg Oral Q4H PRN Max 6/day Lucina Malloy DO     • chlorproMAZINE  25 mg Intramuscular Q6H PRN Sheree Zarate MD      Or   • chlorproMAZINE  25 mg Oral Q6H PRN Sheree Zarate MD     • chlorproMAZINE  50 mg Intramuscular Q6H PRN Sheree Zarate MD      Or   • chlorproMAZINE  50 mg Oral Q6H PRN Sheree Zarate MD     • clonazePAM  0.5 mg Oral BID PRN Lucina Malloy DO     • cloNIDine  0.1 mg Oral Q12H [facility] Lucina Malloy DO     • cloZAPine  75 mg Oral BID Darwin Renner MD     • diphenhydrAMINE  25 mg Intramuscular Q6H PRN Lucina Malloy DO     • hydrOXYzine HCL  50 mg Oral Q6H PRN Max 4/day Lucina Malloy DO      Or   • diphenhydrAMINE  50 mg Intramuscular Q6H PRN Lucina Malloy DO     • divalproex sodium  1,000 mg Oral Q12H [address]ella Fernandez DO     • hydrOXYzine HCL  100 mg Oral Q6H PRN Max 4/day Lucina Malloy DO      Or   • LORazepam  2 mg Intramuscular Q6H PRN Kavita Sallies, DO     • hydrOXYzine HCL  25 mg Oral Q6H PRN Max 4/day Kavita Sallies, DO     • lithium carbonate  750 mg Oral BID Kavita Sallies, DO     • melatonin  3 mg Oral HS PRN Kavita Sallies, DO     • polyethylene glycol  17 g Oral Daily PRN Kavita Sallies, DO     • propranolol  10 mg Oral Q8H PRN Kavita Sallies, DO     • senna-docusate sodium  1 tablet Oral BID Kavita Sallies, DO     • traZODone  100 mg Oral HS PERAL Parson       Risks / Benefits of Treatment:    Risks, benefits, and possible side effects of medications explained to patient. Patient has limited understanding of risks and benefits of treatment at this time, but agrees to take medications as prescribed. Counseling / Coordination of Care: Total floor / unit time spent today 20 minutes. Greater than 50% of total time was spent with the patient and / or family counseling and / or coordination of care. A description of counseling / coordination of care:  Patient's progress discussed with staff in treatment team meeting. Medications, treatment progress and treatment plan reviewed with patient. Importance of medication and treatment compliance reviewed with patient. Encouraged participation in milieu and group therapy on the unit.     Sung Dueñas MD 06/29/23

## 2023-06-29 NOTE — NURSING NOTE
Patient is currently in her room, patient continues to appear agitated, yelling and storming to her room. Patient denies SI/HI/AH/VH, but appears internally occupied. Patient compliant with meds and meals and remains in behavioral control.

## 2023-06-29 NOTE — NURSING NOTE
Patient was witnessed spitting on the floor and threatening to spit on staff. PRN Thorazine 50 mg PO given for severe agitation. BROSET SCORE 3. Will reassess in an hr.

## 2023-06-29 NOTE — NURSING NOTE
Patient is visible in the patient lounge, no signs of agitation. PRN Thorazine 50 mg for severe agitation, effective.

## 2023-06-29 NOTE — NURSING NOTE
Pt visible, restless, labile, uncooperative at times this evening. Pt did allow staff to get lab, but afterward, pt became very upset, yelling, and began aggressively and repeatedly pinching the vein which has a small bruise from blood draw. Staff intervened, pt uncooperative and required brief physical blocking of her hand from the vein. Pt began screaming, crying, threw herself on her bed, threw headphones and belongings on floor. Pt initially refused to take all medications PO, including scheduled medications and PRNs. Pt is staff-selective, and agreeably took PO medications from another female staff member. Pt given limits. PRN thorazine 50mg PO, atarax 100mg PO, and melatonin 3mg PO administered @ 21:50; currently pt is resting quietly, no longer at immediate risk for harming self. PRNs effective. Pt remains on continual observation, close proximity.

## 2023-06-30 PROCEDURE — 99232 SBSQ HOSP IP/OBS MODERATE 35: CPT | Performed by: PSYCHIATRY & NEUROLOGY

## 2023-06-30 RX ORDER — SIMETHICONE 80 MG
80 TABLET,CHEWABLE ORAL EVERY 6 HOURS PRN
Status: DISCONTINUED | OUTPATIENT
Start: 2023-06-30 | End: 2023-07-19 | Stop reason: HOSPADM

## 2023-06-30 RX ORDER — ONDANSETRON 4 MG/1
4 TABLET, ORALLY DISINTEGRATING ORAL EVERY 6 HOURS PRN
Status: DISCONTINUED | OUTPATIENT
Start: 2023-06-30 | End: 2023-07-19 | Stop reason: HOSPADM

## 2023-06-30 RX ADMIN — ONDANSETRON 4 MG: 4 TABLET, ORALLY DISINTEGRATING ORAL at 18:24

## 2023-06-30 RX ADMIN — SIMETHICONE 80 MG: 80 TABLET, CHEWABLE ORAL at 18:25

## 2023-06-30 RX ADMIN — CLOZAPINE 100 MG: 100 TABLET ORAL at 17:08

## 2023-06-30 RX ADMIN — LITHIUM CARBONATE 750 MG: 450 TABLET, EXTENDED RELEASE ORAL at 09:16

## 2023-06-30 RX ADMIN — TRAZODONE HYDROCHLORIDE 100 MG: 100 TABLET ORAL at 21:53

## 2023-06-30 RX ADMIN — BENZTROPINE MESYLATE 0.5 MG: 0.5 TABLET ORAL at 17:09

## 2023-06-30 RX ADMIN — DIVALPROEX SODIUM 1000 MG: 500 TABLET, DELAYED RELEASE ORAL at 09:16

## 2023-06-30 RX ADMIN — BENZTROPINE MESYLATE 0.5 MG: 0.5 TABLET ORAL at 09:16

## 2023-06-30 RX ADMIN — SENNOSIDES AND DOCUSATE SODIUM 1 TABLET: 50; 8.6 TABLET ORAL at 09:15

## 2023-06-30 RX ADMIN — SENNOSIDES AND DOCUSATE SODIUM 1 TABLET: 50; 8.6 TABLET ORAL at 17:08

## 2023-06-30 RX ADMIN — CLONIDINE HYDROCHLORIDE 0.1 MG: 0.1 TABLET ORAL at 21:57

## 2023-06-30 RX ADMIN — DIVALPROEX SODIUM 1000 MG: 500 TABLET, DELAYED RELEASE ORAL at 21:53

## 2023-06-30 RX ADMIN — ACETAMINOPHEN 975 MG: 325 TABLET ORAL at 17:08

## 2023-06-30 RX ADMIN — CLOZAPINE 100 MG: 100 TABLET ORAL at 09:16

## 2023-06-30 RX ADMIN — LITHIUM CARBONATE 750 MG: 450 TABLET, EXTENDED RELEASE ORAL at 17:08

## 2023-06-30 NOTE — NURSING NOTE
Pt loud, boisterous, labile throughout evening. During unit protocol room check, be became severely anxious and upset. Pt uncooperative, interfering with staff, grabbing items from garbage, slapping hand on dresser and walls and yelling. Pt uncooperative with staff redirection. Security called. Pt refused PO medication. PRN IM ativan 2mg given @ 21:10 with Security assistance; pt combative during administration. Pt has since calmed, become more appropriate, and has re-entered milieu; PRN ativan effective. Pt adherent with scheduled qhs medications. Pt remains on contiual observation, close proximity.

## 2023-06-30 NOTE — NURSING NOTE
Patient complaining of 7/10 RLQ pain, states on palpation her RLQ is "a little painful" but her LLQ and LUQ are more painful. Patient did have a loose bowel movement per BHT today; and did not appear to strain. Patient complaining of nausea. Bowel sounds are hypoactive. NIYA Mortensen reached out to- continue to monitor and let SLIM know if she develops fever, emesis, or diarrhea. SLIM to perscribe PRN zofran. Patient given 975mg of tylenol for severe pain. 18:08: 975mg of tylenol effective for severe pain.      18:24: Given zofran for nausea

## 2023-06-30 NOTE — NURSING NOTE
Pt has been maintained on continual observation, close proximity throughout the night. Pt had episode of urinary incontinence.

## 2023-06-30 NOTE — PROGRESS NOTES
Progress Note - [address] 28 y.[address] female MRN: [id number]   Unit/Bed#: [facility] 349-01 Encounter: [id number]    Patient was seen and evaluated for continuity of care. Per nursing report yesterday patient was severely agitated at 5pm spitting on the floor and threatening to spit on staff. She received PRN thorazine 50mg. Later around 10pm, patient was "loud, boisterous, labile." During unit protocol room check, she became severely anxious and upset, uncooperative, interfering with staff, grabbing items from garbage, slapping hand on dresser and walls and yelling. She was uncooperative with staff redirection. She received PRN IM ativan 2mg with security assistance. Since, she has remained more calm but continues to be irritated. On exam, patient is laying in bed under covers. She got agitated and loud when we said hello. She was uncooperative with the interview but stated her mood is "good" and denied hearing voices. She was noted to be internally preoccupied and distracted. Further interview questions were answered with mumbles of decreasing volume. She refused to answer safety assessment questions.      Sleep: Unable to assess as patient was not cooperative  Appetite: Unable to assess as patient was not cooperative, ate 100% of breakfast per staff documentation, last bowel movement reported was on 6/27/2023  Medication side effects: Unable to assess as patient was not cooperative   ROS: does not answer    Mental Status Evaluation:    Appearance:  Thin, Latin X female, no acute distress, no eye contact, dressed casually, marginal hygiene, disheveled, in bed   Behavior:  uncooperative, childlike, mild to moderate psychomotor agitation   Speech:  incoherent, articulation error, does not want to talk, loud at times   Mood:  "good"   Affect:  increased in intensity, more labile   Thought Process:  unable to assess   Associations: unable to assess - does not answer   Thought Content:  unable to assess - does not answer   Perceptual Disturbances: denies auditory hallucinations when asked, appears distracted, appears preoccupied   Risk Potential: Suicidal ideation - unable to assess, does not answer  Homicidal ideation - unable to assess, does not answer  Potential for aggression - Yes, due to agitation   Sensorium:  unable to assess   Memory:  recent and remote memory: unable to assess due to lack of cooperation   Consciousness:  drowsy   Attention/Concentration: poor concentration and poor attention span   Insight:  poor   Judgment: poor   Gait/Station: normal gait/station   Motor Activity: no abnormal movements     Vital signs in last 24 hours:    Temp:  [96.9 °F (36.1 °C)-98 °F (36.7 °C)] 96.9 °F (36.1 °C)  HR:  [101-113] 109  Resp:  [16] 16  BP: (106-138)/(71-74) 106/74    Laboratory results: I have personally reviewed all pertinent laboratory/tests results    Results from the past 24 hours:   No results found for this or any previous visit (from the past 24 hour(s)).   Most Recent Labs:   Lab Results   Component Value Date    WBC 9.72 06/27/2023    RBC 4.65 06/27/2023    HGB 13.7 06/27/2023    HCT 43.0 06/27/2023     06/27/2023    RDW 13.2 06/27/2023    NEUTROABS 6.16 06/27/2023    SODIUM 141 06/16/2023    K 4.0 06/16/2023     06/16/2023    CO2 30 06/16/2023    BUN 11 06/16/2023    CREATININE 0.53 (L) 06/16/2023    GLUC 73 06/16/2023    CALCIUM 10.1 06/16/2023    AST 19 06/16/2023    ALT 13 06/16/2023    ALKPHOS 32 (L) 06/16/2023    TP 6.8 06/16/2023    ALB 4.3 06/16/2023    TBILI 0.43 06/16/2023    CHOLESTEROL 181 06/11/2023    HDL 64 06/11/2023    TRIG 106 06/11/2023    LDLCALC 96 06/11/2023    NONHDLC 117 06/11/2023    VALPROICTOT 87 06/11/2023    LITHIUM 1.1 06/27/2023    JGT7MXXFZXAK 3.269 06/11/2023    PREGSERUM Negative 06/11/2023    HGBA1C 5.1 03/20/2023     03/20/2023     Progress Toward Goals: regressed, patient is not in behavioral or emotional control requiring PRNs and security assistance. She is much more labile and uncooperative than previous days    Assessment/Plan   Principal Problem:    Unspecified mood (affective) disorder (HCC)  Active Problems:    Medical clearance for psychiatric admission    Intellectual disability    Psychosis ([address])    Dysuria    Vaginal lesion      Recommended Treatment:     Planned medication and treatment changes:     All current active medications have been reviewed  Encourage group therapy, milieu therapy and occupational therapy  Continue close observation for safety    1) Continue Clozapine 100mg BID for psychosis   2) Continue Depakote DR tablet 1000 mg every 12 hours for mood/agitation  3) Continue Lithobid 750 mg twice daily for mood/agitation   4) Continue benztropine 1 mg twice daily for EPS  5) Continue trazodone 100 mg at bedtime for insomnia  6) Continue 1-1 monitoring   7) Continue Senokot 1 tablet BID for bowel regimen    Current Facility-Administered Medications   Medication Dose Route Frequency Provider Last Rate   • acetaminophen  650 mg Oral Q6H PRN Verdene Bearded, DO     • acetaminophen  650 mg Oral Q4H PRN Verdene Bearded, DO     • acetaminophen  975 mg Oral Q6H PRN Verdene Bearded, DO     • aluminum-magnesium hydroxide-simethicone  30 mL Oral Q4H PRN Verdene Bearded, DO     • benztropine  1 mg Intramuscular Q4H PRN Max 6/day Verdene Bearded, DO     • benztropine  0.5 mg Oral BID Augie Finnegan MD     • benztropine  1 mg Oral BID PRN Verdene Bearded, DO     • benztropine  1 mg Oral Q4H PRN Max 6/day Verdene Bearded, DO     • chlorproMAZINE  25 mg Intramuscular Q6H PRN Brady Branch MD      Or   • chlorproMAZINE  25 mg Oral Q6H PRN Brady Branch MD     • chlorproMAZINE  50 mg Intramuscular Q6H PRN Brady Branch MD      Or   • chlorproMAZINE  50 mg Oral Q6H PRN Brady Branch MD     • clonazePAM  0.5 mg Oral BID PRN Verdene Bearded, DO     • cloNIDine  0.1 mg Oral Q12H [address] Fernandez, DO     • cloZAPine  100 mg Oral BID Shavonne DAS Erica Selby MD     • diphenhydrAMINE  25 mg Intramuscular Q6H PRN Cleatus Roots, DO     • hydrOXYzine HCL  50 mg Oral Q6H PRN Max 4/day Cleatus Roots, DO      Or   • diphenhydrAMINE  50 mg Intramuscular Q6H PRN Cleatus Roots, DO     • divalproex sodium  1,000 mg Oral Q12H [address] Cleatus Roots, DO     • hydrOXYzine HCL  100 mg Oral Q6H PRN Max 4/day Cleatus Roots, DO      Or   • LORazepam  2 mg Intramuscular Q6H PRN Cleatus Roots, DO     • hydrOXYzine HCL  25 mg Oral Q6H PRN Max 4/day Cleatus Roots, DO     • lithium carbonate  750 mg Oral BID Cleatus Roots, DO     • melatonin  3 mg Oral HS PRN Cleatus Roots, DO     • polyethylene glycol  17 g Oral Daily PRN Cleatus Roots, DO     • propranolol  10 mg Oral Q8H PRN Cleatus Roots, DO     • senna-docusate sodium  1 tablet Oral BID Cleatus Roots, DO     • traZODone  100 mg Oral HS PERLA Alaniz       Risks / Benefits of Treatment:    Risks, benefits, and possible side effects of medications explained to patient. Patient has limited understanding of risks and benefits of treatment at this time, but agrees to take medications as prescribed. Counseling / Coordination of Care: Total floor / unit time spent today 20 minutes. Greater than 50% of total time was spent with the patient and / or family counseling and / or coordination of care. A description of counseling / coordination of care:  Patient's progress discussed with staff in treatment team meeting. Medications, treatment progress and treatment plan reviewed with patient. Importance of medication and treatment compliance reviewed with patient. Encouraged participation in milieu and group therapy on the unit.     Jihan Hunter 06/30/23

## 2023-06-30 NOTE — PLAN OF CARE
304 hearing held this morning with [facility]. Pt did not attend. 304 upheld for up to 90 days.  304 as of 6/30, exp 9/28

## 2023-06-30 NOTE — NURSING NOTE
Patient requires frequent redirection, is labile and disorganized. Patient denies SI/HI and gets upset when Angeles Stanford is asked. Patient makes "crazy" finger gesture towards head. Patient is cooperative with redirection, currently.

## 2023-06-30 NOTE — PLAN OF CARE
Problem: Risk for Self Injury/Neglect  Goal: Treatment Goal: Remain safe during length of stay, learn and adopt new coping skills, and be free of self-injurious ideation, impulses and acts at the time of discharge  Outcome: Progressing  Goal: Verbalize thoughts and feelings  Description: Interventions:  - Assess and re-assess patient's lethality and potential for self-injury  - Engage patient in 1:1 interactions, daily, for a minimum of 15 minutes  - Encourage patient to express feelings, fears, frustrations, hopes  - Establish rapport/trust with patient   Outcome: Progressing  Goal: Refrain from harming self  Description: Interventions:  - Monitor patient closely, per order  - Develop a trusting relationship  - Supervise medication ingestion, monitor effects and side effects   Outcome: Progressing  Goal: Attend and participate in unit activities, including therapeutic, recreational, and educational groups  Description: Interventions:  - Provide therapeutic and educational activities daily, encourage attendance and participation, and document same in the medical record  - Obtain collateral information, encourage visitation and family involvement in care   Outcome: Progressing  Goal: Recognize maladaptive responses and adopt new coping mechanisms  Outcome: Progressing  Goal: Complete daily ADLs, including personal hygiene independently, as able  Description: Interventions:  - Observe, teach, and assist patient with ADLS  - Monitor and promote a balance of rest/activity, with adequate nutrition and elimination  Outcome: Progressing     Problem: JENAE  Goal: Will exhibit normal sleep and speech and no impulsivity  Description: INTERVENTIONS:  - Administer medication as ordered  - Set limits on impulsive behavior  - Make attempts to decrease external stimuli as possible  Outcome: Progressing     Problem: SAFETY, RESTRAINT - VIOLENT/SELF-DESTRUCTIVE  Goal: Remains free of harm/injury from restraints (Restraint for Violent/Self-Destructive Behavior)  Description: INTERVENTIONS:  - Instruct patient/family regarding restraint use   - Assess and monitor physiologic and psychological status   - Provide interventions and comfort measures to meet assessed patient needs   - Ensure continuous in person monitoring is provided   - Identify and implement measures to help patient regain control  - Assess readiness for release of restraint  Outcome: Progressing  Goal: Returns to optimal restraint-free functioning  Description: INTERVENTIONS:  - Assess the patient's behavior and symptoms that indicate continued need for restraint  - Identify and implement measures to help patient regain control  - Assess readiness for release of restraint   Outcome: Progressing

## 2023-07-01 PROCEDURE — 99232 SBSQ HOSP IP/OBS MODERATE 35: CPT | Performed by: STUDENT IN AN ORGANIZED HEALTH CARE EDUCATION/TRAINING PROGRAM

## 2023-07-01 RX ADMIN — CLOZAPINE 100 MG: 100 TABLET ORAL at 17:51

## 2023-07-01 RX ADMIN — BENZTROPINE MESYLATE 0.5 MG: 0.5 TABLET ORAL at 10:08

## 2023-07-01 RX ADMIN — CLONIDINE HYDROCHLORIDE 0.1 MG: 0.1 TABLET ORAL at 10:08

## 2023-07-01 RX ADMIN — SENNOSIDES AND DOCUSATE SODIUM 1 TABLET: 50; 8.6 TABLET ORAL at 17:51

## 2023-07-01 RX ADMIN — BENZTROPINE MESYLATE 0.5 MG: 0.5 TABLET ORAL at 17:51

## 2023-07-01 RX ADMIN — SENNOSIDES AND DOCUSATE SODIUM 1 TABLET: 50; 8.6 TABLET ORAL at 10:08

## 2023-07-01 RX ADMIN — CLONIDINE HYDROCHLORIDE 0.1 MG: 0.1 TABLET ORAL at 21:51

## 2023-07-01 RX ADMIN — TRAZODONE HYDROCHLORIDE 100 MG: 100 TABLET ORAL at 21:51

## 2023-07-01 RX ADMIN — LITHIUM CARBONATE 750 MG: 450 TABLET, EXTENDED RELEASE ORAL at 17:51

## 2023-07-01 RX ADMIN — DIVALPROEX SODIUM 1000 MG: 500 TABLET, DELAYED RELEASE ORAL at 10:08

## 2023-07-01 RX ADMIN — DIVALPROEX SODIUM 1000 MG: 500 TABLET, DELAYED RELEASE ORAL at 21:51

## 2023-07-01 RX ADMIN — LITHIUM CARBONATE 750 MG: 450 TABLET, EXTENDED RELEASE ORAL at 10:08

## 2023-07-01 RX ADMIN — CLOZAPINE 100 MG: 100 TABLET ORAL at 10:08

## 2023-07-01 NOTE — NURSING NOTE
Patient continues on 1:1 close proximity for safety/non-suicide. Patient requires frequent redirection, labile in affect; alternating between smiling and appearing happy, and tearful. Patient becomes tearful when pointing to her various venipuncture marks, and places where she had received IM's before. Patient denies AVH/SI/HI at this time.

## 2023-07-01 NOTE — NURSING NOTE
Patient calm and cooperative, medication compliant. Patient denies psychiatric symptoms at this time, denies any unmet needs. Staff continuous observer remains in place at this time.

## 2023-07-01 NOTE — PROGRESS NOTES
Progress Note - [address] 28 y.o. female MRN: [id number]  Unit/Bed#: [facility] 349-01 Encounter: [id number]    All documentation, nursing notes, labs, and vitals reviewed. The patient's medication reconciliation chart was also analyzed for medication adherence. I personally evaluated Maria Eugenia Humphrey and discussed current care with treatment team. No acute events overnight. Maria T Melo remains on 1:1 observation for her safety. On examination today, interpretor services were used (#[id number]). Maria T Melo is notably loud, labile, and affectively dysregulated. She is intrusive and truly lacks insight into her disease process. He reports compliance with current medications and denies adverse medication side effects. She speaks at length about "96, 97, and 98 shots". She is perseverative regarding this and does not respond well to re-directing. Maria T Melo is able to deny active SI/HI. She states that she is "not crazy". Her sleep and appetite are erratic. Her energy is at baseline. She is attempting to engage in activities (puzzes, coloring). Maria T Melo reports anxiety and worry regarding being on the unit. She is without panic attacks. She is notably on-edge, tense, reactive, and restless. When asked about overt perceptual disturbances (A/V hallucinations, etc.), Maria T Melo does not respond. She is paranoid and suspicious of others. She appears to be delusional although this is challenging to decipher. Will consider further optimization of Clozapine tomorrow.      Mental Status Evaluation:    Appearance:  marginal hygiene, looks older than stated age   Behavior:  bizarre, demanding, guarded, uncooperative   Speech:  pressured, loud   Mood:  labile   Affect:  labile   Thought Process:  illogical, tangential   Associations: tangential associations   Thought Content:  some paranoia   Perceptual Disturbances: appears responding to internal stimuli, talks to self at times   Risk Potential: Suicidal ideation - None at present  Homicidal ideation - None at present  Potential for aggression - Yes, due to poor impulse control   Sensorium:  unable to assess   Memory:  recent and remote memory: unable to assess due to lack of cooperation   Consciousness:  alert and awake    Attention: attention span and concentration appear shorter than expected for age   Insight:  poor   Judgment: poor   Gait/Station: normal gait/station   Motor Activity: no abnormal movements       Assessment:     Principal Problem:    Unspecified mood (affective) disorder ([address])  Active Problems:    Medical clearance for psychiatric admission    Intellectual disability    Psychosis ([address])    Dysuria    Vaginal lesion      Plan/Recommended Treatment:     - Continue with pharmacotherapy, group therapy, milieu therapy and occupational therapy. - Risks/benefits/alternatives to treatment discussed and Ed Drone continues to verbalize understanding   - No psychopharmacologic changes necessary at this juncture, continue scheduled psychotropic agents at current doses (see below)   - Will consider further optimization of psychotropic medication regimen as hospital course progresses   - Continue to assess for adverse medication side effects.  - Medical management as per [address] to participate in nonverbal forms of therapy including journaling and art/music therapy  - Continue precautionary Q7-minute safety checks. - Continue to engage case management/SW to assist with collateral information, discharge planning, and the implementation of an individualized, patient-centered plan of care.   - The patient will be maintained on the following medications:    Current Facility-Administered Medications   Medication Dose Route Frequency Provider Last Rate   • acetaminophen  650 mg Oral Q6H PRN Mckenzie Kettle, DO     • acetaminophen  650 mg Oral Q4H PRN Mckenzie Kettle, DO     • acetaminophen  975 mg Oral Q6H PRN Mckenzie Kettle, DO     • aluminum-magnesium hydroxide-simethicone  30 mL Oral Q4H PRN Meagan Villalobos, DO     • benztropine  1 mg Intramuscular Q4H PRN Max 6/day Meagan Villalobos, DO     • benztropine  0.5 mg Oral BID Heydi Yousif MD     • benztropine  1 mg Oral BID PRN Meagan Villalobos, DO     • benztropine  1 mg Oral Q4H PRN Max 6/day Meagan Villalobos, DO     • chlorproMAZINE  25 mg Intramuscular Q6H PRN Shaun Pierce MD      Or   • chlorproMAZINE  25 mg Oral Q6H PRN Shaun Pierce MD     • chlorproMAZINE  50 mg Intramuscular Q6H PRN Shaun Pierce MD      Or   • chlorproMAZINE  50 mg Oral Q6H PRN Shaun Pierce MD     • clonazePAM  0.5 mg Oral BID PRN Meagan Villalobos, DO     • cloNIDine  0.1 mg Oral Q12H [address] , DO     • cloZAPine  100 mg Oral BID Trino Whitlock MD     • diphenhydrAMINE  25 mg Intramuscular Q6H PRN Meagan Villalobos, DO     • hydrOXYzine HCL  50 mg Oral Q6H PRN Max 4/day Meagan Villalobos, DO      Or   • diphenhydrAMINE  50 mg Intramuscular Q6H PRN Meagan Villalobos, DO     • divalproex sodium  1,000 mg Oral Q12H [address] , DO     • hydrOXYzine HCL  100 mg Oral Q6H PRN Max 4/day Meagan Villalobos, DO      Or   • LORazepam  2 mg Intramuscular Q6H PRN Meagan Villalobos, DO     • hydrOXYzine HCL  25 mg Oral Q6H PRN Max 4/day Meagan Villalobos, DO     • lithium carbonate  750 mg Oral BID Meagan Villalobos, DO     • melatonin  3 mg Oral HS PRN Meagan Villalobos, DO     • ondansetron  4 mg Oral Q6H PRN Kilauea Congress, PA-C     • polyethylene glycol  17 g Oral Daily PRN Meagan Villalobos, DO     • propranolol  10 mg Oral Q8H PRN Meagan Villalobos, DO     • senna-docusate sodium  1 tablet Oral BID Meagan Villalobos, DO     • simethicone  80 mg Oral Q6H PRN Kilauea Congress, PA-C     • traZODone  100 mg Oral HS PERLA Arora

## 2023-07-01 NOTE — QUICK NOTE
Family Medicine will sign off for gynecological condition, which seems to be resolved. Continue current medical management as per SLIM. We will be available for further concerns.      Steven Mascorro MD  PGY-3 FM resident

## 2023-07-02 PROCEDURE — 99232 SBSQ HOSP IP/OBS MODERATE 35: CPT | Performed by: STUDENT IN AN ORGANIZED HEALTH CARE EDUCATION/TRAINING PROGRAM

## 2023-07-02 RX ADMIN — SENNOSIDES AND DOCUSATE SODIUM 1 TABLET: 50; 8.6 TABLET ORAL at 18:07

## 2023-07-02 RX ADMIN — SIMETHICONE 80 MG: 80 TABLET, CHEWABLE ORAL at 18:07

## 2023-07-02 RX ADMIN — ACETAMINOPHEN 975 MG: 325 TABLET ORAL at 18:07

## 2023-07-02 RX ADMIN — CLONIDINE HYDROCHLORIDE 0.1 MG: 0.1 TABLET ORAL at 10:15

## 2023-07-02 RX ADMIN — DIVALPROEX SODIUM 1000 MG: 500 TABLET, DELAYED RELEASE ORAL at 21:09

## 2023-07-02 RX ADMIN — SENNOSIDES AND DOCUSATE SODIUM 1 TABLET: 50; 8.6 TABLET ORAL at 10:14

## 2023-07-02 RX ADMIN — BENZTROPINE MESYLATE 0.5 MG: 0.5 TABLET ORAL at 18:07

## 2023-07-02 RX ADMIN — DIVALPROEX SODIUM 1000 MG: 500 TABLET, DELAYED RELEASE ORAL at 10:15

## 2023-07-02 RX ADMIN — CLOZAPINE 100 MG: 100 TABLET ORAL at 18:07

## 2023-07-02 RX ADMIN — CLOZAPINE 100 MG: 100 TABLET ORAL at 10:14

## 2023-07-02 RX ADMIN — BENZTROPINE MESYLATE 0.5 MG: 0.5 TABLET ORAL at 10:13

## 2023-07-02 RX ADMIN — TRAZODONE HYDROCHLORIDE 100 MG: 100 TABLET ORAL at 21:09

## 2023-07-02 RX ADMIN — CLONIDINE HYDROCHLORIDE 0.1 MG: 0.1 TABLET ORAL at 21:09

## 2023-07-02 RX ADMIN — LITHIUM CARBONATE 750 MG: 450 TABLET, EXTENDED RELEASE ORAL at 10:14

## 2023-07-02 RX ADMIN — LITHIUM CARBONATE 750 MG: 450 TABLET, EXTENDED RELEASE ORAL at 18:07

## 2023-07-02 NOTE — NURSING NOTE
Patient given 975mg of tylenol for severe abdomen pain 10/10. Simethicone given for gas. Stomach appears distended, and 1:1 sitter reports patient is gassy. 1707: patient reports no pain. 975mg of tylenol effective for severe abdomen pain. Simethicone mildly effective as well.

## 2023-07-02 NOTE — PLAN OF CARE
Problem: Risk for Self Injury/Neglect  Goal: Treatment Goal: Remain safe during length of stay, learn and adopt new coping skills, and be free of self-injurious ideation, impulses and acts at the time of discharge  Outcome: Progressing  Goal: Verbalize thoughts and feelings  Description: Interventions:  - Assess and re-assess patient's lethality and potential for self-injury  - Engage patient in 1:1 interactions, daily, for a minimum of 15 minutes  - Encourage patient to express feelings, fears, frustrations, hopes  - Establish rapport/trust with patient   Outcome: Progressing  Goal: Refrain from harming self  Description: Interventions:  - Monitor patient closely, per order  - Develop a trusting relationship  - Supervise medication ingestion, monitor effects and side effects   Outcome: Progressing  Goal: Attend and participate in unit activities, including therapeutic, recreational, and educational groups  Description: Interventions:  - Provide therapeutic and educational activities daily, encourage attendance and participation, and document same in the medical record  - Obtain collateral information, encourage visitation and family involvement in care   Outcome: Progressing  Goal: Recognize maladaptive responses and adopt new coping mechanisms  Outcome: Progressing  Goal: Complete daily ADLs, including personal hygiene independently, as able  Description: Interventions:  - Observe, teach, and assist patient with ADLS  - Monitor and promote a balance of rest/activity, with adequate nutrition and elimination  Outcome: Progressing     Problem: Risk for Violence/Aggression Toward Others  Goal: Treatment Goal: Refrain from acts of violence/aggression during length of stay, and demonstrate improved impulse control at the time of discharge  Outcome: Progressing  Goal: Verbalize thoughts and feelings  Description: Interventions:  - Assess and re-assess patient's level of risk, every waking shift  - Engage patient in 1:1 interactions, daily, for a minimum of 15 minutes   - Allow patient to express feelings and frustrations in a safe and non-threatening manner   - Establish rapport/trust with patient   Outcome: Progressing  Goal: Refrain from harming others  Outcome: Progressing  Goal: Refrain from destructive acts on the environment or property  Outcome: Progressing  Goal: Control angry outbursts  Description: Interventions:  - Monitor patient closely, per order  - Ensure early verbal de-escalation  - Monitor prn medication needs  - Set reasonable/therapeutic limits, outline behavioral expectations, and consequences   - Provide a non-threatening milieu, utilizing the least restrictive interventions   Outcome: Progressing  Goal: Attend and participate in unit activities, including therapeutic, recreational, and educational groups  Description: Interventions:  - Provide therapeutic and educational activities daily, encourage attendance and participation, and document same in the medical record   Outcome: Progressing  Goal: Identify appropriate positive anger management techniques  Description: Interventions:  - Offer anger management and coping skills groups   - Staff will provide positive feedback for appropriate anger control  Outcome: Progressing     Problem: INVOLUNTARY ADMIT  Goal: Will cooperate with staff recommendations and doctor's orders and will demonstrate appropriate behavior  Description: INTERVENTIONS:  - Treat underlying conditions and offer medication as ordered  - Educate regarding involuntary admission procedures and rules  - Utilize positive consistent limit setting strategies to support patient and staff safety  Outcome: Progressing     Problem: JENAE  Goal: Will exhibit normal sleep and speech and no impulsivity  Description: INTERVENTIONS:  - Administer medication as ordered  - Set limits on impulsive behavior  - Make attempts to decrease external stimuli as possible  Outcome: Progressing     Problem: SELF CARE DEFICIT  Goal: Return ADL status to a safe level of function  Description: INTERVENTIONS:  - Administer medication as ordered  - Assess ADL deficits and provide assistive devices as needed  - Obtain PT/OT consults as needed  - Assist and instruct patient to increase activity and self care as tolerated  Outcome: Progressing     Problem: INVOLUNTARY ADMIT  Goal: Will cooperate with staff recommendations and doctor's orders and will demonstrate appropriate behavior  Description: INTERVENTIONS:  - Treat underlying conditions and offer medication as ordered  - Educate regarding involuntary admission procedures and rules  - Utilize positive consistent limit setting strategies to support patient and staff safety  Outcome: Progressing     Problem: SAFETY, RESTRAINT - VIOLENT/SELF-DESTRUCTIVE  Goal: Remains free of harm/injury from restraints (Restraint for Violent/Self-Destructive Behavior)  Description: INTERVENTIONS:  - Instruct patient/family regarding restraint use   - Assess and monitor physiologic and psychological status   - Provide interventions and comfort measures to meet assessed patient needs   - Ensure continuous in person monitoring is provided   - Identify and implement measures to help patient regain control  - Assess readiness for release of restraint  Outcome: Progressing  Goal: Returns to optimal restraint-free functioning  Description: INTERVENTIONS:  - Assess the patient's behavior and symptoms that indicate continued need for restraint  - Identify and implement measures to help patient regain control  - Assess readiness for release of restraint   Outcome: Progressing

## 2023-07-02 NOTE — NURSING NOTE
Patient denies AVH/SI/HI. She is labile in affect, loud, pressured speech. She appears more euphoric today. Patient has been in behavioral control but requires frequent redirection with 1:1 sitter.

## 2023-07-02 NOTE — PROGRESS NOTES
Progress Note - [address] 28 y.o. female MRN: [id number]  Unit/Bed#: [facility] 349-01 Encounter: [id number]    All documentation, nursing notes, labs, and vitals reviewed. The patient's medication reconciliation chart was also analyzed for medication adherence. I personally evaluated Danyelle Jeronimo and discussed current care with treatment team. During today's assessment, use of interpretor phone was utilized (#[id number]). On examination, Nima Rodney is loud, erratic in behavior, and disjointed in thought. She remains hyperfixated on injections she has received while on the unit. Verbal redirecting proved to be ineffective. Nima Rodney is able to share that she slept well last evening. No concerns regarding appetite or energy. She denies SI/HI when asked today. She is without pathologic anxiety or worry. She remains on 1:1 for safety precautions. Nima Rodney continues to be disruptive and unpredictable in behavior. She has been compliant with her medications and denies overt concerns. During today's examination, Nima Rodney does not exhibit objective evidence of psychosis, aside from delusional content, which is chronic. Nima Rodney denies A/V hallucinations, ideas of reference, or Schneiderian symptoms. She is notably paranoid and suspicious. Nima Rodney denies any further concerns.        Mental Status Evaluation:    Appearance:  marginal hygiene, looks older than stated age   Behavior:  agitated, bizarre, uncooperative, limited eye contact   Speech:  pressured, loud   Mood:  labile, irritable   Affect:  labile   Thought Process:  disorganized, tangential   Associations: tangential associations   Thought Content:  persecutory delusions, paranoid thoughts   Perceptual Disturbances: no auditory hallucinations, no visual hallucinations   Risk Potential: Suicidal ideation - None at present  Homicidal ideation - None at present  Potential for aggression - Yes, due to poor impulse control   Sensorium:  oriented to person, place and time/date   Memory:  recent and remote memory: unable to assess due to lack of cooperation   Consciousness:  alert and awake    Attention: attention span and concentration appear shorter than expected for age   Insight:  impaired   Judgment: poor   Gait/Station: normal gait/station   Motor Activity: no abnormal movements       Assessment:     Principal Problem:    Unspecified mood (affective) disorder ([address])  Active Problems:    Medical clearance for psychiatric admission    Intellectual disability    Psychosis ([address])    Dysuria    Vaginal lesion      Plan/Recommended Treatment:     - Continue with pharmacotherapy, group therapy, milieu therapy and occupational therapy. - Risks/benefits/alternatives to treatment discussed and Zack Corey continues to verbalize understanding   - No psychopharmacologic changes necessary at this juncture, continue scheduled psychotropic agents at current doses (see below)   - Will consider further optimization of psychotropic medication regimen as hospital course progresses   - Continue to assess for adverse medication side effects.  - Medical management as per [address] to participate in nonverbal forms of therapy including journaling and art/music therapy  - Continue precautionary Q7-minute safety checks. - Continue to engage case management/SW to assist with collateral information, discharge planning, and the implementation of an individualized, patient-centered plan of care.   - The patient will be maintained on the following medications:    Current Facility-Administered Medications   Medication Dose Route Frequency Provider Last Rate   • acetaminophen  650 mg Oral Q6H PRN [place] Muhammadricia, DO     • acetaminophen  650 mg Oral Q4H PRN [place]ano Grimaldoia, DO     • acetaminophen  975 mg Oral Q6H PRN [place]ano Grimaldoia, DO     • aluminum-magnesium hydroxide-simethicone  30 mL Oral Q4H PRN [place]ano Clifton, DO     • benztropine  1 mg Intramuscular Q4H PRN Max 6/day [facility] Velvet, DO     • benztropine  0.5 mg Oral BID Marry Ledesma MD     • benztropine  1 mg Oral BID PRN [facility] Velvet, DO     • benztropine  1 mg Oral Q4H PRN Max 6/day [facility] Velvet, DO     • chlorproMAZINE  25 mg Intramuscular Q6H PRN Melodie Wilder MD      Or   • chlorproMAZINE  25 mg Oral Q6H PRN Melodie Wilder MD     • chlorproMAZINE  50 mg Intramuscular Q6H PRN Melodie Wilder MD      Or   • chlorproMAZINE  50 mg Oral Q6H PRN Melodie Wilder MD     • clonazePAM  0.5 mg Oral BID PRN [facility] Velvet, DO     • cloNIDine  0.1 mg Oral Q12H [address] , DO     • cloZAPine  100 mg Oral BID Megha Laura MD     • diphenhydrAMINE  25 mg Intramuscular Q6H PRN [facility] Velvet, DO     • hydrOXYzine HCL  50 mg Oral Q6H PRN Max 4/day [facility] Velvet, DO      Or   • diphenhydrAMINE  50 mg Intramuscular Q6H PRN [facility] Velvet, DO     • divalproex sodium  1,000 mg Oral Q12H [address] , DO     • hydrOXYzine HCL  100 mg Oral Q6H PRN Max 4/day [facility] Velvet, DO      Or   • LORazepam  2 mg Intramuscular Q6H PRN [facility] Velvet, DO     • hydrOXYzine HCL  25 mg Oral Q6H PRN Max 4/day [facility] Velvet, DO     • lithium carbonate  750 mg Oral BID [facility] Velvet, DO     • melatonin  3 mg Oral HS PRN [facility] Velvet, DO     • ondansetron  4 mg Oral Q6H PRN Aleyda Marquez PA-C     • polyethylene glycol  17 g Oral Daily PRN [facility] Velvet, DO     • propranolol  10 mg Oral Q8H PRN [facility] Velvet, DO     • senna-docusate sodium  1 tablet Oral BID [facility] Velvet, DO     • simethicone  80 mg Oral Q6H PRN Aleyda Marquez PA-C     • traZODone  100 mg Oral HS PERLA Maldonado

## 2023-07-02 NOTE — NURSING NOTE
Patient calm and cooperative, medication compliant. Patient denies psychiatric symptoms at this time. Patient continuous staff observer in place at this time. Patient did mention to staff she did not want to be here any longer and was upset, staff spoke to and calmed patient in [address] [address], patient verbalized her understanding to the staff member.

## 2023-07-03 PROCEDURE — 99232 SBSQ HOSP IP/OBS MODERATE 35: CPT | Performed by: PSYCHIATRY & NEUROLOGY

## 2023-07-03 RX ADMIN — TRAZODONE HYDROCHLORIDE 100 MG: 100 TABLET ORAL at 21:17

## 2023-07-03 RX ADMIN — POLYETHYLENE GLYCOL 3350 17 G: 17 POWDER, FOR SOLUTION ORAL at 10:20

## 2023-07-03 RX ADMIN — CLONIDINE HYDROCHLORIDE 0.1 MG: 0.1 TABLET ORAL at 21:17

## 2023-07-03 RX ADMIN — BENZTROPINE MESYLATE 0.5 MG: 0.5 TABLET ORAL at 08:14

## 2023-07-03 RX ADMIN — SENNOSIDES AND DOCUSATE SODIUM 1 TABLET: 50; 8.6 TABLET ORAL at 08:14

## 2023-07-03 RX ADMIN — LITHIUM CARBONATE 750 MG: 450 TABLET, EXTENDED RELEASE ORAL at 08:14

## 2023-07-03 RX ADMIN — SIMETHICONE 80 MG: 80 TABLET, CHEWABLE ORAL at 10:20

## 2023-07-03 RX ADMIN — CLOZAPINE 100 MG: 100 TABLET ORAL at 08:14

## 2023-07-03 RX ADMIN — LITHIUM CARBONATE 750 MG: 450 TABLET, EXTENDED RELEASE ORAL at 17:49

## 2023-07-03 RX ADMIN — CLONIDINE HYDROCHLORIDE 0.1 MG: 0.1 TABLET ORAL at 08:15

## 2023-07-03 RX ADMIN — SENNOSIDES AND DOCUSATE SODIUM 1 TABLET: 50; 8.6 TABLET ORAL at 17:50

## 2023-07-03 RX ADMIN — DIVALPROEX SODIUM 1000 MG: 500 TABLET, DELAYED RELEASE ORAL at 08:14

## 2023-07-03 RX ADMIN — BENZTROPINE MESYLATE 0.5 MG: 0.5 TABLET ORAL at 17:50

## 2023-07-03 RX ADMIN — DIVALPROEX SODIUM 1000 MG: 500 TABLET, DELAYED RELEASE ORAL at 21:17

## 2023-07-03 RX ADMIN — CLOZAPINE 125 MG: 25 TABLET ORAL at 17:50

## 2023-07-03 NOTE — NURSING NOTE
Pt remains on a 1:1 for impaired judgement. Medication and meal compliant. Can be loud and disorganized but is redirectable. Pt reports PRN miralax to have been effective for constipation. Denies any unmet needs or complaints.

## 2023-07-03 NOTE — PROGRESS NOTES
Progress Note - Behavioral Health   Marylu Ontiveros 28 y.o. female MRN: [id number]  Unit/Bed#: [facility] 349-01 Encounter: [id number]    Assessment/Plan   Principal Problem:    Unspecified mood (affective) disorder (HCC)  Active Problems:    Medical clearance for psychiatric admission    Intellectual disability    Psychosis ([address])    Dysuria    Vaginal lesion      Recommended Treatment:   • Increase Clozapine to 125 mg twice daily for psychosis (CBC 6/27/23 with WBC - 9.72 and ANC - 6.16  • Continue Depakote 1000 mg every 12 hours for mood stability/agitation (VPA level - 87)  • Continue lithium 750 mg twice daily for mood stability/agitation (Li level - 1.1 / No toxicity noted on exam)  • Continue Cogentin 1 mg twice daily for EPS  • Continue continuous visual monitoring for patient and staff safety  • Continue medical management by SLIM team.  • Continue with group therapy, milieu therapy and occupational therapy. • Continue frequent safety checks and vitals per unit protocol. • Discharge disposition:  TBD    Case discussed with treatment team.  Risks, benefits and possible side effects of Medications: Risks, benefits, and possible side effects of medications have been explained to the patient, who does not verbalize understanding at this time. ------------------------------------------------------------  Chief Complaint:  "Let me see your badge"    Subjective: The patient was evaluated this morning for continuity of care and no acute distress noted throughout the evaluation. Over the past 24 hours staff noted the patient was labile, pressured, loud but has remained under behavioral control. She has been compliant with her medications and at times was observed to be tearful. On Saturday she remained on 1:1 observation for safety to herself and others, Sunday she also remained on continual observation and was given prn simethicone and tylenol for flatulence/ distended stomach and pain.         Today on exam the patient was cooperative with sitting in the interview room however she was easily distracted and not fully participative in the interview. Speech is pressured, she speaks both [place] and [place] during the interview. When asked about sleep and appetite she reports having no issues. During exam she shows psychomotor agitation, she displays poor boundaries by trying to touch the note writer and PA student on the knee. She will sporadically blow kisses at the PA student. She denies when asked about self harm thoughts, suicidal thoughts and homicidal thoughts. She denies AH and VH but does engage in conversation with herself and speech at times is incoherent.      Psychiatric Review of Systems:  Behavior over the last 24 hours:  unchanged  Sleep: no changes per pt  Appetite: No changes per patient  Medication side effects: No   ROS: all other systems are negative    Current Medications:  Current Facility-Administered Medications   Medication Dose Route Frequency Provider Last Rate   • acetaminophen  650 mg Oral Q6H PRN Josue Hernandez DO     • acetaminophen  650 mg Oral Q4H PRN Josue Hernandez DO     • acetaminophen  975 mg Oral Q6H PRN Josue Hernandez DO     • aluminum-magnesium hydroxide-simethicone  30 mL Oral Q4H PRN Josue Hernandez DO     • benztropine  1 mg Intramuscular Q4H PRN Max 6/day Josue Hernandez DO     • benztropine  0.5 mg Oral BID Jose Quintana MD     • benztropine  1 mg Oral BID PRN Josue Hernandez DO     • benztropine  1 mg Oral Q4H PRN Max 6/day Josue Hernandez DO     • chlorproMAZINE  25 mg Intramuscular Q6H PRN Rachael Greenberg MD      Or   • chlorproMAZINE  25 mg Oral Q6H PRN Rachael Greenberg MD     • chlorproMAZINE  50 mg Intramuscular Q6H PRN Rachael Greenberg MD      Or   • chlorproMAZINE  50 mg Oral Q6H PRN Rachael Greenberg MD     • clonazePAM  0.5 mg Oral BID PRN Josue Hernandez DO     • cloNIDine  0.1 mg Oral Q12H [address], DO     • cloZAPine  125 mg Oral BID Bridgette Brunner DO • diphenhydrAMINE  25 mg Intramuscular Q6H PRN Ely Base, DO     • hydrOXYzine HCL  50 mg Oral Q6H PRN Max 4/day Ely Base, DO      Or   • diphenhydrAMINE  50 mg Intramuscular Q6H PRN Ely Base, DO     • divalproex sodium  1,000 mg Oral Q12H [facility] Ely Base, DO     • hydrOXYzine HCL  100 mg Oral Q6H PRN Max 4/day Ely Base, DO      Or   • LORazepam  2 mg Intramuscular Q6H PRN Ely Base, DO     • hydrOXYzine HCL  25 mg Oral Q6H PRN Max 4/day Ely Base, DO     • lithium carbonate  750 mg Oral BID Ely Base, DO     • melatonin  3 mg Oral HS PRN Ely Base, DO     • ondansetron  4 mg Oral Q6H PRN Pardeep Wilson PA-C     • polyethylene glycol  17 g Oral Daily PRN Ely Base, DO     • propranolol  10 mg Oral Q8H PRN Ely Base, DO     • senna-docusate sodium  1 tablet Oral BID Ely Base, DO     • simethicone  80 mg Oral Q6H PRN Pardeep Wilson PA-C     • traZODone  100 mg Oral HS PERLA Seals         Behavioral Health Medications: all current active meds have been reviewed. Vital signs in last 24 hours:  Temp:  [97.5 °F (36.4 °C)-97.6 °F (36.4 °C)] 97.6 °F (36.4 °C)  HR:  [] 100  Resp:  [16] 16  BP: (115-130)/(80-98) 130/98    Laboratory results:  I have personally reviewed all pertinent laboratory/tests results.     Mental Status Evaluation:    Appearance:  disheveled, marginal hygiene, looks younger than stated age, thin, edentulous, minimal eye contact, psychomotor agitation   Behavior:  bizarre, psychomotor agitation, inappropriate, responds to redirection, poor boundaries, child like   Speech:  pressured, hypertalkative, loud, slurred, incoherent at times   Mood:  "good"   Affect:  labile   Thought Process:  disorganized, illogical, tangential, perseverative   Associations: tangential associations   Thought Content:  intrusive thoughts   Perceptual Disturbances: denies auditory hallucinations when asked, appears responding to internal stimuli, appears preoccupied, talks to self at times   Risk Potential: Suicidal ideation - No active suicidal ideation  Homicidal ideation - None at present  Potential for aggression - Yes, due to poor impulse control   Sensorium:  unable to assess   Memory:  recent and remote memory: unable to assess due to lack of cooperation   Consciousness:  alert and awake   Attention/Concentration: attention span and concentration appear shorter than expected for age   Insight:  poor   Judgment: poor   Gait/Station: normal gait/station   Motor Activity: psychomotor agitation         Progress Toward Goals: Osmin Mccracken continues to demonstrate poor boundaries, rapid speech at times incoherent, psychomotor agitations and responding to internal stimuli. At this time current behavior supports increase in Clozaril to 125mg BID - plan to continue to optimize based on symptom improvement and tolerability. Recommended Treatment:   See above for assessment and plan. Risks, benefits and possible side effects of Medications:   Patient does not verbalize understanding at this time and will require further explanation. This note has been constructed using a voice recognition system. There may be translation, syntax, or grammatical errors. If you have any questions, please contact the dictating provider. Sanjana Farley D.O.   Psychiatry PGY4

## 2023-07-03 NOTE — NURSING NOTE
Patient calm and cooperative, medication compliant.  Patient denies psychiatric symptoms at this time, denies any unmet needs and continuous staff observer remains in place at this time

## 2023-07-03 NOTE — PROGRESS NOTES
07/03/23 0844   Team Meeting   Meeting Type Daily Rounds   Team Members Present   Team Members Present Physician;Nurse;   Physician Team Member [id number] Member Cathi   Patient/Family Present   Patient Present No   Patient's Family Present No   1:1. Requires frequent redirection, labile in affect. No behavioral issues over the weekend. Pt med/meal compliant. Titrate up clozapine. DC tbd.

## 2023-07-04 LAB
BASOPHILS # BLD AUTO: 0.03 THOUSANDS/ÂΜL (ref 0–0.1)
BASOPHILS NFR BLD AUTO: 0 % (ref 0–1)
EOSINOPHIL # BLD AUTO: 0.09 THOUSAND/ÂΜL (ref 0–0.61)
EOSINOPHIL NFR BLD AUTO: 1 % (ref 0–6)
ERYTHROCYTE [DISTWIDTH] IN BLOOD BY AUTOMATED COUNT: 12.8 % (ref 11.6–15.1)
HCT VFR BLD AUTO: 43.4 % (ref 34.8–46.1)
HGB BLD-MCNC: 13.9 G/DL (ref 11.5–15.4)
IMM GRANULOCYTES # BLD AUTO: 0.04 THOUSAND/UL (ref 0–0.2)
IMM GRANULOCYTES NFR BLD AUTO: 0 % (ref 0–2)
LYMPHOCYTES # BLD AUTO: 1.99 THOUSANDS/ÂΜL (ref 0.6–4.47)
LYMPHOCYTES NFR BLD AUTO: 15 % (ref 14–44)
MCH RBC QN AUTO: 29.6 PG (ref 26.8–34.3)
MCHC RBC AUTO-ENTMCNC: 32 G/DL (ref 31.4–37.4)
MCV RBC AUTO: 92 FL (ref 82–98)
MONOCYTES # BLD AUTO: 1.19 THOUSAND/ÂΜL (ref 0.17–1.22)
MONOCYTES NFR BLD AUTO: 9 % (ref 4–12)
NEUTROPHILS # BLD AUTO: 10.09 THOUSANDS/ÂΜL (ref 1.85–7.62)
NEUTS SEG NFR BLD AUTO: 75 % (ref 43–75)
NRBC BLD AUTO-RTO: 0 /100 WBCS
PLATELET # BLD AUTO: 216 THOUSANDS/UL (ref 149–390)
PMV BLD AUTO: 8.9 FL (ref 8.9–12.7)
RBC # BLD AUTO: 4.7 MILLION/UL (ref 3.81–5.12)
WBC # BLD AUTO: 13.43 THOUSAND/UL (ref 4.31–10.16)

## 2023-07-04 PROCEDURE — 99232 SBSQ HOSP IP/OBS MODERATE 35: CPT | Performed by: PSYCHIATRY & NEUROLOGY

## 2023-07-04 PROCEDURE — 85025 COMPLETE CBC W/AUTO DIFF WBC: CPT

## 2023-07-04 RX ORDER — GUAIFENESIN/DEXTROMETHORPHAN 100-10MG/5
10 SYRUP ORAL EVERY 4 HOURS PRN
Status: DISCONTINUED | OUTPATIENT
Start: 2023-07-04 | End: 2023-07-19 | Stop reason: HOSPADM

## 2023-07-04 RX ADMIN — ACETAMINOPHEN 975 MG: 325 TABLET ORAL at 10:33

## 2023-07-04 RX ADMIN — LITHIUM CARBONATE 750 MG: 450 TABLET, EXTENDED RELEASE ORAL at 17:30

## 2023-07-04 RX ADMIN — TRAZODONE HYDROCHLORIDE 100 MG: 100 TABLET ORAL at 21:15

## 2023-07-04 RX ADMIN — SENNOSIDES AND DOCUSATE SODIUM 1 TABLET: 50; 8.6 TABLET ORAL at 17:30

## 2023-07-04 RX ADMIN — BENZTROPINE MESYLATE 0.5 MG: 0.5 TABLET ORAL at 08:24

## 2023-07-04 RX ADMIN — LITHIUM CARBONATE 750 MG: 450 TABLET, EXTENDED RELEASE ORAL at 08:24

## 2023-07-04 RX ADMIN — CLOZAPINE 125 MG: 25 TABLET ORAL at 17:30

## 2023-07-04 RX ADMIN — SENNOSIDES AND DOCUSATE SODIUM 1 TABLET: 50; 8.6 TABLET ORAL at 08:24

## 2023-07-04 RX ADMIN — CLONIDINE HYDROCHLORIDE 0.1 MG: 0.1 TABLET ORAL at 08:24

## 2023-07-04 RX ADMIN — BENZTROPINE MESYLATE 0.5 MG: 0.5 TABLET ORAL at 17:48

## 2023-07-04 RX ADMIN — DIVALPROEX SODIUM 1000 MG: 500 TABLET, DELAYED RELEASE ORAL at 08:24

## 2023-07-04 RX ADMIN — DIVALPROEX SODIUM 1000 MG: 500 TABLET, DELAYED RELEASE ORAL at 20:39

## 2023-07-04 RX ADMIN — CLOZAPINE 125 MG: 25 TABLET ORAL at 08:24

## 2023-07-04 RX ADMIN — CLONIDINE HYDROCHLORIDE 0.1 MG: 0.1 TABLET ORAL at 20:39

## 2023-07-04 NOTE — NURSING NOTE
Pt refused to get up for breakfast and vital signs. VS then taken in pt's room. Via  pt stated she didn't feel well because her head hurt. PRN tylenol 975 provided and found to be effective. Medication compliant with encouragement. Pt up at one point blowing kisses to staff and smiling. Staff reporting pt to be coughing periodically, medical made aware and PRN robitussin made available. No coughing observed at this time by this writer.

## 2023-07-04 NOTE — PROGRESS NOTES
Progress Note - [address] 28 y.o[address] female MRN: [id number]   Unit/Bed#: [facility] 349-01 Encounter: [id number]      Subjective: Chart reviewed and case discussed with the nurse. The patient was seen in her room today. The patient was sleeping but woke up on verbal stimuli. The patient is Sinhala-speaking only and staff helped with the translation. and she presented very labile 1 minute she was laughing and another minute started crying. Was a poor historian. Reports she is doing okay but complained of having some abdominal discomfort. Reports passing gas. She denies feeling depressed or having any suicidal ideation when specifically asked. The patient asked my name and wanted to know what it means in [facility] [place]. When she was told that I am not sure she got stuck on it and kept asking and started getting agitated. The meeting was ended as the patient was getting very upset. Patient is maintained on continual observation due to behavior issues and being very intrusive. As per the nurse the patient refused breakfast and getting vital signs in the morning. She complained of headache in the morning and was given Tylenol. Compliant with the medication. Occasional complaint of coughing.       ROS: all other systems are negative    Mental Status Evaluation:    Appearance:  casually dressed   Behavior:  agitated   Speech:  coherent   Mood:  labile   Affect:  labile, Irritable at times   Thought Process:  disorganized   Associations: perseveration   Thought Content:  paranoid ideation   Perceptual Disturbances: denies auditory or visual hallucinations when asked   Risk Potential: Suicidal ideation - None  Homicidal ideation - None  Potential for aggression - Yes, due to poor impulse control   Sensorium:  unable to assess   Memory:  recent and remote memory: unable to assess due to lack of cooperation   Consciousness:  alert and awake   Attention: poor attention span   Insight:  poor Judgment: poor   Gait/Station: in bed   Motor Activity: no abnormal movements     Vital signs in last 24 hours:    Temp:  [97.9 °F (36.6 °C)] 97.9 °F (36.6 °C)  HR:  [] 85  Resp:  [16] 16  BP: (117-136)/(69-93) 117/74    Laboratory results:   I have personally reviewed all pertinent laboratory/tests results. Most Recent Labs:   Lab Results   Component Value Date    WBC 9.72 06/27/2023    RBC 4.65 06/27/2023    HGB 13.7 06/27/2023    HCT 43.0 06/27/2023     06/27/2023    RDW 13.2 06/27/2023    NEUTROABS 6.16 06/27/2023    SODIUM 141 06/16/2023    K 4.0 06/16/2023     06/16/2023    CO2 30 06/16/2023    BUN 11 06/16/2023    CREATININE 0.53 (L) 06/16/2023    GLUC 73 06/16/2023    GLUF 73 06/16/2023    CALCIUM 10.1 06/16/2023    AST 19 06/16/2023    ALT 13 06/16/2023    ALKPHOS 32 (L) 06/16/2023    TP 6.8 06/16/2023    ALB 4.3 06/16/2023    TBILI 0.43 06/16/2023    CHOLESTEROL 181 06/11/2023    HDL 64 06/11/2023    TRIG 106 06/11/2023    LDLCALC 96 06/11/2023    NONHDLC 117 06/11/2023    VALPROICTOT 87 06/11/2023    LITHIUM 1.1 06/27/2023    LTM6WEHZLRIE 3.269 06/11/2023    PREGSERUM Negative 06/11/2023    HGBA1C 5.1 03/20/2023     03/20/2023       Progress Toward Goals: improving slowly    Assessment/Plan  The patient is labile  and intrusive. Continues to be very disorganized though behaviorally slightly better today. Plan is to continue the current medication at this time with no changes. The Clozapine was increased yesterday to 125 mg twice a day. We will continue to monitor her for her mood, behavior, safety, sleep and response to meds. We will continue with one-to-one monitoring for patient and others safety.   Principal Problem:    Unspecified mood (affective) disorder (HCC)  Active Problems:    Medical clearance for psychiatric admission    Intellectual disability    Psychosis ([address])    Dysuria    Vaginal lesion    Recommended Treatment:     Planned medication and treatment changes:     All current active medications have been reviewed  Encourage group therapy, milieu therapy and occupational therapy  Continue close observation for safety  Continue current medications:  Current Facility-Administered Medications   Medication Dose Route Frequency Provider Last Rate   • acetaminophen  650 mg Oral Q6H PRN Kavita Sallies, DO     • acetaminophen  650 mg Oral Q4H PRN Kavita Sallies, DO     • acetaminophen  975 mg Oral Q6H PRN Kavita Sallies, DO     • aluminum-magnesium hydroxide-simethicone  30 mL Oral Q4H PRN Kavita Sallies, DO     • benztropine  1 mg Intramuscular Q4H PRN Max 6/day Kavita Sallies, DO     • benztropine  0.5 mg Oral BID Louise Kong MD     • benztropine  1 mg Oral BID PRN Kavita Sallies, DO     • benztropine  1 mg Oral Q4H PRN Max 6/day Kavita Sallies, DO     • chlorproMAZINE  25 mg Intramuscular Q6H PRN Crystal Concepcion MD      Or   • chlorproMAZINE  25 mg Oral Q6H PRN Crystal Concepcion MD     • chlorproMAZINE  50 mg Intramuscular Q6H PRN Crystal Concepcion MD      Or   • chlorproMAZINE  50 mg Oral Q6H PRN Crystal Concepcion MD     • clonazePAM  0.5 mg Oral BID PRN Kavita Sallies, DO     • cloNIDine  0.1 mg Oral Q12H [facility] HOME Kavita Sallies, DO     • cloZAPine  125 mg Oral BID Selene Bose DO     • dextromethorphan-guaiFENesin  10 mL Oral Q4H PRN Ginger Chaney PA-C     • diphenhydrAMINE  25 mg Intramuscular Q6H PRN Kavita Sallies, DO     • hydrOXYzine HCL  50 mg Oral Q6H PRN Max 4/day Kavita Sallies, DO      Or   • diphenhydrAMINE  50 mg Intramuscular Q6H PRN Kavita Sallies, DO     • divalproex sodium  1,000 mg Oral Q12H [facility] HOME Kavita Sallies, DO     • hydrOXYzine HCL  100 mg Oral Q6H PRN Max 4/day Kavita Sallies, DO      Or   • LORazepam  2 mg Intramuscular Q6H PRN Kavita Vo,      • hydrOXYzine HCL  25 mg Oral Q6H PRN Max 4/day Kavita Vo DO     • lithium carbonate  750 mg Oral BID Kavita Vo DO     • melatonin  3 mg Oral HS PRN Kavita Vo,      • ondansetron  4 mg Oral Q6H PRN Margarita Baird PA-C     • polyethylene glycol  17 g Oral Daily PRN Kelley Gold, DO     • propranolol  10 mg Oral Q8H PRN Kelley Gold, DO     • senna-docusate sodium  1 tablet Oral BID Kelley Gold, DO     • simethicone  80 mg Oral Q6H PRN Margarita Baird PA-C     • traZODone  100 mg Oral HS PERLA Singh         Risks / Benefits of Treatment:    Risks, benefits, and possible side effects of medications explained to patient and patient verbalizes understanding and agreement for treatment. Counseling / Coordination of Care:    Patient's progress discussed with staff in treatment team meeting. Medications, treatment progress and treatment plan reviewed with patient.     Cecil Gallardo MD 07/04/23

## 2023-07-05 PROCEDURE — 99232 SBSQ HOSP IP/OBS MODERATE 35: CPT | Performed by: PSYCHIATRY & NEUROLOGY

## 2023-07-05 RX ORDER — AMOXICILLIN 250 MG
1 CAPSULE ORAL 2 TIMES DAILY PRN
Status: DISCONTINUED | OUTPATIENT
Start: 2023-07-05 | End: 2023-07-10

## 2023-07-05 RX ORDER — AMOXICILLIN 250 MG
1 CAPSULE ORAL ONCE
Status: COMPLETED | OUTPATIENT
Start: 2023-07-05 | End: 2023-07-05

## 2023-07-05 RX ADMIN — CLOZAPINE 125 MG: 25 TABLET ORAL at 09:07

## 2023-07-05 RX ADMIN — DIVALPROEX SODIUM 1000 MG: 500 TABLET, DELAYED RELEASE ORAL at 20:04

## 2023-07-05 RX ADMIN — CHLORPROMAZINE HYDROCHLORIDE 50 MG: 50 TABLET, COATED ORAL at 20:00

## 2023-07-05 RX ADMIN — LITHIUM CARBONATE 750 MG: 450 TABLET, EXTENDED RELEASE ORAL at 17:51

## 2023-07-05 RX ADMIN — TRAZODONE HYDROCHLORIDE 100 MG: 100 TABLET ORAL at 21:19

## 2023-07-05 RX ADMIN — SENNOSIDES AND DOCUSATE SODIUM 1 TABLET: 50; 8.6 TABLET ORAL at 17:51

## 2023-07-05 RX ADMIN — DIVALPROEX SODIUM 1000 MG: 500 TABLET, DELAYED RELEASE ORAL at 09:08

## 2023-07-05 RX ADMIN — CLOZAPINE 150 MG: 25 TABLET ORAL at 17:51

## 2023-07-05 RX ADMIN — BENZTROPINE MESYLATE 0.5 MG: 0.5 TABLET ORAL at 09:08

## 2023-07-05 RX ADMIN — CLONIDINE HYDROCHLORIDE 0.1 MG: 0.1 TABLET ORAL at 20:04

## 2023-07-05 RX ADMIN — Medication 3 MG: at 20:04

## 2023-07-05 RX ADMIN — SENNOSIDES AND DOCUSATE SODIUM 1 TABLET: 8.6; 5 TABLET ORAL at 09:10

## 2023-07-05 RX ADMIN — ONDANSETRON 4 MG: 4 TABLET, ORALLY DISINTEGRATING ORAL at 18:56

## 2023-07-05 RX ADMIN — SENNOSIDES AND DOCUSATE SODIUM 1 TABLET: 50; 8.6 TABLET ORAL at 09:08

## 2023-07-05 RX ADMIN — BENZTROPINE MESYLATE 0.5 MG: 0.5 TABLET ORAL at 17:51

## 2023-07-05 RX ADMIN — CLONIDINE HYDROCHLORIDE 0.1 MG: 0.1 TABLET ORAL at 09:08

## 2023-07-05 RX ADMIN — LITHIUM CARBONATE 750 MG: 450 TABLET, EXTENDED RELEASE ORAL at 09:07

## 2023-07-05 NOTE — PROGRESS NOTES
07/05/23 0913   Team Meeting   Meeting Type Daily Rounds   Team Members Present   Team Members Present Physician;Nurse;   Physician Team Member [id number] Member Cathi   Patient/Family Present   Patient Present No   Patient's Family Present No     1:1. Pt refused breakfast yesterday. Pt reported headache -PRN tylenol effective. Pt blowing kisses at staff. Clozaril increased. Med/meal compliant. DC tbd.

## 2023-07-05 NOTE — PROGRESS NOTES
Progress Note - Behavioral Health   Giovanny Gramajo 28 y.o. female MRN: [id number]  Unit/Bed#: [facility] 349-01 Encounter: [id number]    Assessment/Plan   Principal Problem:    Unspecified mood (affective) disorder ([address])  Active Problems:    Medical clearance for psychiatric admission    Intellectual disability    Psychosis ([address])    Dysuria    Vaginal lesion      Recommended Treatment:     Increase Clozapine to 150 mg BID for psychosis     Continue medications as below:   - Depakote DR 1000 mg q 12h for mood/agitation   - Lithobid 750 mg BID for mood/agitation   - Cogentin 1 mg BID daily for EPS   - Trazodone 100mg qhs for insomnia     Continue with pharmacotherapy, group therapy, milieu therapy and occupational therapy. Continue to assess for adverse medication side effects. Encourage Giovanny Gramajo to participate in nonverbal forms of therapy including journaling and art/music therapy. Continue frequent safety checks and vitals per unit protocol. Continue to engage CM/SW to assist with collateral, disposition planning, and the implementation of an individualized, patient-centered plan of care. Continue medical management by medical team.  Case discussed with treatment team.    Legal Status: involuntary 304  ------------------------------------------------------------    Subjective: All documentation including nursing notes, medication history to ensure medication adherence on the unit, labs, and vitals were reviewed. Ankush Rhodes was evaluated this morning for continuity of care and no acute distress noted throughout the evaluation. Over the past 24 hours per nursing report, Ankush Rhodes has been cooperative on the unit and compliant with medications. Per nursing, patient has been loud, labile, and internally preoccupied. Patient is Romanian speaking and a poor historian and provides history in [email or web address] and [place] and [facility]. Today, Ankush Rhodes is consenting for safety on the unit.  Ankush Rhodes reports feeling "good." Ankush Rhodes notes having good sleep and energy. Ruthy Boswell states having a good appetite. Ruthy Boswell has been taking the medications as prescribed. She does not endorse any side effects. States she has been going to the bathroom. When asked if she has goals for today, patient does not answer question. Ruthy Boswell denies suicidal ideations. Ruthy Boswell denies homicidal ideations. Regarding hallucinations, Evelin endorses auditory hallucinations. When asked what she has been hearing, patient begins talking in [address] and states "[address]". Ruthy Boswell denies visual hallucinations. Patient then begins hyperfixating on a Italian song that she has written down and begins to read lyrics to this author repeatedly. Patient appears disorganized and easily distracted. She endorses no further history and is seen mumbling to self. Patient later seen walking in [place] smiling and fist-bumping another resident. PRNs overnight: Tylenol   VS: Reviewed, within normal limits    Progress Toward Goals: slow improvement    Psychiatric Review of Systems:  Behavior over the last 24 hours:  regressed  Sleep: normal  Appetite: normal  Medication side effects: No   ROS: all other systems are negative    Vital signs in last 24 hours:  Temp:  [97.5 °F (36.4 °C)-98.4 °F (36.9 °C)] 97.8 °F (36.6 °C)  HR:  [92-99] 96  Resp:  [16] 16  BP: (123-130)/(67-84) 129/76    Laboratory results:  I have personally reviewed all pertinent laboratory/tests results.   Recent Results (from the past 48 hour(s))   CBC and differential    Collection Time: 07/04/23  4:26 PM   Result Value Ref Range    WBC 13.43 (H) 4.31 - 10.16 Thousand/uL    RBC 4.70 3.81 - 5.12 Million/uL    Hemoglobin 13.9 11.5 - 15.4 g/dL    Hematocrit 43.4 34.8 - 46.1 %    MCV 92 82 - 98 fL    MCH 29.6 26.8 - 34.3 pg    MCHC 32.0 31.4 - 37.4 g/dL    RDW 12.8 11.6 - 15.1 %    MPV 8.9 8.9 - 12.7 fL    Platelets 341 402 - 725 Thousands/uL    nRBC 0 /100 WBCs    Neutrophils Relative 75 43 - 75 %    Immat GRANS % 0 0 - 2 % Lymphocytes Relative 15 14 - 44 %    Monocytes Relative 9 4 - 12 %    Eosinophils Relative 1 0 - 6 %    Basophils Relative 0 0 - 1 %    Neutrophils Absolute 10.09 (H) 1.85 - 7.62 Thousands/µL    Immature Grans Absolute 0.04 0.00 - 0.20 Thousand/uL    Lymphocytes Absolute 1.99 0.60 - 4.47 Thousands/µL    Monocytes Absolute 1.19 0.17 - 1.22 Thousand/µL    Eosinophils Absolute 0.09 0.00 - 0.61 Thousand/µL    Basophils Absolute 0.03 0.00 - 0.10 Thousands/µL         Mental Status Evaluation:    Appearance:  disheveled, dressed in hospital attire, looks older than stated age, poor hygiene, overtly appearing  female   Behavior:  agitated, bizarre, uncooperative, psychomotor agitation, restless and fidgety, inappropriate   Speech:  increased rate, pressured, hypertalkative, increased volume   Mood:  "good"   Affect:  labile   Thought Process:  disorganized, flight of ideas   Associations: flight of ideas   Thought Content:  preoccupied   Perceptual Disturbances: Appears to be responding to internal stimuli and Appears to be internally preoccupied ,distracted   Risk Potential: Suicidal ideation - None  Homicidal ideation - None  Potential for aggression - Not at present   Sensorium:  unable to assess   Memory:  unable to assess   Consciousness:  alert and awake   Attention/Concentration: poor concentration and poor attention span   Insight:  poor   Judgment: poor   Gait/Station: normal gait/station   Motor Activity: no abnormal movements       Current Medications:  Current Facility-Administered Medications   Medication Dose Route Frequency Provider Last Rate   • acetaminophen  650 mg Oral Q6H PRN Libra Dieter, DO     • acetaminophen  650 mg Oral Q4H PRN Libra Dieter, DO     • acetaminophen  975 mg Oral Q6H PRN Libra Dieter, DO     • aluminum-magnesium hydroxide-simethicone  30 mL Oral Q4H PRN Libra Pamelaer, DO     • benztropine  1 mg Intramuscular Q4H PRN Max 6/day Libra Dieter, DO     • benztropine  0.5 mg Oral BID Arturo Harvey MD     • benztropine  1 mg Oral BID PRN Libra Camara, DO     • benztropine  1 mg Oral Q4H PRN Max 6/day Libra Camara, DO     • chlorproMAZINE  25 mg Intramuscular Q6H PRN Alysa Stiles MD      Or   • chlorproMAZINE  25 mg Oral Q6H PRN Alysa Stiles MD     • chlorproMAZINE  50 mg Intramuscular Q6H PRN Alysa Stiles MD      Or   • chlorproMAZINE  50 mg Oral Q6H PRN Alysa Stiles MD     • clonazePAM  0.5 mg Oral BID PRN Libra Camara, DO     • cloNIDine  0.1 mg Oral Q12H [address] Hal Fernandez DO     • cloZAPine  150 mg Oral BID Meño Barber DO     • dextromethorphan-guaiFENesin  10 mL Oral Q4H PRN William Lips, PA-C     • diphenhydrAMINE  25 mg Intramuscular Q6H PRN Libra Camara, DO     • hydrOXYzine HCL  50 mg Oral Q6H PRN Max 4/day Libra Camara, DO      Or   • diphenhydrAMINE  50 mg Intramuscular Q6H PRN Libra Camara, DO     • divalproex sodium  1,000 mg Oral Q12H [facility] Libra Camara DO     • hydrOXYzine HCL  100 mg Oral Q6H PRN Max 4/day Libra Camara, DO      Or   • LORazepam  2 mg Intramuscular Q6H PRN Libra Camara, DO     • hydrOXYzine HCL  25 mg Oral Q6H PRN Max 4/day Libra Camara, DO     • lithium carbonate  750 mg Oral BID Libra Camara, DO     • melatonin  3 mg Oral HS PRN Libra Camara, DO     • ondansetron  4 mg Oral Q6H PRN Regenia Lips, PA-C     • polyethylene glycol  17 g Oral Daily PRN Libra Camara, DO     • propranolol  10 mg Oral Q8H PRN Libra Camara, DO     • senna-docusate sodium  1 tablet Oral BID Libra Camara, DO     • senna-docusate sodium  1 tablet Oral BID PRN Marvine Sena, DO     • simethicone  80 mg Oral Q6H PRN William Barker PA-C     • traZODone  100 mg Oral HS PERLA Rodriguez         Behavioral Health Medications: All current active meds have been reviewed. Changes as in plan section above.   Risks, benefits and possible side effects of Medications:   Risks, benefits, and possible side effects of medications explained to patient and patient verbalizes understanding. Counseling / Coordination of Care:  Patient's progress discussed with staff in treatment team meeting. Medications, treatment progress and treatment plan reviewed with patient. Prabhakar Seals DO  Psychiatry Resident, PGY-I    This note was completed in part utilizing Dragon dictation Software. Grammatical, translation, syntax errors, random word insertions, spelling mistakes, and incomplete sentences may be an occasional consequence of this system secondary to software limitations with voice recognition, ambient noise, and hardware issues. If you have any questions or concerns about the content, text, or information contained within the body of this dictation, please contact the provider for clarification.

## 2023-07-05 NOTE — NURSING NOTE
Pt refused to get OOB for assessment and med pass at first. When RN came back to room, pt on toilet and accepted medications easily. Sitter reports pt straining while having to have BM and pt verifies on assessment. Pt on scheduled senna and having regular BMs per chart. No stool softener available, message left for provider in am. Pt calm and compliant with unit routines and care.

## 2023-07-05 NOTE — PROGRESS NOTES
07/05/23 1000   Activity/Group Checklist   Group Other (Comment)  (Group Art Therapy/Psychodynamic, Open Choice with Discussion)   Attendance Attended  (1:1)   Attendance Duration (min) Greater than 60   Interactions Interacted appropriately  (Language barrier)   Affect/Mood Appropriate   Goals Achieved Able to listen to others; Able to engage in interactions  (Able to engage materials; full participation within limitations of language barrier)

## 2023-07-05 NOTE — NURSING NOTE
Pt is out of bed and attended art group. Took scheduled medications with encouragement. Pt asked this staff to come into her bathroom to show the bowel movement she had and said she feels better now. Continues to be labile and loud at times.  Denies any unmet needs or complaints

## 2023-07-06 PROCEDURE — 99232 SBSQ HOSP IP/OBS MODERATE 35: CPT | Performed by: PSYCHIATRY & NEUROLOGY

## 2023-07-06 RX ADMIN — SENNOSIDES AND DOCUSATE SODIUM 1 TABLET: 50; 8.6 TABLET ORAL at 17:54

## 2023-07-06 RX ADMIN — CLONIDINE HYDROCHLORIDE 0.1 MG: 0.1 TABLET ORAL at 08:16

## 2023-07-06 RX ADMIN — BENZTROPINE MESYLATE 0.5 MG: 0.5 TABLET ORAL at 08:12

## 2023-07-06 RX ADMIN — DIVALPROEX SODIUM 1000 MG: 500 TABLET, DELAYED RELEASE ORAL at 08:12

## 2023-07-06 RX ADMIN — TRAZODONE HYDROCHLORIDE 100 MG: 100 TABLET ORAL at 21:22

## 2023-07-06 RX ADMIN — LITHIUM CARBONATE 750 MG: 450 TABLET, EXTENDED RELEASE ORAL at 17:54

## 2023-07-06 RX ADMIN — SENNOSIDES AND DOCUSATE SODIUM 1 TABLET: 50; 8.6 TABLET ORAL at 08:12

## 2023-07-06 RX ADMIN — CLONIDINE HYDROCHLORIDE 0.1 MG: 0.1 TABLET ORAL at 21:22

## 2023-07-06 RX ADMIN — CHLORPROMAZINE HYDROCHLORIDE 50 MG: 50 TABLET, COATED ORAL at 21:43

## 2023-07-06 RX ADMIN — CLOZAPINE 150 MG: 25 TABLET ORAL at 17:54

## 2023-07-06 RX ADMIN — CLOZAPINE 150 MG: 25 TABLET ORAL at 08:12

## 2023-07-06 RX ADMIN — LITHIUM CARBONATE 750 MG: 450 TABLET, EXTENDED RELEASE ORAL at 08:12

## 2023-07-06 RX ADMIN — DIVALPROEX SODIUM 1000 MG: 500 TABLET, DELAYED RELEASE ORAL at 21:22

## 2023-07-06 RX ADMIN — BENZTROPINE MESYLATE 0.5 MG: 0.5 TABLET ORAL at 17:54

## 2023-07-06 NOTE — NURSING NOTE
Pt restless, labile, agitated. Pt given PRN thorazine 50mg PO @ 20:04 for severe agitation after pt was spitting, yelling, uncooperaive and verbally abuseive with staff. PRN thorazine partially effective, pt continues to spit and be irritable but fewer yelling outbursts. Pt has 1 episode of bowel incontinence. Pt assisted with showering. Pt remains on continual observation, close proximity.

## 2023-07-06 NOTE — NURSING NOTE
Pt awake, restless, walking around room and attending art group. Pt compliant with scheduled medications and meals. Loud at times, labile. Continues on 1:1 continual close proximity observation.

## 2023-07-06 NOTE — PROGRESS NOTES
07/06/23 1000   Activity/Group Checklist   Group Other (Comment)  (Group Art Therapy)   Attendance Attended   Attendance Duration (min) Greater than 60   Interactions Disorganized interaction   Affect/Mood Incongruent

## 2023-07-06 NOTE — PROGRESS NOTES
[facility] Group Note     07/06/23 1400   Activity/Group Checklist   Group Personal control  (Mindfulness Technique - 5 Senses Grounding and Mindful Perspectives)   Attendance Attended   Attendance Duration (min) 31-45  (arrived late to group)   Interactions Other (Comment)  (Unfocused, but motivated;limited participation due to language barrier)   Affect/Mood Bright  (Wide at times, difficult to redirect)   Goals Achieved Discussed coping strategies; Able to listen to others; Able to engage in interactions; Able to recieve feedback; Able to give feedback to another  (received resources/benefited from social presence of group)

## 2023-07-06 NOTE — PROGRESS NOTES
Progress Note - Behavioral Health   Gustavo Gonzalez 28 y.o. female MRN: [id number]  Unit/Bed#: [facility] 349-01 Encounter: [id number]    Assessment/Plan   Principal Problem:    Unspecified mood (affective) disorder ([facility])  Active Problems:    Medical clearance for psychiatric admission    Intellectual disability    Psychosis ([address])    Dysuria    Vaginal lesion      Recommended Treatment:   • Continue clozapine tablet 150 mg BID for psychosis and agitation - (CBC 07/04/23 with WBC 13.43 and ANC 10.09)  • Continue benztropine tablet 0.5 mg BID for EPS  • Continue clonidine tablet 0.1mg BID for anxiety   • Continue divalproex sodium DR tablet 1000 mg for mood and agitation - (VPA level 87 on 6/11)  • Continue lithium carbonate CR tablet 750 mg BID for mood - (Li level 1.1 on 6/27)   • Continue senna-docusate sodium 8.6-50 mg per tablet BID for constipation   • Continue trazodone 100 mg tablet at bedtime for insomnia   • Continue medical management by SLIM team.  • Continue with group therapy, milieu therapy and occupational therapy. • Continue frequent safety checks and vitals per unit protocol. • Repeat CBC 7/7 for leukocytosis (WBC 13.43)  • Discharge disposition:  TBD    Case discussed with treatment team.    ------------------------------------------------------------  Chief Complaint:  "Good"    Subjective: The patient was evaluated this morning for continuity of care and no acute distress noted throughout the evaluation. Over the past 24 hours staff noted the patient was labile, loud, and aggressive toward the staff. Nursing reports patient is spitting and yelling. Patient given PRN thorazine 50 mg PO for agitation which was effective. Patient had one bowel movement in the toilet and one episode of bowel incontinece. Patient was assisted with showering. Patient did participate in group activities today in which she pamela some pictures. Today on exam the patient was seated and cooperative and reports no issues. She describes her mood as "good". She reports sleeping "good" without issues. She reports her appetite as "good." Per Darren Avila she has eaten breakfast and lunch today. She explains she had potatoes for breakfast. She reports her energy is "good". She had a bowel movement today, and she reports some abdominal discomfort which is unchanged from previous days. She denies any thoughts of wanting to hurt herself or others. She denies having any thoughts of others wanting to hurt her. She reports her medication is working "good" and denies any adverse side effects. She could not identify any goals for today. She reports drawing during group today, which she enjoyed. Toward the end of the interview she began to become agitated. She reported some arm pain and pointed at the 1-1 staff. She stated that someone needed to "lock up" the staff.      Psychiatric Review of Systems:  Behavior over the last 24 hours:  Unchanged  Sleep: adequate  Appetite: adequate  Medication side effects: No   ROS: reports abdominal discomfort, all other systems are negative    Current Medications:  Current Facility-Administered Medications   Medication Dose Route Frequency Provider Last Rate   • acetaminophen  650 mg Oral Q6H PRN Stover Gutting, DO     • acetaminophen  650 mg Oral Q4H PRN Stover Gutting, DO     • acetaminophen  975 mg Oral Q6H PRN Stover Gutting, DO     • aluminum-magnesium hydroxide-simethicone  30 mL Oral Q4H PRN Stover Gutting, DO     • benztropine  1 mg Intramuscular Q4H PRN Max 6/day Stover Gutting, DO     • benztropine  0.5 mg Oral BID Cherelle Cervantes MD     • benztropine  1 mg Oral BID PRN Stover Gutting, DO     • benztropine  1 mg Oral Q4H PRN Max 6/day Stover Gutting, DO     • chlorproMAZINE  25 mg Intramuscular Q6H PRN Diana Azevedo MD      Or   • chlorproMAZINE  25 mg Oral Q6H PRN Diana Azevedo MD     • chlorproMAZINE  50 mg Intramuscular Q6H PRN Diana Azevedo MD      Or   • chlorproMAZINE  50 mg Oral Q6H PRN Baljit Perez Rohan Navarro MD     • clonazePAM  0.5 mg Oral BID PRN Philemon Decant, DO     • cloNIDine  0.1 mg Oral Q12H [address] Philemon Decant, DO     • cloZAPine  150 mg Oral BID Brain Large, DO     • dextromethorphan-guaiFENesin  10 mL Oral Q4H PRN Gabino Hamman, PA-C     • diphenhydrAMINE  25 mg Intramuscular Q6H PRN Philemon Decant, DO     • hydrOXYzine HCL  50 mg Oral Q6H PRN Max 4/day Philemon Decant, DO      Or   • diphenhydrAMINE  50 mg Intramuscular Q6H PRN Philemon Decant, DO     • divalproex sodium  1,000 mg Oral Q12H [address] Philemon Decant, DO     • hydrOXYzine HCL  100 mg Oral Q6H PRN Max 4/day Philemon Decant, DO      Or   • LORazepam  2 mg Intramuscular Q6H PRN Philemon Decant, DO     • hydrOXYzine HCL  25 mg Oral Q6H PRN Max 4/day Philemon Decant, DO     • lithium carbonate  750 mg Oral BID Philemon Decant, DO     • melatonin  3 mg Oral HS PRN Philemon Decant, DO     • ondansetron  4 mg Oral Q6H PRN Gabino Hamman, PA-C     • polyethylene glycol  17 g Oral Daily PRN Philemon Decant, DO     • propranolol  10 mg Oral Q8H PRN Philemon Decant, DO     • senna-docusate sodium  1 tablet Oral BID Philemon Decant, DO     • senna-docusate sodium  1 tablet Oral BID PRN Ana Laughlin, DO     • simethicone  80 mg Oral Q6H PRN Gabino Hamman, PA-C     • traZODone  100 mg Oral HS PERLA Box         Behavioral Health Medications: all current active meds have been reviewed and continue current psychiatric medications. Vital signs in last 24 hours:  Temp:  [96.5 °F (35.8 °C)-97.9 °F (36.6 °C)] 96.5 °F (35.8 °C)  HR:  [104-120] 116  Resp:  [16-18] 18  BP: (127-148)/(70-91) 127/70    Laboratory results:    I have personally reviewed all pertinent laboratory/tests results.   Labs in last 72 hours:   Recent Labs     07/04/23  1626   WBC 13.43*   RBC 4.70   HGB 13.9   HCT 43.4      RDW 12.8   NEUTROABS 10.09*       Mental Status Evaluation:    Appearance:  disheveled, looks stated age, poor hygiene, wearing fei, overtly  female    Behavior:  Initially cooperative during interview but became progressively less cooperative as interview progressed, restless and fidgety, some agitation, child-like   Speech:  increased rate, hypertalkative, incoherent at times, garbled, fluctuating between loud and soft volume, switching between [place] and [place]   Mood:  "Good"   Affect:  tearful, labile, mood-incongruent   Thought Process:  disorganized, increased rate of thoughts, flight of ideas, perseverative   Associations: loose associations   Thought Content:  preoccupied   Perceptual Disturbances: appears responding to internal stimuli, appears internally preoccupied, appears distracted throughout interview   Risk Potential: Suicidal ideation - None at present  Homicidal ideation - None  Potential for aggression - Not at present   Sensorium:  unable to assess   Memory:  recent and remote memory: unable to assess due to lack of cooperation   Consciousness:  alert   Attention/Concentration: poor concentration and poor attention span   Insight:  poor   Judgment: poor   Gait/Station: normal gait/station   Motor Activity: no abnormal movements     Progress Toward Goals: Patient remains disorganized, psychotic, and internally preoccupied. Her speech is pressured and oftentimes incoherent. She is still primarily speaking in [address] [place] [address] [place] but speaks in [facility] at times. Overall, her progress is unchanged from yesterday. Recommended Treatment:   See above for assessment and plan. This note has been constructed using a voice recognition system. There may be translation, syntax, or grammatical errors. If you have any questions, please contact the dictating provider.     Divya BYRD

## 2023-07-06 NOTE — PROGRESS NOTES
[facility] Group Note     07/05/23 1417   Activity/Group Checklist   Group Other (Comment)  (Open discussion on the nature of freedom and happiness)   Attendance Attended   Attendance Duration (min) 16-30  (in and out of group)   Interactions Disorganized interaction  (Unfocused and distractable , but motivated)   Affect/Mood Incongruent   Goals Achieved Able to listen to others; Able to engage in interactions; Able to recieve feedback; Able to give feedback to another  (benefited from social presence of the group)

## 2023-07-06 NOTE — NURSING NOTE
Patient is visible on unit, walking around, remains internally preoccupied and observed actively responding to internal stimuli. Patient continues to have outbursts at times but is more redirectable. Patient is delusional, c/o of feet being itchy and that there is something in there. Patient feet assessed, skin is clean, dry, and intact, nothing abnormal noted. Patient rubbed feet with lotion, as recommended, and denied any further discomfort. Patient is compliant with medications and meals. 1:1 continual close proximity observation maintained.

## 2023-07-06 NOTE — PROGRESS NOTES
07/06/23 0855   Team Meeting   Meeting Type Daily Rounds   Team Members Present   Team Members Present Physician;Nurse;   Physician Team Member [id number] Cathi   Patient/Family Present   Patient Present No   Patient's Family Present No   1:1. Pt restless, labile, agitated, spitting. PRN thorazine for agitation-partially effective. Bowel incontinence, showered with staff assistance. Med/meal compliant. Clozapine increased yesterday. DC tbd.

## 2023-07-06 NOTE — TREATMENT PLAN
TREATMENT PLAN REVIEW - [address] 28 y.o. 1990 female MRN: [id number]    [address] [place] [address] Room / Bed: [place] [address]/[place] [address] Encounter: [id number]        Admit Date/Time:  6/9/2023  2:08 PM    Treatment Team: Attending Provider: Renee Gonsalez MD; Resident: Bernice Steele DO; Consulting Physician: Cole Herbert DO; : Jenni Kwan; Consulting Physician: Bridgette Morrison MD; Medical Student: Roxana Rosado; Resident: Ursula Topete DO; Medical Student: Garrett Pearson; Licensed Practical Nurse: Amita Maki LPN; Patient Care Assistant: Spencer Grossman; Patient Care Technician: Siri Garcia;  Patient Care Assistant: Richie Del Castillo    Diagnosis: Principal Problem:    Unspecified mood (affective) disorder ([address])  Active Problems:    Medical clearance for psychiatric admission    Intellectual disability    Psychosis ([address])    Dysuria    Vaginal lesion      Patient Strengths/Assets: cooperative, good physical health, good support system      Patient Barriers/Limitations: chronic mental illness, intellectual disability, poor self-care, uncooperative    Short Term Goals: decrease in psychotic symptoms, decrease in level of agitation, improvement in self care, improvement in impulse control, mood stabilization    Long Term Goals: stabilization of mood, resolution of psychotic symptoms, improved reality testing, improved impulse control, no agitation on the unit, able to express basic needs, adequate self care    Progress Towards Goals: starting psychiatric medications as prescribed    Recommended Treatment: medication management, patient medication education, group therapy, milieu therapy, continued Behavioral Health psychiatric evaluation/assessment process     Treatment Frequency: daily medication monitoring, group and milieu therapy daily, monitoring through interdisciplinary rounds, monitoring through weekly patient care conferences    Expected Discharge Date: TBD    Discharge Plan: referrals as indicated    Treatment Plan Created/Updated By: Graeme Riley DO

## 2023-07-07 ENCOUNTER — APPOINTMENT (INPATIENT)
Dept: RADIOLOGY | Facility: HOSPITAL | Age: 33
DRG: 750 | End: 2023-07-07
Payer: COMMERCIAL

## 2023-07-07 PROBLEM — R05.9 COUGH: Status: ACTIVE | Noted: 2023-07-07

## 2023-07-07 LAB
BASOPHILS # BLD AUTO: 0.03 THOUSANDS/ÂΜL (ref 0–0.1)
BASOPHILS NFR BLD AUTO: 0 % (ref 0–1)
EOSINOPHIL # BLD AUTO: 0.13 THOUSAND/ÂΜL (ref 0–0.61)
EOSINOPHIL NFR BLD AUTO: 2 % (ref 0–6)
ERYTHROCYTE [DISTWIDTH] IN BLOOD BY AUTOMATED COUNT: 13.2 % (ref 11.6–15.1)
FLUAV RNA RESP QL NAA+PROBE: NEGATIVE
FLUBV RNA RESP QL NAA+PROBE: NEGATIVE
HCT VFR BLD AUTO: 47 % (ref 34.8–46.1)
HGB BLD-MCNC: 15.1 G/DL (ref 11.5–15.4)
IMM GRANULOCYTES # BLD AUTO: 0.03 THOUSAND/UL (ref 0–0.2)
IMM GRANULOCYTES NFR BLD AUTO: 0 % (ref 0–2)
LYMPHOCYTES # BLD AUTO: 2.25 THOUSANDS/ÂΜL (ref 0.6–4.47)
LYMPHOCYTES NFR BLD AUTO: 29 % (ref 14–44)
MCH RBC QN AUTO: 29.8 PG (ref 26.8–34.3)
MCHC RBC AUTO-ENTMCNC: 32.1 G/DL (ref 31.4–37.4)
MCV RBC AUTO: 93 FL (ref 82–98)
MONOCYTES # BLD AUTO: 0.59 THOUSAND/ÂΜL (ref 0.17–1.22)
MONOCYTES NFR BLD AUTO: 8 % (ref 4–12)
NEUTROPHILS # BLD AUTO: 4.83 THOUSANDS/ÂΜL (ref 1.85–7.62)
NEUTS SEG NFR BLD AUTO: 61 % (ref 43–75)
NRBC BLD AUTO-RTO: 0 /100 WBCS
PLATELET # BLD AUTO: 232 THOUSANDS/UL (ref 149–390)
PMV BLD AUTO: 9.1 FL (ref 8.9–12.7)
RBC # BLD AUTO: 5.06 MILLION/UL (ref 3.81–5.12)
RSV RNA RESP QL NAA+PROBE: NEGATIVE
SARS-COV-2 RNA RESP QL NAA+PROBE: NEGATIVE
WBC # BLD AUTO: 7.86 THOUSAND/UL (ref 4.31–10.16)

## 2023-07-07 PROCEDURE — 80159 DRUG ASSAY CLOZAPINE: CPT | Performed by: PSYCHIATRY & NEUROLOGY

## 2023-07-07 PROCEDURE — 99232 SBSQ HOSP IP/OBS MODERATE 35: CPT | Performed by: PSYCHIATRY & NEUROLOGY

## 2023-07-07 PROCEDURE — 71046 X-RAY EXAM CHEST 2 VIEWS: CPT

## 2023-07-07 PROCEDURE — 99232 SBSQ HOSP IP/OBS MODERATE 35: CPT | Performed by: NURSE PRACTITIONER

## 2023-07-07 PROCEDURE — 85025 COMPLETE CBC W/AUTO DIFF WBC: CPT | Performed by: PHYSICIAN ASSISTANT

## 2023-07-07 PROCEDURE — 0241U HB NFCT DS VIR RESP RNA 4 TRGT: CPT | Performed by: NURSE PRACTITIONER

## 2023-07-07 RX ADMIN — BENZTROPINE MESYLATE 0.5 MG: 0.5 TABLET ORAL at 18:35

## 2023-07-07 RX ADMIN — SENNOSIDES AND DOCUSATE SODIUM 1 TABLET: 50; 8.6 TABLET ORAL at 09:45

## 2023-07-07 RX ADMIN — CHLORPROMAZINE HYDROCHLORIDE 50 MG: 25 INJECTION INTRAMUSCULAR at 21:10

## 2023-07-07 RX ADMIN — DIVALPROEX SODIUM 1000 MG: 500 TABLET, DELAYED RELEASE ORAL at 09:46

## 2023-07-07 RX ADMIN — LITHIUM CARBONATE 750 MG: 450 TABLET, EXTENDED RELEASE ORAL at 09:46

## 2023-07-07 RX ADMIN — CLONIDINE HYDROCHLORIDE 0.1 MG: 0.1 TABLET ORAL at 09:46

## 2023-07-07 RX ADMIN — CLOZAPINE 150 MG: 25 TABLET ORAL at 09:46

## 2023-07-07 RX ADMIN — CLOZAPINE 150 MG: 25 TABLET ORAL at 18:35

## 2023-07-07 RX ADMIN — DIPHENHYDRAMINE HYDROCHLORIDE 50 MG: 50 INJECTION, SOLUTION INTRAMUSCULAR; INTRAVENOUS at 21:10

## 2023-07-07 RX ADMIN — LITHIUM CARBONATE 750 MG: 450 TABLET, EXTENDED RELEASE ORAL at 18:35

## 2023-07-07 RX ADMIN — BENZTROPINE MESYLATE 0.5 MG: 0.5 TABLET ORAL at 09:46

## 2023-07-07 RX ADMIN — SENNOSIDES AND DOCUSATE SODIUM 1 TABLET: 50; 8.6 TABLET ORAL at 18:35

## 2023-07-07 NOTE — NURSING NOTE
Patient initially refused her medications but with encouragement she took them. She started crying when she was taking her medication. Patient is now off unit for chest x-ray.

## 2023-07-07 NOTE — PROGRESS NOTES
Progress Note - Behavioral Health   Melo Javed 28 y.o. female MRN: [id number]  Unit/Bed#: [facility] 349-01 Encounter: [id number]    Assessment/Plan   Principal Problem:    Unspecified mood (affective) disorder ([address])  Active Problems:    Medical clearance for psychiatric admission    Intellectual disability    Psychosis ([address])    Dysuria    Vaginal lesion    Behavior over the last 24 hours:  unchanged  Sleep: Received as needed Thorazine and Benadryl yesterday evening  Appetite: normal  Medication side effects: No  ROS: no complaints    Subjective: Patient actively responding while out in community, intrusive at times with staff and peers but this morning able to be redirected by staff. Per RN report she will refuse medications at times, did get IM thorazine and benadryl last night. Mental Status Evaluation:  Appearance:  thin, in hospital gown   Behavior:  restless and fidgety   Speech:  rambling in [facility], [place] and [place] and [facility]   Mood:  "hello"   Affect:  labile   Thought Process:  disorganized   Associations: concrete associations   Thought Content:  persecutory   Perceptual Disturbances: Obviously responding to internal stimuli   Risk Potential:  Impulsive and needs redirection at times   Sensorium:  Unable to assess   Memory:  recent and remote memory: unable to assess due to lack of cooperation   Consciousness:  alert and awake    Attention: attention span appeared shorter than expected for age   Insight:  limited   Judgment: limited   Gait/Station: normal gait/station   Motor Activity: no abnormal movements     Progress Toward Goals: Remains impulsive, labile, childlike at times    Recommended Treatment: Continue with group therapy, milieu therapy and occupational therapy. Risks, benefits and possible side effects of Medications:   Patient does not verbalize understanding at this time and will require further explanation.       Medications:   all current active meds have been reviewed and current meds:   Current Facility-Administered Medications   Medication Dose Route Frequency   • acetaminophen (TYLENOL) tablet 650 mg  650 mg Oral Q6H PRN   • acetaminophen (TYLENOL) tablet 650 mg  650 mg Oral Q4H PRN   • acetaminophen (TYLENOL) tablet 975 mg  975 mg Oral Q6H PRN   • aluminum-magnesium hydroxide-simethicone (MYLANTA) oral suspension 30 mL  30 mL Oral Q4H PRN   • benztropine (COGENTIN) injection 1 mg  1 mg Intramuscular Q4H PRN Max 6/day   • benztropine (COGENTIN) tablet 0.5 mg  0.5 mg Oral BID   • benztropine (COGENTIN) tablet 1 mg  1 mg Oral BID PRN   • benztropine (COGENTIN) tablet 1 mg  1 mg Oral Q4H PRN Max 6/day   • chlorproMAZINE (THORAZINE) injection SOLN 25 mg  25 mg Intramuscular Q6H PRN    Or   • chlorproMAZINE (THORAZINE) tablet 25 mg  25 mg Oral Q6H PRN   • chlorproMAZINE (THORAZINE) injection SOLN 50 mg  50 mg Intramuscular Q6H PRN    Or   • chlorproMAZINE (THORAZINE) tablet 50 mg  50 mg Oral Q6H PRN   • clonazePAM (KlonoPIN) tablet 0.5 mg  0.5 mg Oral BID PRN   • cloNIDine (CATAPRES) tablet 0.1 mg  0.1 mg Oral Q12H COLT   • cloZAPine (CLOZARIL) tablet 150 mg  150 mg Oral BID   • dextromethorphan-guaiFENesin (ROBITUSSIN DM) oral syrup 10 mL  10 mL Oral Q4H PRN   • diphenhydrAMINE (BENADRYL) injection 25 mg  25 mg Intramuscular Q6H PRN   • hydrOXYzine HCL (ATARAX) tablet 50 mg  50 mg Oral Q6H PRN Max 4/day    Or   • diphenhydrAMINE (BENADRYL) injection 50 mg  50 mg Intramuscular Q6H PRN   • divalproex sodium (DEPAKOTE) DR tablet 1,000 mg  1,000 mg Oral Q12H OCLT   • hydrOXYzine HCL (ATARAX) tablet 100 mg  100 mg Oral Q6H PRN Max 4/day    Or   • LORazepam (ATIVAN) injection 2 mg  2 mg Intramuscular Q6H PRN   • hydrOXYzine HCL (ATARAX) tablet 25 mg  25 mg Oral Q6H PRN Max 4/day   • lithium carbonate (LITHOBID) CR tablet 750 mg  750 mg Oral BID   • melatonin tablet 3 mg  3 mg Oral HS PRN   • ondansetron (ZOFRAN-ODT) dispersible tablet 4 mg  4 mg Oral Q6H PRN   • polyethylene glycol (MIRALAX) packet 17 g  17 g Oral Daily PRN   • propranolol (INDERAL) tablet 10 mg  10 mg Oral Q8H PRN   • senna-docusate sodium (SENOKOT S) 8.6-50 mg per tablet 1 tablet  1 tablet Oral BID   • senna-docusate sodium (SENOKOT S) 8.6-50 mg per tablet 1 tablet  1 tablet Oral BID PRN   • simethicone (MYLICON) chewable tablet 80 mg  80 mg Oral Q6H PRN   • traZODone (DESYREL) tablet 100 mg  100 mg Oral HS   . Labs: I have personally reviewed all pertinent laboratory/tests results. Most Recent Labs:   Lab Results   Component Value Date    WBC 7.86 07/07/2023    RBC 5.06 07/07/2023    HGB 15.1 07/07/2023    HCT 47.0 (H) 07/07/2023     07/07/2023    RDW 13.2 07/07/2023    NEUTROABS 4.83 07/07/2023    SODIUM 141 06/16/2023    K 4.0 06/16/2023     06/16/2023    CO2 30 06/16/2023    BUN 11 06/16/2023    CREATININE 0.53 (L) 06/16/2023    GLUC 73 06/16/2023    GLUF 73 06/16/2023    CALCIUM 10.1 06/16/2023    AST 19 06/16/2023    ALT 13 06/16/2023    ALKPHOS 32 (L) 06/16/2023    TP 6.8 06/16/2023    ALB 4.3 06/16/2023    TBILI 0.43 06/16/2023    CHOLESTEROL 181 06/11/2023    HDL 64 06/11/2023    TRIG 106 06/11/2023    LDLCALC 96 06/11/2023    NONHDLC 117 06/11/2023    VALPROICTOT 87 06/11/2023    LITHIUM 1.1 06/27/2023    OKZ4XAHLEVXO 3.269 06/11/2023    PREGSERUM Negative 06/11/2023    HGBA1C 5.1 03/20/2023     03/20/2023     Wt Readings from Last 3 Encounters:   06/10/23 54.3 kg (119 lb 9.6 oz)     Temp Readings from Last 3 Encounters:   07/08/23 (!) 97.1 °F (36.2 °C) (Temporal)     BP Readings from Last 3 Encounters:   07/08/23 123/90     Pulse Readings from Last 3 Encounters:   07/08/23 96       Counseling / Coordination of Care  Total floor / unit time spent today 20 minutes. Greater than 50% of total time was spent with the patient and / or family counseling and / or coordination of care.  A description of the counseling / coordination of care: medications

## 2023-07-07 NOTE — PROGRESS NOTES
07/07/23 0847   Team Meeting   Meeting Type Daily Rounds   Team Members Present   Team Members Present Physician;Nurse;   Physician Team Member [facility]   Care Management Team Member Cathi   Patient/Family Present   Patient Present No   Patient's Family Present No   1:1. Visible, walking around last evening. Pt screaming in bathroom last night due to [facility] that people were in there looking at her. PRN PO thorazine-effective. DC tbd.

## 2023-07-07 NOTE — PROGRESS NOTES
[facility]  Progress Note  Name: Ruslan Terrazas  MRN: [id number]  Unit/Bed#: [facility] 349-01 I Date of Admission: 2023   Date of Service: 2023 I Hospital Day: 32    Assessment/Plan   Cough  Assessment & Plan  · Asked by the psychiatry service to evaluate the patient for cough tachycardia and wheezing  · Obtained a PA and lateral chest x-ray which was all  · Obtained a COVID RSV flu swab which was negative  · Saw the patient at bedside patient's lungs clear to auscultation bilaterally no rales rhonchi or wheezing  · Patiently mild tachycardia with 110 heart rate  · All vital signs reviewed patient is afebrile  · We will continue to monitor over the weekend    Intellectual disability  Assessment & Plan  · Patient has ID  · A special amount of time and consideration will need to be taken with this patient         Nurse Coordination of Care Discussion: With the psych intern of Dr. Theresa Shepherd as well as the nursing staff    Discussions with Specialists or Other Care Team Provider: The psych intern via [facility] text and the attending in person    Education and Discussions with  Patient: 15 minutes    Time Spent for Care: 30 minutes. More than 50% of total time spent on counseling and coordination of care as described above. Current Length of Stay: 27 day(s)    Code Status: Level 1 - Full Code      Subjective:   Patient intellectually disabled repeating my name    Objective:     Vitals:   Temp (24hrs), Av.1 °F (36.2 °C), Min:96.7 °F (35.9 °C), Max:97.6 °F (36.4 °C)    Temp:  [96.7 °F (35.9 °C)-97.6 °F (36.4 °C)] 97.6 °F (36.4 °C)  HR:  [] 126  Resp:  [16] 16  BP: (123-140)/(83-87) 123/87  SpO2:  [94 %-98 %] 97 %  Body mass index is 20.21 kg/m². Physical Exam:     Physical Exam  Constitutional:       Appearance: Normal appearance. HENT:      Head: Normocephalic.       Right Ear: Tympanic membrane normal.      Left Ear: Tympanic membrane normal.      Nose: Nose normal. Eyes:      Extraocular Movements: Extraocular movements intact. Conjunctiva/sclera: Conjunctivae normal.      Pupils: Pupils are equal, round, and reactive to light. Cardiovascular:      Rate and Rhythm: Regular rhythm. Tachycardia present. Pulses: Normal pulses. Heart sounds: Normal heart sounds. Pulmonary:      Effort: Pulmonary effort is normal.      Breath sounds: Normal breath sounds. No stridor. No wheezing, rhonchi or rales. Abdominal:      General: Abdomen is flat. Bowel sounds are normal.      Palpations: Abdomen is soft. Musculoskeletal:         General: Normal range of motion. Cervical back: Normal range of motion and neck supple. Skin:     General: Skin is warm and dry. Capillary Refill: Capillary refill takes more than 3 seconds. Neurological:      Mental Status: She is alert. Mental status is at baseline. Psychiatric:         Mood and Affect: Mood normal.         Behavior: Behavior normal.           Additional Data:     Labs:    Results from last 7 days   Lab Units 07/07/23  0906   WBC Thousand/uL 7.86   HEMOGLOBIN g/dL 15.1   HEMATOCRIT % 47.0*   PLATELETS Thousands/uL 232   NEUTROS PCT % 61   LYMPHS PCT % 29   MONOS PCT % 8   EOS PCT % 2                         * I Have Reviewed All Lab Data Listed Above. * Additional Pertinent Lab Tests Reviewed:  [facility] Admission Reviewed    Imaging:    Imaging Reports Reviewed Today Include: CXR PA/Lat     No acute cardiopulmonary disease.     Distended stomach with elevation of the diaphragm.       Last 24 Hours Medication List:   Current Facility-Administered Medications   Medication Dose Route Frequency Provider Last Rate   • acetaminophen  650 mg Oral Q6H PRN Louvenia Groom, DO     • acetaminophen  650 mg Oral Q4H PRN Louvenia Groom, DO     • acetaminophen  975 mg Oral Q6H PRN Louvenia Groom, DO     • aluminum-magnesium hydroxide-simethicone  30 mL Oral Q4H PRN Louvenia Groom, DO     • benztropine 1 mg Intramuscular Q4H PRN Max 6/day Shiva Taft, DO     • benztropine  0.5 mg Oral BID Ed Beavers MD     • benztropine  1 mg Oral BID PRN Shiva Taft, DO     • benztropine  1 mg Oral Q4H PRN Max 6/day Shiva Taft, DO     • chlorproMAZINE  25 mg Intramuscular Q6H PRN Michelle Rice MD      Or   • chlorproMAZINE  25 mg Oral Q6H PRN Michelle Rice MD     • chlorproMAZINE  50 mg Intramuscular Q6H PRN Michelle Rice MD      Or   • chlorproMAZINE  50 mg Oral Q6H PRN Michelle Rice MD     • clonazePAM  0.5 mg Oral BID PRN Shiva Taft, DO     • cloNIDine  0.1 mg Oral Q12H [address] [place] , DO     • cloZAPine  150 mg Oral BID Gil Nelson, DO     • dextromethorphan-guaiFENesin  10 mL Oral Q4H PRN Joselin Anthony PA-C     • diphenhydrAMINE  25 mg Intramuscular Q6H PRN Shiva Taft, DO     • hydrOXYzine HCL  50 mg Oral Q6H PRN Max 4/day Shiva Taft, DO      Or   • diphenhydrAMINE  50 mg Intramuscular Q6H PRN Shiva Taft, DO     • divalproex sodium  1,000 mg Oral Q12H [address] [facility] Shiva Taft, DO     • hydrOXYzine HCL  100 mg Oral Q6H PRN Max 4/day Shiva Taft, DO      Or   • LORazepam  2 mg Intramuscular Q6H PRN Shiva Taft, DO     • hydrOXYzine HCL  25 mg Oral Q6H PRN Max 4/day Shiva Taft, DO     • lithium carbonate  750 mg Oral BID Shiva Taft, DO     • melatonin  3 mg Oral HS PRN Shiva Taft, DO     • ondansetron  4 mg Oral Q6H PRN Joselin Anthony PA-C     • polyethylene glycol  17 g Oral Daily PRN Shiva Taft, DO     • propranolol  10 mg Oral Q8H PRN Shiva Taft, DO     • senna-docusate sodium  1 tablet Oral BID Shiva Taft, DO     • senna-docusate sodium  1 tablet Oral BID PRN Carmela Paniagua DO     • simethicone  80 mg Oral Q6H PRN Joselin Anthony PA-C     • traZODone  100 mg Oral HS PERLA Molina          Today, Patient Was Seen By: PERLA Gordon      ** Please Note: Dictation voice to text software may have been used in the creation of this document.  **

## 2023-07-07 NOTE — NURSING NOTE
Pt visible on the unit seen in the dayroom and pacing unit, Pt denied all pain anxiety and depression, Per  pt denied all pain and depression but did have anxiety and AH (see previous note) Pt took all scheduled HS meds, Pt  1:1 ATC for safety continued

## 2023-07-07 NOTE — PROGRESS NOTES
Progress Note - Behavioral Health   Jeanette Diaz 28 y.o. female MRN: [id number]  Unit/Bed#: [facility] 349-01 Encounter: [id number]    Assessment/Plan   Principal Problem:    Unspecified mood (affective) disorder (HCC)  Active Problems:    Medical clearance for psychiatric admission    Intellectual disability    Psychosis ([address])    Dysuria    Vaginal lesion      Recommended Treatment:   • Continue clozapine tablet 150 mg BID for psychosis and agitation - (CBC 07/07/23 with WBC 7.86 and ANC 4.83, 7/7/2023 clozapine level pending )  • Continue benztropine tablet 0.5 mg BID for EPS  • Continue clonidine tablet 0.1mg BID for anxiety   • Continue divalproex sodium DR tablet 1000 mg for mood and agitation - (VPA level 87 on 6/11)  • Continue lithium carbonate CR tablet 750 mg BID for mood - (Li level 1.1 on 6/27)   • Continue senna-docusate sodium 8.6-50 mg per tablet BID for constipation   • Continue trazodone 100 mg tablet at bedtime for insomnia    • Continue with group therapy, milieu therapy and occupational therapy. • Continue frequent safety checks and vitals per unit protocol. • Continue 1-1 observation  • Continue medical management by SLIM team, CXR ordered for cough, wheezing, and tachycardia- will follow-up  • Discharge disposition:  TBD    ------------------------------------------------------------  Chief Complaint:  "Good"    Subjective: The patient was evaluated this morning for continuity of care and no acute distress noted throughout the evaluation. Over the past 24 hours staff noted the patient was internally preoccupied, responding to internal stimuli, and delusional. Per nursing note, patient reported AH and anxiety. She was given PO thorazine for agitation which was effective. Karolyn Baumann was later seen walking around the unit and was reported to be compliant with medications and meals. The patient remains on 1-1 observation.  Nursing mentioned that the patient has been seen coughing on more than one occasion. Patient is speaking in [facility] and [place], and she was interpreted by this writer. Today on exam the patient was eating breakfast and cooperative and reports her mood is "good". She reports her sleep is "good" and her appetite is "good". Per Karolyn Baumann, her energy is "good", and she reports that she feels her medications are working "good" with no reported side effects. Karolyn Baumann denies any thoughts or plans of hurting herself or commiting suicide. Per patient, she is not having any VH, but she reports AH "sometimes". She reports hearing voices that tell her that they are going to "kill her". The last time she heard these voices was yesterday. Karolyn Baumann reports feeling safe at the hospital, and denies any thoughts that someone is going to hurt her. Per patient, she enjoys dancing and singing in group, and she could not identify any goals for today. She reports 2 loose bowel movements today without blood. Per patient, she is still having abdominal pain which has been present for "a while". She reports 2 episodes of "vomit which was yellow and had a lot mucus". She denies any blood in the "vomit". Karolyn Baumann also complains of a "fast heartbeat" and some blood in her nose when she "blew it". She complains of a "ball in my mouth". Karolyn Baumann explains that she feels "like I have the flu". She denies headaches, chest pain, fevers, chills, body aches, sore throat.      Psychiatric Review of Systems:  Behavior over the last 24 hours:  Slightly improved  Sleep: adequate  Appetite: adequate  Medication side effects: No   ROS: all other systems are negative, reports palpitations, abdominal discomfort, "ball in mouth", cough, "vomitting", runny nose    Current Medications:  Current Facility-Administered Medications   Medication Dose Route Frequency Provider Last Rate   • acetaminophen  650 mg Oral Q6H PRN Sherri Reardon DO     • acetaminophen  650 mg Oral Q4H PRN Sherri Reardon DO     • acetaminophen  975 mg Oral Q6H PRN Fleta Gold Nehemias Part, DO     • aluminum-magnesium hydroxide-simethicone  30 mL Oral Q4H PRN Karie Ayden, DO     • benztropine  1 mg Intramuscular Q4H PRN Max 6/day Karie Ayden, DO     • benztropine  0.5 mg Oral BID Guillermo Claudio MD     • benztropine  1 mg Oral BID PRN Karie Ayden, DO     • benztropine  1 mg Oral Q4H PRN Max 6/day Karie Ayden, DO     • chlorproMAZINE  25 mg Intramuscular Q6H PRN Daniele Perry MD      Or   • chlorproMAZINE  25 mg Oral Q6H PRN Daniele Perry MD     • chlorproMAZINE  50 mg Intramuscular Q6H PRN Daniele Perry MD      Or   • chlorproMAZINE  50 mg Oral Q6H PRN Daniele Perry MD     • clonazePAM  0.5 mg Oral BID PRN Karie Ayden, DO     • cloNIDine  0.1 mg Oral Q12H [address] Fernandez, DO     • cloZAPine  150 mg Oral BID Prabhakar Seals, DO     • dextromethorphan-guaiFENesin  10 mL Oral Q4H PRN Kenroy Castro PA-C     • diphenhydrAMINE  25 mg Intramuscular Q6H PRN Karie Ayden, DO     • hydrOXYzine HCL  50 mg Oral Q6H PRN Max 4/day Karie Ayden, DO      Or   • diphenhydrAMINE  50 mg Intramuscular Q6H PRN Karie Ayden, DO     • divalproex sodium  1,000 mg Oral Q12H [address] Fernandez, DO     • hydrOXYzine HCL  100 mg Oral Q6H PRN Max 4/day Karie Ayden, DO      Or   • LORazepam  2 mg Intramuscular Q6H PRN Karie Ayden, DO     • hydrOXYzine HCL  25 mg Oral Q6H PRN Max 4/day Karie Ayden, DO     • lithium carbonate  750 mg Oral BID Karie Ayden, DO     • melatonin  3 mg Oral HS PRN Karie Ayden, DO     • ondansetron  4 mg Oral Q6H PRN Kenroy Castro PA-C     • polyethylene glycol  17 g Oral Daily PRN Karie Ayden, DO     • propranolol  10 mg Oral Q8H PRN Karie Ayden, DO     • senna-docusate sodium  1 tablet Oral BID Karie Ayden, DO     • senna-docusate sodium  1 tablet Oral BID PRN Tiara Boswell DO     • simethicone  80 mg Oral Q6H PRN Kenroy Castro PA-C     • traZODone  100 mg Oral HS Whit Luiza Mills Medications: all current active meds have been reviewed and continue current psychiatric medications. Vital signs in last 24 hours:  Temp:  [96.7 °F (35.9 °C)-97.1 °F (36.2 °C)] 97.1 °F (36.2 °C)  HR:  [] 140  Resp:  [16] 16  BP: (124-140)/(83-87) 140/87    Laboratory results:    I have personally reviewed all pertinent laboratory/tests results.   Labs in last 72 hours:   Recent Labs     07/07/23  0906   WBC 7.86   RBC 5.06   HGB 15.1   HCT 47.0*      RDW 13.2   NEUTROABS 4.83       Mental Status Evaluation:    Appearance:  disheveled, looks stated age, poor hygiene, wearing pajamas, overtly  appearing female, sitting comfortably in chair, eating breakfast    Behavior:  pleasant, mostly cooperative, overall more redirectable, restless and fidgety   Speech:  alternating between normal and decreased volume, alternating between [place] and [place], mostly increased rate and at times pressured, garbled and incoherent at times, less instances of yelling vs. loud speech    Mood:  "Good" (states 'remi" in Japanese)   Affect:  labile, fluctuating still between irritable, anxious and fearful when discussing hallucinations   Thought Process:  disorganized, concrete   Associations: concrete associations   Thought Content:  Preoccupied with hallucinations threatening to harm her   Perceptual Disturbances: no visual hallucinations, no current auditory hallucinations, appears responding to internal stimuli, appears distracted, appears preoccupied   Risk Potential: Suicidal ideation - None at present  Homicidal ideation - None  Potential for aggression - Not at present   Sensorium:  unable to assess   Memory:  recent and remote memory: unable to assess due to lack of cooperation   Consciousness:  alert and awake   Attention/Concentration: poor concentration and poor attention span   Insight:  poor   Judgment: poor   Gait/Station: normal gait/station   Motor Activity: no abnormal movements     Progress Toward Goals: Patient remains internally preoccupied and disorganized. She was more redirectable during her interview today than in previous days. She did not become agitated, but her speech became progressively more pressured toward the end of the interview. She is still speaking primarily in [address] [place] [address] with some [place]. Overall, she has slightly improved since yesterday. Recommended Treatment:   See above for assessment and plan. This note has been constructed using a voice recognition system. There may be translation, syntax, or grammatical errors. If you have any questions, please contact the dictating provider.     Elvia BYRD

## 2023-07-07 NOTE — ASSESSMENT & PLAN NOTE
· Asked by the psychiatry service to evaluate the patient for cough tachycardia and wheezing  · Obtained a PA and lateral chest x-ray which was all  · Obtained a COVID RSV flu swab which was negative  · Saw the patient at bedside patient's lungs clear to auscultation bilaterally no rales rhonchi or wheezing  · Patiently mild tachycardia with 110 heart rate  · All vital signs reviewed patient is afebrile  · We will continue to monitor over the weekend

## 2023-07-07 NOTE — TREATMENT TEAM
07/07/23 0950   Activity/Group Checklist   Group Community meeting   Attendance Attended   Attendance Duration (min) 0-15   Interactions Disorganized interaction   Affect/Mood Appropriate   Goals Achieved Able to listen to others; Able to self-disclose

## 2023-07-07 NOTE — NURSING NOTE
Pt screaming in bathroom, through , stated "Sofiya Leonard looking at me, get those people away from me " Pt speaking really fast and shaky, Pt given PO PRN Thorazine, will reassess    Pt reassessed at 2243 and pt is resting in room sleeping, PRN medication effective

## 2023-07-08 PROCEDURE — 99232 SBSQ HOSP IP/OBS MODERATE 35: CPT | Performed by: PSYCHIATRY & NEUROLOGY

## 2023-07-08 RX ADMIN — LITHIUM CARBONATE 750 MG: 450 TABLET, EXTENDED RELEASE ORAL at 17:21

## 2023-07-08 RX ADMIN — BENZTROPINE MESYLATE 0.5 MG: 0.5 TABLET ORAL at 17:23

## 2023-07-08 RX ADMIN — TRAZODONE HYDROCHLORIDE 100 MG: 100 TABLET ORAL at 21:04

## 2023-07-08 RX ADMIN — SENNOSIDES AND DOCUSATE SODIUM 1 TABLET: 50; 8.6 TABLET ORAL at 08:22

## 2023-07-08 RX ADMIN — DIVALPROEX SODIUM 1000 MG: 500 TABLET, DELAYED RELEASE ORAL at 08:21

## 2023-07-08 RX ADMIN — LITHIUM CARBONATE 750 MG: 450 TABLET, EXTENDED RELEASE ORAL at 08:21

## 2023-07-08 RX ADMIN — BENZTROPINE MESYLATE 0.5 MG: 0.5 TABLET ORAL at 09:32

## 2023-07-08 RX ADMIN — DIVALPROEX SODIUM 1000 MG: 500 TABLET, DELAYED RELEASE ORAL at 21:04

## 2023-07-08 RX ADMIN — CLONIDINE HYDROCHLORIDE 0.1 MG: 0.1 TABLET ORAL at 08:22

## 2023-07-08 RX ADMIN — SENNOSIDES AND DOCUSATE SODIUM 1 TABLET: 50; 8.6 TABLET ORAL at 17:22

## 2023-07-08 RX ADMIN — CLOZAPINE 150 MG: 25 TABLET ORAL at 17:20

## 2023-07-08 RX ADMIN — BENZTROPINE MESYLATE 1 MG: 1 TABLET ORAL at 12:15

## 2023-07-08 RX ADMIN — CHLORPROMAZINE HYDROCHLORIDE 50 MG: 50 TABLET, COATED ORAL at 12:15

## 2023-07-08 RX ADMIN — CLOZAPINE 150 MG: 25 TABLET ORAL at 08:21

## 2023-07-08 RX ADMIN — CLONIDINE HYDROCHLORIDE 0.1 MG: 0.1 TABLET ORAL at 21:04

## 2023-07-08 RX ADMIN — POLYETHYLENE GLYCOL 3350 17 G: 17 POWDER, FOR SOLUTION ORAL at 18:40

## 2023-07-08 NOTE — PROGRESS NOTES
07/08/23 1300   Activity/Group Checklist   Group Other (Comment)  (OPEN STUDIO Art Therapy/Social Group)   Attendance Attended  (1:1)   Attendance Duration (min) 46-60   Interactions Interacted appropriately   Affect/Mood Appropriate   Goals Achieved Able to listen to others; Able to engage in interactions

## 2023-07-08 NOTE — NURSING NOTE
11:15: Patient is labile, when BHT 1:1 sitter attempted to crack patient's door to bathroom, Ankush Rhodes became very agitated as BHT kept door open with her foot. Patient started kicking her foot, and posturing towards BHT. Patient spoken to and given opportunity to demonstrate proper behavioral control. 12:15: Patient unable to maintain behavioral control and became aggressive towards BHT redirection in the dayroom. Patient was eventually able to be directed towards her bedroom as patient agreed to take PO meds instead of IM. Patient required multiple attempts to take PO medication as she continued refusing to. Patient eventually agreeable to take PO medications after patient saw her thorazine shots. Patient given 50mg thorazine PO for severe agitation, and per Dr. Peng Mehta, okay to give cogentin to prevent EPS like symptoms due to antipsychotics. 13:15: Patient calmer and in behavioral control. Thorazine and cogentin effective.

## 2023-07-08 NOTE — NURSING NOTE
Patient denies AVH/SI/HI at this time. She requires frequent redirection, and is labile in speech and pressured. She is cooperative with her medication.  Continues on 1:1.

## 2023-07-08 NOTE — PROGRESS NOTES
Progress Note - Behavioral Health   David Farooq 28 y.o. female MRN: [id number]  Unit/Bed#: [facility] 349-01 Encounter: [id number]    Assessment/Plan   Principal Problem:    Unspecified mood (affective) disorder ([address])  Active Problems:    Medical clearance for psychiatric admission    Intellectual disability    Psychosis ([address])    Dysuria    Vaginal lesion    Cough      Behavior over the last 24 hours:  unchanged  Sleep: normal  Appetite: normal  Medication side effects: No  ROS: no complaints    Subjective: Very visible on the unit with one-to-one to help with redirection, needs limit setting with some peers and very impulsive, loud, labile in interactions with peers. Clozaril increased 7/ 5. Per RN report she is childlike, labile, continued pressured speech, hangs out with select peers, needed PRN thorazine and cogentin and was effective for aggression to staff. Miralax for constipation given    Mental Status Evaluation:  Appearance:  thin & gaunt looking and In hospital gowns   Behavior:  restless and fidgety   Speech:  loud and Hyperverbal   Mood:  euphoric   Affect:  labile   Thought Process:  disorganized   Associations: concrete associations   Thought Content:   Paranoia   Perceptual Disturbances: Very distractible talks to self at times   Risk Potential:  No suicidal or homicidal ideation verbalized   Sensorium:  person   Memory:  recent and remote memory: unable to assess due to lack of cooperation   Consciousness:  alert and awake    Attention: attention span appeared shorter than expected for age   Insight:  limited   Judgment: limited   Gait/Station: normal gait/station   Motor Activity: Psychomotor agitation     Progress Toward Goals: Continue current medications, follow-up with appropriate Clozaril labs, continue monitoring bowel regimen    Recommended Treatment: Continue with group therapy, milieu therapy and occupational therapy.       Risks, benefits and possible side effects of Medications:   Risks, benefits, and possible side effects of medications explained to patient and patient verbalizes understanding.       Medications:   all current active meds have been reviewed and current meds:   Current Facility-Administered Medications   Medication Dose Route Frequency   • acetaminophen (TYLENOL) tablet 650 mg  650 mg Oral Q6H PRN   • acetaminophen (TYLENOL) tablet 650 mg  650 mg Oral Q4H PRN   • acetaminophen (TYLENOL) tablet 975 mg  975 mg Oral Q6H PRN   • aluminum-magnesium hydroxide-simethicone (MYLANTA) oral suspension 30 mL  30 mL Oral Q4H PRN   • benztropine (COGENTIN) injection 1 mg  1 mg Intramuscular Q4H PRN Max 6/day   • benztropine (COGENTIN) tablet 0.5 mg  0.5 mg Oral BID   • benztropine (COGENTIN) tablet 1 mg  1 mg Oral BID PRN   • benztropine (COGENTIN) tablet 1 mg  1 mg Oral Q4H PRN Max 6/day   • chlorproMAZINE (THORAZINE) injection SOLN 25 mg  25 mg Intramuscular Q6H PRN    Or   • chlorproMAZINE (THORAZINE) tablet 25 mg  25 mg Oral Q6H PRN   • chlorproMAZINE (THORAZINE) injection SOLN 50 mg  50 mg Intramuscular Q6H PRN    Or   • chlorproMAZINE (THORAZINE) tablet 50 mg  50 mg Oral Q6H PRN   • clonazePAM (KlonoPIN) tablet 0.5 mg  0.5 mg Oral BID PRN   • cloNIDine (CATAPRES) tablet 0.1 mg  0.1 mg Oral Q12H COLT   • cloZAPine (CLOZARIL) tablet 150 mg  150 mg Oral BID   • dextromethorphan-guaiFENesin (ROBITUSSIN DM) oral syrup 10 mL  10 mL Oral Q4H PRN   • diphenhydrAMINE (BENADRYL) injection 25 mg  25 mg Intramuscular Q6H PRN   • hydrOXYzine HCL (ATARAX) tablet 50 mg  50 mg Oral Q6H PRN Max 4/day    Or   • diphenhydrAMINE (BENADRYL) injection 50 mg  50 mg Intramuscular Q6H PRN   • divalproex sodium (DEPAKOTE) DR tablet 1,000 mg  1,000 mg Oral Q12H COLT   • hydrOXYzine HCL (ATARAX) tablet 100 mg  100 mg Oral Q6H PRN Max 4/day    Or   • LORazepam (ATIVAN) injection 2 mg  2 mg Intramuscular Q6H PRN   • hydrOXYzine HCL (ATARAX) tablet 25 mg  25 mg Oral Q6H PRN Max 4/day   • lithium carbonate (LITHOBID) CR tablet 750 mg  750 mg Oral BID   • melatonin tablet 3 mg  3 mg Oral HS PRN   • ondansetron (ZOFRAN-ODT) dispersible tablet 4 mg  4 mg Oral Q6H PRN   • polyethylene glycol (MIRALAX) packet 17 g  17 g Oral Daily PRN   • propranolol (INDERAL) tablet 10 mg  10 mg Oral Q8H PRN   • senna-docusate sodium (SENOKOT S) 8.6-50 mg per tablet 1 tablet  1 tablet Oral BID   • senna-docusate sodium (SENOKOT S) 8.6-50 mg per tablet 1 tablet  1 tablet Oral BID PRN   • simethicone (MYLICON) chewable tablet 80 mg  80 mg Oral Q6H PRN   • traZODone (DESYREL) tablet 100 mg  100 mg Oral HS   . Labs: I have personally reviewed all pertinent laboratory/tests results. Most Recent Labs:   Lab Results   Component Value Date    WBC 7.86 07/07/2023    RBC 5.06 07/07/2023    HGB 15.1 07/07/2023    HCT 47.0 (H) 07/07/2023     07/07/2023    RDW 13.2 07/07/2023    NEUTROABS 4.83 07/07/2023    SODIUM 141 06/16/2023    K 4.0 06/16/2023     06/16/2023    CO2 30 06/16/2023    BUN 11 06/16/2023    CREATININE 0.53 (L) 06/16/2023    GLUC 73 06/16/2023    GLUF 73 06/16/2023    CALCIUM 10.1 06/16/2023    AST 19 06/16/2023    ALT 13 06/16/2023    ALKPHOS 32 (L) 06/16/2023    TP 6.8 06/16/2023    ALB 4.3 06/16/2023    TBILI 0.43 06/16/2023    CHOLESTEROL 181 06/11/2023    HDL 64 06/11/2023    TRIG 106 06/11/2023    LDLCALC 96 06/11/2023    NONHDLC 117 06/11/2023    VALPROICTOT 87 06/11/2023    LITHIUM 1.1 06/27/2023    OOF3JJZXQCNW 3.269 06/11/2023    PREGSERUM Negative 06/11/2023    HGBA1C 5.1 03/20/2023     03/20/2023       Counseling / Coordination of Care  Total floor / unit time spent today 15 minutes. Greater than 50% of total time was spent with the patient and / or family counseling and / or coordination of care.  A description of the counseling / coordination of care: Medications

## 2023-07-09 PROCEDURE — 99232 SBSQ HOSP IP/OBS MODERATE 35: CPT | Performed by: PSYCHIATRY & NEUROLOGY

## 2023-07-09 RX ADMIN — TRAZODONE HYDROCHLORIDE 100 MG: 100 TABLET ORAL at 21:10

## 2023-07-09 RX ADMIN — CLOZAPINE 150 MG: 25 TABLET ORAL at 09:29

## 2023-07-09 RX ADMIN — CLONIDINE HYDROCHLORIDE 0.1 MG: 0.1 TABLET ORAL at 09:29

## 2023-07-09 RX ADMIN — SENNOSIDES AND DOCUSATE SODIUM 1 TABLET: 50; 8.6 TABLET ORAL at 18:03

## 2023-07-09 RX ADMIN — SENNOSIDES AND DOCUSATE SODIUM 1 TABLET: 50; 8.6 TABLET ORAL at 09:29

## 2023-07-09 RX ADMIN — CLOZAPINE 150 MG: 25 TABLET ORAL at 18:03

## 2023-07-09 RX ADMIN — BENZTROPINE MESYLATE 0.5 MG: 0.5 TABLET ORAL at 09:29

## 2023-07-09 RX ADMIN — BENZTROPINE MESYLATE 0.5 MG: 0.5 TABLET ORAL at 18:03

## 2023-07-09 RX ADMIN — ALUMINUM HYDROXIDE, MAGNESIUM HYDROXIDE, AND DIMETHICONE 30 ML: 200; 20; 200 SUSPENSION ORAL at 10:19

## 2023-07-09 RX ADMIN — LITHIUM CARBONATE 750 MG: 450 TABLET, EXTENDED RELEASE ORAL at 09:30

## 2023-07-09 RX ADMIN — ACETAMINOPHEN 975 MG: 325 TABLET ORAL at 18:24

## 2023-07-09 RX ADMIN — CLONIDINE HYDROCHLORIDE 0.1 MG: 0.1 TABLET ORAL at 21:10

## 2023-07-09 RX ADMIN — HYDROXYZINE HYDROCHLORIDE 100 MG: 50 TABLET, FILM COATED ORAL at 09:18

## 2023-07-09 RX ADMIN — LITHIUM CARBONATE 750 MG: 450 TABLET, EXTENDED RELEASE ORAL at 18:03

## 2023-07-09 RX ADMIN — DIVALPROEX SODIUM 1000 MG: 500 TABLET, DELAYED RELEASE ORAL at 09:30

## 2023-07-09 RX ADMIN — DIVALPROEX SODIUM 1000 MG: 500 TABLET, DELAYED RELEASE ORAL at 21:10

## 2023-07-09 NOTE — PLAN OF CARE
Problem: Risk for Self Injury/Neglect  Goal: Treatment Goal: Remain safe during length of stay, learn and adopt new coping skills, and be free of self-injurious ideation, impulses and acts at the time of discharge  Outcome: Progressing  Goal: Verbalize thoughts and feelings  Description: Interventions:  - Assess and re-assess patient's lethality and potential for self-injury  - Engage patient in 1:1 interactions, daily, for a minimum of 15 minutes  - Encourage patient to express feelings, fears, frustrations, hopes  - Establish rapport/trust with patient   Outcome: Progressing  Goal: Refrain from harming self  Description: Interventions:  - Monitor patient closely, per order  - Develop a trusting relationship  - Supervise medication ingestion, monitor effects and side effects   Outcome: Progressing  Goal: Attend and participate in unit activities, including therapeutic, recreational, and educational groups  Description: Interventions:  - Provide therapeutic and educational activities daily, encourage attendance and participation, and document same in the medical record  - Obtain collateral information, encourage visitation and family involvement in care   Outcome: Progressing  Goal: Recognize maladaptive responses and adopt new coping mechanisms  Outcome: Progressing  Goal: Complete daily ADLs, including personal hygiene independently, as able  Description: Interventions:  - Observe, teach, and assist patient with ADLS  - Monitor and promote a balance of rest/activity, with adequate nutrition and elimination  Outcome: Progressing     Problem: Risk for Violence/Aggression Toward Others  Goal: Treatment Goal: Refrain from acts of violence/aggression during length of stay, and demonstrate improved impulse control at the time of discharge  Outcome: Progressing     Problem: Risk for Violence/Aggression Toward Others  Goal: Treatment Goal: Refrain from acts of violence/aggression during length of stay, and demonstrate improved impulse control at the time of discharge  Outcome: Progressing  Goal: Verbalize thoughts and feelings  Description: Interventions:  - Assess and re-assess patient's level of risk, every waking shift  - Engage patient in 1:1 interactions, daily, for a minimum of 15 minutes   - Allow patient to express feelings and frustrations in a safe and non-threatening manner   - Establish rapport/trust with patient   Outcome: Progressing  Goal: Refrain from harming others  Outcome: Progressing  Goal: Refrain from destructive acts on the environment or property  Outcome: Progressing  Goal: Control angry outbursts  Description: Interventions:  - Monitor patient closely, per order  - Ensure early verbal de-escalation  - Monitor prn medication needs  - Set reasonable/therapeutic limits, outline behavioral expectations, and consequences   - Provide a non-threatening milieu, utilizing the least restrictive interventions   Outcome: Progressing  Goal: Attend and participate in unit activities, including therapeutic, recreational, and educational groups  Description: Interventions:  - Provide therapeutic and educational activities daily, encourage attendance and participation, and document same in the medical record   Outcome: Progressing  Goal: Identify appropriate positive anger management techniques  Description: Interventions:  - Offer anger management and coping skills groups   - Staff will provide positive feedback for appropriate anger control  Outcome: Progressing     Problem: JENAE  Goal: Will exhibit normal sleep and speech and no impulsivity  Description: INTERVENTIONS:  - Administer medication as ordered  - Set limits on impulsive behavior  - Make attempts to decrease external stimuli as possible  Outcome: Progressing     Problem: INVOLUNTARY ADMIT  Goal: Will cooperate with staff recommendations and doctor's orders and will demonstrate appropriate behavior  Description: INTERVENTIONS:  - Treat underlying conditions and offer medication as ordered  - Educate regarding involuntary admission procedures and rules  - Utilize positive consistent limit setting strategies to support patient and staff safety  Outcome: Progressing     Problem: SELF CARE DEFICIT  Goal: Return ADL status to a safe level of function  Description: INTERVENTIONS:  - Administer medication as ordered  - Assess ADL deficits and provide assistive devices as needed  - Obtain PT/OT consults as needed  - Assist and instruct patient to increase activity and self care as tolerated  Outcome: Progressing     Problem: DISCHARGE PLANNING - CARE MANAGEMENT  Goal: Discharge to post-acute care or home with appropriate resources  Description: INTERVENTIONS:  - Conduct assessment to determine patient/family and health care team treatment goals, and need for post-acute services based on payer coverage, community resources, and patient preferences, and barriers to discharge  - Address psychosocial, clinical, and financial barriers to discharge as identified in assessment in conjunction with the patient/family and health care team  - Arrange appropriate level of post-acute services according to patient’s   needs and preference and payer coverage in collaboration with the physician and health care team  - Communicate with and update the patient/family, physician, and health care team regarding progress on the discharge plan  - Arrange appropriate transportation to post-acute venues  Outcome: Progressing

## 2023-07-09 NOTE — NURSING NOTE
Patient cooperative, medication compliant. Patient denies psychiatric symptoms at this time, denies any unmet needs. Patient remains on continuous staff observation at this time.     Patient behaved appropriately in the milieu with no observed behavioral outbursts

## 2023-07-09 NOTE — PLAN OF CARE
Problem: Risk for Self Injury/Neglect  Goal: Treatment Goal: Remain safe during length of stay, learn and adopt new coping skills, and be free of self-injurious ideation, impulses and acts at the time of discharge  Outcome: Progressing  Goal: Verbalize thoughts and feelings  Description: Interventions:  - Assess and re-assess patient's lethality and potential for self-injury  - Engage patient in 1:1 interactions, daily, for a minimum of 15 minutes  - Encourage patient to express feelings, fears, frustrations, hopes  - Establish rapport/trust with patient   Outcome: Progressing  Goal: Refrain from harming self  Description: Interventions:  - Monitor patient closely, per order  - Develop a trusting relationship  - Supervise medication ingestion, monitor effects and side effects   Outcome: Progressing  Goal: Recognize maladaptive responses and adopt new coping mechanisms  Outcome: Progressing  Goal: Complete daily ADLs, including personal hygiene independently, as able  Description: Interventions:  - Observe, teach, and assist patient with ADLS  - Monitor and promote a balance of rest/activity, with adequate nutrition and elimination  Outcome: Progressing

## 2023-07-09 NOTE — NURSING NOTE
Patient continues with rapid, loud, pressured speech, impulsive. Patient requires frequent re-direction. 1:1 sitter continues in place for behavioral issues/non-suicidal. Patient is medication compliant.

## 2023-07-09 NOTE — NURSING NOTE
Patient complaining of severe anxiety, repeatedly asking for meds and very labile. Patient appears very anxious despite calming methods. Patient given 100mg atarax.

## 2023-07-09 NOTE — NURSING NOTE
Patient calm and cooperative, medication compliant. Patient continuous staff observer remains in place at this time.

## 2023-07-09 NOTE — NURSING NOTE
Patient given maalox for indigestion/stomach upset. Patient appears less anxious and more redirectable. 100mg of atarax is effective. 11:19: Patient appears more comfortable and no longer complaining of stomach upset.

## 2023-07-09 NOTE — PROGRESS NOTES
[facility] Group Note     07/09/23 1300   Activity/Group Checklist   Group Life Skills  (Teamwork and Communication)   Attendance Attended   Attendance Duration (min) 16-30  (in and out of group)   Interactions Other (Comment)  (Intrusive at times, required frequent redirection)   Affect/Mood Bright   Goals Achieved Able to listen to others; Able to engage in interactions; Able to recieve feedback; Able to give feedback to another  (benefited from social presence of the group)

## 2023-07-09 NOTE — NURSING NOTE
Pt offers c/o pain and discomfort to her back tooth. PRN Tylenol 975mg given at 1824 for pain. Will continue to monitor.

## 2023-07-09 NOTE — NURSING NOTE
Patient very labile, appears less anxious after administration of 100mg of atarax at 9:19. Patient still screaming, and very excitable but in behavioral control. Patient denies AVH/SI/HI. She continues on 1:1 close proximity for safety/non-suicide.

## 2023-07-10 PROCEDURE — 99232 SBSQ HOSP IP/OBS MODERATE 35: CPT | Performed by: STUDENT IN AN ORGANIZED HEALTH CARE EDUCATION/TRAINING PROGRAM

## 2023-07-10 RX ORDER — AMOXICILLIN 250 MG
2 CAPSULE ORAL 2 TIMES DAILY PRN
Status: DISCONTINUED | OUTPATIENT
Start: 2023-07-10 | End: 2023-07-10

## 2023-07-10 RX ORDER — AMOXICILLIN 250 MG
2 CAPSULE ORAL 2 TIMES DAILY
Status: DISCONTINUED | OUTPATIENT
Start: 2023-07-10 | End: 2023-07-19 | Stop reason: HOSPADM

## 2023-07-10 RX ADMIN — LITHIUM CARBONATE 750 MG: 450 TABLET, EXTENDED RELEASE ORAL at 08:40

## 2023-07-10 RX ADMIN — SENNOSIDES AND DOCUSATE SODIUM 2 TABLET: 8.6; 5 TABLET ORAL at 17:35

## 2023-07-10 RX ADMIN — CHLORPROMAZINE HYDROCHLORIDE 50 MG: 25 INJECTION INTRAMUSCULAR at 14:00

## 2023-07-10 RX ADMIN — CLOZAPINE 150 MG: 25 TABLET ORAL at 08:40

## 2023-07-10 RX ADMIN — BENZTROPINE MESYLATE 0.5 MG: 0.5 TABLET ORAL at 17:35

## 2023-07-10 RX ADMIN — DIVALPROEX SODIUM 1000 MG: 500 TABLET, DELAYED RELEASE ORAL at 08:41

## 2023-07-10 RX ADMIN — TRAZODONE HYDROCHLORIDE 100 MG: 100 TABLET ORAL at 21:09

## 2023-07-10 RX ADMIN — LITHIUM CARBONATE 750 MG: 450 TABLET, EXTENDED RELEASE ORAL at 17:35

## 2023-07-10 RX ADMIN — BENZTROPINE MESYLATE 0.5 MG: 0.5 TABLET ORAL at 08:41

## 2023-07-10 RX ADMIN — CLONIDINE HYDROCHLORIDE 0.1 MG: 0.1 TABLET ORAL at 08:41

## 2023-07-10 RX ADMIN — DIVALPROEX SODIUM 1000 MG: 500 TABLET, DELAYED RELEASE ORAL at 20:45

## 2023-07-10 RX ADMIN — CLONIDINE HYDROCHLORIDE 0.1 MG: 0.1 TABLET ORAL at 20:45

## 2023-07-10 RX ADMIN — SENNOSIDES AND DOCUSATE SODIUM 1 TABLET: 50; 8.6 TABLET ORAL at 08:40

## 2023-07-10 RX ADMIN — CLOZAPINE 150 MG: 25 TABLET ORAL at 17:35

## 2023-07-10 NOTE — NURSING NOTE
No significant change observed with pt. She remains loud, intrusive, disorganized, and requires extensive redirection throughout the day.  used during assessment. Pt remains on 1:1. She denies any SI/HI/AH/VH. Pt is med and meal compliant. Staff availability reinforced.

## 2023-07-10 NOTE — TREATMENT TEAM
07/10/23 1300   Activity/Group Checklist   Group   (recovery group)   Attendance Attended   Attendance Duration (min) 31-45   Interactions Interacted appropriately   Affect/Mood Appropriate  (needed redirection)   Goals Achieved Discussed coping strategies; Discussed self-esteem issues; Able to listen to others; Able to engage in interactions; Able to reflect/comment on own behavior;Able to self-disclose; Able to recieve feedback

## 2023-07-10 NOTE — NURSING NOTE
Pt observed to be yelling at peers, staff and out loud. She is not accepting of verbal redirection back to her room. Pt is intermittently punching walls in the hallway. Pt increasingly agitated - IM Thorazine 50 mg administered at this time.

## 2023-07-10 NOTE — PROGRESS NOTES
07/10/23 0834   Team Meeting   Meeting Type Daily Rounds   Team Members Present   Team Members Present Physician;Nurse;   Physician Team Member [address] [place] [address] [facility] Member Cathi   Patient/Family Present   Patient Present No   Patient's Family Present No   1:1. Saturday labile, agitated, kicking foot, posturing toward staff. Pt aggressive toward [facility]. PRN thorazine for severe agitation-effective. Pt with c/o constipation, PRN Miralax. Sunday- PRN atarax for anxiety. Pt rapid, loud, requiring redirection. PRN atarax-effective. Med/meal compliant.  DC tbd

## 2023-07-10 NOTE — PROGRESS NOTES
Progress Note - Behavioral Health   Danyelle Jeronimo 28 y.o. female MRN: [id number]  Unit/Bed#: [facility] 349-01 Encounter: [id number]    Assessment/Plan   Principal Problem:    Unspecified mood (affective) disorder ([address])  Active Problems:    Medical clearance for psychiatric admission    Intellectual disability    Psychosis ([address])    Dysuria    Vaginal lesion    Cough      Recommended Treatment:   No psychopharmacologic changes necessary at this moment; will continue to assess daily for further optimization    Continue medications as below:   Clozapine 150 BID for psychosis (most recent CBC 7/07 WBC 7.86, ANC 4.83)  Depakote DR 1000 mg BID for mood/agitation (most recent Depakote level 6/11 was 87)  Lithobid 750 mg BID for mood/agitation (most recent Lithium level 6/27 was 1.1)   Senokot 2 tab BID for bowel regimen     CXR 7/07 ordered by medical team for cough - demonstrates no acute pathology, stomach distention with elevation of the diaphragm. COVID/FLU/RSV negative. Appreciate medicine recommendations. Continue with pharmacotherapy, group therapy, milieu therapy   Monitor for medication side effects   Safety checks per unit protocol   Vital signs per unit protocol   CM/SW recommendations   Continue medical management by medical team  Case discussed with treatment team.    Legal Status: involuntary 304  ------------------------------------------------------------    Subjective: All documentation including nursing notes, medication history to ensure medication adherence on the unit, labs, and vitals were reviewed. Nima Rodney was evaluated this morning for continuity of care. History provided by patient in both [facility] and [place]. Interviewed by myself with [place] fluent PA student at bedside. On Saturday 07/08, per nursing notes, patient was agitated and aggressive toward staff. Patient was able to be redirected to room and given Thorazine po with improvement.  On Sunday 07/09, per nursing notes, patient c/o anxiety and was given Atarax with improvement. She was also given Maalox for indigestion/stomach upset with relief. Patient also given Tylenol for tooth pain with relief. No further weekend events. On exam today, Deshaun Trejo is seen sitting at her desk, preoccupied with coloring and her markers. She states her mood is "good". When asked what she is coloring, Deshaun Trejo rips up her paper and becomes slightly agitated. Deshaun Trejo reports visual hallucinations earlier this am. She states she saw candles on the floor and a girl telling her she was going to kill her. Deshaun Trejo denies auditory hallucinations today. Reports 2 days ago, she heard voices telling her to kill herself. Deshaun Trejo states she feels safe on the unit. Deshaun Trejo denies suicidal ideations. Deshaun Trejo denies homicidal ideations. She reports she has been attending group. Reports she has a "good appetite". Reports her sleep has been "good". Reports her energy has been "good". Denies any side effects from medications. Denies any goals for today. Throughout interview, Deshaun Trejo is preoccupied with showing this writer all her drawings and stickers. She also states she has a boyfriend and points to a drawing of a cartoon male figure. Deshaun Trejo continues to report generalized abdominal pain. States she did have a BM yesterday, however notes she was constipated. States she "threw up blood yesterday", however this was not observed by nursing staff. She denies cough, congestion, flu-like symptoms, chest pain, SOB nausea. Endorses no further complaints at this time.      PRNs overnight: Atarax, Tylenol, Mylanta  VS: Reviewed, within normal limits    Progress Toward Goals: slow improvement    Psychiatric Review of Systems:  Behavior over the last 24 hours:  unchanged  Sleep: normal  Appetite: normal  Medication side effects: No   ROS: all other systems are negative    Vital signs in last 24 hours:  Temp:  [96.5 °F (35.8 °C)-98.6 °F (37 °C)] 98.6 °F (37 °C)  HR:  [101-123] 101  Resp: [16-17] 17  BP: (118-157)/() 147/82    Laboratory results:  I have personally reviewed all pertinent laboratory/tests results. No results found for this or any previous visit (from the past 48 hour(s)). Mental Status Evaluation:    Appearance:  overtly appearing  female, sitting at desk facing away, preoccupied with coloring.  poor hygiene   Behavior:  pleasant, restless and fidgety, occasionally slightly agitated   Speech:  pressured, disorganized, perseverative, alternating between [address] and English, garbled and incoherent at times   Mood:  "good"   Affect:  labile   Thought Process:  disorganized   Associations: concrete associations   Thought Content:  disorganized   Perceptual Disturbances: Reports auditory and visual hallucinations, Appears to be responding to internal stimuli and Appears to be internally preoccupied   Risk Potential: Suicidal ideation - None at present, contracts for safety on the unit, would talk to staff if not feeling safe on the unit  Homicidal ideation - None  Potential for aggression - Not at present   Sensorium:  unable to assess   Memory:  recent and remote memory: unable to assess due to lack of cooperation   Consciousness:  alert and awake   Attention/Concentration: poor concentration and poor attention span   Insight:  limited   Judgment: limited   Gait/Station: normal gait/station   Motor Activity: no abnormal movements       Current Medications:  Current Facility-Administered Medications   Medication Dose Route Frequency Provider Last Rate   • acetaminophen  650 mg Oral Q6H PRN Demetrius Alvarado DO     • acetaminophen  650 mg Oral Q4H PRN Demetrius Alvarado DO     • acetaminophen  975 mg Oral Q6H PRN Demetrius Alvarado DO     • aluminum-magnesium hydroxide-simethicone  30 mL Oral Q4H PRN Demetrius Alvarado DO     • benztropine  1 mg Intramuscular Q4H PRN Max 6/day Demetriusaida Alvarado DO     • benztropine  0.5 mg Oral BID Erica Burkitt, MD     • benztropine  1 mg Oral BID PRN Lucina Malloy, DO     • benztropine  1 mg Oral Q4H PRN Max 6/day Lucina Malloy, DO     • chlorproMAZINE  25 mg Intramuscular Q6H PRN Sheree Zarate MD      Or   • chlorproMAZINE  25 mg Oral Q6H PRN Sheree Zarate MD     • chlorproMAZINE  50 mg Intramuscular Q6H PRN Sheree Zarate MD      Or   • chlorproMAZINE  50 mg Oral Q6H PRN Sheree Zarate MD     • clonazePAM  0.5 mg Oral BID PRN Lucina Malloy, DO     • cloNIDine  0.1 mg Oral Q12H [address] , DO     • cloZAPine  150 mg Oral BID Ari Akins, DO     • dextromethorphan-guaiFENesin  10 mL Oral Q4H PRN Derrick Tarango, PA-C     • diphenhydrAMINE  25 mg Intramuscular Q6H PRN Lucina Malloy, DO     • hydrOXYzine HCL  50 mg Oral Q6H PRN Max 4/day Lucina Malloy, DO      Or   • diphenhydrAMINE  50 mg Intramuscular Q6H PRN Lucina Malloy, DO     • divalproex sodium  1,000 mg Oral Q12H [facility], [facility]     • hydrOXYzine HCL  100 mg Oral Q6H PRN Max 4/day Lucina Malloy DO      Or   • LORazepam  2 mg Intramuscular Q6H PRN Lucina Malloy, DO     • hydrOXYzine HCL  25 mg Oral Q6H PRN Max 4/day Lucina Malloy, DO     • lithium carbonate  750 mg Oral BID Lucina Malloy, DO     • melatonin  3 mg Oral HS PRN Lucina Malloy, DO     • ondansetron  4 mg Oral Q6H PRN Derrick Tarango PAMalikC     • polyethylene glycol  17 g Oral Daily PRN Lucina Malloy, DO     • propranolol  10 mg Oral Q8H PRN Lucina Malloy, DO     • senna-docusate sodium  1 tablet Oral BID Lucina Malloy, DO     • senna-docusate sodium  1 tablet Oral BID PRN Michael Dove DO     • simethicone  80 mg Oral Q6H PRN YVROSE NavarroC     • traZODone  100 mg Oral HS PERLA Andersen         Suicide/Homicide Risk Assessment:    Behavioral Health Medications: All current active meds have been reviewed. Changes as in plan section above.   Risks, benefits and possible side effects of Medications:   Risks, benefits, and possible side effects of medications explained to patient and patient verbalizes understanding. Counseling / Coordination of Care:  Patient's progress discussed with staff in treatment team meeting. Medications, treatment progress and treatment plan reviewed with patient. Lanre Gonzalez DO  Psychiatry Resident, PGY-I    This note was completed in part utilizing Dragon dictation Software. Grammatical, translation, syntax errors, random word insertions, spelling mistakes, and incomplete sentences may be an occasional consequence of this system secondary to software limitations with voice recognition, ambient noise, and hardware issues. If you have any questions or concerns about the content, text, or information contained within the body of this dictation, please contact the provider for clarification.

## 2023-07-10 NOTE — PROGRESS NOTES
07/10/23 1453   Team Meeting   Meeting Type Tx Team Meeting   Team Members Present   Team Members Present Physician;Nurse;   Physician Team Member [facility] Team Member [id number]  Management Team Member Rocky Marti   Patient/Family Present   Patient Present Yes   Patient's Family Present No   Pt attempted to be seen for 30 day tx team meeting. Pt unable to waken to be educated on med management, case management and group therapy. Tx plan reviewed and unable to sign due to current mental status due to recent IM PRNs given for increased agitation.

## 2023-07-10 NOTE — NURSING NOTE
Patient cooperative, medication compliant. Patient experienced occasional loud verbal outburst but was redirectable. Patient denies any unmet needs.  Continuous staff observer in place at this time

## 2023-07-11 LAB
BASOPHILS # BLD AUTO: 0.06 THOUSANDS/ÂΜL (ref 0–0.1)
BASOPHILS NFR BLD AUTO: 1 % (ref 0–1)
CLOZAPINE SERPL-MCNC: 392 NG/ML (ref 350–600)
CLOZAPINE+NOR SERPL-MCNC: 473 NG/ML
EOSINOPHIL # BLD AUTO: 0.16 THOUSAND/ÂΜL (ref 0–0.61)
EOSINOPHIL NFR BLD AUTO: 2 % (ref 0–6)
ERYTHROCYTE [DISTWIDTH] IN BLOOD BY AUTOMATED COUNT: 13.5 % (ref 11.6–15.1)
HCT VFR BLD AUTO: 48.7 % (ref 34.8–46.1)
HGB BLD-MCNC: 15.2 G/DL (ref 11.5–15.4)
IMM GRANULOCYTES # BLD AUTO: 0.04 THOUSAND/UL (ref 0–0.2)
IMM GRANULOCYTES NFR BLD AUTO: 0 % (ref 0–2)
LYMPHOCYTES # BLD AUTO: 2.96 THOUSANDS/ÂΜL (ref 0.6–4.47)
LYMPHOCYTES NFR BLD AUTO: 29 % (ref 14–44)
MCH RBC QN AUTO: 30 PG (ref 26.8–34.3)
MCHC RBC AUTO-ENTMCNC: 31.2 G/DL (ref 31.4–37.4)
MCV RBC AUTO: 96 FL (ref 82–98)
MONOCYTES # BLD AUTO: 0.74 THOUSAND/ÂΜL (ref 0.17–1.22)
MONOCYTES NFR BLD AUTO: 7 % (ref 4–12)
NEUTROPHILS # BLD AUTO: 6.2 THOUSANDS/ÂΜL (ref 1.85–7.62)
NEUTS SEG NFR BLD AUTO: 61 % (ref 43–75)
NORCLOZAPINE SERPL-MCNC: 81 NG/ML
NRBC BLD AUTO-RTO: 0 /100 WBCS
PLATELET # BLD AUTO: 288 THOUSANDS/UL (ref 149–390)
PMV BLD AUTO: 9.4 FL (ref 8.9–12.7)
RBC # BLD AUTO: 5.07 MILLION/UL (ref 3.81–5.12)
WBC # BLD AUTO: 10.16 THOUSAND/UL (ref 4.31–10.16)

## 2023-07-11 PROCEDURE — 99232 SBSQ HOSP IP/OBS MODERATE 35: CPT | Performed by: PSYCHIATRY & NEUROLOGY

## 2023-07-11 PROCEDURE — 85025 COMPLETE CBC W/AUTO DIFF WBC: CPT

## 2023-07-11 RX ADMIN — CHLORPROMAZINE HYDROCHLORIDE 50 MG: 50 TABLET, COATED ORAL at 21:12

## 2023-07-11 RX ADMIN — HYDROXYZINE HYDROCHLORIDE 100 MG: 50 TABLET, FILM COATED ORAL at 17:11

## 2023-07-11 RX ADMIN — BENZTROPINE MESYLATE 0.5 MG: 0.5 TABLET ORAL at 09:04

## 2023-07-11 RX ADMIN — CLONIDINE HYDROCHLORIDE 0.1 MG: 0.1 TABLET ORAL at 21:10

## 2023-07-11 RX ADMIN — SENNOSIDES AND DOCUSATE SODIUM 2 TABLET: 8.6; 5 TABLET ORAL at 17:07

## 2023-07-11 RX ADMIN — CLOZAPINE 150 MG: 25 TABLET ORAL at 09:02

## 2023-07-11 RX ADMIN — CLONIDINE HYDROCHLORIDE 0.1 MG: 0.1 TABLET ORAL at 09:02

## 2023-07-11 RX ADMIN — HYDROXYZINE HYDROCHLORIDE 100 MG: 50 TABLET, FILM COATED ORAL at 09:03

## 2023-07-11 RX ADMIN — CLOZAPINE 150 MG: 25 TABLET ORAL at 17:07

## 2023-07-11 RX ADMIN — SENNOSIDES AND DOCUSATE SODIUM 2 TABLET: 8.6; 5 TABLET ORAL at 09:02

## 2023-07-11 RX ADMIN — DIVALPROEX SODIUM 1000 MG: 500 TABLET, DELAYED RELEASE ORAL at 21:09

## 2023-07-11 RX ADMIN — LITHIUM CARBONATE 750 MG: 450 TABLET, EXTENDED RELEASE ORAL at 17:07

## 2023-07-11 RX ADMIN — BENZTROPINE MESYLATE 0.5 MG: 0.5 TABLET ORAL at 17:07

## 2023-07-11 RX ADMIN — LITHIUM CARBONATE 750 MG: 450 TABLET, EXTENDED RELEASE ORAL at 09:02

## 2023-07-11 RX ADMIN — TRAZODONE HYDROCHLORIDE 100 MG: 100 TABLET ORAL at 21:10

## 2023-07-11 RX ADMIN — Medication 3 MG: at 21:10

## 2023-07-11 RX ADMIN — DIVALPROEX SODIUM 1000 MG: 500 TABLET, DELAYED RELEASE ORAL at 09:02

## 2023-07-11 NOTE — NURSING NOTE
Pt remains on 1:1. Requires redirection when irritable and excessively loud. Able to maintains behavioral control with guidance. No signs of self injurious behavior. Continued close observation.

## 2023-07-11 NOTE — NURSING NOTE
Patient was unwilling to take her morning medication. She began crying and yelling. It took a lot of encouragement for the patient to eventually take her scheduled meds. 100 mg of atarax was added because of her anxiety related to taking medication. Medication was effective, she is less loud.

## 2023-07-11 NOTE — PLAN OF CARE
Problem: Risk for Self Injury/Neglect  Goal: Refrain from harming self  Description: Interventions:  - Monitor patient closely, per order  - Develop a trusting relationship  - Supervise medication ingestion, monitor effects and side effects   Outcome: Progressing  Goal: Attend and participate in unit activities, including therapeutic, recreational, and educational groups  Description: Interventions:  - Provide therapeutic and educational activities daily, encourage attendance and participation, and document same in the medical record  - Obtain collateral information, encourage visitation and family involvement in care   Outcome: Progressing  Goal: Complete daily ADLs, including personal hygiene independently, as able  Description: Interventions:  - Observe, teach, and assist patient with ADLS  - Monitor and promote a balance of rest/activity, with adequate nutrition and elimination  Outcome: Progressing     Problem: Risk for Violence/Aggression Toward Others  Goal: Refrain from harming others  Outcome: Progressing  Goal: Attend and participate in unit activities, including therapeutic, recreational, and educational groups  Description: Interventions:  - Provide therapeutic and educational activities daily, encourage attendance and participation, and document same in the medical record   Outcome: Progressing     Problem: JENAE  Goal: Will exhibit normal sleep and speech and no impulsivity  Description: INTERVENTIONS:  - Administer medication as ordered  - Set limits on impulsive behavior  - Make attempts to decrease external stimuli as possible  Outcome: Progressing     Problem: SELF CARE DEFICIT  Goal: Return ADL status to a safe level of function  Description: INTERVENTIONS:  - Administer medication as ordered  - Assess ADL deficits and provide assistive devices as needed  - Obtain PT/OT consults as needed  - Assist and instruct patient to increase activity and self care as tolerated  Outcome: Progressing Problem: DISCHARGE PLANNING - CARE MANAGEMENT  Goal: Discharge to post-acute care or home with appropriate resources  Description: INTERVENTIONS:  - Conduct assessment to determine patient/family and health care team treatment goals, and need for post-acute services based on payer coverage, community resources, and patient preferences, and barriers to discharge  - Address psychosocial, clinical, and financial barriers to discharge as identified in assessment in conjunction with the patient/family and health care team  - Arrange appropriate level of post-acute services according to patient’s   needs and preference and payer coverage in collaboration with the physician and health care team  - Communicate with and update the patient/family, physician, and health care team regarding progress on the discharge plan  - Arrange appropriate transportation to post-acute venues  Outcome: Progressing     Problem: SAFETY, RESTRAINT - VIOLENT/SELF-DESTRUCTIVE  Goal: Remains free of harm/injury from restraints (Restraint for Violent/Self-Destructive Behavior)  Description: INTERVENTIONS:  - Instruct patient/family regarding restraint use   - Assess and monitor physiologic and psychological status   - Provide interventions and comfort measures to meet assessed patient needs   - Ensure continuous in person monitoring is provided   - Identify and implement measures to help patient regain control  - Assess readiness for release of restraint  Outcome: Progressing  Goal: Returns to optimal restraint-free functioning  Description: INTERVENTIONS:  - Assess the patient's behavior and symptoms that indicate continued need for restraint  - Identify and implement measures to help patient regain control  - Assess readiness for release of restraint   Outcome: Progressing     Problem: Risk for Self Injury/Neglect  Goal: Treatment Goal: Remain safe during length of stay, learn and adopt new coping skills, and be free of self-injurious ideation, impulses and acts at the time of discharge  Outcome: Not Progressing  Goal: Verbalize thoughts and feelings  Description: Interventions:  - Assess and re-assess patient's lethality and potential for self-injury  - Engage patient in 1:1 interactions, daily, for a minimum of 15 minutes  - Encourage patient to express feelings, fears, frustrations, hopes  - Establish rapport/trust with patient   Outcome: Not Progressing  Goal: Recognize maladaptive responses and adopt new coping mechanisms  Outcome: Not Progressing     Problem: Risk for Violence/Aggression Toward Others  Goal: Treatment Goal: Refrain from acts of violence/aggression during length of stay, and demonstrate improved impulse control at the time of discharge  Outcome: Not Progressing  Goal: Verbalize thoughts and feelings  Description: Interventions:  - Assess and re-assess patient's level of risk, every waking shift  - Engage patient in 1:1 interactions, daily, for a minimum of 15 minutes   - Allow patient to express feelings and frustrations in a safe and non-threatening manner   - Establish rapport/trust with patient   Outcome: Not Progressing  Goal: Refrain from destructive acts on the environment or property  Outcome: Not Progressing  Goal: Control angry outbursts  Description: Interventions:  - Monitor patient closely, per order  - Ensure early verbal de-escalation  - Monitor prn medication needs  - Set reasonable/therapeutic limits, outline behavioral expectations, and consequences   - Provide a non-threatening milieu, utilizing the least restrictive interventions   Outcome: Not Progressing  Goal: Identify appropriate positive anger management techniques  Description: Interventions:  - Offer anger management and coping skills groups   - Staff will provide positive feedback for appropriate anger control  Outcome: Not Progressing     Problem: INVOLUNTARY ADMIT  Goal: Will cooperate with staff recommendations and doctor's orders and will demonstrate appropriate behavior  Description: INTERVENTIONS:  - Treat underlying conditions and offer medication as ordered  - Educate regarding involuntary admission procedures and rules  - Utilize positive consistent limit setting strategies to support patient and staff safety  Outcome: Not Progressing

## 2023-07-11 NOTE — PROGRESS NOTES
Progress Note - Behavioral Health   Miky Jenkins 28 y.o. female MRN: [id number]  Unit/Bed#: [facility] 349-01 Encounter: [id number]    Assessment/Plan   Principal Problem:    Unspecified mood (affective) disorder (HCC)  Active Problems:    Medical clearance for psychiatric admission    Intellectual disability    Psychosis ([address])    Dysuria    Vaginal lesion    Cough      Recommended Treatment:   No psychopharmacologic changes necessary at this moment; will continue to assess daily for further optimization. Continue medications as below:   Clozapine 150mg BID for psychosis (most recent CBC 7/11 WBC 10.16, ANC 6.2, clozapine level 7/7 392)  Depakote DR tab 1000mg q12h for mood/agitation (most recent Depakote level 6/11 87)  Lithobid 750mg BID for mood/agitation (most recent Lithium level 6/27 1. 1)  Cogentin 1mg BID for EPS   Trazodone 100mg qhs for insomnia     Continue 1:1 monitoring for safety   Continue with pharmacotherapy, group therapy, milieu therapy   Monitor for medication side effects   Safety checks per unit protocol   Vitals per unit protocol   CW/SW recommendations   Continue medical management by medical team.  Case discussed with treatment team.    Legal Status: involuntary [id number]  ------------------------------------------------------------    Subjective: All documentation including nursing notes, medication history to ensure medication adherence on the unit, labs, and vitals were reviewed. Patient is primarily [address] [place] [address] speaking, interviewed by myself with Dr. Rabia Floyd who is fluent for translation. Verenice Blackmon was evaluated this morning for continuity of care and no acute distress noted throughout the evaluation. Per nursing notes, yesterday "Pt observed to be yelling at peers, staff and out loud. She is not accepting of verbal redirection back to her room. Pt is intermittently punching walls in the hallway. Pt increasingly agitated". She received IM Thorazine for agitation which was effective.  No further acute events. Upon exam today, patient is seen sitting on unit interacting with residents and rushes to kiss this writer on the hand. Patient is restless and fidgety throughout interview, however is more redirectable and open to questioning today. Today, Jay Cuevas is consenting for safety on the unit. Jay Cuevas reports feeling "good." Jay Cuevas notes having "good" sleep. Jay Cuevas states having a "good" appetite. Jay Cuevas has been taking the medications as prescribed and reporting no side effects. She reports she "had a big BM". Reports minimal generalized abdominal pain. Denies cough, congestion, wheezing, SOB. Jay Cuevas denies suicidal ideations. Jay Cuevas denies homicidal ideations. Regarding hallucinations, Jay Cuevas reports auditory hallucinations of "hearing the devil" and states "dont play games with me". She denies hearing any commands. She denies visual hallucinations. When asked about goals, Jay Cuevas pulls out her note card and passes it to this author. She endorses no further complaints. PRNs overnight: Thorazine IM   VS: Reviewed, within normal limits    Progress Toward Goals: slow improvement    Psychiatric Review of Systems:  Behavior over the last 24 hours: unchanged  Sleep: normal  Appetite: normal  Medication side effects: No   ROS: all other systems are negative    Vital signs in last 24 hours:  Temp:  [96.9 °F (36.1 °C)-97.8 °F (36.6 °C)] 97.8 °F (36.6 °C)  HR:  [110-120] 120  BP: (126-141)/(65-83) 131/82    Laboratory results:  I have personally reviewed all pertinent laboratory/tests results.   Recent Results (from the past 48 hour(s))   CBC and differential    Collection Time: 07/11/23  8:13 AM   Result Value Ref Range    WBC 10.16 4.31 - 10.16 Thousand/uL    RBC 5.07 3.81 - 5.12 Million/uL    Hemoglobin 15.2 11.5 - 15.4 g/dL    Hematocrit 48.7 (H) 34.8 - 46.1 %    MCV 96 82 - 98 fL    MCH 30.0 26.8 - 34.3 pg    MCHC 31.2 (L) 31.4 - 37.4 g/dL    RDW 13.5 11.6 - 15.1 %    MPV 9.4 8.9 - 12.7 fL Platelets 016 393 - 146 Thousands/uL    nRBC 0 /100 WBCs    Neutrophils Relative 61 43 - 75 %    Immat GRANS % 0 0 - 2 %    Lymphocytes Relative 29 14 - 44 %    Monocytes Relative 7 4 - 12 %    Eosinophils Relative 2 0 - 6 %    Basophils Relative 1 0 - 1 %    Neutrophils Absolute 6.20 1.85 - 7.62 Thousands/µL    Immature Grans Absolute 0.04 0.00 - 0.20 Thousand/uL    Lymphocytes Absolute 2.96 0.60 - 4.47 Thousands/µL    Monocytes Absolute 0.74 0.17 - 1.22 Thousand/µL    Eosinophils Absolute 0.16 0.00 - 0.61 Thousand/µL    Basophils Absolute 0.06 0.00 - 0.10 Thousands/µL         Mental Status Evaluation:    Appearance:  overtly appearing  female, sitting in chair, poor hygiene    Behavior:  pleasant, restless and fidgety, some psychomotor agitation    Speech:  increased rate, pressured, loud, garbled and incoherent at times   Mood:  "good"   Affect:  labile, slightly brighter   Thought Process:  disorganized   Associations: concrete associations   Thought Content:  disorganized   Perceptual Disturbances: Reports auditory hallucinations. Denies visual hallucinations. Appears to be internally preoccupied. Appears to be responding to internal stimuli.     Risk Potential: Suicidal ideation - None at present, contracts for safety on the unit, would talk to staff if not feeling safe on the unit  Homicidal ideation - None  Potential for aggression - Not at present   Sensorium:  unable to assess   Memory:  recent and remote memory: unable to assess due to lack of cooperation   Consciousness:  alert and awake   Attention/Concentration: attention span and concentration are age appropriate   Insight:  limited   Judgment: limited   Gait/Station: normal gait/station   Motor Activity: no abnormal movements       Current Medications:  Current Facility-Administered Medications   Medication Dose Route Frequency Provider Last Rate   • acetaminophen  650 mg Oral Q6H PRN Ck Orellana DO     • acetaminophen  650 mg Oral Q4H PRN Mo Stai, DO     • acetaminophen  975 mg Oral Q6H PRN Mo Stai, DO     • aluminum-magnesium hydroxide-simethicone  30 mL Oral Q4H PRN Mo Stai, DO     • benztropine  1 mg Intramuscular Q4H PRN Max 6/day Mo Stai, DO     • benztropine  0.5 mg Oral BID Efra Gonzalez MD     • benztropine  1 mg Oral BID PRN Mo Stai, DO     • benztropine  1 mg Oral Q4H PRN Max 6/day Mo Stai, DO     • chlorproMAZINE  25 mg Intramuscular Q6H PRN Terrell Brizuela MD      Or   • chlorproMAZINE  25 mg Oral Q6H PRN Terrell Brizuela MD     • chlorproMAZINE  50 mg Intramuscular Q6H PRN Terrell Brizuela MD      Or   • chlorproMAZINE  50 mg Oral Q6H PRN Terrell Brizuela MD     • clonazePAM  0.5 mg Oral BID PRN Mo Stai, DO     • cloNIDine  0.1 mg Oral Q12H [address], DO     • cloZAPine  150 mg Oral BID Lanre Gonzalez, DO     • dextromethorphan-guaiFENesin  10 mL Oral Q4H PRN Lanette Del Valle PA-C     • diphenhydrAMINE  25 mg Intramuscular Q6H PRN Mo Stai, DO     • hydrOXYzine HCL  50 mg Oral Q6H PRN Max 4/day Mo Stai, DO      Or   • diphenhydrAMINE  50 mg Intramuscular Q6H PRN Mo Stai, DO     • divalproex sodium  1,000 mg Oral Q12H [address], DO     • hydrOXYzine HCL  100 mg Oral Q6H PRN Max 4/day Mo Stai, DO      Or   • LORazepam  2 mg Intramuscular Q6H PRN Mo Stai, DO     • hydrOXYzine HCL  25 mg Oral Q6H PRN Max 4/day Mo Stai, DO     • lithium carbonate  750 mg Oral BID Mo Stai, DO     • melatonin  3 mg Oral HS PRN Mo Stai, DO     • ondansetron  4 mg Oral Q6H PRN Lanette Del Valle PA-C     • polyethylene glycol  17 g Oral Daily PRN Mo Stai, DO     • propranolol  10 mg Oral Q8H PRN Mo Stai, DO     • senna-docusate sodium  2 tablet Oral BID Lanre Gonzalez DO     • simethicone  80 mg Oral Q6H PRN Lanette Del Valle PA-C     • traZODone  100 mg Oral HS Luiza Beavers Medications: All current active meds have been reviewed. Changes as in plan section above. Risks, benefits and possible side effects of Medications:   Risks, benefits, and possible side effects of medications explained to patient and patient verbalizes understanding. Counseling / Coordination of Care:  Patient's progress discussed with staff in treatment team meeting. Medications, treatment progress and treatment plan reviewed with patient. Forrest Habermann, DO  Psychiatry Resident, PGY-I    This note was completed in part utilizing Dragon dictation Software. Grammatical, translation, syntax errors, random word insertions, spelling mistakes, and incomplete sentences may be an occasional consequence of this system secondary to software limitations with voice recognition, ambient noise, and hardware issues. If you have any questions or concerns about the content, text, or information contained within the body of this dictation, please contact the provider for clarification.

## 2023-07-11 NOTE — PROGRESS NOTES
07/11/23 0856   Team Meeting   Meeting Type Daily Rounds   Team Members Present   Team Members Present Physician;Nurse;   Physician Team Member [id number] [facility] Member Cathi   Patient/Family Present   Patient Present No   Patient's Family Present No   1:1. Pt yelling at peers and staff yesterday. Pt intermittently punching wall. PRN IM thorazine. Pt loud, intrusive, disorganized. Pt requires extensive redirection throughout the day. Clozapine level this week. BM yesterday. Bowel regimen senokot increased. DC tbd.

## 2023-07-12 PROCEDURE — 99232 SBSQ HOSP IP/OBS MODERATE 35: CPT | Performed by: PSYCHIATRY & NEUROLOGY

## 2023-07-12 RX ADMIN — DIVALPROEX SODIUM 1000 MG: 500 TABLET, DELAYED RELEASE ORAL at 08:52

## 2023-07-12 RX ADMIN — CHLORPROMAZINE HYDROCHLORIDE 50 MG: 25 INJECTION INTRAMUSCULAR at 10:36

## 2023-07-12 RX ADMIN — LITHIUM CARBONATE 750 MG: 450 TABLET, EXTENDED RELEASE ORAL at 08:51

## 2023-07-12 RX ADMIN — SENNOSIDES AND DOCUSATE SODIUM 2 TABLET: 8.6; 5 TABLET ORAL at 08:51

## 2023-07-12 RX ADMIN — CLOZAPINE 175 MG: 100 TABLET ORAL at 17:27

## 2023-07-12 RX ADMIN — CLOZAPINE 150 MG: 25 TABLET ORAL at 08:51

## 2023-07-12 RX ADMIN — BENZTROPINE MESYLATE 0.5 MG: 0.5 TABLET ORAL at 17:27

## 2023-07-12 RX ADMIN — CLONIDINE HYDROCHLORIDE 0.1 MG: 0.1 TABLET ORAL at 20:58

## 2023-07-12 RX ADMIN — SENNOSIDES AND DOCUSATE SODIUM 2 TABLET: 8.6; 5 TABLET ORAL at 17:27

## 2023-07-12 RX ADMIN — CLONIDINE HYDROCHLORIDE 0.1 MG: 0.1 TABLET ORAL at 08:52

## 2023-07-12 RX ADMIN — CHLORPROMAZINE HYDROCHLORIDE 25 MG: 25 TABLET, FILM COATED ORAL at 16:06

## 2023-07-12 RX ADMIN — BENZTROPINE MESYLATE 0.5 MG: 0.5 TABLET ORAL at 08:52

## 2023-07-12 RX ADMIN — TRAZODONE HYDROCHLORIDE 100 MG: 100 TABLET ORAL at 21:00

## 2023-07-12 RX ADMIN — LITHIUM CARBONATE 750 MG: 450 TABLET, EXTENDED RELEASE ORAL at 17:27

## 2023-07-12 RX ADMIN — DIVALPROEX SODIUM 1000 MG: 500 TABLET, DELAYED RELEASE ORAL at 20:58

## 2023-07-12 NOTE — PROGRESS NOTES
Progress Note - Behavioral Health   Enrrique Angelo 28 y.o. female MRN: [id number]  Unit/Bed#: [facility] 349-01 Encounter: [id number]    Assessment/Plan   Principal Problem:    Unspecified mood (affective) disorder ([address])  Active Problems:    Medical clearance for psychiatric admission    Intellectual disability    Psychosis ([address])    Dysuria    Vaginal lesion    Cough      Recommended Treatment:     Continue medications as below:   Increase Clozapine to 175mg BID for psychosis (most recent CBC 7/11 WBC 10.16, ANC 6.2, clozapine level 7/7 392)  Depakote DR tab 1000mg q12h for mood/agitation (most recent Depakote level 6/11 87)  Lithobid 750mg BID for mood/agitation (most recent Lithium level 6/27 1. 1)  Cogentin 1mg BID for EPS   Trazodone 1000mg qhs for insomnia     Will obtain Lithium level tomorrow AM  Will obtain EKG to further evaluate tachycardia   Continue 1:1 monitoring for safety   Continue with pharmacotherapy, group therapy, milieu therapy and occupational therapy. Monitor for medication side effects. Safety checks and vitals per unit protocol  CM/SW recommendations   Continue medical management by medical team.  Case discussed with treatment team.    Legal Status: involuntary 304  ------------------------------------------------------------    Subjective: All documentation including nursing notes, medication history to ensure medication adherence on the unit, labs, and vitals were reviewed. Rebecca Starr was evaluated this morning for continuity of care and no acute distress noted throughout the evaluation. Patient is primarily [address] [place] [address] speaking, history obtained by myself, with Dr. Beatriz Rowe who is fluent for translation. Per nursing, yesterday evening patient was agitated, "restless, labile, loud with rapid speech, yelling and calling out to select female staff.  Pt sexually inappropriate this evening to the point of attempting to touch/slap the gluteal regions of select female staff members along with making sexually inappropriate comments. Pt minimally cooperative with Hebrew-speaking staff's verbal redirection and education on expected appropriate behaviors, pt smiling and giggling in response, then repeating behaviors". She was given po thorazine and melatonin which was effective. No further acute events. Today, Carlee Bates is consenting for safety on the unit. Carlee Bates reports feeling "good". Throughout interview, she is preoccupied with showing this writer her stickers and magazines. She points to two separate male figures in magazine and states "this is my boyfriend. She later points to a picture of an elderly male and states "he  at 80years old" and begins to kiss the image. She remains internally preoccupied and disorganized. She states her energy has been "good". Reports her appetite has been "good". States her sleep has been "good" Carlee Bates has been taking the medications as prescribed and reporting no side effects. Carlee Bates states she had a BM 4x in the past day. States she has no goals for today. Carlee Bates denies suicidal ideations. Carlee Bates denies homicidal ideations. Regarding hallucinations, Carlee Bates continues to report auditory hallucinations. States this morning she heard voices that "said they were going to kill me". She denies visual hallucinations. She endorses no further complaints. PRNs overnight: Thorazine po, melatonin   VS: Reviewed,  this am, otherwise wnl     Progress Toward Goals: slow improvement    Psychiatric Review of Systems:  Behavior over the last 24 hours:  regressed  Sleep: normal  Appetite: normal  Medication side effects: Yes, slight tremor   ROS: all other systems are negative    Vital signs in last 24 hours:  Temp:  [97.1 °F (36.2 °C)-97.6 °F (36.4 °C)] 97.1 °F (36.2 °C)  HR:  [] 110  Resp:  [16-18] 16  BP: (124-146)/() 135/93    Laboratory results:  I have personally reviewed all pertinent laboratory/tests results.   Recent Results (from the past 48 hour(s))   CBC and differential    Collection Time: 07/11/23  8:13 AM   Result Value Ref Range    WBC 10.16 4.31 - 10.16 Thousand/uL    RBC 5.07 3.81 - 5.12 Million/uL    Hemoglobin 15.2 11.5 - 15.4 g/dL    Hematocrit 48.7 (H) 34.8 - 46.1 %    MCV 96 82 - 98 fL    MCH 30.0 26.8 - 34.3 pg    MCHC 31.2 (L) 31.4 - 37.4 g/dL    RDW 13.5 11.6 - 15.1 %    MPV 9.4 8.9 - 12.7 fL    Platelets 078 123 - 317 Thousands/uL    nRBC 0 /100 WBCs    Neutrophils Relative 61 43 - 75 %    Immat GRANS % 0 0 - 2 %    Lymphocytes Relative 29 14 - 44 %    Monocytes Relative 7 4 - 12 %    Eosinophils Relative 2 0 - 6 %    Basophils Relative 1 0 - 1 %    Neutrophils Absolute 6.20 1.85 - 7.62 Thousands/µL    Immature Grans Absolute 0.04 0.00 - 0.20 Thousand/uL    Lymphocytes Absolute 2.96 0.60 - 4.47 Thousands/µL    Monocytes Absolute 0.74 0.17 - 1.22 Thousand/µL    Eosinophils Absolute 0.16 0.00 - 0.61 Thousand/µL    Basophils Absolute 0.06 0.00 - 0.10 Thousands/µL         Mental Status Evaluation:    Appearance:  overtly appearing  female, poor hygiene, appears older than stated age    Behavior:  cooperative, restless and fidgety, some psychomotor agitation   Speech:  increased rate, pressured, garbled, incoherent at times   Mood:  "good"   Affect:  labile   Thought Process:  disorganized   Associations: concrete associations   Thought Content:  preoccupied with sexual thoughts   Perceptual Disturbances: Reports auditory hallucinations, Appears to be responding to internal stimuli and Appears to be internally preoccupied, appears distracted   Risk Potential: Suicidal ideation - None at present, contracts for safety on the unit, would talk to staff if not feeling safe on the unit  Homicidal ideation - None  Potential for aggression - Not at present   Sensorium:  unable to assess   Memory:  recent and remote memory: unable to assess due to lack of cooperation   Consciousness:  alert and awake   Attention/Concentration: attention span and concentration are age appropriate   Insight:  limited   Judgment: limited   Gait/Station: normal gait/station   Motor Activity: mild tremor noted in bilateral hands       Current Medications:  Current Facility-Administered Medications   Medication Dose Route Frequency Provider Last Rate   • acetaminophen  650 mg Oral Q6H PRN María Frohlich, DO     • acetaminophen  650 mg Oral Q4H PRN María Frohlich, DO     • acetaminophen  975 mg Oral Q6H PRN María Frohlich, DO     • aluminum-magnesium hydroxide-simethicone  30 mL Oral Q4H PRN María Frohlich, DO     • benztropine  1 mg Intramuscular Q4H PRN Max 6/day María Frohlich, DO     • benztropine  0.5 mg Oral BID Shay Finnegan MD     • benztropine  1 mg Oral BID PRN María Frohlich, DO     • benztropine  1 mg Oral Q4H PRN Max 6/day María Frohlich, DO     • chlorproMAZINE  25 mg Intramuscular Q6H PRN Mitchell Venegas MD      Or   • chlorproMAZINE  25 mg Oral Q6H PRN Mitchell Venegas MD     • chlorproMAZINE  50 mg Intramuscular Q6H PRN Mitchell Venegas MD      Or   • chlorproMAZINE  50 mg Oral Q6H PRN Mitchell Venegas MD     • clonazePAM  0.5 mg Oral BID PRN María Frojenniferich, DO     • cloNIDine  0.1 mg Oral Q12H [address] , DO     • cloZAPine  150 mg Oral BID Valerie Squibb, DO     • dextromethorphan-guaiFENesin  10 mL Oral Q4H PRN Benji Zafar PA-C     • diphenhydrAMINE  25 mg Intramuscular Q6H PRN María Frohlich, DO     • hydrOXYzine HCL  50 mg Oral Q6H PRN Max 4/day María Frojenniferich, DO      Or   • diphenhydrAMINE  50 mg Intramuscular Q6H PRN María Frohlich, DO     • divalproex sodium  1,000 mg Oral Q12H [address] [facility] María Frohlich, DO     • hydrOXYzine HCL  100 mg Oral Q6H PRN Max 4/day María Frojennifreich, DO      Or   • LORazepam  2 mg Intramuscular Q6H PRN María Frohlich, DO     • hydrOXYzine HCL  25 mg Oral Q6H PRN Max 4/day María Lawson DO     • lithium carbonate  750 mg Oral BID María Lawson DO     • melatonin  3 mg Oral HS PRN María Lawson DO     • ondansetron  4 mg Oral Q6H PRN Lilli Sanders PA-C     • polyethylene glycol  17 g Oral Daily PRN Stover Gutting, DO     • propranolol  10 mg Oral Q8H PRN Stover Gutting, DO     • senna-docusate sodium  2 tablet Oral BID Damian Foster DO     • simethicone  80 mg Oral Q6H PRN Lilli Sanders PA-C     • traZODone  100 mg Oral HS PERLA Isaacs         Behavioral Health Medications: All current active meds have been reviewed. Changes as in plan section above. Risks, benefits and possible side effects of Medications:   Risks, benefits, and possible side effects of medications explained to patient and patient verbalizes understanding. Counseling / Coordination of Care:  Patient's progress discussed with staff in treatment team meeting. Medications, treatment progress and treatment plan reviewed with patient. Damian Foster DO  Psychiatry Resident, PGY-I    This note was completed in part utilizing Dragon dictation Software. Grammatical, translation, syntax errors, random word insertions, spelling mistakes, and incomplete sentences may be an occasional consequence of this system secondary to software limitations with voice recognition, ambient noise, and hardware issues. If you have any questions or concerns about the content, text, or information contained within the body of this dictation, please contact the provider for clarification.

## 2023-07-12 NOTE — NURSING NOTE
Pt is restless, labile, loud with rapid speech, yelling and calling out to select female staff. Pt sexually inappropriate this evening to the point of attempting to touch/slap the gluteal regions of select female staff members along with making sexually inappropriate comments. Pt minimally cooperative with Telugu-speaking staff's verbal redirection and education on expected appropriate behaviors, pt smiling and giggling in response, then repeating behaviors. Pt escorted back to bedroom for medications, pt screaming and agitated. Pt given PRN melatonin 3mg @ 2110 and PO thorazine 50mg PO @ 2112 for severe agitation; both effective. Pt in behavioral control, resting in bed.

## 2023-07-12 NOTE — PROGRESS NOTES
07/12/23 0885   Team Meeting   Meeting Type Daily Rounds   Team Members Present   Team Members Present Physician;Nurse;   Physician Team Member [id number] Member Cathi   Patient/Family Present   Patient Present No   Patient's Family Present No   Restless, labile, loud, pt sexually inappropriate and attempted to slap females butt. PRN melatonin and thorazine-effective. Pt slightly less disorganized yesterday. Consider increase in clozaril. DC tbd.

## 2023-07-12 NOTE — NURSING NOTE
Patient took her medications this morning without incident. She is bright, loud, friendly, and has rapid speech.  Patient remains on constant observation

## 2023-07-13 LAB — LITHIUM SERPL-SCNC: 0.9 MMOL/L (ref 0.6–1.2)

## 2023-07-13 PROCEDURE — 99232 SBSQ HOSP IP/OBS MODERATE 35: CPT | Performed by: PSYCHIATRY & NEUROLOGY

## 2023-07-13 PROCEDURE — 80178 ASSAY OF LITHIUM: CPT | Performed by: PHYSICIAN ASSISTANT

## 2023-07-13 RX ORDER — HALOPERIDOL 5 MG/1
5 TABLET ORAL ONCE
Status: COMPLETED | OUTPATIENT
Start: 2023-07-13 | End: 2023-07-13

## 2023-07-13 RX ADMIN — LITHIUM CARBONATE 750 MG: 450 TABLET, EXTENDED RELEASE ORAL at 08:00

## 2023-07-13 RX ADMIN — DIVALPROEX SODIUM 1000 MG: 500 TABLET, DELAYED RELEASE ORAL at 08:00

## 2023-07-13 RX ADMIN — CLOZAPINE 175 MG: 100 TABLET ORAL at 17:33

## 2023-07-13 RX ADMIN — SENNOSIDES AND DOCUSATE SODIUM 2 TABLET: 8.6; 5 TABLET ORAL at 17:42

## 2023-07-13 RX ADMIN — CLONIDINE HYDROCHLORIDE 0.1 MG: 0.1 TABLET ORAL at 20:53

## 2023-07-13 RX ADMIN — HYDROXYZINE HYDROCHLORIDE 100 MG: 50 TABLET, FILM COATED ORAL at 16:06

## 2023-07-13 RX ADMIN — DIVALPROEX SODIUM 1000 MG: 500 TABLET, DELAYED RELEASE ORAL at 20:54

## 2023-07-13 RX ADMIN — ONDANSETRON 4 MG: 4 TABLET, ORALLY DISINTEGRATING ORAL at 09:44

## 2023-07-13 RX ADMIN — BENZTROPINE MESYLATE 0.5 MG: 0.5 TABLET ORAL at 08:00

## 2023-07-13 RX ADMIN — BENZTROPINE MESYLATE 0.5 MG: 0.5 TABLET ORAL at 17:33

## 2023-07-13 RX ADMIN — CHLORPROMAZINE HYDROCHLORIDE 50 MG: 50 TABLET, COATED ORAL at 16:07

## 2023-07-13 RX ADMIN — HALOPERIDOL 5 MG: 5 TABLET ORAL at 20:53

## 2023-07-13 RX ADMIN — LITHIUM CARBONATE 750 MG: 450 TABLET, EXTENDED RELEASE ORAL at 17:33

## 2023-07-13 RX ADMIN — TRAZODONE HYDROCHLORIDE 100 MG: 100 TABLET ORAL at 21:05

## 2023-07-13 RX ADMIN — CLONIDINE HYDROCHLORIDE 0.1 MG: 0.1 TABLET ORAL at 08:00

## 2023-07-13 RX ADMIN — SENNOSIDES AND DOCUSATE SODIUM 2 TABLET: 8.6; 5 TABLET ORAL at 08:00

## 2023-07-13 RX ADMIN — CHLORPROMAZINE HYDROCHLORIDE 50 MG: 50 TABLET, COATED ORAL at 04:45

## 2023-07-13 RX ADMIN — CLOZAPINE 175 MG: 100 TABLET ORAL at 08:00

## 2023-07-13 NOTE — PROGRESS NOTES
07/13/23 1000   Activity/Group Checklist   Group Other (Comment)  (Group Art Therapy, Choose one material that you can easily move around the paper to fill the entire space and then choose another material to add to it.)   Attendance Attended   Attendance Duration (min) Greater than 60   Interactions Disorganized interaction   Affect/Mood Incongruent

## 2023-07-13 NOTE — NURSING NOTE
Pt remains on 1:1. Pt requires frequent redirecting and cueing. No self injurious or violent behaviors to note. Compliant with evening meds.

## 2023-07-13 NOTE — PROGRESS NOTES
07/13/23 0828   Team Meeting   Meeting Type Daily Rounds   Team Members Present   Team Members Present Physician;Nurse;   Physician Team Member [id number] [facility] Team Member Cathi   Patient/Family Present   Patient Present No   Patient's Family Present No   1:1. Lithium level 0.9. Bright, loud, friendly. Evening pt was agitated, yelling at peers, loud. PO PRN thorazine-effective. Med/meal compliant. Clozapine increased. DC tbd.

## 2023-07-13 NOTE — PROGRESS NOTES
Progress Note - Behavioral Health   Anuradha Macias 28 y.o. female MRN: [id number]  Unit/Bed#: [facility] 349-01 Encounter: [id number]    Assessment/Plan   Principal Problem:    Unspecified mood (affective) disorder (HCC)  Active Problems:    Medical clearance for psychiatric admission    Intellectual disability    Psychosis ([address])    Dysuria    Vaginal lesion    Cough      Recommended Treatment:   No psychopharmacologic changes necessary at this moment; will continue to assess daily for further optimization. Continue medications as below:   Clozapine 175mg BID for psychosis (most recent CBC 7/11 WBC 10.16, ANC 6.2, Clozapine level 7/7 392)  Depakote DR tab 1000mg BID for mood/agitation (most recent Depakote level 6/11 87)  Lithobid 750mg BID for mood/agitation (Lithium level this am 0.9)  Cogentin 1mg BID for EPS   Trazodone 100 mg qhs for insomnia    EKG pending to further evaluate tachycardia   Continue 1:1 monitoring for safety   Continue with pharmacotherapy, group therapy, milieu therapy and occupational therapy. Monitor for adverse medication side effects. Safety checks and vitals per unit protocol. CM/SW recommendations   Continue medical management by medical team.  Case discussed with treatment team.    Legal Status: involuntary 304  ------------------------------------------------------------    Subjective: All documentation including nursing notes, medication history to ensure medication adherence on the unit, labs, and vitals were reviewed. Karla Nelson was evaluated this morning for continuity of care and no acute distress noted throughout the evaluation. Patient primarily [id number] [facility] speaking, interviewed by myself with Dr. Ursula Topete who is fluent for translation. Per nursing, yesterday evening patient was "yelling and screaming on other peers face" and received Thorazine, which was effective. She was also noted to be blowing kisses and hugging staff.  Otherwise she has been cooperative on the unit and compliant with medications. Today on evaluation, Heath Brewer walks this author to her room, quickly pulls up a chair and sits down for her interview. She remains loud, labile, disorganized, and internally preoccupied. Heath Brewer is consenting for safety on the unit. Heath Brewer reports feeling "good." Heath Brewer notes having "good" sleep and states she "slept 5 times". Heath Brewer states having a "good" appetite and states she "ate potatoes and cornflakes". Heath Brewer has been taking medications as prescribed and reporting no side effects. She states "she is big and strong" and shows this author her muscles. She then proceeds to do pushups and counts out loud. 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 Heath Brewer then proceeds to grab her mask from [employer] and rushes to place mask on this author's face. She then identifies all the colors on the mask. shelter through identifying colors, Heath Brewer begins to say "Myy head is itchy. My grandmother . My grandfather . My aunt ". She endorses no complaints today. Heath Brewer denies suicidal ideations. Heaht Brewer denies homicidal ideations. Regarding hallucinations, Heath Brewer reports auditory hallucinations and hears voices saying "how are you". She denies visual hallucinations. PRNs overnight: Thorazine po & IM   VS: Reviewed, , otherwise wnl     Progress Toward Goals: slow improvement    Psychiatric Review of Systems:  Behavior over the last 24 hours:  unchanged  Sleep: normal  Appetite: normal  Medication side effects: No   ROS: all other systems are negative    Vital signs in last 24 hours:  Temp:  [97 °F (36.1 °C)-97.3 °F (36.3 °C)] 97 °F (36.1 °C)  HR:  [109-131] 109  Resp:  [16-18] 18  BP: (121-135)/(79-84) 124/84    Laboratory results:  I have personally reviewed all pertinent laboratory/tests results.   Recent Results (from the past 48 hour(s))   Lithium level    Collection Time: 23  5:30 AM   Result Value Ref Range Lithium Lvl 0.9 0.6 - 1.2 mmol/L         Mental Status Evaluation:    Appearance:  looks older than stated age, poor hygiene, overtly appearing  female    Behavior:  cooperative, restless and fidgety, some psychomotor agitation   Speech:  increased rate, pressured, increased volume, incoherent at times   Mood:  "good"   Affect:  labile   Thought Process:  disorganized   Associations: loose associations   Thought Content:  preoccupied   Perceptual Disturbances: Appears to be responding to internal stimuli and Appears to be internally preoccupied, appears distracted throughout interview   Risk Potential: Suicidal ideation - None at present  Homicidal ideation - None at present  Potential for aggression - Not at present   Sensorium:  unable to assess   Memory:  recent and remote memory: unable to assess due to lack of cooperation   Consciousness:  alert and awake   Attention/Concentration: poor concentration and poor attention span   Insight:  poor   Judgment: poor   Gait/Station: normal gait/station   Motor Activity: slight tremor bilateral hands       Current Medications:  Current Facility-Administered Medications   Medication Dose Route Frequency Provider Last Rate   • acetaminophen  650 mg Oral Q6H PRN Cherie Perkins DO     • acetaminophen  650 mg Oral Q4H PRN Cherie Perkins DO     • acetaminophen  975 mg Oral Q6H PRN Cherie Perkins DO     • aluminum-magnesium hydroxide-simethicone  30 mL Oral Q4H PRN Cherie Perkins DO     • benztropine  1 mg Intramuscular Q4H PRN Max 6/day Cherie Perkins DO     • benztropine  0.5 mg Oral BID Neha Casarez MD     • benztropine  1 mg Oral BID PRN Cherie Perkins DO     • benztropine  1 mg Oral Q4H PRN Max 6/day Cherie Perkins DO     • chlorproMAZINE  25 mg Intramuscular Q6H PRN Pam Zendejas MD      Or   • chlorproMAZINE  25 mg Oral Q6H PRN Pam Zendejas MD     • chlorproMAZINE  50 mg Intramuscular Q6H PRN Pam Zendejas MD      Or   • chlorproMAZINE  50 mg Oral Q6H PRN Crystal Concepcion MD     • clonazePAM  0.5 mg Oral BID PRN Kavita Sallies, DO     • cloNIDine  0.1 mg Oral Q12H [address] Kavita Sallies, DO     • cloZAPine  175 mg Oral BID Barnie Janice, DO     • dextromethorphan-guaiFENesin  10 mL Oral Q4H PRN YVROSE HoangC     • diphenhydrAMINE  25 mg Intramuscular Q6H PRN Kavita Sallies, DO     • hydrOXYzine HCL  50 mg Oral Q6H PRN Max 4/day Kavita Sallies, DO      Or   • diphenhydrAMINE  50 mg Intramuscular Q6H PRN Kavita Sallies, DO     • divalproex sodium  1,000 mg Oral Q12H [address] Kavita Sallies, DO     • hydrOXYzine HCL  100 mg Oral Q6H PRN Max 4/day Kavita Sallies, DO      Or   • LORazepam  2 mg Intramuscular Q6H PRN Kavita Sallies, DO     • hydrOXYzine HCL  25 mg Oral Q6H PRN Max 4/day Kavita Sallies, DO     • lithium carbonate  750 mg Oral BID Kavita Sallies, DO     • melatonin  3 mg Oral HS PRN Kavita Sallies, DO     • ondansetron  4 mg Oral Q6H PRN YVROSE HoangC     • polyethylene glycol  17 g Oral Daily PRN Kavita Sallies, DO     • propranolol  10 mg Oral Q8H PRN Kavita Sallies, DO     • senna-docusate sodium  2 tablet Oral BID Karen Navarroa, DO     • simethicone  80 mg Oral Q6H PRN Ginger Chaney PA-C     • traZODone  100 mg Oral HS PERLA Parson         Behavioral Health Medications: All current active meds have been reviewed. Changes as in plan section above. Risks, benefits and possible side effects of Medications:   Risks, benefits, and possible side effects of medications explained to patient and patient verbalizes understanding. Counseling / Coordination of Care:  Patient's progress discussed with staff in treatment team meeting. Medications, treatment progress and treatment plan reviewed with patient. Karen Camacho DO  Psychiatry Resident, PGY-I    This note was completed in part utilizing Dragon dictation Software.  Grammatical, translation, syntax errors, random word insertions, spelling mistakes, and incomplete sentences may be an occasional consequence of this system secondary to software limitations with voice recognition, ambient noise, and hardware issues. If you have any questions or concerns about the content, text, or information contained within the body of this dictation, please contact the provider for clarification.

## 2023-07-13 NOTE — NURSING NOTE
Pt received thorazine 50mg po and atarax 100mg @ 1305 for severe agitation and anxiety. Pt was yelling and cursing at staff, pushing past them and not redirectable at this time. Pt walked to her room, agreeable to stay in there until medication can take effect. Reassessed @ [phone] and somewhat effective. Pt is in more behavior control at this time.

## 2023-07-13 NOTE — NURSING NOTE
Pt remains on 1:1 for continual observation for behaviors. Medication and meal compliant with encouragement. Pt prefers taking meds with milk. Continues to need frequent verbal redirection throughout the day, blowing kisses and hugging staff. Pt denies SI/HI/AH/VH at this time. Denies any unmet needs or complaints at this time.

## 2023-07-14 PROCEDURE — 99232 SBSQ HOSP IP/OBS MODERATE 35: CPT | Performed by: PSYCHIATRY & NEUROLOGY

## 2023-07-14 RX ORDER — LORAZEPAM 2 MG/ML
2 INJECTION INTRAMUSCULAR
Status: DISCONTINUED | OUTPATIENT
Start: 2023-07-14 | End: 2023-07-18

## 2023-07-14 RX ORDER — BENZTROPINE MESYLATE 1 MG/ML
1 INJECTION INTRAMUSCULAR; INTRAVENOUS
Status: DISCONTINUED | OUTPATIENT
Start: 2023-07-14 | End: 2023-07-18

## 2023-07-14 RX ORDER — LORAZEPAM 2 MG/ML
1 INJECTION INTRAMUSCULAR
Status: DISCONTINUED | OUTPATIENT
Start: 2023-07-14 | End: 2023-07-18

## 2023-07-14 RX ORDER — HALOPERIDOL 5 MG/ML
2.5 INJECTION INTRAMUSCULAR
Status: DISCONTINUED | OUTPATIENT
Start: 2023-07-14 | End: 2023-07-18

## 2023-07-14 RX ORDER — BENZTROPINE MESYLATE 1 MG/ML
0.5 INJECTION INTRAMUSCULAR; INTRAVENOUS
Status: DISCONTINUED | OUTPATIENT
Start: 2023-07-14 | End: 2023-07-18

## 2023-07-14 RX ORDER — HALOPERIDOL 5 MG/1
5 TABLET ORAL
Status: DISCONTINUED | OUTPATIENT
Start: 2023-07-14 | End: 2023-07-18

## 2023-07-14 RX ORDER — HALOPERIDOL 5 MG/ML
5 INJECTION INTRAMUSCULAR
Status: DISCONTINUED | OUTPATIENT
Start: 2023-07-14 | End: 2023-07-18

## 2023-07-14 RX ORDER — HALOPERIDOL 1 MG/1
2 TABLET ORAL
Status: DISCONTINUED | OUTPATIENT
Start: 2023-07-14 | End: 2023-07-18

## 2023-07-14 RX ADMIN — CLOZAPINE 175 MG: 100 TABLET ORAL at 17:07

## 2023-07-14 RX ADMIN — BENZTROPINE MESYLATE 0.5 MG: 0.5 TABLET ORAL at 08:07

## 2023-07-14 RX ADMIN — DIVALPROEX SODIUM 1000 MG: 500 TABLET, DELAYED RELEASE ORAL at 08:07

## 2023-07-14 RX ADMIN — DIVALPROEX SODIUM 1000 MG: 500 TABLET, DELAYED RELEASE ORAL at 21:09

## 2023-07-14 RX ADMIN — HYDROXYZINE HYDROCHLORIDE 100 MG: 50 TABLET, FILM COATED ORAL at 23:00

## 2023-07-14 RX ADMIN — LITHIUM CARBONATE 750 MG: 450 TABLET, EXTENDED RELEASE ORAL at 08:08

## 2023-07-14 RX ADMIN — CHLORPROMAZINE HYDROCHLORIDE 50 MG: 50 TABLET, COATED ORAL at 11:42

## 2023-07-14 RX ADMIN — HALOPERIDOL 5 MG: 5 TABLET ORAL at 15:53

## 2023-07-14 RX ADMIN — HYDROXYZINE HYDROCHLORIDE 100 MG: 50 TABLET, FILM COATED ORAL at 11:42

## 2023-07-14 RX ADMIN — LITHIUM CARBONATE 750 MG: 450 TABLET, EXTENDED RELEASE ORAL at 17:07

## 2023-07-14 RX ADMIN — CLONIDINE HYDROCHLORIDE 0.1 MG: 0.1 TABLET ORAL at 08:08

## 2023-07-14 RX ADMIN — SENNOSIDES AND DOCUSATE SODIUM 2 TABLET: 8.6; 5 TABLET ORAL at 17:07

## 2023-07-14 RX ADMIN — TRAZODONE HYDROCHLORIDE 100 MG: 100 TABLET ORAL at 21:09

## 2023-07-14 RX ADMIN — BENZTROPINE MESYLATE 0.5 MG: 0.5 TABLET ORAL at 17:07

## 2023-07-14 RX ADMIN — CLONIDINE HYDROCHLORIDE 0.1 MG: 0.1 TABLET ORAL at 21:09

## 2023-07-14 RX ADMIN — SENNOSIDES AND DOCUSATE SODIUM 2 TABLET: 8.6; 5 TABLET ORAL at 08:07

## 2023-07-14 RX ADMIN — CLOZAPINE 175 MG: 100 TABLET ORAL at 08:08

## 2023-07-14 RX ADMIN — ACETAMINOPHEN 975 MG: 325 TABLET ORAL at 07:31

## 2023-07-14 RX ADMIN — HALOPERIDOL 5 MG: 5 TABLET ORAL at 23:00

## 2023-07-14 NOTE — PROGRESS NOTES
07/14/23 1000   Activity/Group Checklist   Group Other (Comment)  (Group Art Therapy, Creatively demonstrate your relationship with a randomly chosen word)   Attendance Attended   Attendance Duration (min) 16-30   Interactions Other (Comment)  (Left early.  Acted inappropriately in group by spitting on the floor.)

## 2023-07-14 NOTE — NURSING NOTE
Pt remains on 1:1 continuously for safety prn haldol 5 mg given at 46  For yelling and screaming and verbally abusive to staff spitting on floor and sexually inappropriate. Reassessed at this time medication effective in behavior control at this time .

## 2023-07-14 NOTE — PROGRESS NOTES
07/14/23 0840   Team Meeting   Meeting Type Daily Rounds   Team Members Present   Team Members Present Physician;Nurse;   Physician Team Member [id number] [facility] Team Member [facility] Team Member constantin   Patient/Family Present   Patient Present No   Patient's Family Present No   PRN thorazine and atarax yesterday, yelling, cursing at staff, pushing past staff, difficult to redirect. PRNs-partially effective. Pt intrusive at times and can be sexually inappropriate, but that is baseline per family. Possible DC to begin to be planned with family next week.

## 2023-07-14 NOTE — PROGRESS NOTES
Progress Note - Behavioral Health   Yany Cheng 28 y.o. female MRN: [id number]  Unit/Bed#: [facility] 349-01 Encounter: [id number]    Assessment/Plan   Principal Problem:    Unspecified mood (affective) disorder (HCC)  Active Problems:    Medical clearance for psychiatric admission    Intellectual disability    Psychosis ([address])    Dysuria    Vaginal lesion    Cough      Recommended Treatment:   No psychopharmacologic changes necessary at this moment; will continue to assess daily for further optimization    Continue medications as below:   Clozapine 175mg BID for psychosis (most recent CBC 7/11 WBC 10.16, ANC 6.2, Clozapine level 7/7 392)  Depakote DR tab 1000mg BID for mood/agitation (most recent Depakote level 6/11 87)  Lithobid 750mg BID for mood/agitation (most recent Lithium level 7/13 0.9)  Cogentin 1mg BID for EPS   Trazodone 100 mg qhs for insomnia    EKG pending   Continue 1:1 monitoring for safety   Continue with pharmacotherapy, group therapy, milieu therapy and occupational therapy. Monitor for adverse medication side effects. Safety checks and vitals per unit protocol. CM/SW recommendations   Continue medical management by medical team.  Case discussed with treatment team.    Legal Status: involuntary 304  ------------------------------------------------------------    Subjective: All documentation including nursing notes, medication history to ensure medication adherence on the unit, labs, and vitals were reviewed. Joey Velásquez was evaluated this morning for continuity of care and no acute distress noted throughout the evaluation. Per nursing, yesterday evening patient was loud, agitated, cursing at staff, and anxious. She received Atarax and Thorazine which were effective. Later in the evening, per nursing she was "making sexually inappropriate gestures and comments in Uzbek toward female staff members. Pt attempted to grab female staff member's thigh and tried to kiss the same staff member.  Pt hyperactive and running around unit, yelling, has poor focus and concentration". She was given po Haldol, which was effective. Otherwise, no acute events overnight. Upon interview, patient is pleasant, friendly, and continues to be loud and disorganized. Today, Jay Cuevas is consenting for safety on the unit. Jay Cuevas reports feeling "good". Jay Cuevas notes having "lots of good" sleep and states she slept 10 hours. Jay Cuevas states having a "good" appetite and shows this writer the cereal and mae she is eating. Jay Cuevas has been taking the medications as prescribed and reporting no side effects. Jay Cuevas denies suicidal ideations. Jay Cuevas denies homicidal ideations. Regarding hallucinations, Jay Cuevas denies auditory and visual hallucinations. During interview, Evelin blows this [place] a kiss. She endorses no further complaints today. PRNs overnight: Atarax, Thorazine, Haldol    VS: Reviewed, within normal limits    Progress Toward Goals: slow improvement    Psychiatric Review of Systems:  Behavior over the last 24 hours:  unchanged  Sleep: normal  Appetite: normal  Medication side effects: No   ROS: all other systems are negative    Vital signs in last 24 hours:  Temp:  [96.8 °F (36 °C)-97.3 °F (36.3 °C)] 96.8 °F (36 °C)  HR:  [] 102  Resp:  [16] 16  BP: (120-138)/(70-91) 120/70    Laboratory results:  I have personally reviewed all pertinent laboratory/tests results.   Recent Results (from the past 48 hour(s))   Lithium level    Collection Time: 07/13/23  5:30 AM   Result Value Ref Range    Lithium Lvl 0.9 0.6 - 1.2 mmol/L         Mental Status Evaluation:    Appearance:  poor hygiene, casually dressed, looks older than stated age, overtly appearing  female    Behavior:  cooperative, restless and fidgety, some psychomotor agitation   Speech:  increased rate, pressured, increased volume, incoherent at times   Mood:  "good"   Affect:  labile   Thought Process:  disorganized   Associations: loose associations   Thought Content:  preoccupied    Perceptual Disturbances: Appears to be responding to internal stimuli and Appears to be internally preoccupied, appears distracted throughout interview   Risk Potential: Suicidal ideation - None at present  Homicidal ideation - None at present  Potential for aggression - Not at present   Sensorium:  unable to assess   Memory:  recent and remote memory: unable to assess due to lack of cooperation   Consciousness:  alert and awake   Attention/Concentration: poor concentration and poor attention span   Insight:  poor   Judgment: poor   Gait/Station: normal gait/station   Motor Activity: no abnormal movements       Current Medications:  Current Facility-Administered Medications   Medication Dose Route Frequency Provider Last Rate   • acetaminophen  650 mg Oral Q6H PRN Sharl Arm, DO     • acetaminophen  650 mg Oral Q4H PRN Sharl Arm, DO     • acetaminophen  975 mg Oral Q6H PRN Sharl Arm, DO     • aluminum-magnesium hydroxide-simethicone  30 mL Oral Q4H PRN Sharl Arm, DO     • benztropine  1 mg Intramuscular Q4H PRN Max 6/day Sharl Arm, DO     • benztropine  0.5 mg Oral BID Rocio Pelletier MD     • benztropine  1 mg Oral BID PRN Sharl Arm, DO     • benztropine  1 mg Oral Q4H PRN Max 6/day Sharl Arm, DO     • chlorproMAZINE  25 mg Intramuscular Q6H PRN Renee Batista MD      Or   • chlorproMAZINE  25 mg Oral Q6H PRN Renee Batista MD     • chlorproMAZINE  50 mg Intramuscular Q6H PRN Renee Batista MD      Or   • chlorproMAZINE  50 mg Oral Q6H PRN Renee Batista MD     • clonazePAM  0.5 mg Oral BID PRN Sharl Arm, DO     • cloNIDine  0.1 mg Oral Q12H [address] Sharl Arm, DO     • cloZAPine  175 mg Oral BID Forrest Habermann, DO     • dextromethorphan-guaiFENesin  10 mL Oral Q4H PRN Jorge A Patino PA-C     • diphenhydrAMINE  25 mg Intramuscular Q6H PRN Sharl Arm, DO     • hydrOXYzine HCL  50 mg Oral Q6H PRN Max 4/day Rashida Kemp Gela Alarcon, DO      Or   • diphenhydrAMINE  50 mg Intramuscular Q6H PRN Ahmad Mare, DO     • divalproex sodium  1,000 mg Oral Q12H [facility] Ahmad Mare, DO     • hydrOXYzine HCL  100 mg Oral Q6H PRN Max 4/day Ahmad Mare, DO      Or   • LORazepam  2 mg Intramuscular Q6H PRN Ahmad Mare, DO     • hydrOXYzine HCL  25 mg Oral Q6H PRN Max 4/day Ahmad Mare, DO     • lithium carbonate  750 mg Oral BID Ahmad Mare, DO     • melatonin  3 mg Oral HS PRN Ahmad Mare, DO     • ondansetron  4 mg Oral Q6H PRN Regan Less, PA-C     • polyethylene glycol  17 g Oral Daily PRN Ahmad Mare, DO     • propranolol  10 mg Oral Q8H PRN Ahmad Mare, DO     • senna-docusate sodium  2 tablet Oral BID Ely Salguero,      • simethicone  80 mg Oral Q6H PRN Regan Less, PA-C     • traZODone  100 mg Oral HS PERLA Johansen         Behavioral Health Medications: All current active meds have been reviewed. Changes as in plan section above. Risks, benefits and possible side effects of Medications:   Risks, benefits, and possible side effects of medications explained to patient and patient verbalizes understanding. Counseling / Coordination of Care:  Patient's progress discussed with staff in treatment team meeting. Medications, treatment progress and treatment plan reviewed with patient. Ely Salguero DO  Psychiatry Resident, PGY-I    This note was completed in part utilizing Dragon dictation Software. Grammatical, translation, syntax errors, random word insertions, spelling mistakes, and incomplete sentences may be an occasional consequence of this system secondary to software limitations with voice recognition, ambient noise, and hardware issues. If you have any questions or concerns about the content, text, or information contained within the body of this dictation, please contact the provider for clarification.

## 2023-07-14 NOTE — NURSING NOTE
Pt  on 1:1 continuously , reported headache pain 10/10  Prn tylenol administered @ 0730 reassessed and effective. Noted yelling and screaming verbally abusive with staff unable to redirect  Prn atarax 100 mg and thorazine 50 mg administered. Currently able to redirect with no issues. Compliant with all meds pt request to have meds with milk.

## 2023-07-14 NOTE — NURSING NOTE
Pt very difficult to redirect this evening. Pt has been making sexually inappropriate gestures and comments in Kyrgyz toward female staff members. Pt attempted to grab female staff member's thigh and tried to kiss the same staff member. Pt hyperactive and running around unit, yelling, has poor focus and concentration. Pt labile in mood. Takes medications with much encouragement. Pt in need of medication for agitation after punching nurses' station window and then posturing at staff and peers. Nurse not able to give PRN thorazine yet. [facility] provider, Dr. Issa Damon, contacted via TT. Provider placed 1x order for haldol 5mg PO, given @ 20:53. Haldol effective, pt is in better behavioral control, no longer posturing or punching glass. Pt also stopped grabbing staff, making sexual gestures, running, and yelling. Pt awake and appropriate at this time.

## 2023-07-14 NOTE — NURSING NOTE
Patient 1:1 maintained continuously prn atarax and thorazine not effective continues to yell and verbally abusive to staff unable to redirect.   Denies pain at this time

## 2023-07-15 LAB
ATRIAL RATE: 113 BPM
ATRIAL RATE: 113 BPM
ATRIAL RATE: 114 BPM
P AXIS: 70 DEGREES
P AXIS: 71 DEGREES
P AXIS: 72 DEGREES
PR INTERVAL: 150 MS
PR INTERVAL: 152 MS
PR INTERVAL: 158 MS
QRS AXIS: 13 DEGREES
QRS AXIS: 15 DEGREES
QRS AXIS: 15 DEGREES
QRSD INTERVAL: 78 MS
QRSD INTERVAL: 78 MS
QRSD INTERVAL: 80 MS
QT INTERVAL: 362 MS
QT INTERVAL: 364 MS
QT INTERVAL: 366 MS
QTC INTERVAL: 496 MS
QTC INTERVAL: 499 MS
QTC INTERVAL: 504 MS
T WAVE AXIS: 62 DEGREES
T WAVE AXIS: 64 DEGREES
T WAVE AXIS: 64 DEGREES
VENTRICULAR RATE: 113 BPM
VENTRICULAR RATE: 113 BPM
VENTRICULAR RATE: 114 BPM

## 2023-07-15 PROCEDURE — 93005 ELECTROCARDIOGRAM TRACING: CPT

## 2023-07-15 PROCEDURE — 99232 SBSQ HOSP IP/OBS MODERATE 35: CPT | Performed by: PSYCHIATRY & NEUROLOGY

## 2023-07-15 PROCEDURE — 93010 ELECTROCARDIOGRAM REPORT: CPT | Performed by: INTERNAL MEDICINE

## 2023-07-15 RX ADMIN — LITHIUM CARBONATE 750 MG: 450 TABLET, EXTENDED RELEASE ORAL at 09:15

## 2023-07-15 RX ADMIN — LITHIUM CARBONATE 750 MG: 450 TABLET, EXTENDED RELEASE ORAL at 17:15

## 2023-07-15 RX ADMIN — CLONIDINE HYDROCHLORIDE 0.1 MG: 0.1 TABLET ORAL at 21:20

## 2023-07-15 RX ADMIN — BENZTROPINE MESYLATE 0.5 MG: 0.5 TABLET ORAL at 09:13

## 2023-07-15 RX ADMIN — LORAZEPAM 2 MG: 2 INJECTION INTRAMUSCULAR; INTRAVENOUS at 19:58

## 2023-07-15 RX ADMIN — HALOPERIDOL 5 MG: 5 TABLET ORAL at 19:30

## 2023-07-15 RX ADMIN — CLOZAPINE 175 MG: 100 TABLET ORAL at 09:15

## 2023-07-15 RX ADMIN — DIVALPROEX SODIUM 1000 MG: 500 TABLET, DELAYED RELEASE ORAL at 09:15

## 2023-07-15 RX ADMIN — HYDROXYZINE HYDROCHLORIDE 100 MG: 50 TABLET, FILM COATED ORAL at 05:30

## 2023-07-15 RX ADMIN — SENNOSIDES AND DOCUSATE SODIUM 2 TABLET: 8.6; 5 TABLET ORAL at 17:15

## 2023-07-15 RX ADMIN — BENZTROPINE MESYLATE 0.5 MG: 0.5 TABLET ORAL at 17:13

## 2023-07-15 RX ADMIN — HALOPERIDOL 5 MG: 5 TABLET ORAL at 05:30

## 2023-07-15 RX ADMIN — HYDROXYZINE HYDROCHLORIDE 100 MG: 50 TABLET, FILM COATED ORAL at 16:34

## 2023-07-15 RX ADMIN — HYDROXYZINE HYDROCHLORIDE 100 MG: 50 TABLET, FILM COATED ORAL at 09:15

## 2023-07-15 RX ADMIN — SENNOSIDES AND DOCUSATE SODIUM 2 TABLET: 8.6; 5 TABLET ORAL at 09:15

## 2023-07-15 RX ADMIN — TRAZODONE HYDROCHLORIDE 100 MG: 100 TABLET ORAL at 22:00

## 2023-07-15 RX ADMIN — DIVALPROEX SODIUM 1000 MG: 500 TABLET, DELAYED RELEASE ORAL at 22:00

## 2023-07-15 RX ADMIN — CLONIDINE HYDROCHLORIDE 0.1 MG: 0.1 TABLET ORAL at 09:14

## 2023-07-15 RX ADMIN — CLOZAPINE 175 MG: 100 TABLET ORAL at 17:14

## 2023-07-15 NOTE — NURSING NOTE
Patient has been in her room, remains on constant observation. Patient is loud, shrieking, and is very difficult to redirect. Patient was med/meal compliant.

## 2023-07-15 NOTE — NURSING NOTE
Pt was out of their room visible in the dayroom coloring. Pt loud at times and reminded to be mindful of this and compliant. Compliant with scheduled night time medications. Remains on continual observation close proximity without any issues.

## 2023-07-15 NOTE — NURSING NOTE
Patient has been shouting in her room on and off for approx 30 minutes despite redirection by staff. Serbian speaking staff do not understand what she is shouting and when there is loud, shrieking laughter they are likewise unaware what is prompting that. It seems that she may be internally stimulated. Atarax 100mgs po prn given and Haldol 5mgs po prn given at this time.

## 2023-07-15 NOTE — PROGRESS NOTES
Progress Note - Behavioral Health   Quynh Jules 28 y.o. female MRN: [id number]  Unit/Bed#: [facility] 349-01 Encounter: [id number]    Assessment/Plan   Principal Problem:    Unspecified mood (affective) disorder ([address])  Active Problems:    Medical clearance for psychiatric admission    Intellectual disability    Psychosis ([address])    Dysuria    Vaginal lesion    Cough      Behavior over the last 24 hours:  unchanged  Sleep: normal  Appetite: normal  Medication side effects: No  ROS: no complaints        Mental Status Evaluation:  Appearance:  age appropriate and casually dressed   Behavior:  restless and fidgety   Speech:  pressured   Mood:  labile   Affect:  labile   Thought Process:  disorganized   Associations: tangential associations   Thought Content:  normal   Perceptual Disturbances: None   Risk Potential: Suicidal Ideations none  Homicidal Ideations none  Potential for Aggression No   Sensorium:  person and place   Memory:  recent and remote memory grossly intact   Consciousness:  alert and awake    Attention: attention span appeared shorter than expected for age   Insight:  limited   Judgment: limited   Gait/Station: normal gait/station and normal balance   Motor Activity: no abnormal movements     Progress Toward Goals: Patient remains compliant with medications and denies side effects. Her mood is labile as she just told me she is doing fine and then starts yelling and screaming about being upset with her boyfriend. She is pressured, tangential and difficult to understand even when I speak Romansh. She denies SI or HI. Denies A/V hallucinations. She stated she enjoys coloring in her room. Has very poor impulse control and gave me a hug and kiss on the cheek as I walked out of her room. Continue 1:1      Recommended Treatment: Continue with group therapy, milieu therapy and occupational therapy.   1) Continue Clozapine 175mg BID for psychosis (patient's most recent CBC on 7/11/2023 showed a WBC of 10.16 and ANC of 6.20, clozapine level on 7/7/2023 was noted to be 392)  2) Continue Depakote DR tablet 1000 mg every 12 hours for mood/agitation (patient's most recent Depakote level on 6/11/2023 was noted to be 87)  3) Continue Lithobid 750 mg twice daily for mood/agitation (most recent lithium level on 7/13/2023 was noted to be 0.9)  4) Continue Cogentin 1 mg twice daily for EPS  5) Continue Trazodone 100 mg at bedtime for insomnia  6) Continue 1-1 monitoring for patient and staff safety  7) CM to continue care coordination for patient  8) Continue medical recommendations per SLIM  9) Continue Senokot 1 tablet BID for bowel regimen  10) switch Thorazine PRNs to Haldol for agitation    Risks, benefits and possible side effects of Medications:   Patient does not verbalize understanding at this time and will require further explanation.       Medications:   all current active meds have been reviewed, continue current psychiatric medications and current meds:   Current Facility-Administered Medications   Medication Dose Route Frequency   • acetaminophen (TYLENOL) tablet 650 mg  650 mg Oral Q6H PRN   • acetaminophen (TYLENOL) tablet 650 mg  650 mg Oral Q4H PRN   • acetaminophen (TYLENOL) tablet 975 mg  975 mg Oral Q6H PRN   • aluminum-magnesium hydroxide-simethicone (MYLANTA) oral suspension 30 mL  30 mL Oral Q4H PRN   • haloperidol lactate (HALDOL) injection 2.5 mg  2.5 mg Intramuscular Q6H PRN Max 4/day    And   • LORazepam (ATIVAN) injection 1 mg  1 mg Intramuscular Q6H PRN Max 4/day    And   • benztropine (COGENTIN) injection 0.5 mg  0.5 mg Intramuscular Q6H PRN Max 4/day   • benztropine (COGENTIN) injection 1 mg  1 mg Intramuscular Q4H PRN Max 6/day   • haloperidol lactate (HALDOL) injection 5 mg  5 mg Intramuscular Q4H PRN Max 4/day    And   • LORazepam (ATIVAN) injection 2 mg  2 mg Intramuscular Q4H PRN Max 4/day    And   • benztropine (COGENTIN) injection 1 mg  1 mg Intramuscular Q4H PRN Max 4/day   • benztropine (COGENTIN) tablet 0.5 mg  0.5 mg Oral BID   • benztropine (COGENTIN) tablet 1 mg  1 mg Oral BID PRN   • clonazePAM (KlonoPIN) tablet 0.5 mg  0.5 mg Oral BID PRN   • cloNIDine (CATAPRES) tablet 0.1 mg  0.1 mg Oral Q12H COLT   • cloZAPine (CLOZARIL) tablet 175 mg  175 mg Oral BID   • dextromethorphan-guaiFENesin (ROBITUSSIN DM) oral syrup 10 mL  10 mL Oral Q4H PRN   • diphenhydrAMINE (BENADRYL) injection 25 mg  25 mg Intramuscular Q6H PRN   • hydrOXYzine HCL (ATARAX) tablet 50 mg  50 mg Oral Q6H PRN Max 4/day    Or   • diphenhydrAMINE (BENADRYL) injection 50 mg  50 mg Intramuscular Q6H PRN   • divalproex sodium (DEPAKOTE) DR tablet 1,000 mg  1,000 mg Oral Q12H COLT   • haloperidol (HALDOL) tablet 2 mg  2 mg Oral Q4H PRN Max 6/day   • haloperidol (HALDOL) tablet 5 mg  5 mg Oral Q6H PRN Max 4/day   • haloperidol (HALDOL) tablet 5 mg  5 mg Oral Q4H PRN Max 4/day   • hydrOXYzine HCL (ATARAX) tablet 100 mg  100 mg Oral Q6H PRN Max 4/day    Or   • LORazepam (ATIVAN) injection 2 mg  2 mg Intramuscular Q6H PRN   • hydrOXYzine HCL (ATARAX) tablet 25 mg  25 mg Oral Q6H PRN Max 4/day   • lithium carbonate (LITHOBID) CR tablet 750 mg  750 mg Oral BID   • melatonin tablet 3 mg  3 mg Oral HS PRN   • nicotine polacrilex (NICORETTE) gum 2 mg  2 mg Oral Q2H PRN   • ondansetron (ZOFRAN-ODT) dispersible tablet 4 mg  4 mg Oral Q6H PRN   • polyethylene glycol (MIRALAX) packet 17 g  17 g Oral Daily PRN   • propranolol (INDERAL) tablet 10 mg  10 mg Oral Q8H PRN   • senna-docusate sodium (SENOKOT S) 8.6-50 mg per tablet 2 tablet  2 tablet Oral BID   • simethicone (MYLICON) chewable tablet 80 mg  80 mg Oral Q6H PRN   • traZODone (DESYREL) tablet 100 mg  100 mg Oral HS   . Labs: I have personally reviewed all pertinent laboratory/tests results.    Most Recent Labs:   Lab Results   Component Value Date    WBC 10.16 07/11/2023    RBC 5.07 07/11/2023    HGB 15.2 07/11/2023    HCT 48.7 (H) 07/11/2023     07/11/2023    RDW 13.5 07/11/2023    NEUTROABS 6.20 07/11/2023    SODIUM 141 06/16/2023    K 4.0 06/16/2023     06/16/2023    CO2 30 06/16/2023    BUN 11 06/16/2023    CREATININE 0.53 (L) 06/16/2023    GLUC 73 06/16/2023    GLUF 73 06/16/2023    CALCIUM 10.1 06/16/2023    AST 19 06/16/2023    ALT 13 06/16/2023    ALKPHOS 32 (L) 06/16/2023    TP 6.8 06/16/2023    ALB 4.3 06/16/2023    TBILI 0.43 06/16/2023    CHOLESTEROL 181 06/11/2023    HDL 64 06/11/2023    TRIG 106 06/11/2023    LDLCALC 96 06/11/2023    NONHDLC 117 06/11/2023    VALPROICTOT 87 06/11/2023    LITHIUM 0.9 07/13/2023    [id number] 3.269 06/11/2023    PREGSERUM Negative 06/11/2023    HGBA1C 5.1 03/20/2023     03/20/2023       Counseling / Coordination of Care  Total floor / unit time spent today n/a minutes. Greater than 50% of total time was spent with the patient and / or family counseling and / or coordination of care.  A description of the counseling / coordination of care:

## 2023-07-15 NOTE — NURSING NOTE
Upon reassessment one hour later at 0000 pt is quiet and in their bed sleeping. PRN haldol 5 mgand atarax 100 mg effective.

## 2023-07-16 PROCEDURE — 99232 SBSQ HOSP IP/OBS MODERATE 35: CPT | Performed by: PSYCHIATRY & NEUROLOGY

## 2023-07-16 RX ADMIN — HYDROXYZINE HYDROCHLORIDE 50 MG: 50 TABLET, FILM COATED ORAL at 20:33

## 2023-07-16 RX ADMIN — DIVALPROEX SODIUM 1000 MG: 500 TABLET, DELAYED RELEASE ORAL at 21:14

## 2023-07-16 RX ADMIN — Medication 3 MG: at 20:34

## 2023-07-16 RX ADMIN — LITHIUM CARBONATE 750 MG: 450 TABLET, EXTENDED RELEASE ORAL at 08:29

## 2023-07-16 RX ADMIN — ALUMINUM HYDROXIDE, MAGNESIUM HYDROXIDE, AND DIMETHICONE 30 ML: 200; 20; 200 SUSPENSION ORAL at 13:59

## 2023-07-16 RX ADMIN — Medication 3 MG: at 21:13

## 2023-07-16 RX ADMIN — TRAZODONE HYDROCHLORIDE 100 MG: 100 TABLET ORAL at 21:13

## 2023-07-16 RX ADMIN — HYDROXYZINE HYDROCHLORIDE 100 MG: 50 TABLET, FILM COATED ORAL at 08:41

## 2023-07-16 RX ADMIN — HALOPERIDOL 5 MG: 5 TABLET ORAL at 20:33

## 2023-07-16 RX ADMIN — SENNOSIDES AND DOCUSATE SODIUM 2 TABLET: 8.6; 5 TABLET ORAL at 08:30

## 2023-07-16 RX ADMIN — CLONIDINE HYDROCHLORIDE 0.1 MG: 0.1 TABLET ORAL at 08:30

## 2023-07-16 RX ADMIN — CLONIDINE HYDROCHLORIDE 0.1 MG: 0.1 TABLET ORAL at 21:13

## 2023-07-16 RX ADMIN — HALOPERIDOL 5 MG: 5 TABLET ORAL at 08:40

## 2023-07-16 RX ADMIN — CLOZAPINE 175 MG: 100 TABLET ORAL at 17:34

## 2023-07-16 RX ADMIN — DIVALPROEX SODIUM 1000 MG: 500 TABLET, DELAYED RELEASE ORAL at 08:30

## 2023-07-16 RX ADMIN — BENZTROPINE MESYLATE 0.5 MG: 0.5 TABLET ORAL at 08:30

## 2023-07-16 RX ADMIN — CLOZAPINE 175 MG: 100 TABLET ORAL at 08:29

## 2023-07-16 RX ADMIN — LITHIUM CARBONATE 750 MG: 450 TABLET, EXTENDED RELEASE ORAL at 17:35

## 2023-07-16 RX ADMIN — SENNOSIDES AND DOCUSATE SODIUM 2 TABLET: 8.6; 5 TABLET ORAL at 17:34

## 2023-07-16 RX ADMIN — BENZTROPINE MESYLATE 0.5 MG: 0.5 TABLET ORAL at 17:35

## 2023-07-16 NOTE — RESTRAINT FACE TO FACE
Restraint Face to Face   Danyelle Jeronimo 28 y.o. female MRN: [id number]  Unit/Bed#: [facility] 349-01 Encounter: [id number]      Physical Evaluation: no apparent injury, skin intact  Purpose for Restraints/ Seclusion High risk for causing significant disruption of treatment environment  and High risk for harm to others  Patient's reaction to the intervention: tolerated  Patient's medical condition: stable  Patient's Behavioral condition: agitated, screaming, kicking  Restraints to be Continued

## 2023-07-16 NOTE — PLAN OF CARE
Problem: Risk for Self Injury/Neglect  Goal: Treatment Goal: Remain safe during length of stay, learn and adopt new coping skills, and be free of self-injurious ideation, impulses and acts at the time of discharge  Outcome: Progressing  Goal: Verbalize thoughts and feelings  Description: Interventions:  - Assess and re-assess patient's lethality and potential for self-injury  - Engage patient in 1:1 interactions, daily, for a minimum of 15 minutes  - Encourage patient to express feelings, fears, frustrations, hopes  - Establish rapport/trust with patient   Outcome: Progressing  Goal: Refrain from harming self  Description: Interventions:  - Monitor patient closely, per order  - Develop a trusting relationship  - Supervise medication ingestion, monitor effects and side effects   Outcome: Progressing  Goal: Attend and participate in unit activities, including therapeutic, recreational, and educational groups  Description: Interventions:  - Provide therapeutic and educational activities daily, encourage attendance and participation, and document same in the medical record  - Obtain collateral information, encourage visitation and family involvement in care   Outcome: Progressing  Goal: Recognize maladaptive responses and adopt new coping mechanisms  Outcome: Progressing  Goal: Complete daily ADLs, including personal hygiene independently, as able  Description: Interventions:  - Observe, teach, and assist patient with ADLS  - Monitor and promote a balance of rest/activity, with adequate nutrition and elimination  Outcome: Progressing     Problem: Risk for Violence/Aggression Toward Others  Goal: Treatment Goal: Refrain from acts of violence/aggression during length of stay, and demonstrate improved impulse control at the time of discharge  Outcome: Not Progressing  Goal: Verbalize thoughts and feelings  Description: Interventions:  - Assess and re-assess patient's level of risk, every waking shift  - Engage patient in 1:1 interactions, daily, for a minimum of 15 minutes   - Allow patient to express feelings and frustrations in a safe and non-threatening manner   - Establish rapport/trust with patient   Outcome: Progressing  Goal: Refrain from harming others  Outcome: Not Progressing  Goal: Refrain from destructive acts on the environment or property  Outcome: Not Progressing  Goal: Control angry outbursts  Description: Interventions:  - Monitor patient closely, per order  - Ensure early verbal de-escalation  - Monitor prn medication needs  - Set reasonable/therapeutic limits, outline behavioral expectations, and consequences   - Provide a non-threatening milieu, utilizing the least restrictive interventions   Outcome: Not Progressing  Goal: Attend and participate in unit activities, including therapeutic, recreational, and educational groups  Description: Interventions:  - Provide therapeutic and educational activities daily, encourage attendance and participation, and document same in the medical record   Outcome: Progressing  Goal: Identify appropriate positive anger management techniques  Description: Interventions:  - Offer anger management and coping skills groups   - Staff will provide positive feedback for appropriate anger control  Outcome: Not Progressing     Problem: JENAE  Goal: Will exhibit normal sleep and speech and no impulsivity  Description: INTERVENTIONS:  - Administer medication as ordered  - Set limits on impulsive behavior  - Make attempts to decrease external stimuli as possible  Outcome: Not Progressing

## 2023-07-16 NOTE — PROGRESS NOTES
07/15/23 1300   Activity/Group Checklist   Group Other (Comment)  (OPEN STUDIO Art Therapy/Social Group)   Attendance Attended  (1:1)   Attendance Duration (min) 46-60   Interactions Interacted appropriately

## 2023-07-16 NOTE — NURSING NOTE
Patient removed from 4 point restraint at 2245 - she is back in impulse control. She seems to have some understanding that the physically aggressive behavior is not appropriate and Russian speaking staff have been communicating with her throughout the evening about behavior, and about nursing interventions.

## 2023-07-16 NOTE — NURSING NOTE
Patient has been awake since 3am - wants to wash hands(several times), chat, look for people in the hallway she can talk with.  Mongolian speaking staff have redirected her several times

## 2023-07-16 NOTE — NURSING NOTE
Patient running away from 1-1 staff and trying to push  At staff in order to get past. Security staff and nursing staff accompanied her to return to her room - she was shrieking and screaming and hitting door and wall and furniture in her room. Ativan 2mgs IM given at this time.

## 2023-07-16 NOTE — PROGRESS NOTES
Progress Note - Behavioral Health   Tendoy Casey 28 y.o. female MRN: [id number]  Unit/Bed#: [facility] 349-01 Encounter: [id number]    Assessment/Plan   Principal Problem:    Unspecified mood (affective) disorder ([address])  Active Problems:    Medical clearance for psychiatric admission    Intellectual disability    Psychosis ([address])    Dysuria    Vaginal lesion    Cough      Behavior over the last 24 hours:  unchanged  Sleep: normal  Appetite: poor  Medication side effects: No  ROS: no complaints        Mental Status Evaluation:  Appearance:  age appropriate and casually dressed   Behavior:  restless and fidgety   Speech:  pressured   Mood:  labile   Affect:  labile   Thought Process:  disorganized   Associations: tangential associations   Thought Content:  normal   Perceptual Disturbances: None   Risk Potential: Suicidal Ideations none  Homicidal Ideations none  Potential for Aggression No   Sensorium:  person and place   Memory:  recent and remote memory grossly intact   Consciousness:  alert and awake    Attention: attention span appeared shorter than expected for age   Insight:  limited   Judgment: limited   Gait/Station: normal gait/station and normal balance   Motor Activity: no abnormal movements     Progress Toward Goals: Patient remains compliant with medications and denies side effects. She remains in continual observation for safety due ti impulsivity and sexually inapropriate behavior. Last evening patient was extremely agitated, impulsive, angry and demanding, unable to follow direction, walking away from 1-1 staff and refusing to follow direction to remain with 1-1 staff. She was given Haldol 5mgs po prn given at [address]. She continued agitated running away from 1-1 staff and trying to push staff in order to get past. Security staff and nursing staff accompanied her to return to her room - she was shrieking and screaming and hitting door and wall and furniture in her room. She then received Ativan 2mgs IM .  She continued verbally and physically aggressive with 1-1 staff in response to redirection. She was aggressive - kicking, hitting and trying to bite staff who intervened to assist. With Security on the floor she was placed in 4 point restraints. After 3 hours she was taken off restrains since she was back in impulse control. She seems to have some understanding that the physically aggressive behavior is not appropriate and Beninese speaking staff have been communicating with her  about behavior, and about nursing interventions. Unfortunately this morning was given 5 mh haldol and 100 mg atarax at 8:30 this am.  Patient was yelling, shrieking, and pushing staff. Medication was not effective. Patient was throwing her coffee cups at nurses station. Patient escorted to her room. She is now calm and in her room. She has co complaints or concerns for today. Recommended Treatment: Continue with group therapy, milieu therapy and occupational therapy. 1) Continue Clozapine 175mg BID for psychosis (patient's most recent CBC on 7/11/2023 showed a WBC of 10.16 and ANC of 6.20, clozapine level on 7/7/2023 was noted to be 392)  2) Continue Depakote DR tablet 1000 mg every 12 hours for mood/agitation (patient's most recent Depakote level on 6/11/2023 was noted to be 87)  3) Continue Lithobid 750 mg twice daily for mood/agitation (most recent lithium level on 7/13/2023 was noted to be 0.9)  4) Continue Cogentin 1 mg twice daily for EPS  5) Continue Trazodone 100 mg at bedtime for insomnia  6) Continue 1-1 monitoring for patient and staff safety  7) CM to continue care coordination for patient  8) Continue medical recommendations per SLIM  9) Continue Senokot 1 tablet BID for bowel regimen  10) Continue PRN Haldol for agitation    Risks, benefits and possible side effects of Medications:   Patient does not verbalize understanding at this time and will require further explanation.       Medications:   all current active meds have been reviewed, continue current psychiatric medications and current meds:   Current Facility-Administered Medications   Medication Dose Route Frequency   • acetaminophen (TYLENOL) tablet 650 mg  650 mg Oral Q6H PRN   • acetaminophen (TYLENOL) tablet 650 mg  650 mg Oral Q4H PRN   • acetaminophen (TYLENOL) tablet 975 mg  975 mg Oral Q6H PRN   • aluminum-magnesium hydroxide-simethicone (MYLANTA) oral suspension 30 mL  30 mL Oral Q4H PRN   • haloperidol lactate (HALDOL) injection 2.5 mg  2.5 mg Intramuscular Q6H PRN Max 4/day    And   • LORazepam (ATIVAN) injection 1 mg  1 mg Intramuscular Q6H PRN Max 4/day    And   • benztropine (COGENTIN) injection 0.5 mg  0.5 mg Intramuscular Q6H PRN Max 4/day   • benztropine (COGENTIN) injection 1 mg  1 mg Intramuscular Q4H PRN Max 6/day   • haloperidol lactate (HALDOL) injection 5 mg  5 mg Intramuscular Q4H PRN Max 4/day    And   • LORazepam (ATIVAN) injection 2 mg  2 mg Intramuscular Q4H PRN Max 4/day    And   • benztropine (COGENTIN) injection 1 mg  1 mg Intramuscular Q4H PRN Max 4/day   • benztropine (COGENTIN) tablet 0.5 mg  0.5 mg Oral BID   • benztropine (COGENTIN) tablet 1 mg  1 mg Oral BID PRN   • clonazePAM (KlonoPIN) tablet 0.5 mg  0.5 mg Oral BID PRN   • cloNIDine (CATAPRES) tablet 0.1 mg  0.1 mg Oral Q12H COLT   • cloZAPine (CLOZARIL) tablet 175 mg  175 mg Oral BID   • dextromethorphan-guaiFENesin (ROBITUSSIN DM) oral syrup 10 mL  10 mL Oral Q4H PRN   • diphenhydrAMINE (BENADRYL) injection 25 mg  25 mg Intramuscular Q6H PRN   • hydrOXYzine HCL (ATARAX) tablet 50 mg  50 mg Oral Q6H PRN Max 4/day    Or   • diphenhydrAMINE (BENADRYL) injection 50 mg  50 mg Intramuscular Q6H PRN   • divalproex sodium (DEPAKOTE) DR tablet 1,000 mg  1,000 mg Oral Q12H COLT   • haloperidol (HALDOL) tablet 2 mg  2 mg Oral Q4H PRN Max 6/day   • haloperidol (HALDOL) tablet 5 mg  5 mg Oral Q6H PRN Max 4/day   • haloperidol (HALDOL) tablet 5 mg  5 mg Oral Q4H PRN Max 4/day   • hydrOXYzine HCL (ATARAX) tablet 100 mg  100 mg Oral Q6H PRN Max 4/day    Or   • LORazepam (ATIVAN) injection 2 mg  2 mg Intramuscular Q6H PRN   • hydrOXYzine HCL (ATARAX) tablet 25 mg  25 mg Oral Q6H PRN Max 4/day   • lithium carbonate (LITHOBID) CR tablet 750 mg  750 mg Oral BID   • melatonin tablet 3 mg  3 mg Oral HS PRN   • nicotine polacrilex (NICORETTE) gum 2 mg  2 mg Oral Q2H PRN   • ondansetron (ZOFRAN-ODT) dispersible tablet 4 mg  4 mg Oral Q6H PRN   • polyethylene glycol (MIRALAX) packet 17 g  17 g Oral Daily PRN   • propranolol (INDERAL) tablet 10 mg  10 mg Oral Q8H PRN   • senna-docusate sodium (SENOKOT S) 8.6-50 mg per tablet 2 tablet  2 tablet Oral BID   • simethicone (MYLICON) chewable tablet 80 mg  80 mg Oral Q6H PRN   • traZODone (DESYREL) tablet 100 mg  100 mg Oral HS   . Labs: I have personally reviewed all pertinent laboratory/tests results. Most Recent Labs:   Lab Results   Component Value Date    WBC 10.16 07/11/2023    RBC 5.07 07/11/2023    HGB 15.2 07/11/2023    HCT 48.7 (H) 07/11/2023     07/11/2023    RDW 13.5 07/11/2023    NEUTROABS 6.20 07/11/2023    SODIUM 141 06/16/2023    K 4.0 06/16/2023     06/16/2023    CO2 30 06/16/2023    BUN 11 06/16/2023    CREATININE 0.53 (L) 06/16/2023    GLUC 73 06/16/2023    GLUF 73 06/16/2023    CALCIUM 10.1 06/16/2023    AST 19 06/16/2023    ALT 13 06/16/2023    ALKPHOS 32 (L) 06/16/2023    TP 6.8 06/16/2023    ALB 4.3 06/16/2023    TBILI 0.43 06/16/2023    CHOLESTEROL 181 06/11/2023    HDL 64 06/11/2023    TRIG 106 06/11/2023    LDLCALC 96 06/11/2023    NONHDLC 117 06/11/2023    VALPROICTOT 87 06/11/2023    LITHIUM 0.9 07/13/2023    [id number] 3.269 06/11/2023    PREGSERUM Negative 06/11/2023    HGBA1C 5.1 03/20/2023     03/20/2023       Counseling / Coordination of Care  Total floor / unit time spent today n/a minutes. Greater than 50% of total time was spent with the patient and / or family counseling and / or coordination of care.  A description of the counseling / coordination of care:

## 2023-07-16 NOTE — PROGRESS NOTES
[facility] Group Note     07/16/23 1300   Activity/Group Checklist   Group Life Skills  (Teamwork and Communication)   Attendance Attended   Attendance Duration (min) 16-30  (left group early)   Interactions Other (Comment)  (More focused than previous encounters, but still disorganized)   Affect/Mood Bright   Goals Achieved Able to listen to others; Able to engage in interactions; Able to reflect/comment on own behavior;Able to recieve feedback; Able to give feedback to another

## 2023-07-16 NOTE — NURSING NOTE
Patient verbally and physically aggressive with 1-1 staff in response to redirection. She was aggressive - kicking, hitting and trying to bite staff who intervened to assist. With Security on the floor she was olaced in 4 point restraints.

## 2023-07-16 NOTE — NURSING NOTE
Patient extremely agitated, impulsive, angry and demanding, unable to follow direction, walking away from 1-1 staff and refusing to follow direction to remain with 1-1 staff.   [facility]

## 2023-07-16 NOTE — NURSING NOTE
Patient was given 5 mh haldol and 100 mg atarax at 8:30 this am.  Patient was yelling, shrieking, and push staff. Medication was not effective. Patient was throwing her coffee cups at nurses station. Patient escorted to her room.

## 2023-07-17 PROCEDURE — 99232 SBSQ HOSP IP/OBS MODERATE 35: CPT | Performed by: STUDENT IN AN ORGANIZED HEALTH CARE EDUCATION/TRAINING PROGRAM

## 2023-07-17 RX ORDER — MIRTAZAPINE 15 MG/1
15 TABLET, FILM COATED ORAL
Status: DISCONTINUED | OUTPATIENT
Start: 2023-07-17 | End: 2023-07-19 | Stop reason: HOSPADM

## 2023-07-17 RX ORDER — BENZTROPINE MESYLATE 0.5 MG/1
0.5 TABLET ORAL DAILY
Status: DISCONTINUED | OUTPATIENT
Start: 2023-07-18 | End: 2023-07-18

## 2023-07-17 RX ADMIN — SENNOSIDES AND DOCUSATE SODIUM 2 TABLET: 8.6; 5 TABLET ORAL at 17:09

## 2023-07-17 RX ADMIN — LORAZEPAM 2 MG: 2 INJECTION INTRAMUSCULAR; INTRAVENOUS at 09:26

## 2023-07-17 RX ADMIN — HALOPERIDOL 5 MG: 5 TABLET ORAL at 00:53

## 2023-07-17 RX ADMIN — DIPHENHYDRAMINE HYDROCHLORIDE 25 MG: 50 INJECTION, SOLUTION INTRAMUSCULAR; INTRAVENOUS at 11:59

## 2023-07-17 RX ADMIN — DIVALPROEX SODIUM 1000 MG: 500 TABLET, DELAYED RELEASE ORAL at 20:45

## 2023-07-17 RX ADMIN — CLOZAPINE 175 MG: 100 TABLET ORAL at 08:02

## 2023-07-17 RX ADMIN — CLONIDINE HYDROCHLORIDE 0.1 MG: 0.1 TABLET ORAL at 20:47

## 2023-07-17 RX ADMIN — ACETAMINOPHEN 650 MG: 325 TABLET ORAL at 00:52

## 2023-07-17 RX ADMIN — LORAZEPAM 2 MG: 2 INJECTION INTRAMUSCULAR; INTRAVENOUS at 02:34

## 2023-07-17 RX ADMIN — BENZTROPINE MESYLATE 1 MG: 1 INJECTION INTRAMUSCULAR; INTRAVENOUS at 09:26

## 2023-07-17 RX ADMIN — SENNOSIDES AND DOCUSATE SODIUM 2 TABLET: 8.6; 5 TABLET ORAL at 08:03

## 2023-07-17 RX ADMIN — LITHIUM CARBONATE 750 MG: 450 TABLET, EXTENDED RELEASE ORAL at 08:03

## 2023-07-17 RX ADMIN — MIRTAZAPINE 15 MG: 15 TABLET, FILM COATED ORAL at 21:12

## 2023-07-17 RX ADMIN — CLONIDINE HYDROCHLORIDE 0.1 MG: 0.1 TABLET ORAL at 08:03

## 2023-07-17 RX ADMIN — LITHIUM CARBONATE 750 MG: 450 TABLET, EXTENDED RELEASE ORAL at 17:09

## 2023-07-17 RX ADMIN — BENZTROPINE MESYLATE 0.5 MG: 0.5 TABLET ORAL at 08:02

## 2023-07-17 RX ADMIN — HALOPERIDOL LACTATE 5 MG: 5 INJECTION, SOLUTION INTRAMUSCULAR at 09:26

## 2023-07-17 RX ADMIN — CLOZAPINE 150 MG: 100 TABLET ORAL at 17:09

## 2023-07-17 RX ADMIN — DIVALPROEX SODIUM 1000 MG: 500 TABLET, DELAYED RELEASE ORAL at 08:03

## 2023-07-17 NOTE — NURSING NOTE
Pt able to verbalize she will be in behavior control at this time. Restraints discontinued at this time. IM benadryl effective.

## 2023-07-17 NOTE — RESTRAINT FACE TO FACE
Restraint Face to Face   Yany Cheng 28 y.o. female MRN: [id number]  Unit/Bed#: [facility] 349-01 Encounter: [id number]      Physical Evaluation: No outward signs of distress. Purpose for Restraints/ Seclusion High risk for self harm, High risk for causing significant disruption of treatment environment , High risk for harm to others and high risk for flight  Patient's reaction to the intervention: agitated, trying to hit staff, trying to bite restraints.   Patient's medical condition: stable  Patient's Behavioral condition: agitated  Restraints to be Continued

## 2023-07-17 NOTE — NURSING NOTE
PRN haldol 5mg, atarax 50mg, melatonin 3mg administered at 2033 were effective on reassessment. Patient remained in behavioral control and did not act out impulsively.

## 2023-07-17 NOTE — PLAN OF CARE
Problem: Risk for Self Injury/Neglect  Goal: Treatment Goal: Remain safe during length of stay, learn and adopt new coping skills, and be free of self-injurious ideation, impulses and acts at the time of discharge  Outcome: Progressing  Goal: Verbalize thoughts and feelings  Description: Interventions:  - Assess and re-assess patient's lethality and potential for self-injury  - Engage patient in 1:1 interactions, daily, for a minimum of 15 minutes  - Encourage patient to express feelings, fears, frustrations, hopes  - Establish rapport/trust with patient   Outcome: Progressing  Goal: Refrain from harming self  Description: Interventions:  - Monitor patient closely, per order  - Develop a trusting relationship  - Supervise medication ingestion, monitor effects and side effects   Outcome: Progressing  Goal: Attend and participate in unit activities, including therapeutic, recreational, and educational groups  Description: Interventions:  - Provide therapeutic and educational activities daily, encourage attendance and participation, and document same in the medical record  - Obtain collateral information, encourage visitation and family involvement in care   Outcome: Progressing  Goal: Recognize maladaptive responses and adopt new coping mechanisms  Outcome: Progressing  Goal: Complete daily ADLs, including personal hygiene independently, as able  Description: Interventions:  - Observe, teach, and assist patient with ADLS  - Monitor and promote a balance of rest/activity, with adequate nutrition and elimination  Outcome: Progressing     Problem: Risk for Violence/Aggression Toward Others  Goal: Treatment Goal: Refrain from acts of violence/aggression during length of stay, and demonstrate improved impulse control at the time of discharge  Outcome: Not Progressing  Goal: Verbalize thoughts and feelings  Description: Interventions:  - Assess and re-assess patient's level of risk, every waking shift  - Engage patient in 1:1 interactions, daily, for a minimum of 15 minutes   - Allow patient to express feelings and frustrations in a safe and non-threatening manner   - Establish rapport/trust with patient   Outcome: Progressing  Goal: Refrain from harming others  Outcome: Not Progressing  Goal: Refrain from destructive acts on the environment or property  Outcome: Not Progressing  Goal: Control angry outbursts  Description: Interventions:  - Monitor patient closely, per order  - Ensure early verbal de-escalation  - Monitor prn medication needs  - Set reasonable/therapeutic limits, outline behavioral expectations, and consequences   - Provide a non-threatening milieu, utilizing the least restrictive interventions   Outcome: Not Progressing  Goal: Attend and participate in unit activities, including therapeutic, recreational, and educational groups  Description: Interventions:  - Provide therapeutic and educational activities daily, encourage attendance and participation, and document same in the medical record   Outcome: Progressing     Problem: JENAE  Goal: Will exhibit normal sleep and speech and no impulsivity  Description: INTERVENTIONS:  - Administer medication as ordered  - Set limits on impulsive behavior  - Make attempts to decrease external stimuli as possible  Outcome: Progressing     Problem: INVOLUNTARY ADMIT  Goal: Will cooperate with staff recommendations and doctor's orders and will demonstrate appropriate behavior  Description: INTERVENTIONS:  - Treat underlying conditions and offer medication as ordered  - Educate regarding involuntary admission procedures and rules  - Utilize positive consistent limit setting strategies to support patient and staff safety  Outcome: Progressing

## 2023-07-17 NOTE — PROGRESS NOTES
07/17/23 0854   Team Meeting   Meeting Type Daily Rounds   Team Members Present   Team Members Present Physician;Nurse;   Physician Team Member [id number] Cathi   Patient/Family Present   Patient Present No   Patient's Family Present No   1:1. Loud, shrieking, impulsive, demanding, walking away from 1:1 and unable to be redirected. PRN haldol- minimally effective. Pt remained nonredirectable, verbally and physically aggressive, kicking, hitting, biting, PRN IM and restraints on Saturday. Sunday PRN haldol and atarax, yelling and pushing, throwing coffee cups. Pt escorted back to room. PRN haldol PO last night-ineffective, PRN IM Ativan-effective. DC tbd.

## 2023-07-17 NOTE — NURSING NOTE
Pt hitting nurses station and screaming at staff due to it taking too long to print her coloring pictures. Not verbally redirectable, security called to unit for assistance. Pt offered po haldol 5 mg but pt stated she needed a shot. Haldol 5 mg, Ativan 2 mg and cogentin 1 mg given IM in the R and L deltoids. Pt shortly after receiving IM's began hitting the walls, attempting to kick and hit staff and biting her own hands. Pt placed in restraints @ 0935. Medical provider made aware. Unable to debrief at this time. Pt remains on 1:1. No outward signs of distress.

## 2023-07-17 NOTE — PROGRESS NOTES
Progress Note - Behavioral Health   [facility] 28 [facility] MR[facility]: [id number]  Unit/Bed#: [facility] 349-01 Encounter: [id number]    Assessment/Plan   Principal Problem:    Unspecified mood (affective) disorder (HCC)  Active Problems:    Medical clearance for psychiatric admission    Intellectual disability    Psychosis ([address])    Dysuria    Vaginal lesion    Cough      Recommended Treatment:     Medications as below:   Decrease Clozapine to 150 mg BID for psychosis (most recent CBC 7/11 WBC 10.16, ANC 6.2, Clozapine level 7/7 392)  Decrease Cogentin to 0.5 mg qd for EPS, plan to taper off   Discontinue Trazodone 100 mg qhs. Start Mirtazapine 15 mg qhs for insomnia   Depakote DR tab 1000mg BID for mood/agitation (most recent Depakote level 6/11 87)  Lithobid 750mg BID for mood/agitation (most recent Lithium level 7/13 0.9)     Continue 1:1 for safety   EKG 7/15 demonstrates "sinus tachycardia, nonspecific ST wave changes, QTc 504"  Follow up EKG tomorrow    Continue with pharmacotherapy, group therapy, milieu therapy and occupational therapy. Monitor for adverse medication side effects. Safety checks and vitals per unit protocol. CM/SW recommendations   Continue medical management by medical team.  Case discussed with treatment team.    Legal Status: involuntary 304  ------------------------------------------------------------    Subjective: All documentation including nursing notes, medication history to ensure medication adherence on the unit, labs, and vitals were reviewed. Heath Brewer was evaluated this morning for continuity of care. On 7/15: Per nursing notes, early in the morning patient was labile and agitated, not able to be redirected. She was given Haldol & Atarax, which were effective. Later in the evening, patient was again agitated and not able to be redirected, she was given Haldol.  Patient later was "running away from 1-1 staff and trying to push  At staff in order to get past. Security staff and nursing staff accompanied her to return to her room - she was shrieking and screaming and hitting door and wall and furniture in her room". Patient became physically and verbally aggressive with staff requiring 4 point restraints. Restraints were later removed. On 7/16: Per nursing notes, patient was awake since 3AM and agitated. Later in the morning, patient required Haldol and Atarax for agitation. Later in the evening, patient was increasingly agitated, and per notes, was "yelling and attempted to head butt this writer". She was given haldol, atarax, and melatonin, which were effective. Today, patient remains on 1:1. This morning, patient was agitated, screaming at staff, and not verbally redirectable. Security was called for assistance and patient received HAC. Shortly after receiving IM meds, patient began hitting walls and being aggressive towards staff. She was placed in restraints at 0935. Throughout the morning, patient could be heard crying and screaming. Restraints were later discontinued. On evaluation in the afternoon, patient was noted to be cooperative with questioning, but drowsy. She appeared to be internally preoccupied and was rummaging through personal items sprawled on her bed. She was incoherent, labile, and loud. She did stop to blow a kiss at staff member during evaluation. Further history unable to be obtained due to drowsiness. VS: Reviewed, , otherwise wnl     Progress Toward Goals: slow improvement    Psychiatric Review of Systems:  Behavior over the last 24 hours:  regressed  Sleep: decreased  Appetite: normal  Medication side effects: No   ROS: all other systems are negative    Vital signs in last 24 hours:  Temp:  [96.6 °F (35.9 °C)-97.2 °F (36.2 °C)] 96.6 °F (35.9 °C)  HR:  [111-125] 120  Resp:  [16] 16  BP: (125-151)/(85-94) 125/85    Laboratory results:  I have personally reviewed all pertinent laboratory/tests results.   No results found for this or any previous visit (from the past 48 hour(s)).       Mental Status Evaluation:    Appearance:  casually dressed, marginal hygiene, looks older than stated age, overtly appearing  female   Behavior:  agitated, guarded, restless and fidgety   Speech:  increased rate, pressured, loud, incoherent   Mood:  Agitated    Affect:  labile   Thought Process:  disorganized   Associations: loose associations   Thought Content:  preoccupied   Perceptual Disturbances: Appears to be responding to internal stimuli and Appears to be internally preoccupied, distracted throughout interview   Risk Potential: Suicidal ideation - None at present, contracts for safety on the unit, would talk to staff if not feeling safe on the unit  Homicidal ideation - None at present  Potential for aggression - Not at present   Sensorium:  unable to assess   Memory:  recent and remote memory: unable to assess due to lack of cooperation   Consciousness:  awake, drowsy   Attention/Concentration: poor concentration and poor attention span   Insight:  poor   Judgment: poor   Gait/Station: normal gait/station   Motor Activity: no abnormal movements       Current Medications:  Current Facility-Administered Medications   Medication Dose Route Frequency Provider Last Rate   • acetaminophen  650 mg Oral Q6H PRN Neli Alma, DO     • acetaminophen  650 mg Oral Q4H PRN Neli Alma, DO     • acetaminophen  975 mg Oral Q6H PRN Neli Alma, DO     • aluminum-magnesium hydroxide-simethicone  30 mL Oral Q4H PRN Neli Alma, DO     • haloperidol lactate  2.5 mg Intramuscular Q6H PRN Max 4/day Isiah Bautista MD      And   • LORazepam  1 mg Intramuscular Q6H PRN Max 4/day Isiah Bautista MD      And   • benztropine  0.5 mg Intramuscular Q6H PRN Max 4/day Isiah Bautista MD     • benztropine  1 mg Intramuscular Q4H PRN Max 6/day Neli Alma, DO     • haloperidol lactate  5 mg Intramuscular Q4H PRN Max 4/day Isiah Bautista MD      And   • LORazepam  2 mg Intramuscular Q4H PRN Max 4/day Isiah Bautista MD      And   • benztropine  1 mg Intramuscular Q4H PRN Max 4/day Isiah Bautista MD     • benztropine  0.5 mg Oral BID Mel Fields MD     • benztropine  1 mg Oral BID PRN Neli Alma, DO     • clonazePAM  0.5 mg Oral BID PRN Neli Alma, DO     • cloNIDine  0.1 mg Oral Q12H [facility] Neli Alma, DO     • cloZAPine  175 mg Oral BID Salem Aceves, DO     • dextromethorphan-guaiFENesin  10 mL Oral Q4H PRN Michael Shields PA-C     • diphenhydrAMINE  25 mg Intramuscular Q6H PRN Neli Alma, DO     • hydrOXYzine HCL  50 mg Oral Q6H PRN Max 4/day Neli Alma, DO      Or   • diphenhydrAMINE  50 mg Intramuscular Q6H PRN Neli Alma, DO     • divalproex sodium  1,000 mg Oral Q12H [facility] Neli Alma, DO     • haloperidol  2 mg Oral Q4H PRN Max 6/day Isiah Bautista MD     • haloperidol  5 mg Oral Q6H PRN Max 4/day Isiah Bautista MD     • haloperidol  5 mg Oral Q4H PRN Max 4/day Isiah Bautista MD     • hydrOXYzine HCL  100 mg Oral Q6H PRN Max 4/day Neli Alma, DO      Or   • LORazepam  2 mg Intramuscular Q6H PRN Neli Alma, DO     • hydrOXYzine HCL  25 mg Oral Q6H PRN Max 4/day Neli Alma, DO     • lithium carbonate  750 mg Oral BID Neli Alma, DO     • melatonin  3 mg Oral HS PRN Neli Alma, DO     • nicotine polacrilex  2 mg Oral Q2H PRN Isiah Bautista MD     • ondansetron  4 mg Oral Q6H PRN Michael Shields PA-C     • polyethylene glycol  17 g Oral Daily PRN Neli Alma, DO     • propranolol  10 mg Oral Q8H PRN Neli Alma, DO     • senna-docusate sodium  2 tablet Oral BID Salemdayron Aceevs, DO     • simethicone  80 mg Oral Q6H PRN Michael Shields PA-C     • traZODone  100 mg Oral HS PERLA Martinez         Behavioral Health Medications: All current active meds have been reviewed. Changes as in plan section above.   Risks, benefits and possible side effects of Medications:   Risks, benefits, and possible side effects of medications explained to patient and patient verbalizes understanding. Counseling / Coordination of Care:  Patient's progress discussed with staff in treatment team meeting. Medications, treatment progress and treatment plan reviewed with patient. Jeffrey Aceves DO  Psychiatry Resident, PGY-I    This note was completed in part utilizing Dragon dictation Software. Grammatical, translation, syntax errors, random word insertions, spelling mistakes, and incomplete sentences may be an occasional consequence of this system secondary to software limitations with voice recognition, ambient noise, and hardware issues. If you have any questions or concerns about the content, text, or information contained within the body of this dictation, please contact the provider for clarification.

## 2023-07-17 NOTE — NURSING NOTE
Patient getting increasingly agitated. Patient yelling and attempted to head butt this writer. PRN haldol 5mg and atarax 50mg were administered for moderate agitation and anxiety. PRN melatonin 3mg was administered for difficulty sleeping. Will monitor for effectiveness.

## 2023-07-17 NOTE — NURSING NOTE
PRN medication and scheduled medication have been effective and patient is now in bed and resting. Continual observation maintained.

## 2023-07-17 NOTE — NURSING NOTE
Haldol 5 mg, Ativan 2 mg and cogentin 1 mg IM not effective at this time. Pt continues to attempt to get out of restraints and unable to verbalize understanding of behaviors leading up to her being placed in restraints.

## 2023-07-17 NOTE — NURSING NOTE
Patient is in her room shouting and banging and not receptive to redirection. She is also c/o neck pain. She was given Haldol 5mgs for agitation and Tylenol 650mgs for neck pain.

## 2023-07-18 LAB
ATRIAL RATE: 97 BPM
BASOPHILS # BLD AUTO: 0.04 THOUSANDS/ÂΜL (ref 0–0.1)
BASOPHILS NFR BLD AUTO: 0 % (ref 0–1)
EOSINOPHIL # BLD AUTO: 0.2 THOUSAND/ÂΜL (ref 0–0.61)
EOSINOPHIL NFR BLD AUTO: 2 % (ref 0–6)
ERYTHROCYTE [DISTWIDTH] IN BLOOD BY AUTOMATED COUNT: 13.3 % (ref 11.6–15.1)
HCT VFR BLD AUTO: 45.8 % (ref 34.8–46.1)
HGB BLD-MCNC: 14.8 G/DL (ref 11.5–15.4)
IMM GRANULOCYTES # BLD AUTO: 0.03 THOUSAND/UL (ref 0–0.2)
IMM GRANULOCYTES NFR BLD AUTO: 0 % (ref 0–2)
LYMPHOCYTES # BLD AUTO: 3.02 THOUSANDS/ÂΜL (ref 0.6–4.47)
LYMPHOCYTES NFR BLD AUTO: 26 % (ref 14–44)
MCH RBC QN AUTO: 30.1 PG (ref 26.8–34.3)
MCHC RBC AUTO-ENTMCNC: 32.3 G/DL (ref 31.4–37.4)
MCV RBC AUTO: 93 FL (ref 82–98)
MONOCYTES # BLD AUTO: 0.95 THOUSAND/ÂΜL (ref 0.17–1.22)
MONOCYTES NFR BLD AUTO: 8 % (ref 4–12)
NEUTROPHILS # BLD AUTO: 7.52 THOUSANDS/ÂΜL (ref 1.85–7.62)
NEUTS SEG NFR BLD AUTO: 64 % (ref 43–75)
NRBC BLD AUTO-RTO: 0 /100 WBCS
P AXIS: 65 DEGREES
PLATELET # BLD AUTO: 305 THOUSANDS/UL (ref 149–390)
PMV BLD AUTO: 9.1 FL (ref 8.9–12.7)
PR INTERVAL: 150 MS
QRS AXIS: 16 DEGREES
QRSD INTERVAL: 78 MS
QT INTERVAL: 372 MS
QTC INTERVAL: 472 MS
RBC # BLD AUTO: 4.92 MILLION/UL (ref 3.81–5.12)
T WAVE AXIS: 63 DEGREES
VENTRICULAR RATE: 97 BPM
WBC # BLD AUTO: 11.76 THOUSAND/UL (ref 4.31–10.16)

## 2023-07-18 PROCEDURE — 93010 ELECTROCARDIOGRAM REPORT: CPT | Performed by: INTERNAL MEDICINE

## 2023-07-18 PROCEDURE — 99232 SBSQ HOSP IP/OBS MODERATE 35: CPT | Performed by: PSYCHIATRY & NEUROLOGY

## 2023-07-18 PROCEDURE — 85025 COMPLETE CBC W/AUTO DIFF WBC: CPT

## 2023-07-18 PROCEDURE — 93005 ELECTROCARDIOGRAM TRACING: CPT

## 2023-07-18 RX ORDER — WATER 1000 ML/1000ML
INJECTION, SOLUTION INTRAVENOUS
Status: COMPLETED
Start: 2023-07-18 | End: 2023-07-18

## 2023-07-18 RX ORDER — OLANZAPINE 10 MG/1
5 INJECTION, POWDER, LYOPHILIZED, FOR SOLUTION INTRAMUSCULAR ONCE
Status: COMPLETED | OUTPATIENT
Start: 2023-07-18 | End: 2023-07-18

## 2023-07-18 RX ORDER — OLANZAPINE 10 MG/1
5 INJECTION, POWDER, LYOPHILIZED, FOR SOLUTION INTRAMUSCULAR
Status: DISCONTINUED | OUTPATIENT
Start: 2023-07-18 | End: 2023-07-19 | Stop reason: HOSPADM

## 2023-07-18 RX ORDER — OLANZAPINE 10 MG/1
10 TABLET ORAL
Status: DISCONTINUED | OUTPATIENT
Start: 2023-07-18 | End: 2023-07-19 | Stop reason: HOSPADM

## 2023-07-18 RX ORDER — OLANZAPINE 5 MG/1
5 TABLET ORAL
Status: DISCONTINUED | OUTPATIENT
Start: 2023-07-18 | End: 2023-07-19 | Stop reason: HOSPADM

## 2023-07-18 RX ORDER — OLANZAPINE 2.5 MG/1
2.5 TABLET ORAL
Status: DISCONTINUED | OUTPATIENT
Start: 2023-07-18 | End: 2023-07-19 | Stop reason: HOSPADM

## 2023-07-18 RX ORDER — PROPRANOLOL HYDROCHLORIDE 10 MG/1
10 TABLET ORAL 3 TIMES DAILY
Status: DISCONTINUED | OUTPATIENT
Start: 2023-07-18 | End: 2023-07-19 | Stop reason: HOSPADM

## 2023-07-18 RX ORDER — OLANZAPINE 10 MG/1
10 INJECTION, POWDER, LYOPHILIZED, FOR SOLUTION INTRAMUSCULAR
Status: DISCONTINUED | OUTPATIENT
Start: 2023-07-18 | End: 2023-07-19 | Stop reason: HOSPADM

## 2023-07-18 RX ADMIN — WATER 10 ML: 1 INJECTION INTRAMUSCULAR; INTRAVENOUS; SUBCUTANEOUS at 12:32

## 2023-07-18 RX ADMIN — DIVALPROEX SODIUM 1000 MG: 500 TABLET, DELAYED RELEASE ORAL at 09:13

## 2023-07-18 RX ADMIN — LITHIUM CARBONATE 750 MG: 450 TABLET, EXTENDED RELEASE ORAL at 09:13

## 2023-07-18 RX ADMIN — LITHIUM CARBONATE 750 MG: 450 TABLET, EXTENDED RELEASE ORAL at 17:19

## 2023-07-18 RX ADMIN — CLOZAPINE 150 MG: 100 TABLET ORAL at 17:19

## 2023-07-18 RX ADMIN — WATER 10 ML: 1 INJECTION INTRAMUSCULAR; INTRAVENOUS; SUBCUTANEOUS at 23:55

## 2023-07-18 RX ADMIN — DIPHENHYDRAMINE HYDROCHLORIDE 50 MG: 50 INJECTION, SOLUTION INTRAMUSCULAR; INTRAVENOUS at 23:55

## 2023-07-18 RX ADMIN — PROPRANOLOL HYDROCHLORIDE 10 MG: 10 TABLET ORAL at 20:56

## 2023-07-18 RX ADMIN — HALOPERIDOL 5 MG: 5 TABLET ORAL at 11:43

## 2023-07-18 RX ADMIN — DIVALPROEX SODIUM 1000 MG: 500 TABLET, DELAYED RELEASE ORAL at 20:57

## 2023-07-18 RX ADMIN — BENZTROPINE MESYLATE 1 MG: 1 TABLET ORAL at 11:42

## 2023-07-18 RX ADMIN — HYDROXYZINE HYDROCHLORIDE 100 MG: 50 TABLET, FILM COATED ORAL at 11:42

## 2023-07-18 RX ADMIN — OLANZAPINE 5 MG: 10 INJECTION, POWDER, FOR SOLUTION INTRAMUSCULAR at 12:32

## 2023-07-18 RX ADMIN — SENNOSIDES AND DOCUSATE SODIUM 2 TABLET: 8.6; 5 TABLET ORAL at 17:19

## 2023-07-18 RX ADMIN — CLONIDINE HYDROCHLORIDE 0.1 MG: 0.1 TABLET ORAL at 09:13

## 2023-07-18 RX ADMIN — SENNOSIDES AND DOCUSATE SODIUM 2 TABLET: 8.6; 5 TABLET ORAL at 09:13

## 2023-07-18 RX ADMIN — MIRTAZAPINE 15 MG: 15 TABLET, FILM COATED ORAL at 21:32

## 2023-07-18 RX ADMIN — OLANZAPINE 10 MG: 10 TABLET, FILM COATED ORAL at 20:57

## 2023-07-18 RX ADMIN — OLANZAPINE 10 MG: 10 INJECTION, POWDER, FOR SOLUTION INTRAMUSCULAR at 23:55

## 2023-07-18 RX ADMIN — BENZTROPINE MESYLATE 0.5 MG: 0.5 TABLET ORAL at 09:13

## 2023-07-18 RX ADMIN — CLOZAPINE 150 MG: 100 TABLET ORAL at 09:13

## 2023-07-18 RX ADMIN — PROPRANOLOL HYDROCHLORIDE 10 MG: 10 TABLET ORAL at 16:10

## 2023-07-18 RX ADMIN — PROPRANOLOL HYDROCHLORIDE 10 MG: 10 TABLET ORAL at 12:24

## 2023-07-18 NOTE — PLAN OF CARE
Pt with escalated outbursts more recently. Medications to be adjusted. DC plans to be arranged when appropriate.

## 2023-07-18 NOTE — NURSING NOTE
Spoke with patient's sister. Sister says the family feels like the pt is acting like her normal self when she speaks with them on the phone and they would like to have her come home. She says the pt will act out when she cannot get her way and would do better at home as she is more redirectable with her mother. Told the sister that this information will be relayed to the treatment team. Sister was grateful.

## 2023-07-18 NOTE — NURSING NOTE
Pt given scheduled propranolol, went to sink and spit it out. Pt took pill again but did the same thing but this nurse did not witness the pill being spit out, though the one to one staff said she thought she saw it come out. Dr. Anthony North notified, order for mouth checks now in place.

## 2023-07-18 NOTE — NURSING NOTE
Pt went to hug oncoming shift staff member but was redirected by staff. Pt became angry and slapped cup of ice out of staff's hand. Redirected to bed now sitting there without further agitation.

## 2023-07-18 NOTE — PROGRESS NOTES
07/18/23 0834   Team Meeting   Meeting Type Daily Rounds   Team Members Present   Team Members Present Physician;Nurse;   Physician Team Member [id number] Cathi   Patient/Family Present   Patient Present No   Patient's Family Present No   Restraints yesterday-unable to be redirected, yelling, agitation, pushing staff. No further issues last night. Heart rate increased. Repeat EKG and vitals this afternoon. Med/meal compliant. Dc tbd.

## 2023-07-18 NOTE — NURSING NOTE
Pt restless and labile throughout the morning denying SI/HI/AVH, depression or anxiety. Pt making inappropriate sexual gestures toward staff. Pt frequently touching and wrapping her hands around staff's waist and blowing them kisses. Pt also points to her private area frequently. Redirection provided by continual observation staff. Pt given 5mg po haldol for severe agitation with 100mg atarax for severe anxiety at 1143. Intervention not effective as pt continued to touch staff and threw a chair while remaining boisterous. One time order IM zyprexa 5mg given at 1232. Intervention effective as pt is now calm in room.

## 2023-07-18 NOTE — NURSING NOTE
PT continues on 1:1 close observation with nursing staff, continues to require re-directions and enforcement towards behavioral issues, PT took HS medications without any difficulty. PT in bed as of this time.

## 2023-07-18 NOTE — PROGRESS NOTES
Progress Note - Behavioral Health   [place] 28 y.o. female MRN: [id number]  Unit/Bed#: [facility] 349-01 Encounter: [id number]    Assessment/Plan   Principal Problem:    Unspecified mood (affective) disorder (HCC)  Active Problems:    Medical clearance for psychiatric admission    Intellectual disability    Psychosis ([address])    Dysuria    Vaginal lesion    Cough      Recommended Treatment:     Medications as below:   Clozapine 150 mg BID for psychosis (most recent CBC 7/18 WBC 11.76, ANC 7.52, Clozapine level 7/7 392)  Depakote DR tab 1000mg BID for mood/agitation (most recent Depakote level 6/11 87)  Lithobid 750mg BID for mood/agitation (most recent Lithium level 7/13 0.9)  Mirtazapine 15 mg qhs for insomnia   Discontinue Cogentin due to elevated QTc  Discontinue Clonidine due to tachycardia   Start Propanolol 10 mg TID for tachycardia    EKG 7/18 demonstrates "Normal sinus rhythm, Normal ECG, QTc 472"  Continue 1:1 for safety   Continue with pharmacotherapy, group therapy, milieu therapy and occupational therapy. Monitor for adverse medication side effects. Safety checks and vitals per unit protocol. CM/SW recommendations   Continue medical management by medical team.  Case discussed with treatment team.    Legal Status: involuntary 304  ------------------------------------------------------------    Subjective: All documentation including nursing notes, medication history to ensure medication adherence on the unit, labs, and vitals were reviewed. Ruthy Boswell was evaluated this morning for continuity of care and no acute distress noted throughout the evaluation. Patient is primarily [facility], history obtained with Dr. Mattie Tejada who is fluent for translation. No acute events overnight. This morning Evelin has been agitated, yelling at staff, and difficult to redirect. Per nursing notes, "Pt making inappropriate sexual gestures toward staff.  Pt frequently touching and wrapping her hands around staff's waist and blowing them kisses. Pt also points to her private area frequently." Patient was given Haldol and Atarax which were not effective. Patient was later given Zyprexa IM which was effective. On interview today, Sander Pritchard remains internally preoccupied, labile, and child-like. She is holding a Bible and states she is reading it. At one point she states she is "asking God for forgiveness". Sander Pritchard reports feeling "good." Sander Pritchard notes having "good" sleep. Sander Pritchard states having a "good" appetite. Sander Pritchard has been taking the medications as prescribed. She denies any medication side effects. She reports pain in her chest. Denies shortness of breath, other physical symptoms. She states she has been having BM and reports she went "15 times". Sander Pritchard denies suicidal ideations. Sander Pritchard denies homicidal ideations. Regarding hallucinations, Evelin reports auditory and visual hallucinations. She then starts counting numbers and pointing to the ground stating she sees "people on the floor dying". She starts crying mid-interview. Sander Pritchard then brings out a mask she made in groups and puts mask on this author. She endorses no further complaints. VS: Reviewed,  this am, otherwise WNL. Repeat in afternoon , otherwise WNL. Progress Toward Goals: slow improvement    Psychiatric Review of Systems:  Behavior over the last 24 hours:  unchanged  Sleep: normal  Appetite: normal  Medication side effects: Yes - mild sialorrhea   ROS: all other systems are negative    Vital signs in last 24 hours:  Temp:  [97.1 °F (36.2 °C)] 97.1 °F (36.2 °C)  HR:  [131-135] 131  Resp:  [16-18] 16  BP: (132-135)/(74) 132/74    Laboratory results:  I have personally reviewed all pertinent laboratory/tests results.   Recent Results (from the past 48 hour(s))   CBC and differential    Collection Time: 07/18/23  6:58 AM   Result Value Ref Range    WBC 11.76 (H) 4.31 - 10.16 Thousand/uL    RBC 4.92 3.81 - 5.12 Million/uL Hemoglobin 14.8 11.5 - 15.4 g/dL    Hematocrit 45.8 34.8 - 46.1 %    MCV 93 82 - 98 fL    MCH 30.1 26.8 - 34.3 pg    MCHC 32.3 31.4 - 37.4 g/dL    RDW 13.3 11.6 - 15.1 %    MPV 9.1 8.9 - 12.7 fL    Platelets 400 405 - 461 Thousands/uL    nRBC 0 /100 WBCs    Neutrophils Relative 64 43 - 75 %    Immat GRANS % 0 0 - 2 %    Lymphocytes Relative 26 14 - 44 %    Monocytes Relative 8 4 - 12 %    Eosinophils Relative 2 0 - 6 %    Basophils Relative 0 0 - 1 %    Neutrophils Absolute 7.52 1.85 - 7.62 Thousands/µL    Immature Grans Absolute 0.03 0.00 - 0.20 Thousand/uL    Lymphocytes Absolute 3.02 0.60 - 4.47 Thousands/µL    Monocytes Absolute 0.95 0.17 - 1.22 Thousand/µL    Eosinophils Absolute 0.20 0.00 - 0.61 Thousand/µL    Basophils Absolute 0.04 0.00 - 0.10 Thousands/µL         Mental Status Evaluation:    Appearance:  casually dressed, marginal hygiene, looks older than stated age, overtly appearing  female   Behavior:  agitated, restless and fidgety, responds better to redirection today   Speech:  pressured, loud, incoherent at times   Mood:  "good"   Affect:  labile   Thought Process:  disorganized   Associations: loose associations   Thought Content:  preoccupied   Perceptual Disturbances: Reports auditory and visual hallucinations , Appears to be responding to internal stimuli and Appears to be internally preoccupied, appears distracted   Risk Potential: Suicidal ideation - None at present, contracts for safety on the unit, would talk to staff if not feeling safe on the unit  Homicidal ideation - None at present  Potential for aggression - Not at present   Sensorium:  oriented to place (hospital) and date (July 18).     Memory:  recent and remote memory: unable to assess due to lack of cooperation   Consciousness:  alert and awake   Attention/Concentration: poor concentration and poor attention span   Insight:  poor   Judgment: poor   Gait/Station: normal gait/station   Motor Activity: no abnormal movements Current Medications:  Current Facility-Administered Medications   Medication Dose Route Frequency Provider Last Rate   • acetaminophen  650 mg Oral Q6H PRN Serena Quiver, DO     • acetaminophen  650 mg Oral Q4H PRN Serena Quiver, DO     • acetaminophen  975 mg Oral Q6H PRN Serena Quiver, DO     • aluminum-magnesium hydroxide-simethicone  30 mL Oral Q4H PRN Serena Quiver, DO     • haloperidol lactate  2.5 mg Intramuscular Q6H PRN Max 4/day Vadim Vegas MD      And   • LORazepam  1 mg Intramuscular Q6H PRN Max 4/day Vadim Vegas MD      And   • benztropine  0.5 mg Intramuscular Q6H PRN Max 4/day Vadim Vegas MD     • benztropine  1 mg Intramuscular Q4H PRN Max 6/day Serena Quiver, DO     • haloperidol lactate  5 mg Intramuscular Q4H PRN Max 4/day Vadim Vegas MD      And   • LORazepam  2 mg Intramuscular Q4H PRN Max 4/day Vadmi Vegas MD      And   • benztropine  1 mg Intramuscular Q4H PRN Max 4/day Vadim Vegas MD     • benztropine  1 mg Oral BID PRN Serena Quiver, DO     • clonazePAM  0.5 mg Oral BID PRN Serena Quiver, DO     • cloZAPine  150 mg Oral BID Burnis Ing, DO     • dextromethorphan-guaiFENesin  10 mL Oral Q4H PRN Zuleyam Billings PA-C     • diphenhydrAMINE  25 mg Intramuscular Q6H PRN Serena Quiver, DO     • hydrOXYzine HCL  50 mg Oral Q6H PRN Max 4/day Serena Quiver, DO      Or   • diphenhydrAMINE  50 mg Intramuscular Q6H PRN Serena Quiver, DO     • divalproex sodium  1,000 mg Oral Q12H [facility] Serena Quiver, DO     • haloperidol  2 mg Oral Q4H PRN Max 6/day Vadim Vegas MD     • haloperidol  5 mg Oral Q6H PRN Max 4/day Vadim Vegas MD     • haloperidol  5 mg Oral Q4H PRN Max 4/day Vadim Vegas MD     • hydrOXYzine HCL  100 mg Oral Q6H PRN Max 4/day Serena Quiver, DO      Or   • LORazepam  2 mg Intramuscular Q6H PRN Serena Kim DO     • hydrOXYzine HCL  25 mg Oral Q6H PRN Max 4/day Serena Kim DO     • lithium carbonate  750 mg Oral BID Magno Engel, DO     • melatonin  3 mg Oral HS PRN Magno Engel, DO     • mirtazapine  15 mg Oral HS Ritta Pole, DO     • nicotine polacrilex  2 mg Oral Q2H PRN Evaristo Warren MD     • ondansetron  4 mg Oral Q6H PRN Cedric Zazueta PA-C     • polyethylene glycol  17 g Oral Daily PRN Magno Engel, DO     • propranolol  10 mg Oral Q8H PRN Magno Engel, DO     • propranolol  10 mg Oral TID Ritta Pole, DO     • senna-docusate sodium  2 tablet Oral BID Ritta Pole, DO     • simethicone  80 mg Oral Q6H PRN Cedric Zazueta PA-C         Behavioral Health Medications: All current active meds have been reviewed. Changes as in plan section above. Risks, benefits and possible side effects of Medications:   Risks, benefits, and possible side effects of medications explained to patient and patient verbalizes understanding. Counseling / Coordination of Care:  Patient's progress discussed with staff in treatment team meeting. Medications, treatment progress and treatment plan reviewed with patient. Marissa Luciano DO  Psychiatry Resident, PGY-I    This note was completed in part utilizing Dragon dictation Software. Grammatical, translation, syntax errors, random word insertions, spelling mistakes, and incomplete sentences may be an occasional consequence of this system secondary to software limitations with voice recognition, ambient noise, and hardware issues. If you have any questions or concerns about the content, text, or information contained within the body of this dictation, please contact the provider for clarification.

## 2023-07-18 NOTE — PLAN OF CARE
Problem: Risk for Self Injury/Neglect  Goal: Treatment Goal: Remain safe during length of stay, learn and adopt new coping skills, and be free of self-injurious ideation, impulses and acts at the time of discharge  Outcome: Progressing  Goal: Verbalize thoughts and feelings  Description: Interventions:  - Assess and re-assess patient's lethality and potential for self-injury  - Engage patient in 1:1 interactions, daily, for a minimum of 15 minutes  - Encourage patient to express feelings, fears, frustrations, hopes  - Establish rapport/trust with patient   Outcome: Progressing  Goal: Refrain from harming self  Description: Interventions:  - Monitor patient closely, per order  - Develop a trusting relationship  - Supervise medication ingestion, monitor effects and side effects   Outcome: Progressing  Goal: Attend and participate in unit activities, including therapeutic, recreational, and educational groups  Description: Interventions:  - Provide therapeutic and educational activities daily, encourage attendance and participation, and document same in the medical record  - Obtain collateral information, encourage visitation and family involvement in care   Outcome: Progressing  Goal: Recognize maladaptive responses and adopt new coping mechanisms  Outcome: Progressing  Goal: Complete daily ADLs, including personal hygiene independently, as able  Description: Interventions:  - Observe, teach, and assist patient with ADLS  - Monitor and promote a balance of rest/activity, with adequate nutrition and elimination  Outcome: Progressing     Problem: Risk for Violence/Aggression Toward Others  Goal: Treatment Goal: Refrain from acts of violence/aggression during length of stay, and demonstrate improved impulse control at the time of discharge  Outcome: Progressing  Goal: Verbalize thoughts and feelings  Description: Interventions:  - Assess and re-assess patient's level of risk, every waking shift  - Engage patient in 1:1 interactions, daily, for a minimum of 15 minutes   - Allow patient to express feelings and frustrations in a safe and non-threatening manner   - Establish rapport/trust with patient   Outcome: Progressing  Goal: Refrain from harming others  Outcome: Progressing  Goal: Refrain from destructive acts on the environment or property  Outcome: Progressing  Goal: Control angry outbursts  Description: Interventions:  - Monitor patient closely, per order  - Ensure early verbal de-escalation  - Monitor prn medication needs  - Set reasonable/therapeutic limits, outline behavioral expectations, and consequences   - Provide a non-threatening milieu, utilizing the least restrictive interventions   Outcome: Progressing  Goal: Attend and participate in unit activities, including therapeutic, recreational, and educational groups  Description: Interventions:  - Provide therapeutic and educational activities daily, encourage attendance and participation, and document same in the medical record   Outcome: Progressing  Goal: Identify appropriate positive anger management techniques  Description: Interventions:  - Offer anger management and coping skills groups   - Staff will provide positive feedback for appropriate anger control  Outcome: Progressing     Problem: SELF CARE DEFICIT  Goal: Return ADL status to a safe level of function  Description: INTERVENTIONS:  - Administer medication as ordered  - Assess ADL deficits and provide assistive devices as needed  - Obtain PT/OT consults as needed  - Assist and instruct patient to increase activity and self care as tolerated  Outcome: Progressing

## 2023-07-19 VITALS
HEART RATE: 110 BPM | RESPIRATION RATE: 16 BRPM | TEMPERATURE: 97.5 F | OXYGEN SATURATION: 97 % | SYSTOLIC BLOOD PRESSURE: 122 MMHG | DIASTOLIC BLOOD PRESSURE: 71 MMHG | HEIGHT: 65 IN | WEIGHT: 123.4 LBS | BODY MASS INDEX: 20.56 KG/M2

## 2023-07-19 LAB
ATRIAL RATE: 96 BPM
ATRIAL RATE: 99 BPM
P AXIS: 61 DEGREES
P AXIS: 62 DEGREES
PR INTERVAL: 156 MS
PR INTERVAL: 156 MS
QRS AXIS: 13 DEGREES
QRS AXIS: 15 DEGREES
QRSD INTERVAL: 84 MS
QRSD INTERVAL: 86 MS
QT INTERVAL: 362 MS
QT INTERVAL: 364 MS
QTC INTERVAL: 457 MS
QTC INTERVAL: 467 MS
T WAVE AXIS: 53 DEGREES
T WAVE AXIS: 55 DEGREES
VENTRICULAR RATE: 96 BPM
VENTRICULAR RATE: 99 BPM

## 2023-07-19 PROCEDURE — 93010 ELECTROCARDIOGRAM REPORT: CPT | Performed by: STUDENT IN AN ORGANIZED HEALTH CARE EDUCATION/TRAINING PROGRAM

## 2023-07-19 PROCEDURE — 99238 HOSP IP/OBS DSCHRG MGMT 30/<: CPT | Performed by: PSYCHIATRY & NEUROLOGY

## 2023-07-19 RX ORDER — PROPRANOLOL HYDROCHLORIDE 10 MG/1
10 TABLET ORAL 3 TIMES DAILY
Qty: 90 TABLET | Refills: 1 | Status: SHIPPED | OUTPATIENT
Start: 2023-07-19 | End: 2023-09-17

## 2023-07-19 RX ORDER — CLOZAPINE 50 MG/1
150 TABLET ORAL 2 TIMES DAILY
Qty: 180 TABLET | Refills: 1 | Status: SHIPPED | OUTPATIENT
Start: 2023-07-19 | End: 2023-07-19 | Stop reason: SDUPTHER

## 2023-07-19 RX ORDER — MIRTAZAPINE 15 MG/1
15 TABLET, FILM COATED ORAL
Qty: 30 TABLET | Refills: 1 | Status: SHIPPED | OUTPATIENT
Start: 2023-07-19 | End: 2023-09-17

## 2023-07-19 RX ORDER — LITHIUM CARBONATE 450 MG
450 TABLET, EXTENDED RELEASE ORAL 2 TIMES DAILY
Qty: 60 TABLET | Refills: 1 | Status: SHIPPED | OUTPATIENT
Start: 2023-07-19 | End: 2023-09-17

## 2023-07-19 RX ORDER — LITHIUM CARBONATE 300 MG/1
300 TABLET, FILM COATED, EXTENDED RELEASE ORAL 2 TIMES DAILY
Qty: 60 TABLET | Refills: 1 | Status: SHIPPED | OUTPATIENT
Start: 2023-07-19 | End: 2023-09-17

## 2023-07-19 RX ORDER — AMOXICILLIN 250 MG
2 CAPSULE ORAL 2 TIMES DAILY
Qty: 120 TABLET | Refills: 1 | Status: SHIPPED | OUTPATIENT
Start: 2023-07-19 | End: 2023-09-17

## 2023-07-19 RX ORDER — LITHIUM CARBONATE 450 MG
750 TABLET, EXTENDED RELEASE ORAL 2 TIMES DAILY
Refills: 0 | Status: CANCELLED | OUTPATIENT
Start: 2023-07-19

## 2023-07-19 RX ORDER — CLOZAPINE 100 MG/1
150 TABLET ORAL 2 TIMES DAILY
Qty: 90 TABLET | Refills: 1 | Status: SHIPPED | OUTPATIENT
Start: 2023-07-19 | End: 2023-09-17

## 2023-07-19 RX ORDER — DIVALPROEX SODIUM 500 MG/1
1000 TABLET, DELAYED RELEASE ORAL EVERY 12 HOURS SCHEDULED
Qty: 120 TABLET | Refills: 1 | Status: SHIPPED | OUTPATIENT
Start: 2023-07-19 | End: 2023-09-17

## 2023-07-19 RX ADMIN — PROPRANOLOL HYDROCHLORIDE 10 MG: 10 TABLET ORAL at 15:37

## 2023-07-19 RX ADMIN — SENNOSIDES AND DOCUSATE SODIUM 2 TABLET: 8.6; 5 TABLET ORAL at 17:01

## 2023-07-19 RX ADMIN — CLOZAPINE 150 MG: 100 TABLET ORAL at 08:48

## 2023-07-19 RX ADMIN — HYDROXYZINE HYDROCHLORIDE 100 MG: 50 TABLET, FILM COATED ORAL at 18:10

## 2023-07-19 RX ADMIN — HYDROXYZINE HYDROCHLORIDE 100 MG: 50 TABLET, FILM COATED ORAL at 11:13

## 2023-07-19 RX ADMIN — LITHIUM CARBONATE 750 MG: 450 TABLET, EXTENDED RELEASE ORAL at 08:48

## 2023-07-19 RX ADMIN — DIVALPROEX SODIUM 1000 MG: 500 TABLET, DELAYED RELEASE ORAL at 08:49

## 2023-07-19 RX ADMIN — LITHIUM CARBONATE 750 MG: 450 TABLET, EXTENDED RELEASE ORAL at 17:02

## 2023-07-19 RX ADMIN — CLOZAPINE 150 MG: 100 TABLET ORAL at 17:01

## 2023-07-19 RX ADMIN — SENNOSIDES AND DOCUSATE SODIUM 2 TABLET: 8.6; 5 TABLET ORAL at 08:48

## 2023-07-19 RX ADMIN — PROPRANOLOL HYDROCHLORIDE 10 MG: 10 TABLET ORAL at 08:48

## 2023-07-19 RX ADMIN — OLANZAPINE 10 MG: 10 TABLET, FILM COATED ORAL at 11:13

## 2023-07-19 NOTE — NURSING NOTE
Pt awoke and completed morning routines. AVS printed and reviewed with Pt. Pt verbalized that she understood discharge instructions but had difficulty concentrating with translation. When discussing smoking cessation Pt stated that she does not smoke. Pt discharged with all of her belongings at time of discharge including medications sent from pharmacy. Pt discharged to her Sister and Mother at 12.

## 2023-07-19 NOTE — PLAN OF CARE
Pt to dc today. Pt denies SI/HI, AVH. Pt oriented x3. Pt to return home and will be picked up by sister at 31 [phone]. Pt to follow up with CHANDANA on 7/24 at 1000. Pt to follow up with [address] [place] [address] ([phone]). Meds brought up from [address] [place] [address].      [place] address: [address] [place] [address], [address] [place] [address]  P: [phone]

## 2023-07-19 NOTE — PLAN OF CARE
CM spoke with pt's sister. Sister and mom in agreement with dc today. Sister works today and will be able to pick pt up at [phone].

## 2023-07-19 NOTE — NURSING NOTE
Pt yelling loudly and disrupting sleeping peers, hitting wall, grabbing female 1:1 staff inappropriately and laughing when corrected then repeating behaviors. Pt not verbally redirectable. PRN zyprexa 10mg IM and benadryl 50mg IM given @ 23:55 for severe agitation and moderate anxiety; partially effective.

## 2023-07-19 NOTE — NURSING NOTE
1810 PRN Hydroxyzine 100 mg PO given for severe anxiety. Pt anxious due to wanting to leave now and didn't want to wait for family. [address] Pt was discharged and appeared more relaxed. Pt was tearful giving staff hugs goodbye.

## 2023-07-19 NOTE — NURSING NOTE
Pt loud, difficult to redirect, impulsive, easily-distracted. Pt sexually inappropriate, blowing kisses, trying to touch staff, making explicit comments. Pt labile and agitated when Romansh-speaking staff sets limits. Pt given PRN zyprexa 10mg PO  @ 20:57 for severe agitation following pt swinging at staff and hitting wall. PRN zyprexa minimally effective, pt remains agitated but is less loud. Pt only takes PO medication with persistent prompting and encouragement. Pt remains on continual observation, close proximity.

## 2023-07-19 NOTE — DISCHARGE SUMMARY
Discharge Summary - [address] 28 y.o. female MRN: [id number]  Unit/Bed#: [facility] 349-01 Encounter: [id number]     Admission Date:   Admission Orders (From admission, onward)     Ordered        06/10/23 1423  ED TO SAME CAMPUS [facility] UNIT (using Admission Navigator) - Admit Patient to [facility] Unit  Once                            Discharge Date: 07/19/23     Attending Psychiatrist: Harry Contreras MD    Admission Diagnosis:    Principal Problem:    Unspecified mood (affective) disorder ([address])  Active Problems:    Medical clearance for psychiatric admission    Intellectual disability    Psychosis ([address])    Dysuria    Vaginal lesion    Cough      Discharge Diagnosis:     Principal Problem:    Unspecified mood (affective) disorder ([address])  Active Problems:    Medical clearance for psychiatric admission    Intellectual disability    Psychosis ([address])    Dysuria    Vaginal lesion    Cough  Resolved Problems:    * No resolved hospital problems. *      Reason for Admission/HPI:   Belkis Awan is a 28 y.o. single, overtly appearing  female, with past psychiatric history of schizoaffective disorder, bipolar type, intellectual developmental disorder who initially presented to the B[facility] at [facility] with acute agitation and psychosis. Belkis Awan was admitted to the unit on an involuntary 302 commitment. See admission HPI as below:    Please see consultation by Lucina Malloy DO on 6/10/23:   "Belkis Anderson is a 28 y. o. female with schizoaffective disorder bipolar type per records, admitted for acute agitation and psychosis. Prior records were reviewed. Cheryle Grate is disorganized in speech, thought process, actively responding to internal stimuli,  is having a hard time understanding due to disorganization.  Patient is talking about various birthdays, has fresh wounds on her knee and her head, labile and crying when asking about calling her sister, talking about the "devil", and stating that the 1:1 staff member is her "man" in a sexual sense. Patient is clearly a poor historian and displays continued need for inpatient psychiatric care. UDS was positive for THC and Benzos: patient is prescribed klonopin and received ativan in ED at [facility] approximately 4 days ago. There was discussion of IM haldol, none initiated. Per record on 6/8/23 there are the following contact points: Kwesi Osorio [phone] who states patient's mother, Sol speaks Salvadorean and will need interpretor.  Charo provides contact number for patient's mother, [phone]. [facility] records at [address] [place] [address] as well as coordination with nursing staff, sister was reached and medications were confirmed as listed:     Klonopin 0.5mg TID for agitation  Trazodone 100mg for sleep  Depakote 1000mg Q12 for mood stabilization  Risperidone 3mg BID for psychotic symptoms  Lithium 300mg BID for mood adjunct  Cogentin 2mg daily for EPS"    See ED documentation per Shakira Alfred MD on 6/9/23:          Chief Complaint   Patient presents with   • Psychiatric Evaluation       Pt arrives with sister who states " she has bipolar schizophrenia and ID and I took her to [facility] and they kept her in the ER for 2 day and adjusted some medications and then discharged her yesterday and she is not better, she is hearing voices that someone is going to kill her, she is trying to hurt herself "  pt yelling and kicking at times on arrival         65-year-old female presented emerged department with sister for agitation, manic episode likely. Nu De La Rosa has intellectual disability as well as schizoaffective disorder.  Patient has not slept in 5 days.  Was taken to outside hospital where medication changes were made and discharged from the emergency department. Bonita Villalta has been kicking and screaming, hearing voices, difficult to redirect. Bonita Villalta has been paranoid saying hearing voices that someone is going to kill her.  In the ED patient making sexual gestures and having pressured speech and talking in Qatari quickly.     See note by crisis worker Juventino Mares on 6/9/23:  Patient is a 28 yr old female brought to the ED by sister after she was increasingly agitated at home, not sleeping, screaming, and hearing voices believing that some one is choking her and wants to kill her. and seeing people that are under her bed.  She is up all night, very restless, up and down the steps, flushing toilets and turning lights on and off and writing on walls (sometimes with feces). .   Patient was recently seen in the [facility] ED (was there 2 days) given medication changes , however it was not effective. In the ED, patient was screaming and agitated,  her sister was not able to calm her. Radha Malone did require a 36, sister was the petitioner.     Patient was hospitalized several times when she lived in [place] at [address] [place] [address] family then moved to [place], and she was in treatment with [address] [place] [address],[address] [phone] [facility]). Kierra Ford does not have a local provider, and still does virtual meetings with [facility]. (she has been in [facility] for about 1 yr)     See note from [address] 6/6/23 Leon Morales during patient ED stay at [facility]:  History of the Present Illness:  Patient is a 28 y.o. female with established diagnosis of Schizophrenia, Mild ID, and Bipolar Affective Disorder referred to ED by family transported via private vehicle by her sister Johanne Bose due to decreased functioning. PES met with Pt and introduced self using a Qatari interpretor (Coy # [id number]). Pt was unable to actively participate during the interview due to being incoherent and being unable to follow instructions. Pt was tangential, screaming and euphoric during the evaluation. PES discontinued the interview and obtained Pt's clinical information from her sister.      Collateral Information Kin Patt Maloney [phone]  PES met with Pt's sister Akosua Morrisork Chandrakant Louis introduced self identified role in the ED. Charo states Pt is "having a mental crisis". Charo stated Pt appears uncomfortable in her own body, not sleeping at all, when she does sleep Pt wakes up frequently throughout the night. Charo stated Pt has been experiencing these symptoms for the past three months, they brought her into the hospital today looking for help as they are unable to continue the way they have been. The family has been attempting to manage her symptoms at home due to problems that occurred during last AIP in 2011 where Pt was over medicated causing her to have a seizure. Pt is also reportedly writing on walls and playing with her feces. Charo stated Pt experiences symptoms related to depression where she cries and sleeps a lot, self isolates, is quick to anger and will pick a verbal altercation with those around her. Pt requires assistance with completing ADL's. Charo endorses Pt expressing passive SI stating she cannot live like this and wants to die. Pt has never attempted suicide and has not identified a plan. Charo shared that when Pt is not well she engages in SIB where she picks her skin and hits herself on her head and body, she further experiences anxiety and basilio which is marked with racing, incoherent thoughts, fidgeting, crying, laughing and screaming, and sexually inappropriate behaviors. Charo admits that Pt can be verbally aggressive and will "pick fights" when she does not get her way. She stated Pt "does not currently have the strength to be physically aggressive, but she has been in the past. Charo denies Pt is destructive to property. Charo stated Pt has experienced symptoms related to paranoia and experiences rambling, intrusive thoughts, is incoherent, and, crying, stating "they're going to kill me".  Sanyajoe denies Pt expressing HI, she expressed knowing that the Pt experiences psychosis but is unable to say with certainty if the Pt experiences AH or VH     Following summary is provided from [facility] Stay 6/6/23:  Summary: Kelley Murray is a 28 year woman with established diagnoses of intellectual disability and schizoaffective disorder who was brought to the Emergency Department by family who was concerned about poor sleep and erratic behavior. She was agitated upon arriving to the Emergency Department and was medicated with 10 mg of Zyprexa, 15 mg of Versed and 5 mg of Haldol. She was medically cleared and psychiatry was consulted. Discussed care with ED provider who reports no identifiable medical cause for symptoms. Today Jered Quevedo took oral medications. She is disorganized and restless during interview. Her thoughts were incoherent and at times unable to be translated. Jered Quevedo spoke about eating dog food and asked  to take his penis out. She was able to be redirected but yells out at times. No suicidal or homicidal ideations are reported. At times she speaks to herself as if internally preoccupied. Contact was made with Evelin's outpatient provider KATIE [phone]. Madyson Pro reports that Brandt's last visit was 5/1/23. A plan was made to start Thorazine however it was not covered so Zoloft was increased to 100 mg and Risperdal was increased to 3 mg daily and 3.5 mg at night to target "outbursts." Contact was made with sister Star Lezama [phone]. Charo reports that Jered Quevedo has been incoherent at times, rambling, pacing, digging in trash and smearing feces. Charo reports that Jered Quevedo has not been sleeping, spends much of the day pacing and "looks uncomfortable." Charo does not recall all past medication trials but has been on Zyprexa, Risperdal and Haldol.  No history of treatment with Lithium.     See H&P from Dr. Carlos Mclain DO on 6/11/23:    "Kelley Murray is a 28 y.o. single overtly appering  female, domiciled with mom, dad, sister, with a PPHx of schizoaffective disorder, bipolar type, intellectual developmental disorder, and PMHx of no known significance who presented to [facility] due to agitation and bizarre behavior. Patient was admitted to the [facility] on a involuntary 302 commitment basis due to bizarre behavior and aggressive behavior. On initial evaluation after admission to the inpatient psychiatric unit, Jane Gibbs was seen alongside  [id number].  unable understand patient, attempted to call sister again. Voicemail.      Since being on the unit, patient has required the use of a 1:1 due to boundary difficulties attempting to blow kisses and touch patients as well as staff. Patient required IM Haldol x3 in ED, 100mg atarax overnight on the unit for anxiety, report states she did not sleep overnight and was screaming. Patient keeps talking in [address] [address] about a dog dying, is actively responding to stimuli and is visibly labile on evaluation. "     Past Medical History:   Diagnosis Date   • Psychiatric disorder      History reviewed. No pertinent surgical history. Medications:  Clozapine 150 mg BID for psychosis   Depakote DR tab 1000mg BID for mood/agitation   Lithobid 750mg BID for mood/agitation   Mirtazapine 15 mg qhs for insomnia   Propanolol 10 mg TID for tachycardia    Allergies:     No Known Allergies    Vital signs in last 24 hours:    Temp:  [97.3 °F (36.3 °C)-97.5 °F (36.4 °C)] 97.5 °F (36.4 °C)  HR:  [] 99  Resp:  [16-20] 16  BP: (125-142)/(77-91) 128/77    No intake or output data in the 24 hours ending 07/19/23 1010      Hospital Course: On admission, Jane Gibbs was admitted to the inpatient psychiatric unit. During the hospitalization she was encouraged to attend individual therapy, group therapy, milieu therapy and occupational therapy. Upon admission she was seen by medical service for medical clearance for inpatient treatment and medical follow up.     Evelin's medications were titrated as appropriate until discharge, including:    Clozapine 150 mg BID for psychosis   Depakote DR tab 1000mg BID for mood/agitation   Lithobid 750mg BID for mood/agitation   Mirtazapine 15 mg qhs for insomnia   Propanolol 10 mg TID for tachycardia    Prior to beginning of treatment medications risks and benefits and possible side effects including risk of kidney impairment related to treatment with Lithium, risk of liver impairment related to treatment with Depakote, risk of parkinsonian symptoms, Tardive Dyskinesia and metabolic syndrome related to treatment with antipsychotic medications and risk of impaired next-day mental alertness, complex sleep-related behavior and dependence related to treatment with hypnotic medications were reviewed with Evelin. Carlee Bates verbalized understanding and agreement for treatment. At start of hospitalization, Carlee Bates was disorganized, difficult to redirect, and placed on 1:1 observation for difficulty maintaining boundaries. Patient was sexually preoccupied with both male and female staff and could be seen Jasmeet Dunklin and hugging staff. Patient was agitated throughout hospitalization and required both po and IM prns throughout stay. She continued to be loud, labile, child-like, requiring frequent redirection. Patient was placed in 4 point restraints during stay which were removed. She endorsed auditory and visual hallucinations throughout stay that did improve prior to discharge. Patient had complained of generalized abdominal pain throughout stay which did improve. Patient was ordered a bowel regimen and was having regular bowel movements prior to discharge. Patient was seen by medicine for evaluation of a vaginal lesion and was ordered STI testing which was negative. She later reported dysuria and had a UA completed.  Patient was also seen by medicine for evaluation of cough and tachycardia, she had a CXR and EKG completed which were negative for acute abnormalities and had COVID/FLU/RSV testing which were negative. Patient's symptoms improved prior to discharge. Patient did also have persistent tachycardia and had an EKG done that demonstrated a QTc of 504. Her Clozapine dose was decreased, her Trazodone was discontinued, and her Cogentin was tapered off. Her follow up EKG did demonstrate a Qtc of 472 and her tachycardia did improve. Her vital signs were within normal limits prior to discharge. The patient's mood brightened over the course of treatment, and she was seen in [facility] interacting appropriately with peers. Rebecca Starr did not demonstrate dangerous behavior to self or others prior to discharge. On the day of discharge, she denied suicidal ideation, intent or plan at the time of discharge and denied homicidal ideation, intent or plan at the time of discharge. Auditory hallucinations were significantly improved and not command in nature. She was participating appropriately in milieu at the time of discharge. Behavior was appropriate on the unit at the time of discharge. Sleep and appetite were improved. She was tolerating medications and was not reporting any significant side effects at the time of discharge. Since she was doing well at the end of the hospitalization, treatment team felt that she could be safely discharged to outpatient care. We felt that she achieved the maximum benefit of inpatient stay at that point and could now follow up with outpatient treatment. We felt that at the end of the hospital stay she was at baseline and was ready for discharge. Prior to discharge  spoke with patient's sister to address support and her readiness for discharge. I reviewed with Rebecca Starr the importance of compliance with medications and outpatient treatment after discharge., I discussed the medication regimen and possible side effects of the medications with Evelin prior to discharge. At the time of discharge she was tolerating psychiatric medications. , I discussed outpatient follow up with Carlee Bates. and I reviewed with Evelin crisis plan and safety plan upon discharge. Behavioral Health Medications:  Clozapine 150 mg BID for psychosis   Depakote DR tab 1000mg BID for mood/agitation   Lithobid 750mg BID for mood/agitation   Mirtazapine 15 mg qhs for insomnia   Propanolol 10 mg TID for tachycardia      Labs/Imaging:   I have personally reviewed all pertinent laboratory/tests results.   Most Recent Labs:   Lab Results   Component Value Date    WBC 11.76 (H) 07/18/2023    RBC 4.92 07/18/2023    HGB 14.8 07/18/2023    HCT 45.8 07/18/2023     07/18/2023    RDW 13.3 07/18/2023    NEUTROABS 7.52 07/18/2023    SODIUM 141 06/16/2023    K 4.0 06/16/2023     06/16/2023    CO2 30 06/16/2023    BUN 11 06/16/2023    CREATININE 0.53 (L) 06/16/2023    GLUC 73 06/16/2023    GLUF 73 06/16/2023    CALCIUM 10.1 06/16/2023    AST 19 06/16/2023    ALT 13 06/16/2023    ALKPHOS 32 (L) 06/16/2023    TP 6.8 06/16/2023    ALB 4.3 06/16/2023    TBILI 0.43 06/16/2023    CHOLESTEROL 181 06/11/2023    HDL 64 06/11/2023    TRIG 106 06/11/2023    LDLCALC 96 06/11/2023    NONHDLC 117 06/11/2023    VALPROICTOT 87 06/11/2023    LITHIUM 0.9 07/13/2023    WRH1DMMFWKYB 3.269 06/11/2023    PREGSERUM Negative 06/11/2023    HGBA1C 5.1 03/20/2023     03/20/2023       Mental Status at time of Discharge:   Appearance:  casually dressed, looks older than stated age, overtly appearing  female, cooperative with interview, good eye contact    Behavior:  cooperative, responds better to redirection today    Speech:  pressured, loud and incoherent at times    Mood:  "good"   Affect:  labile   Language Within patient's normal limits    Thought Process:  disorganized   Associations: loose associations      Thought Content:  preoccupied   Perceptual Disturbances: Appears to be responding to internal stimuli and Appears to be internally preoccupied   Risk Potential: Denies suicidal or homicidal ideation, plan, or intent Sensorium:  place and time   Cognition:  Grossly intact   Consciousness:  alert and awake   Attention: Poor attention span    Insight:  limited   Judgment: limited   Intellect impaired intelligence   Gait/Station: normal gait/station   Motor Activity: no abnormal movements     Suicide/Homicide Risk Assessment:  Risk of Harm to Self:   The following ratings are based on assessment at the time of discharge, review of the hospital stay progress and assessment at the time of the interview  Demographic risk factors include: none  Historical Risk Factors include: chronic psychiatric problems, chronic psychotic symptoms, history of cognitive impairment, history of substance use  Current Specific Risk Factors include: recent inpatient psychiatric admission - being discharged today, chronic psychotic symptoms, chronic delusions, impaired cognition  Protective Factors: no current suicidal ideation, no current depressive symptoms, no current anxiety symptoms, no current suicidal plan or intent, family support established, ability to contract for safety with staff, ability to communicate with staff  Weapons/Firearms: none. The following steps have been taken to ensure weapons are properly secured: not applicable  Based on today's assessment, Hien Navarro presents the following risk of harm to self: low    Risk of Harm to Others: The following ratings are based on assessment at the time of discharge, review of the hospital stay progress and assessment at the time of the interview  Demographic Risk Factors include: unemployed, under age 36, lower intelligence . Historical Risk Factors include: history of aggressive behavior, history of substance use. Current Specific Risk Factors include: recent difficulty with impulse control, recent episodes of agitation, impaired cognition, chronic psychotic symptoms  Protective Factors: no current homicidal ideation  Weapons/Firearms: none.  The following steps have been taken to ensure weapons are properly secured: not applicable  Based on today's assessment, Jane Gibbs presents the following risk of harm to others: low    The following interventions are recommended: outpatient follow up with a psychiatrist    Discharge Medications:  Clozapine 150 mg BID for psychosis   Depakote DR tab 1000mg BID for mood/agitation   Lithobid 750mg BID for mood/agitation   Mirtazapine 15 mg qhs for insomnia   Propanolol 10 mg TID for tachycardia    Discharge instructions/Information to patient and family:   See after visit summary for information provided to patient and family. Provisions for Follow-Up Care:  See after visit summary for information related to follow-up care and any pertinent home health orders. This note has been constructed using a voice recognition system. There may be translation, syntax, or grammatical errors. If you have any questions, please contact the dictating provider.     Forrest Habermann, DO  Psychiatry PGY-1

## 2023-07-19 NOTE — DISCHARGE SUMMARY
Discharge Summary - [address] 28 y.o. female MRN: [id number]  Unit/Bed#: U 349-01 Encounter: [id number]     Admission Date:   Admission Orders (From admission, onward)     Ordered        06/10/23 1423  ED TO SAME [facility] UNIT (using Admission Navigator) - Admit Patient to [facility] Unit  Once                            Discharge Date: 07/19/23 ***    Attending Psychiatrist: Lisa Whitlock MD ***    Admission Diagnosis:    Principal Problem:    Unspecified mood (affective) disorder ([address])  Active Problems:    Medical clearance for psychiatric admission    Intellectual disability    Psychosis ([address])    Dysuria    Vaginal lesion    Cough      Discharge Diagnosis:     Principal Problem:    Unspecified mood (affective) disorder ([address])  Active Problems:    Medical clearance for psychiatric admission    Intellectual disability    Psychosis ([address])    Dysuria    Vaginal lesion    Cough  Resolved Problems:    * No resolved hospital problems. *      Reason for Admission/HPI:   Gregory Burgess is a 28 y.o. *** female, ***, who initially presented to the [facility]-*** with {[facility] Psych HPI choices:31107}. Gregory Burgess initially reported ***. Gregory Burgess was admitted to the psychiatric unit on a {Desc; Voluntary/Involuntarily:11362} basis. Past Medical History:   Diagnosis Date   • Psychiatric disorder      History reviewed. No pertinent surgical history. Medications:    {EFO MEDS ON ADMISSION:45979::"All current active medications have been reviewed."}    Allergies:     No Known Allergies    Please refer to the initial H&P for full details. Vital signs in last 24 hours:    Temp:  [97.3 °F (36.3 °C)-97.5 °F (36.4 °C)] 97.5 °F (36.4 °C)  HR:  [] 99  Resp:  [16-20] 16  BP: (125-142)/(77-91) 128/77    No intake or output data in the 24 hours ending 07/19/23 1010      Hospital Course:    On admission, Gregory Burgess was admitted to the inpatient psychiatric unit and started on {EFO Precautions Discharge:60884::"Behavioral Health checks every 7 minutes"}. During the hospitalization she was {[facility] ATTENDING GROUPS:99678::"encouraged to attend individual therapy, group therapy, milieu therapy and occupational therapy"}. {[facility] Unit Consults:45201}    Evelin was started on ***. Verenice Blackmon was also started on ***. Evelin's medications were titrated as appropriate until discharge, including:    • ***    Prior to beginning of treatment medications risks and benefits and possible side effects including {[facility] Hospital Course Risks/Benefits Medications:69791} were {EFO MED DISCUSSION Reviewed:04703::"reviewed"} with Evelin. Evelin {EFO MED DISCUSSION Verbalized:68726::"verbalized"} {EFO MED DISCUSSION UNDERSTANDIN::"understanding"} and {EFO MED DISCUSSION AGREEMENT:69957::"agreement"} for treatment. Evelin tolerated these medications with ***no acute side effects. The patient's mood brightened over the course of treatment, and she was seen in [facility] ***interacting appropriately with peers. Verenice Blackmon did ***not demonstrate dangerous behavior to self or others during her inpatient stay. On the day of discharge, {O DISCHARGE SYMPTOMS:74077} ***     {[facility] Hospital Course End Statements:67571} The outpatient follow up with {[facility] Hospital Course Follow up:67051} was arranged by the unit  upon discharge. {KRMDCSTATEMENTS:48532}      Behavioral Health Medications: {[facility] Behavioral Health Medications:56358}. {Discharge on Two Antipsychotic Medications (Optional):43309}    Labs/Imaging:   {[facility] Progress Note Labs:98938::"I have personally reviewed all pertinent laboratory/tests results. "}    Mental Status at time of Discharge:   Appearance:  {[facility] AMB EXAM; GENERAL PSYCH:47413::"age appropriate","dressed appropriately","looks stated age"}, { AMB PSYCH MENTAL STATUS APPEARANCE:63103::"good eye contact ","sitting comfortably in chair","adequate hygiene and grooming","cooperative with interview"}   Behavior:  {desc; mse behavior:33114::"cooperative"}   Speech:  {O  IP findings; speech:11088:a:"normal rate","normal volume","normal pitch","fluent","clear","coherent"}   Mood:  "***"   Affect:  {DESC; AFFECT:16970::"mood-congruent"}   Language { AMB Language:95195::"Within normal limits"}   Thought Process:  {EFO AMB THOUGHT PROCESS:16987::"organized","logical","goal directed","normal rate of thoughts"}   Associations: {EFO THOUGHT ASSOCIATIONS:23630}      Thought Content:  {NE Thought Content:14250::"No verbalized delusions"}   Perceptual Disturbances: {-[facility]-PD:33615}   Risk Potential: {[facility] RISK:86381}   Sensorium:  {[facility] slp orientation:90852::"person","place","time","current situation"}   Cognition:  {DC cognition:67815::"Grossly intact"}   Consciousness:  {[facility] Exam; psych consciousness:65239::"alert","awake"}   Attention: { ATTENTION:20456::"attention span and concentration were age appropriate"}   Insight:  {insight/judgement:97912::"fair"}   Judgment: {insight/judgement:47238::"fair"}   Intellect {O AMB INTELLECTUAL FUNC:07012::"appears to be of average intelligence"}   Gait/Station: {[facility] BH Gait/Station:72828::"normal gait/station"}   Motor Activity: { Motor Activity:58828::"no abnormal movements"}     Suicide/Homicide Risk Assessment:  Risk of Harm to Self:   • {EFO Suicide Risk Assessment Inpatient on Discharge:64747}    Risk of Harm to Others:  • {EFO Inp Homicide Risk Assessment on Discharge:10101}    The following interventions are recommended: {EFO S/H Inp Risk Intervent on Discharge:58762}    Discharge Medications:  See list above, as well as the after visit summary for all reconciled discharge medications provided to patient and family. Discharge instructions/Information to patient and family:   See after visit summary for information provided to patient and family.       Provisions for Follow-Up Care:  See after visit summary for information related to follow-up care and any pertinent home health orders. This note has been constructed using a voice recognition system. There may be translation, syntax, or grammatical errors. If you have any questions, please contact the dictating provider.     Paola Patten, DO  Psychiatry PGY-1

## 2023-07-19 NOTE — PROGRESS NOTES
07/19/23 0852   Team Meeting   Meeting Type Daily Rounds   Team Members Present   Team Members Present Physician;Nurse;   Physician Team Member [id number] Cathi   Patient/Family Present   Patient Present No   Patient's Family Present No   Pt still loud at times. PRN IM zyprexa. Pt was spitting out propranolol in the afternoon. Pt sexually preoccupied at times. Pt labilt and agitated at night. PRN Zyprexo PO. Pt yelling, hitting walls, keeping pts up, IM PRN Zyprexa. Pt on mouth checks. Family feels pt is at baseline. Consider dc today.

## 2023-07-19 NOTE — BH TRANSITION RECORD
Contact Information: If you have any questions, concerns, pended studies, tests and/or procedures, or emergencies regarding your inpatient behavioral health visit. Please contact [address] behavioral [facility] unit [facility] [phone] and ask to speak to a , nurse or physician. A contact is available 24 hours/ 7 days a week at this number. Summary of Procedures Performed During your Stay:  Below is a list of major procedures performed during your hospital stay and a summary of results:   - Major Imaging Studies: ECG, XR CHEST. ECG 06/10/23: "Normal sinus rhythm. Normal ECG"  ECG 06/28/23: "Normal sinus rhythm. Nonspecific ST and T wave abnormality. Abnormal ECG"  ECG 07/15/23: "Sinus tachycardia. Nonspecific ST-t wave changes. Abnormal ECG"  ECG 07/18/23 "Normal sinus rhythm. Normal ECG"  07/07/23 XR chest: "No acute cardiopulmonary disease. Distended stomach with elevation of the diaphragm."      If studies are pending at discharge, follow up with your PCP and/or referring provider.

## 2023-07-19 NOTE — DISCHARGE INSTR - APPOINTMENTS
Idalmis Dobbs or Kaci, our Tasha and Amari, will be calling you after your discharge, on the phone number that you provided. They will be available as an additional support, if needed. If you wish to speak with one of them, you may contact Idalmis Dobbs at [phone] or Hector Varner at [phone].

## 2023-07-19 NOTE — PLAN OF CARE
Problem: Risk for Self Injury/Neglect  Goal: Treatment Goal: Remain safe during length of stay, learn and adopt new coping skills, and be free of self-injurious ideation, impulses and acts at the time of discharge  Outcome: Progressing  Goal: Verbalize thoughts and feelings  Description: Interventions:  - Assess and re-assess patient's lethality and potential for self-injury  - Engage patient in 1:1 interactions, daily, for a minimum of 15 minutes  - Encourage patient to express feelings, fears, frustrations, hopes  - Establish rapport/trust with patient   Outcome: Progressing  Goal: Refrain from harming self  Description: Interventions:  - Monitor patient closely, per order  - Develop a trusting relationship  - Supervise medication ingestion, monitor effects and side effects   Outcome: Progressing  Goal: Attend and participate in unit activities, including therapeutic, recreational, and educational groups  Description: Interventions:  - Provide therapeutic and educational activities daily, encourage attendance and participation, and document same in the medical record  - Obtain collateral information, encourage visitation and family involvement in care   Outcome: Progressing  Goal: Recognize maladaptive responses and adopt new coping mechanisms  Outcome: Progressing  Goal: Complete daily ADLs, including personal hygiene independently, as able  Description: Interventions:  - Observe, teach, and assist patient with ADLS  - Monitor and promote a balance of rest/activity, with adequate nutrition and elimination  Outcome: Progressing     Problem: Risk for Violence/Aggression Toward Others  Goal: Treatment Goal: Refrain from acts of violence/aggression during length of stay, and demonstrate improved impulse control at the time of discharge  Outcome: Progressing  Goal: Verbalize thoughts and feelings  Description: Interventions:  - Assess and re-assess patient's level of risk, every waking shift  - Engage patient in 1:1 interactions, daily, for a minimum of 15 minutes   - Allow patient to express feelings and frustrations in a safe and non-threatening manner   - Establish rapport/trust with patient   Outcome: Progressing  Goal: Refrain from harming others  Outcome: Progressing  Goal: Refrain from destructive acts on the environment or property  Outcome: Progressing  Goal: Control angry outbursts  Description: Interventions:  - Monitor patient closely, per order  - Ensure early verbal de-escalation  - Monitor prn medication needs  - Set reasonable/therapeutic limits, outline behavioral expectations, and consequences   - Provide a non-threatening milieu, utilizing the least restrictive interventions   Outcome: Progressing  Goal: Attend and participate in unit activities, including therapeutic, recreational, and educational groups  Description: Interventions:  - Provide therapeutic and educational activities daily, encourage attendance and participation, and document same in the medical record   Outcome: Progressing  Goal: Identify appropriate positive anger management techniques  Description: Interventions:  - Offer anger management and coping skills groups   - Staff will provide positive feedback for appropriate anger control  Outcome: Progressing     Problem: SELF CARE DEFICIT  Goal: Return ADL status to a safe level of function  Description: INTERVENTIONS:  - Administer medication as ordered  - Assess ADL deficits and provide assistive devices as needed  - Obtain PT/OT consults as needed  - Assist and instruct patient to increase activity and self care as tolerated  Outcome: Progressing     Problem: INVOLUNTARY ADMIT  Goal: Will cooperate with staff recommendations and doctor's orders and will demonstrate appropriate behavior  Description: INTERVENTIONS:  - Treat underlying conditions and offer medication as ordered  - Educate regarding involuntary admission procedures and rules  - Utilize positive consistent limit setting strategies to support patient and staff safety  Outcome: Progressing     Problem: JENAE  Goal: Will exhibit normal sleep and speech and no impulsivity  Description: INTERVENTIONS:  - Administer medication as ordered  - Set limits on impulsive behavior  - Make attempts to decrease external stimuli as possible  Outcome: Progressing     Problem: Ineffective Coping  Goal: Participates in unit activities  Description: Interventions:  - Provide therapeutic environment   - Provide required programming   - Redirect inappropriate behaviors   Outcome: Progressing     Problem: SAFETY, RESTRAINT - VIOLENT/SELF-DESTRUCTIVE  Goal: Remains free of harm/injury from restraints (Restraint for Violent/Self-Destructive Behavior)  Description: INTERVENTIONS:  - Instruct patient/family regarding restraint use   - Assess and monitor physiologic and psychological status   - Provide interventions and comfort measures to meet assessed patient needs   - Ensure continuous in person monitoring is provided   - Identify and implement measures to help patient regain control  - Assess readiness for release of restraint  Outcome: Progressing  Goal: Returns to optimal restraint-free functioning  Description: INTERVENTIONS:  - Assess the patient's behavior and symptoms that indicate continued need for restraint  - Identify and implement measures to help patient regain control  - Assess readiness for release of restraint   Outcome: Progressing

## 2023-07-19 NOTE — NURSING NOTE
Pt denies SI/HI/AH/VH but appears internally preoccupied. Pt present in milieu. Pt remains on close proximity observation. Medication and meal compliant. Isolative to self but does communicate with staff. Pt was initially pleasant and calm this morning and later became upset/agitated due to wanting to make phone call to Mother. Pt was redirected to her room. Pt received PRN's and remained in behavioral control. Will continue to monitor.

## 2023-07-19 NOTE — PROGRESS NOTES
Due to treatment team considering discharging patient without her knowledge; this writer is not able to completed her relapse prevention plan with her.

## 2023-07-19 NOTE — PLAN OF CARE
Problem: Risk for Self Injury/Neglect  Goal: Treatment Goal: Remain safe during length of stay, learn and adopt new coping skills, and be free of self-injurious ideation, impulses and acts at the time of discharge  Outcome: Completed  Goal: Verbalize thoughts and feelings  Description: Interventions:  - Assess and re-assess patient's lethality and potential for self-injury  - Engage patient in 1:1 interactions, daily, for a minimum of 15 minutes  - Encourage patient to express feelings, fears, frustrations, hopes  - Establish rapport/trust with patient   Outcome: Completed  Goal: Refrain from harming self  Description: Interventions:  - Monitor patient closely, per order  - Develop a trusting relationship  - Supervise medication ingestion, monitor effects and side effects   Outcome: Completed  Goal: Attend and participate in unit activities, including therapeutic, recreational, and educational groups  Description: Interventions:  - Provide therapeutic and educational activities daily, encourage attendance and participation, and document same in the medical record  - Obtain collateral information, encourage visitation and family involvement in care   Outcome: Completed  Goal: Recognize maladaptive responses and adopt new coping mechanisms  Outcome: Completed  Goal: Complete daily ADLs, including personal hygiene independently, as able  Description: Interventions:  - Observe, teach, and assist patient with ADLS  - Monitor and promote a balance of rest/activity, with adequate nutrition and elimination  Outcome: Completed     Problem: Risk for Violence/Aggression Toward Others  Goal: Treatment Goal: Refrain from acts of violence/aggression during length of stay, and demonstrate improved impulse control at the time of discharge  Outcome: Completed  Goal: Verbalize thoughts and feelings  Description: Interventions:  - Assess and re-assess patient's level of risk, every waking shift  - Engage patient in 1:1 interactions, daily, for a minimum of 15 minutes   - Allow patient to express feelings and frustrations in a safe and non-threatening manner   - Establish rapport/trust with patient   Outcome: Completed  Goal: Refrain from harming others  Outcome: Completed  Goal: Refrain from destructive acts on the environment or property  Outcome: Completed  Goal: Control angry outbursts  Description: Interventions:  - Monitor patient closely, per order  - Ensure early verbal de-escalation  - Monitor prn medication needs  - Set reasonable/therapeutic limits, outline behavioral expectations, and consequences   - Provide a non-threatening milieu, utilizing the least restrictive interventions   Outcome: Completed  Goal: Attend and participate in unit activities, including therapeutic, recreational, and educational groups  Description: Interventions:  - Provide therapeutic and educational activities daily, encourage attendance and participation, and document same in the medical record   Outcome: Completed  Goal: Identify appropriate positive anger management techniques  Description: Interventions:  - Offer anger management and coping skills groups   - Staff will provide positive feedback for appropriate anger control  Outcome: Completed     Problem: JENAE  Goal: Will exhibit normal sleep and speech and no impulsivity  Description: INTERVENTIONS:  - Administer medication as ordered  - Set limits on impulsive behavior  - Make attempts to decrease external stimuli as possible  Outcome: Completed     Problem: INVOLUNTARY ADMIT  Goal: Will cooperate with staff recommendations and doctor's orders and will demonstrate appropriate behavior  Description: INTERVENTIONS:  - Treat underlying conditions and offer medication as ordered  - Educate regarding involuntary admission procedures and rules  - Utilize positive consistent limit setting strategies to support patient and staff safety  Outcome: Completed     Problem: SELF CARE DEFICIT  Goal: Return ADL status to a safe level of function  Description: INTERVENTIONS:  - Administer medication as ordered  - Assess ADL deficits and provide assistive devices as needed  - Obtain PT/OT consults as needed  - Assist and instruct patient to increase activity and self care as tolerated  Outcome: Completed     Problem: DISCHARGE PLANNING - CARE MANAGEMENT  Goal: Discharge to post-acute care or home with appropriate resources  Description: INTERVENTIONS:  - Conduct assessment to determine patient/family and health care team treatment goals, and need for post-acute services based on payer coverage, community resources, and patient preferences, and barriers to discharge  - Address psychosocial, clinical, and financial barriers to discharge as identified in assessment in conjunction with the patient/family and health care team  - Arrange appropriate level of post-acute services according to patient’s   needs and preference and payer coverage in collaboration with the physician and health care team  - Communicate with and update the patient/family, physician, and health care team regarding progress on the discharge plan  - Arrange appropriate transportation to post-acute venues  Outcome: Completed     Problem: SAFETY, RESTRAINT - VIOLENT/SELF-DESTRUCTIVE  Goal: Remains free of harm/injury from restraints (Restraint for Violent/Self-Destructive Behavior)  Description: INTERVENTIONS:  - Instruct patient/family regarding restraint use   - Assess and monitor physiologic and psychological status   - Provide interventions and comfort measures to meet assessed patient needs   - Ensure continuous in person monitoring is provided   - Identify and implement measures to help patient regain control  - Assess readiness for release of restraint  Outcome: Completed  Goal: Returns to optimal restraint-free functioning  Description: INTERVENTIONS:  - Assess the patient's behavior and symptoms that indicate continued need for restraint  - Identify and implement measures to help patient regain control  - Assess readiness for release of restraint   Outcome: Completed     Problem: Ineffective Coping  Goal: Participates in unit activities  Description: Interventions:  - Provide therapeutic environment   - Provide required programming   - Redirect inappropriate behaviors   Outcome: Completed

## 2023-07-19 NOTE — DISCHARGE INSTR - OTHER ORDERS
CRISIS INFORMATION  If you are experiencing a mental health emergency, you may call the [facility] [facility] 24 hours a day, 7 days per week at [phone]. In [facility], call [phone]. Khadra Marvin is a confidential 24/7 telephone support service manned by trained mental health consumers. [facility] provides support, a listening ear and can provide information about available services. [facility] specializes in the concerns of mental health consumers, their families and friends. However, we are also here for anyone who has a mental health concern, is confused about or just doesn't know anything about mental health or where to get information. To reach Khadra Marvin, call [phone]. HOW TO GET SUBSTANCE ABUSE HELP:  If you or someone you know has a drug or alcohol problem, there is help:  Davy Mccracken,6Th Floor: [address]: [phone]  An assessment is the first step. In addition to those listed there are other programs available in the area but assessment is best to determine an appropriate level of care. If you DO NOT have Medical Assistance ([place]) or [employer], an assessment can be scheduled at one of these providers:  [facility]  315 [facility], [address] [place] [address]  [phone]   [facility]  1700 [facility],2 And 3 S Floors., [facility], [address] [place] [address]  [address] [facility].  ELSA, [address] [facility] [address]  1900 [facility]  1200 Mingochris SCOTT [facility]   Step by [address]  [address]., [address]  [address] [facility]., TYLER [place], [address] [place] [address]  [phone]     If you [address], an assessment can be scheduled at one of these providers:  [facility]  [facility]., [facility], 630 [facility]  [address] [facility] [address], 350 [facility]  150 [facility] D&A Intake Unit  [address] 1113 [facility], 1st Floor, Rosy HUNTER  [phone]  1 [facility], [facility] [address] [facility]  1700 [facility],2 And 3 S Floors., [facility], [address] [facility]  [address]260 [facility]. ELSA, [address]  [phone]   NET ([address])  90 [facility] [place] 1801 [facility], Rosy HUNTER  [address]  [address]   Step by 112 [address] [facility], 630 [facility]  [address] [facility]., [facility], [address] [place] [address]  1002 [facility] [address] [facility], [address] [place] [address]  [phone]     If you [facility] [facility], an assessment can be scheduled at one of these providers. Please contact these Providers to determine if they are in your network plan:  [facility] D&A Intake Unit  [address] 1113 [facility], 1st Floor, Rosy HUNTER  [facility]  1700 [facility],2 And 3 S Floors., [facility], 350 [facility]  [phone]   223 [facility]  Zahrag[place]. ELSA, [address]  [phone]   NET ([address])  90 [facility] [place]  1801 [facility], Rosy HUNTER  [address]  [address] [facility] [address] 301 [address] [facility]., [facility]BAR[facility], [address]

## 2023-08-05 ENCOUNTER — APPOINTMENT (OUTPATIENT)
Dept: LAB | Facility: HOSPITAL | Age: 33
End: 2023-08-05
Payer: COMMERCIAL

## 2023-08-05 DIAGNOSIS — F39 UNSPECIFIED MOOD (AFFECTIVE) DISORDER (HCC): ICD-10-CM

## 2023-08-05 LAB
BASOPHILS # BLD AUTO: 0.05 THOUSANDS/ÂΜL (ref 0–0.1)
BASOPHILS NFR BLD AUTO: 0 % (ref 0–1)
EOSINOPHIL # BLD AUTO: 0.3 THOUSAND/ÂΜL (ref 0–0.61)
EOSINOPHIL NFR BLD AUTO: 2 % (ref 0–6)
ERYTHROCYTE [DISTWIDTH] IN BLOOD BY AUTOMATED COUNT: 12.5 % (ref 11.6–15.1)
HCT VFR BLD AUTO: 52.1 % (ref 34.8–46.1)
HGB BLD-MCNC: 16.3 G/DL (ref 11.5–15.4)
IMM GRANULOCYTES # BLD AUTO: 0.05 THOUSAND/UL (ref 0–0.2)
IMM GRANULOCYTES NFR BLD AUTO: 0 % (ref 0–2)
LYMPHOCYTES # BLD AUTO: 2.49 THOUSANDS/ÂΜL (ref 0.6–4.47)
LYMPHOCYTES NFR BLD AUTO: 16 % (ref 14–44)
MCH RBC QN AUTO: 29.3 PG (ref 26.8–34.3)
MCHC RBC AUTO-ENTMCNC: 31.3 G/DL (ref 31.4–37.4)
MCV RBC AUTO: 94 FL (ref 82–98)
MONOCYTES # BLD AUTO: 1.27 THOUSAND/ÂΜL (ref 0.17–1.22)
MONOCYTES NFR BLD AUTO: 8 % (ref 4–12)
NEUTROPHILS # BLD AUTO: 11.18 THOUSANDS/ÂΜL (ref 1.85–7.62)
NEUTS SEG NFR BLD AUTO: 74 % (ref 43–75)
NRBC BLD AUTO-RTO: 0 /100 WBCS
PLATELET # BLD AUTO: 274 THOUSANDS/UL (ref 149–390)
PMV BLD AUTO: 10.3 FL (ref 8.9–12.7)
RBC # BLD AUTO: 5.56 MILLION/UL (ref 3.81–5.12)
WBC # BLD AUTO: 15.34 THOUSAND/UL (ref 4.31–10.16)

## 2023-08-05 PROCEDURE — 36415 COLL VENOUS BLD VENIPUNCTURE: CPT

## 2023-08-05 PROCEDURE — 85025 COMPLETE CBC W/AUTO DIFF WBC: CPT

## 2023-08-19 ENCOUNTER — HOSPITAL ENCOUNTER (EMERGENCY)
Facility: HOSPITAL | Age: 33
Discharge: HOME | End: 2023-08-19
Attending: EMERGENCY MEDICINE
Payer: COMMERCIAL

## 2023-08-19 VITALS
BODY MASS INDEX: 18.41 KG/M2 | TEMPERATURE: 98.4 F | WEIGHT: 108.91 LBS | DIASTOLIC BLOOD PRESSURE: 83 MMHG | RESPIRATION RATE: 20 BRPM | OXYGEN SATURATION: 97 % | SYSTOLIC BLOOD PRESSURE: 114 MMHG | HEART RATE: 117 BPM

## 2023-08-19 DIAGNOSIS — Z76.0 MEDICATION REFILL: Primary | ICD-10-CM

## 2023-08-19 DIAGNOSIS — F30.9 MANIC EPISODE (HCC): ICD-10-CM

## 2023-08-19 DIAGNOSIS — F29 PSYCHOSIS, UNSPECIFIED PSYCHOSIS TYPE (HCC): ICD-10-CM

## 2023-08-19 DIAGNOSIS — G47.00 INSOMNIA: ICD-10-CM

## 2023-08-19 PROCEDURE — 99284 EMERGENCY DEPT VISIT MOD MDM: CPT

## 2023-08-19 PROCEDURE — 99282 EMERGENCY DEPT VISIT SF MDM: CPT

## 2023-08-19 RX ORDER — CLOZAPINE 100 MG/1
150 TABLET ORAL 2 TIMES DAILY
Qty: 90 TABLET | Refills: 0 | Status: SHIPPED | OUTPATIENT
Start: 2023-08-19 | End: 2023-08-30

## 2023-08-19 RX ORDER — LITHIUM CARBONATE 300 MG/1
300 TABLET, FILM COATED, EXTENDED RELEASE ORAL 2 TIMES DAILY
Qty: 60 TABLET | Refills: 0 | Status: SHIPPED | OUTPATIENT
Start: 2023-08-19 | End: 2023-09-08

## 2023-08-19 RX ORDER — MIRTAZAPINE 15 MG/1
15 TABLET, FILM COATED ORAL
Qty: 30 TABLET | Refills: 0 | Status: SHIPPED | OUTPATIENT
Start: 2023-08-19 | End: 2023-10-18

## 2023-08-19 RX ORDER — LITHIUM CARBONATE 450 MG
450 TABLET, EXTENDED RELEASE ORAL 2 TIMES DAILY
Qty: 60 TABLET | Refills: 0 | Status: SHIPPED | OUTPATIENT
Start: 2023-08-19 | End: 2023-09-08

## 2023-08-19 NOTE — ED PROVIDER NOTES
History  Chief Complaint   Patient presents with   • Medication Refill     Caregiver of patient says that patient's meds were refilled to the wrong pharmacy; per patient's chart the meds were sent to [address] [place] [address] pharmacy and they did not know this, they are requesting the meds be sent to a pharmacy that is open today     Patient is a 71-year-old female coming in requesting refills of her at home meds, because they were sent to the [address] [place] [address], which is closed today. Last doses were this morning      Medication Refill      Prior to Admission Medications   Prescriptions Last Dose Informant Patient Reported? Taking?    cloZAPine (CLOZARIL) 100 mg tablet   No Yes   Sig: Take 1.5 tablets (150 mg total) by mouth 2 (two) times a day   clozapine (CLOZARIL) 100 mg tablet   No No   Sig: Take 1.5 tablets (150 mg total) by mouth 2 (two) times a day   divalproex sodium (DEPAKOTE) 500 mg DR tablet   No No   Sig: Take 2 tablets (1,000 mg total) by mouth every 12 (twelve) hours   lithium carbonate (LITHOBID) 300 mg CR tablet   No No   Sig: Take 1 tablet (300 mg total) by mouth 2 (two) times a day Total should be 750 mg two times a day   lithium carbonate (LITHOBID) 300 mg CR tablet   No Yes   Sig: Take 1 tablet (300 mg total) by mouth 2 (two) times a day Total should be 750 mg two times a day   lithium carbonate (LITHOBID) 450 mg CR tablet   No No   Sig: Take 1 tablet (450 mg total) by mouth 2 (two) times a day Total dose should be Lithium 750mg two times daily   lithium carbonate (LITHOBID) 450 mg CR tablet   No Yes   Sig: Take 1 tablet (450 mg total) by mouth 2 (two) times a day Total dose should be Lithium 750mg two times daily   mirtazapine (REMERON) 15 mg tablet   No No   Sig: Take 1 tablet (15 mg total) by mouth daily at bedtime   mirtazapine (REMERON) 15 mg tablet   No Yes   Sig: Take 1 tablet (15 mg total) by mouth daily at bedtime   propranolol (INDERAL) 10 mg tablet   No No   Sig: Take 1 tablet (10 mg total) by mouth 3 (three) times a day   senna-docusate sodium (SENOKOT S) 8.6-50 mg per tablet   No No   Sig: Take 2 tablets by mouth 2 (two) times a day      Facility-Administered Medications: None       Past Medical History:   Diagnosis Date   • Psychiatric disorder        History reviewed. No pertinent surgical history. History reviewed. No pertinent family history. I have reviewed and agree with the history as documented. E-Cigarette/Vaping   • E-Cigarette Use Never User      E-Cigarette/Vaping Substances   • Nicotine No    • THC No    • Flavoring No    • Other No      Social History     Tobacco Use   • Smoking status: Every Day     Packs/day: 0.50     Years: 10.00     Total pack years: 5.00     Types: Cigarettes   • Smokeless tobacco: Never   Vaping Use   • Vaping Use: Never used   Substance Use Topics   • Alcohol use: Yes     Alcohol/week: 3.0 standard drinks of alcohol     Types: 3 Glasses of wine per week     Comment: sips of wine   • Drug use: Yes     Frequency: 1.0 times per week     Types: Marijuana       Review of Systems   Unable to perform ROS: Patient nonverbal       Physical Exam  Physical Exam  Vitals reviewed. Constitutional:       Appearance: Normal appearance. She is normal weight. HENT:      Head: Normocephalic and atraumatic. Right Ear: External ear normal.      Left Ear: External ear normal.      Nose: Nose normal.   Eyes:      Conjunctiva/sclera: Conjunctivae normal.   Cardiovascular:      Rate and Rhythm: Regular rhythm. Tachycardia present. Pulmonary:      Effort: Pulmonary effort is normal.   Musculoskeletal:         General: Normal range of motion. Cervical back: Normal range of motion. Skin:     General: Skin is warm and dry. Neurological:      Mental Status: She is alert.          Vital Signs  ED Triage Vitals [08/19/23 1517]   Temperature Pulse Respirations Blood Pressure SpO2   98.4 °F (36.9 °C) (!) 117 20 114/83 97 %      Temp Source Heart Rate Source Patient Position - Orthostatic VS BP Location FiO2 (%)   Tympanic Monitor Sitting Left arm --      Pain Score       --           Vitals:    08/19/23 1517   BP: 114/83   Pulse: (!) 117   Patient Position - Orthostatic VS: Sitting         Visual Acuity      ED Medications  Medications - No data to display    Diagnostic Studies  Results Reviewed     None                 No orders to display              Procedures  Procedures         ED Course                                             Medical Decision Making  Patient is a 78-year-old female coming in for evaluation of medication refills. Is in no acute distress, medications were refilled, and patient was discharged home    Risk  Prescription drug management. Disposition  Final diagnoses:   Medication refill     Time reflects when diagnosis was documented in both MDM as applicable and the Disposition within this note     Time User Action Codes Description Comment    8/19/2023  3:37 PM Hector Lonetree Add [F29] Psychosis, unspecified psychosis type ([address])     8/19/2023  3:37 PM Hector Lonetree Add [F30.9] Manic episode ([address])     8/19/2023  3:37 PM Hector Lonetree Add [G47.00] Insomnia     8/19/2023  3:39 PM Hector Lonetree Add [Z76.0] Medication refill       ED Disposition     ED Disposition   Discharge    Condition   Stable    Date/Time   Sat Aug 19, 2023  3:39 PM    Comment   Mehran Rodriguez discharge to home/self care.                Follow-up Information     Follow up With Specialties Details Why Contact Info Additional [address] [facility] Emergency Department Emergency Medicine  As needed, If symptoms worsen [address] [id number] 2050 [facility] Emergency Department          Discharge Medication List as of 8/19/2023  3:39 PM      CONTINUE these medications which have CHANGED    Details   cloZAPine (CLOZARIL) 100 mg tablet Take 1.5 tablets (150 mg total) by mouth 2 (two) times a day, Starting Sat 8/19/2023, Until Wed 10/18/2023, Normal      !! lithium carbonate (LITHOBID) 300 mg CR tablet Take 1 tablet (300 mg total) by mouth 2 (two) times a day Total should be 750 mg two times a day, Starting Sat 8/19/2023, Until Wed 10/18/2023, Normal      !! lithium carbonate (LITHOBID) 450 mg CR tablet Take 1 tablet (450 mg total) by mouth 2 (two) times a day Total dose should be Lithium 750mg two times daily, Starting Sat 8/19/2023, Until Wed 10/18/2023, Normal      mirtazapine (REMERON) 15 mg tablet Take 1 tablet (15 mg total) by mouth daily at bedtime, Starting Sat 8/19/2023, Until Wed 10/18/2023, Normal       !! - Potential duplicate medications found. Please discuss with provider. CONTINUE these medications which have NOT CHANGED    Details   divalproex sodium (DEPAKOTE) 500 mg DR tablet Take 2 tablets (1,000 mg total) by mouth every 12 (twelve) hours, Starting Wed 7/19/2023, Until Sun 9/17/2023, Normal      propranolol (INDERAL) 10 mg tablet Take 1 tablet (10 mg total) by mouth 3 (three) times a day, Starting Wed 7/19/2023, Until Sun 9/17/2023, Normal      senna-docusate sodium (SENOKOT S) 8.6-50 mg per tablet Take 2 tablets by mouth 2 (two) times a day, Starting Wed 7/19/2023, Until Sun 9/17/2023, Normal             No discharge procedures on file.     PDMP Review       Value Time User    PDMP Reviewed  Yes 7/14/2023  1:14 PM Michael Galarza MD          ED Provider  Electronically Signed by           Joann Moreau PA-C  08/19/23 8528

## 2023-08-21 ENCOUNTER — TELEPHONE (OUTPATIENT)
Dept: PSYCHIATRY | Facility: CLINIC | Age: 33
End: 2023-08-21

## 2023-08-21 ENCOUNTER — APPOINTMENT (OUTPATIENT)
Dept: LAB | Facility: HOSPITAL | Age: 33
End: 2023-08-21
Payer: COMMERCIAL

## 2023-08-21 DIAGNOSIS — F39 UNSPECIFIED MOOD (AFFECTIVE) DISORDER (HCC): ICD-10-CM

## 2023-08-21 LAB
BASOPHILS # BLD AUTO: 0.04 THOUSANDS/ÂΜL (ref 0–0.1)
BASOPHILS NFR BLD AUTO: 0 % (ref 0–1)
EOSINOPHIL # BLD AUTO: 0.25 THOUSAND/ÂΜL (ref 0–0.61)
EOSINOPHIL NFR BLD AUTO: 2 % (ref 0–6)
ERYTHROCYTE [DISTWIDTH] IN BLOOD BY AUTOMATED COUNT: 13.2 % (ref 11.6–15.1)
HCT VFR BLD AUTO: 49.5 % (ref 34.8–46.1)
HGB BLD-MCNC: 15.4 G/DL (ref 11.5–15.4)
IMM GRANULOCYTES # BLD AUTO: 0.05 THOUSAND/UL (ref 0–0.2)
IMM GRANULOCYTES NFR BLD AUTO: 0 % (ref 0–2)
LYMPHOCYTES # BLD AUTO: 1.91 THOUSANDS/ÂΜL (ref 0.6–4.47)
LYMPHOCYTES NFR BLD AUTO: 16 % (ref 14–44)
MCH RBC QN AUTO: 29.3 PG (ref 26.8–34.3)
MCHC RBC AUTO-ENTMCNC: 31.1 G/DL (ref 31.4–37.4)
MCV RBC AUTO: 94 FL (ref 82–98)
MONOCYTES # BLD AUTO: 0.79 THOUSAND/ÂΜL (ref 0.17–1.22)
MONOCYTES NFR BLD AUTO: 7 % (ref 4–12)
NEUTROPHILS # BLD AUTO: 8.8 THOUSANDS/ÂΜL (ref 1.85–7.62)
NEUTS SEG NFR BLD AUTO: 75 % (ref 43–75)
NRBC BLD AUTO-RTO: 0 /100 WBCS
PLATELET # BLD AUTO: 391 THOUSANDS/UL (ref 149–390)
PMV BLD AUTO: 9.8 FL (ref 8.9–12.7)
RBC # BLD AUTO: 5.26 MILLION/UL (ref 3.81–5.12)
WBC # BLD AUTO: 11.84 THOUSAND/UL (ref 4.31–10.16)

## 2023-08-21 PROCEDURE — 85025 COMPLETE CBC W/AUTO DIFF WBC: CPT

## 2023-08-21 PROCEDURE — 36415 COLL VENOUS BLD VENIPUNCTURE: CPT

## 2023-08-21 NOTE — TELEPHONE ENCOUNTER
Patients sister called in stating pharmacy is with holding the Klonopin due to patients lab work results, sister stated patient is already exhibiting effects from not having the medication, and she would provider to advise on what to do or to help get the prescription pushed thru

## 2023-08-21 NOTE — TELEPHONE ENCOUNTER
Writer called patients sister back and advised her to contact PCP and/or outpatient provider, patients sister stated she does not have a PCP or an outpatient provider at this time

## 2023-08-22 ENCOUNTER — OFFICE VISIT (OUTPATIENT)
Dept: FAMILY MEDICINE CLINIC | Facility: CLINIC | Age: 33
End: 2023-08-22
Payer: COMMERCIAL

## 2023-08-22 VITALS
DIASTOLIC BLOOD PRESSURE: 60 MMHG | SYSTOLIC BLOOD PRESSURE: 100 MMHG | OXYGEN SATURATION: 96 % | HEART RATE: 87 BPM | WEIGHT: 116 LBS | BODY MASS INDEX: 19.81 KG/M2 | HEIGHT: 64 IN

## 2023-08-22 DIAGNOSIS — F39 UNSPECIFIED MOOD (AFFECTIVE) DISORDER (HCC): Primary | ICD-10-CM

## 2023-08-22 DIAGNOSIS — F79 INTELLECTUAL DISABILITY: ICD-10-CM

## 2023-08-22 DIAGNOSIS — E78.2 HYPERLIPIDEMIA, MIXED: ICD-10-CM

## 2023-08-22 DIAGNOSIS — F25.9 CHRONIC SCHIZOAFFECTIVE DISORDER (HCC): ICD-10-CM

## 2023-08-22 PROBLEM — Z82.49 FAMILY HISTORY OF CARDIOVASCULAR DISORDER: Status: ACTIVE | Noted: 2022-08-24

## 2023-08-22 PROBLEM — Z60.3 IMPAIRED ABILITY TO USE COMMUNITY RESOURCES DUE TO LANGUAGE BARRIER: Status: ACTIVE | Noted: 2022-08-24

## 2023-08-22 PROCEDURE — 99205 OFFICE O/P NEW HI 60 MIN: CPT | Performed by: FAMILY MEDICINE

## 2023-08-22 RX ORDER — BENZTROPINE MESYLATE 2 MG/1
2 TABLET ORAL 2 TIMES DAILY
COMMUNITY
Start: 2023-08-18 | End: 2023-08-30

## 2023-08-22 NOTE — ASSESSMENT & PLAN NOTE
Patient does have significant issues with regard to intellectual disability. We will need to follow with psychiatry as well. We will certainly have some issues with regard to her vulnerability.

## 2023-08-22 NOTE — ASSESSMENT & PLAN NOTE
At some point, the patient will need to have repeat lipid panel but at this point is less concerning than the other issues that she has.

## 2023-08-22 NOTE — ASSESSMENT & PLAN NOTE
Patient does have a mood disorder. This was noted in the hospital.  Again, there are significant issues with regard to this. I did discuss with the mother through translation for her sister that I would contact psychiatry and discussed with them about the possibility of moving forward with getting her set up with an outpatient psychiatrist to reevaluate all the medications and what she needed to have at this point. For now, given that there is confusion over the Clozaril, would prefer that psychiatry take over this and if she cannot have that, she would need to not continue on this medication in the short-term. Certainly, continue on the other medications. If she has more behavioral issues, would recommend emergency room.

## 2023-08-22 NOTE — PATIENT INSTRUCTIONS
1. Unspecified mood (affective) disorder ([address])  Assessment & Plan:  Patient does have a mood disorder. This was noted in the hospital.  Again, there are significant issues with regard to this. I did discuss with the mother through translation for her sister that I would contact psychiatry and discussed with them about the possibility of moving forward with getting her set up with an outpatient psychiatrist to reevaluate all the medications and what she needed to have at this point. For now, given that there is confusion over the Clozaril, would prefer that psychiatry take over this and if she cannot have that, she would need to not continue on this medication in the short-term. Certainly, continue on the other medications. If she has more behavioral issues, would recommend emergency room. Orders:  -     Ambulatory Referral to Psychiatry; Future    2. Chronic schizoaffective disorder [facility])  Assessment & Plan:  Significant issues with disability. Patient has significant as well. Currently on multiple medications,, Clozaril. Unfortunately because it was not able to be prescribed because of issues with her neutrophil count. She does need to follow-up with possible. We will set up referral, as well as send a message to her inpatient psychiatrist.    Orders:  -     Ambulatory Referral to Psychiatry; Future    3. Hyperlipidemia, mixed  Assessment & Plan: At some point, the patient will need to have repeat lipid panel but at this point is less concerning than the other issues that she has. 4. Intellectual disability  Assessment & Plan:  Patient does have significant issues with regard to intellectual disability. We will need to follow with psychiatry as well. We will certainly have some issues with regard to her vulnerability. Orders:  -     Ambulatory Referral to Psychiatry;  Future

## 2023-08-22 NOTE — ASSESSMENT & PLAN NOTE
Significant issues with disability. Patient has significant as well. Currently on multiple medications,, Clozaril. Unfortunately because it was not able to be prescribed because of issues with her neutrophil count. She does need to follow-up with possible.     We will set up referral, as well as send a message to her inpatient psychiatrist.

## 2023-08-22 NOTE — PROGRESS NOTES
Name: Evangelista Montaño      : 1990      MRN: [id number]  Encounter Provider: Colt Hurtado MD  Encounter Date: 2023   Encounter department: [facility] PRIMARY CARE    Assessment & Plan     1. Unspecified mood (affective) disorder ([address] [facility])  Assessment & Plan:  Patient does have a mood disorder. This was noted in the hospital.  Again, there are significant issues with regard to this. I did discuss with the mother through translation for her sister that I would contact psychiatry and discussed with them about the possibility of moving forward with getting her set up with an outpatient psychiatrist to reevaluate all the medications and what she needed to have at this point. For now, given that there is confusion over the Clozaril, would prefer that psychiatry take over this and if she cannot have that, she would need to not continue on this medication in the short-term. Certainly, continue on the other medications. If she has more behavioral issues, would recommend emergency room. Orders:  -     Ambulatory Referral to Psychiatry; Future    2. Chronic schizoaffective disorder [facility])  Assessment & Plan:  Significant issues with disability. Patient has significant as well. Currently on multiple medications,, Clozaril. Unfortunately because it was not able to be prescribed because of issues with her neutrophil count. She does need to follow-up with possible. We will set up referral, as well as send a message to her inpatient psychiatrist.    Orders:  -     Ambulatory Referral to Psychiatry; Future    3. Hyperlipidemia, mixed  Assessment & Plan: At some point, the patient will need to have repeat lipid panel but at this point is less concerning than the other issues that she has. 4. Intellectual disability  Assessment & Plan:  Patient does have significant issues with regard to intellectual disability. We will need to follow with psychiatry as well.   We will certainly have some issues with regard to her vulnerability. Orders:  -     Ambulatory Referral to Psychiatry; Future       Spoke with psychiatry. They are going to refer to behavioral health nurse for further help with intake. Subjective      Chief Complaint   Patient presents with   • Well Check     Patient is present today to established care       Here to establish care. She is here with her mother, and does need translation services. We did attempt that with the system through [facility], which then failed. The connection was lost.  Mother then called the patient's sister who does translate for us today as well. She has been seen before at [facility], and admission to the hospital.    She was started before on Clozaril. This was from prior provider. Appears to have been from hospital.    There was a problem for this due to the medications with regard to labs. Spoke directly to the pharmacist at [facility].  There is a concern about the white count with Clozaril. It has to be followed every month, and uploaded to a website for that. Certain pharmacies are allowed to prescribe it. When she (pharmacist) plugged in the most recent data that was given to her, the white count was not appropriate for Clozaril. Unfortunately, the psychiatrist is not available, as it was the inpatient psychiatrist, not the psychiatrist for outpatient. She also wondered about a test called [employer] and a specific provider who would do that at [facility].       Review of Systems   Unable to perform ROS: Psychiatric disorder       Current Outpatient Medications on File Prior to Visit   Medication Sig   • divalproex sodium (DEPAKOTE) 500 mg DR tablet Take 2 tablets (1,000 mg total) by mouth every 12 (twelve) hours   • lithium carbonate (LITHOBID) 300 mg CR tablet Take 1 tablet (300 mg total) by mouth 2 (two) times a day Total should be 750 mg two times a day   • lithium carbonate (LITHOBID) 450 mg CR tablet Take 1 tablet (450 mg total) by mouth 2 (two) times a day Total dose should be Lithium 750mg two times daily   • mirtazapine (REMERON) 15 mg tablet Take 1 tablet (15 mg total) by mouth daily at bedtime   • propranolol (INDERAL) 10 mg tablet Take 1 tablet (10 mg total) by mouth 3 (three) times a day   • senna-docusate sodium (SENOKOT S) 8.6-50 mg per tablet Take 2 tablets by mouth 2 (two) times a day   • benztropine (COGENTIN) 2 mg tablet Take 2 mg by mouth 2 (two) times a day (Patient not taking: Reported on 8/22/2023)   • cloZAPine (CLOZARIL) 100 mg tablet Take 1.5 tablets (150 mg total) by mouth 2 (two) times a day (Patient not taking: Reported on 8/22/2023)       Objective     /60 (BP Location: Left arm, Patient Position: Sitting, Cuff Size: Standard)   Pulse 87   Ht 5' 4" (1.626 m)   Wt 52.6 kg (116 lb)   SpO2 96%   BMI 19.91 kg/m²     Physical Exam  Vitals and nursing note reviewed. Constitutional:       Appearance: She is well-developed. HENT:      Head: Normocephalic and atraumatic. Mouth/Throat:      Comments: Poor dentition  Cardiovascular:      Rate and Rhythm: Normal rate and regular rhythm. Pulses:           Carotid pulses are 2+ on the right side and 2+ on the left side. Heart sounds: Normal heart sounds. Pulmonary:      Effort: Pulmonary effort is normal.      Breath sounds: Normal breath sounds. No wheezing or rales. Chest:      Chest wall: No tenderness. Neurological:      Mental Status: She is alert. Mental status is at baseline. She is disoriented. Psychiatric:      Comments: Patient had abnormal behavior today, with unintelligible discussion, despite the fact that she speaks Georgian primarily and I do not. She was pleasant, but somewhat agitated. Unable to assess cognition, judgment, thought content, though I do expect that judgment is impaired.             I have spent a total time of 70 minutes on 08/22/23 in caring for this patient including Diagnostic results, Prognosis, Risks and benefits of tx options, Instructions for management, Patient and family education, Importance of tx compliance, Risk factor reductions, Impressions, Counseling / Coordination of care, Documenting in the medical record, Reviewing / ordering tests, medicine, procedures  , Obtaining or reviewing history  , Communicating with other healthcare professionals  and Translation assistance. Teresita Amato MD

## 2023-08-23 ENCOUNTER — TELEPHONE (OUTPATIENT)
Dept: FAMILY MEDICINE CLINIC | Facility: CLINIC | Age: 33
End: 2023-08-23

## 2023-08-23 ENCOUNTER — TELEPHONE (OUTPATIENT)
Dept: PSYCHIATRY | Facility: CLINIC | Age: 33
End: 2023-08-23

## 2023-08-23 NOTE — TELEPHONE ENCOUNTER
Reached out to patient in regards to ASAP referral. Pt interested in med mgmt. Interested in [place] testing. Has no provider preference , wants [facility].     Informed pt of wait-list placed pt on ASAP wait-list.

## 2023-08-23 NOTE — TELEPHONE ENCOUNTER
----- Message from Dereje Michel sent at 8/23/2023  1:39 PM EDT -----  Regarding: Patient with Clozaril concerns  Amber Taylor,    I understand that Ms. Karrie Miranda presented to you with a concern about not being able to continue her Clozaril. Clozaril was started while inpatient on our [address], however, upon discharge (7/19/23) She was scheduled for continued follow-up outpatient Psychiatry through [facility]) and was scheduled for an appointment on 7/24/23 at [address] [place] [address],  [address] [place] [address]    Additionally, she has an ongoing  Supports Coordinator in the community to assist with access to treatmentCl MURPHY (P: [phone])    Since Ms. Karrie Miranda was discharged from out inpatient unit greater than 1 month ago, we are unable to prescribe Clozaril for her at this time, nor provide follow-up care. Further, she is not connected with our outpatient department and there is a very long wait list.    She should follow-up with [facility]- where she was connected for continued care. It is imperative that she follow-up asap to avoid risk of decompensation. If she continues to have difficulties, Her Supports coordinator Cl Diaz [phone] should also be able to provide guidance/assistance.     Thank you,  Kelley Hernandez

## 2023-08-30 ENCOUNTER — HOSPITAL ENCOUNTER (INPATIENT)
Facility: HOSPITAL | Age: 33
LOS: 9 days | Discharge: HOME | DRG: 816 | End: 2023-09-08
Attending: EMERGENCY MEDICINE | Admitting: INTERNAL MEDICINE
Payer: COMMERCIAL

## 2023-08-30 ENCOUNTER — APPOINTMENT (EMERGENCY)
Dept: CT IMAGING | Facility: HOSPITAL | Age: 33
DRG: 816 | End: 2023-08-30
Payer: COMMERCIAL

## 2023-08-30 ENCOUNTER — APPOINTMENT (INPATIENT)
Dept: DIALYSIS | Facility: HOSPITAL | Age: 33
DRG: 816 | End: 2023-08-30
Attending: INTERNAL MEDICINE
Payer: COMMERCIAL

## 2023-08-30 DIAGNOSIS — E87.0 HYPERNATREMIA: ICD-10-CM

## 2023-08-30 DIAGNOSIS — F25.9 CHRONIC SCHIZOAFFECTIVE DISORDER (HCC): ICD-10-CM

## 2023-08-30 DIAGNOSIS — T42.6X1A VALPROIC ACID TOXICITY: ICD-10-CM

## 2023-08-30 DIAGNOSIS — F29 PSYCHOSIS (HCC): ICD-10-CM

## 2023-08-30 DIAGNOSIS — R41.0 DELIRIUM: Primary | ICD-10-CM

## 2023-08-30 DIAGNOSIS — F79 INTELLECTUAL DISABILITY: ICD-10-CM

## 2023-08-30 DIAGNOSIS — T56.891A LITHIUM TOXICITY: ICD-10-CM

## 2023-08-30 DIAGNOSIS — N25.1 NDI (NEPHROGENIC DIABETES INSIPIDUS) (HCC): ICD-10-CM

## 2023-08-30 DIAGNOSIS — T56.894A LITHIUM TOXICITY, UNDETERMINED INTENT, INITIAL ENCOUNTER: ICD-10-CM

## 2023-08-30 DIAGNOSIS — F30.9 MANIC EPISODE (HCC): ICD-10-CM

## 2023-08-30 LAB
ALBUMIN SERPL BCP-MCNC: 3.9 G/DL (ref 3.5–5)
ALP SERPL-CCNC: 110 U/L (ref 34–104)
ALT SERPL W P-5'-P-CCNC: 16 U/L (ref 7–52)
AMMONIA PLAS-SCNC: 21 UMOL/L (ref 18–72)
AMMONIA PLAS-SCNC: 22 UMOL/L (ref 18–72)
ANION GAP SERPL CALCULATED.3IONS-SCNC: -1 MMOL/L
APAP SERPL-MCNC: <10 UG/ML (ref 10–20)
AST SERPL W P-5'-P-CCNC: 14 U/L (ref 13–39)
ATRIAL RATE: 85 BPM
BACTERIA UR QL AUTO: NORMAL /HPF
BASOPHILS # BLD AUTO: 0.02 THOUSANDS/ÂΜL (ref 0–0.1)
BASOPHILS NFR BLD AUTO: 0 % (ref 0–1)
BILIRUB SERPL-MCNC: 0.29 MG/DL (ref 0.2–1)
BILIRUB UR QL STRIP: NEGATIVE
BUN SERPL-MCNC: 7 MG/DL (ref 5–25)
CA-I BLD-SCNC: 1.11 MMOL/L (ref 1.12–1.32)
CALCIUM SERPL-MCNC: 9.8 MG/DL (ref 8.4–10.2)
CHLORIDE SERPL-SCNC: 108 MMOL/L (ref 96–108)
CLARITY UR: CLEAR
CO2 SERPL-SCNC: 30 MMOL/L (ref 21–32)
COLOR UR: YELLOW
CREAT SERPL-MCNC: 0.66 MG/DL (ref 0.6–1.3)
EOSINOPHIL # BLD AUTO: 0.09 THOUSAND/ÂΜL (ref 0–0.61)
EOSINOPHIL NFR BLD AUTO: 1 % (ref 0–6)
ERYTHROCYTE [DISTWIDTH] IN BLOOD BY AUTOMATED COUNT: 13.6 % (ref 11.6–15.1)
ETHANOL SERPL-MCNC: <10 MG/DL
EXT PREGNANCY TEST URINE: NEGATIVE
EXT. CONTROL: NORMAL
GFR SERPL CREATININE-BSD FRML MDRD: 115 ML/MIN/1.73SQ M
GLUCOSE SERPL-MCNC: 93 MG/DL (ref 65–140)
GLUCOSE UR STRIP-MCNC: NEGATIVE MG/DL
HCT VFR BLD AUTO: 45.2 % (ref 34.8–46.1)
HGB BLD-MCNC: 14.5 G/DL (ref 11.5–15.4)
HGB UR QL STRIP.AUTO: ABNORMAL
IMM GRANULOCYTES # BLD AUTO: 0.08 THOUSAND/UL (ref 0–0.2)
IMM GRANULOCYTES NFR BLD AUTO: 1 % (ref 0–2)
KETONES UR STRIP-MCNC: NEGATIVE MG/DL
LEUKOCYTE ESTERASE UR QL STRIP: NEGATIVE
LITHIUM SERPL-SCNC: 1.4 MMOL/L (ref 0.6–1.2)
LITHIUM SERPL-SCNC: 4.6 MMOL/L (ref 0.6–1.2)
LYMPHOCYTES # BLD AUTO: 1 THOUSANDS/ÂΜL (ref 0.6–4.47)
LYMPHOCYTES NFR BLD AUTO: 6 % (ref 14–44)
MCH RBC QN AUTO: 29.7 PG (ref 26.8–34.3)
MCHC RBC AUTO-ENTMCNC: 32.1 G/DL (ref 31.4–37.4)
MCV RBC AUTO: 92 FL (ref 82–98)
MONOCYTES # BLD AUTO: 1.34 THOUSAND/ÂΜL (ref 0.17–1.22)
MONOCYTES NFR BLD AUTO: 8 % (ref 4–12)
NEUTROPHILS # BLD AUTO: 14.88 THOUSANDS/ÂΜL (ref 1.85–7.62)
NEUTS SEG NFR BLD AUTO: 84 % (ref 43–75)
NITRITE UR QL STRIP: NEGATIVE
NON-SQ EPI CELLS URNS QL MICRO: NORMAL /HPF
NRBC BLD AUTO-RTO: 0 /100 WBCS
P AXIS: 85 DEGREES
PH UR STRIP.AUTO: 7.5 [PH] (ref 4.5–8)
PLATELET # BLD AUTO: 145 THOUSANDS/UL (ref 149–390)
PLATELET # BLD AUTO: 163 THOUSANDS/UL (ref 149–390)
PMV BLD AUTO: 9.3 FL (ref 8.9–12.7)
PMV BLD AUTO: 9.5 FL (ref 8.9–12.7)
POTASSIUM SERPL-SCNC: 4.5 MMOL/L (ref 3.5–5.3)
PR INTERVAL: 170 MS
PROT SERPL-MCNC: 6.3 G/DL (ref 6.4–8.4)
PROT UR STRIP-MCNC: NEGATIVE MG/DL
QRS AXIS: 28 DEGREES
QRSD INTERVAL: 82 MS
QT INTERVAL: 458 MS
QTC INTERVAL: 545 MS
RBC # BLD AUTO: 4.89 MILLION/UL (ref 3.81–5.12)
RBC #/AREA URNS AUTO: NORMAL /HPF
SALICYLATES SERPL-MCNC: <5 MG/DL (ref 3–20)
SODIUM SERPL-SCNC: 137 MMOL/L (ref 135–147)
SP GR UR STRIP.AUTO: 1.01 (ref 1–1.03)
T WAVE AXIS: 103 DEGREES
T4 FREE SERPL-MCNC: 0.61 NG/DL (ref 0.61–1.12)
TSH SERPL DL<=0.05 MIU/L-ACNC: 9.49 UIU/ML (ref 0.45–4.5)
UROBILINOGEN UR QL STRIP.AUTO: 0.2 E.U./DL
VALPROATE SERPL-MCNC: 141 UG/ML (ref 50–100)
VENTRICULAR RATE: 85 BPM
WBC # BLD AUTO: 17.41 THOUSAND/UL (ref 4.31–10.16)
WBC #/AREA URNS AUTO: NORMAL /HPF

## 2023-08-30 PROCEDURE — 86706 HEP B SURFACE ANTIBODY: CPT | Performed by: INTERNAL MEDICINE

## 2023-08-30 PROCEDURE — G1004 CDSM NDSC: HCPCS

## 2023-08-30 PROCEDURE — 70450 CT HEAD/BRAIN W/O DYE: CPT

## 2023-08-30 PROCEDURE — 80179 DRUG ASSAY SALICYLATE: CPT | Performed by: EMERGENCY MEDICINE

## 2023-08-30 PROCEDURE — 96372 THER/PROPH/DIAG INJ SC/IM: CPT

## 2023-08-30 PROCEDURE — 99291 CRITICAL CARE FIRST HOUR: CPT | Performed by: EMERGENCY MEDICINE

## 2023-08-30 PROCEDURE — 93005 ELECTROCARDIOGRAM TRACING: CPT

## 2023-08-30 PROCEDURE — 90935 HEMODIALYSIS ONE EVALUATION: CPT | Performed by: INTERNAL MEDICINE

## 2023-08-30 PROCEDURE — 87081 CULTURE SCREEN ONLY: CPT | Performed by: NURSE PRACTITIONER

## 2023-08-30 PROCEDURE — 36415 COLL VENOUS BLD VENIPUNCTURE: CPT | Performed by: EMERGENCY MEDICINE

## 2023-08-30 PROCEDURE — 80178 ASSAY OF LITHIUM: CPT

## 2023-08-30 PROCEDURE — 82330 ASSAY OF CALCIUM: CPT

## 2023-08-30 PROCEDURE — 99285 EMERGENCY DEPT VISIT HI MDM: CPT

## 2023-08-30 PROCEDURE — 85049 AUTOMATED PLATELET COUNT: CPT

## 2023-08-30 PROCEDURE — 80164 ASSAY DIPROPYLACETIC ACD TOT: CPT | Performed by: EMERGENCY MEDICINE

## 2023-08-30 PROCEDURE — 80178 ASSAY OF LITHIUM: CPT | Performed by: EMERGENCY MEDICINE

## 2023-08-30 PROCEDURE — 99255 IP/OBS CONSLTJ NEW/EST HI 80: CPT | Performed by: INTERNAL MEDICINE

## 2023-08-30 PROCEDURE — 81001 URINALYSIS AUTO W/SCOPE: CPT

## 2023-08-30 PROCEDURE — 99223 1ST HOSP IP/OBS HIGH 75: CPT | Performed by: INTERNAL MEDICINE

## 2023-08-30 PROCEDURE — 84439 ASSAY OF FREE THYROXINE: CPT | Performed by: EMERGENCY MEDICINE

## 2023-08-30 PROCEDURE — 36556 INSERT NON-TUNNEL CV CATH: CPT | Performed by: NURSE PRACTITIONER

## 2023-08-30 PROCEDURE — 86704 HEP B CORE ANTIBODY TOTAL: CPT | Performed by: INTERNAL MEDICINE

## 2023-08-30 PROCEDURE — 80143 DRUG ASSAY ACETAMINOPHEN: CPT | Performed by: EMERGENCY MEDICINE

## 2023-08-30 PROCEDURE — NC001 PR NO CHARGE: Performed by: NURSE PRACTITIONER

## 2023-08-30 PROCEDURE — 99223 1ST HOSP IP/OBS HIGH 75: CPT | Performed by: EMERGENCY MEDICINE

## 2023-08-30 PROCEDURE — 82077 ASSAY SPEC XCP UR&BREATH IA: CPT | Performed by: EMERGENCY MEDICINE

## 2023-08-30 PROCEDURE — 93010 ELECTROCARDIOGRAM REPORT: CPT

## 2023-08-30 PROCEDURE — 80053 COMPREHEN METABOLIC PANEL: CPT | Performed by: EMERGENCY MEDICINE

## 2023-08-30 PROCEDURE — 81025 URINE PREGNANCY TEST: CPT | Performed by: EMERGENCY MEDICINE

## 2023-08-30 PROCEDURE — 5A1D70Z PERFORMANCE OF URINARY FILTRATION, INTERMITTENT, LESS THAN 6 HOURS PER DAY: ICD-10-PCS | Performed by: INTERNAL MEDICINE

## 2023-08-30 PROCEDURE — 84443 ASSAY THYROID STIM HORMONE: CPT | Performed by: EMERGENCY MEDICINE

## 2023-08-30 PROCEDURE — 96365 THER/PROPH/DIAG IV INF INIT: CPT

## 2023-08-30 PROCEDURE — 85025 COMPLETE CBC W/AUTO DIFF WBC: CPT | Performed by: EMERGENCY MEDICINE

## 2023-08-30 PROCEDURE — 82140 ASSAY OF AMMONIA: CPT

## 2023-08-30 PROCEDURE — 06HY33Z INSERTION OF INFUSION DEVICE INTO LOWER VEIN, PERCUTANEOUS APPROACH: ICD-10-PCS | Performed by: INTERNAL MEDICINE

## 2023-08-30 PROCEDURE — 82140 ASSAY OF AMMONIA: CPT | Performed by: EMERGENCY MEDICINE

## 2023-08-30 PROCEDURE — 74177 CT ABD & PELVIS W/CONTRAST: CPT

## 2023-08-30 PROCEDURE — 87340 HEPATITIS B SURFACE AG IA: CPT | Performed by: INTERNAL MEDICINE

## 2023-08-30 PROCEDURE — 86705 HEP B CORE ANTIBODY IGM: CPT | Performed by: INTERNAL MEDICINE

## 2023-08-30 PROCEDURE — 86803 HEPATITIS C AB TEST: CPT | Performed by: INTERNAL MEDICINE

## 2023-08-30 RX ORDER — HALOPERIDOL 5 MG/ML
5 INJECTION INTRAMUSCULAR ONCE
Status: COMPLETED | OUTPATIENT
Start: 2023-08-30 | End: 2023-08-30

## 2023-08-30 RX ORDER — HEPARIN SODIUM 5000 [USP'U]/ML
5000 INJECTION, SOLUTION INTRAVENOUS; SUBCUTANEOUS EVERY 8 HOURS SCHEDULED
Status: DISCONTINUED | OUTPATIENT
Start: 2023-08-30 | End: 2023-09-03

## 2023-08-30 RX ORDER — SODIUM CHLORIDE, SODIUM GLUCONATE, SODIUM ACETATE, POTASSIUM CHLORIDE, MAGNESIUM CHLORIDE, SODIUM PHOSPHATE, DIBASIC, AND POTASSIUM PHOSPHATE .53; .5; .37; .037; .03; .012; .00082 G/100ML; G/100ML; G/100ML; G/100ML; G/100ML; G/100ML; G/100ML
100 INJECTION, SOLUTION INTRAVENOUS CONTINUOUS
Status: DISCONTINUED | OUTPATIENT
Start: 2023-08-30 | End: 2023-09-01

## 2023-08-30 RX ORDER — MIDAZOLAM HYDROCHLORIDE 2 MG/2ML
INJECTION, SOLUTION INTRAMUSCULAR; INTRAVENOUS
Status: DISCONTINUED
Start: 2023-08-30 | End: 2023-08-30 | Stop reason: WASHOUT

## 2023-08-30 RX ORDER — FENTANYL CITRATE 50 UG/ML
INJECTION, SOLUTION INTRAMUSCULAR; INTRAVENOUS
Status: DISPENSED
Start: 2023-08-30 | End: 2023-08-31

## 2023-08-30 RX ORDER — CHLORHEXIDINE GLUCONATE 0.12 MG/ML
15 RINSE ORAL EVERY 12 HOURS SCHEDULED
Status: DISCONTINUED | OUTPATIENT
Start: 2023-08-30 | End: 2023-09-07

## 2023-08-30 RX ADMIN — SODIUM CHLORIDE, SODIUM GLUCONATE, SODIUM ACETATE, POTASSIUM CHLORIDE, MAGNESIUM CHLORIDE, SODIUM PHOSPHATE, DIBASIC, AND POTASSIUM PHOSPHATE 200 ML/HR: .53; .5; .37; .037; .03; .012; .00082 INJECTION, SOLUTION INTRAVENOUS at 15:03

## 2023-08-30 RX ADMIN — HALOPERIDOL LACTATE 5 MG: 5 INJECTION, SOLUTION INTRAMUSCULAR at 12:25

## 2023-08-30 RX ADMIN — HEPARIN SODIUM 5000 UNITS: 5000 INJECTION INTRAVENOUS; SUBCUTANEOUS at 20:48

## 2023-08-30 RX ADMIN — SODIUM CHLORIDE 1000 ML: 0.9 INJECTION, SOLUTION INTRAVENOUS at 15:01

## 2023-08-30 RX ADMIN — IOHEXOL 85 ML: 350 INJECTION, SOLUTION INTRAVENOUS at 13:54

## 2023-08-30 NOTE — ASSESSMENT & PLAN NOTE
-Patient has history of congenital intellectual disability. Peñaloza at baseline patient is very active alert and able to maintain conversation and generally oriented.    -Although as per her sister at times she can have periods of manic episodes, acting out behavior, hypersexual behavior and rapid pressured speech  -Her acute behavioral dysfunction in the past 2 weeks is likely due to lithium toxicity    Plan:  -Toxicology and nephrology on board  -she will be getting emergent dialysis 8/30  -Continue to monitor her neurological status

## 2023-08-30 NOTE — H&P
[facility]  H&P  Name: Jalyn Cantu 28 y.o. female I MRN: [id number]  Unit/Bed#: ICU 13 I Date of Admission: 8/30/2023   Date of Service: 8/30/2023 I Hospital Day: 0      Assessment/Plan   Acute encephalopathy 2/2 lithium toxicity  Assessment & Plan  Assessment:  -She has a history of schizoaffective disorder, intellectual disability, bipolar disorder, presented with worsening encephalopathy, lethargy for the past two weeks.   -As per patient's sister, at her baseline she is generally very active, alert, able to maintain conversation, generally oriented despite having intellectual disability.  -Patients family reports symptoms started approximately two weeks when Clozaril was discontinued for abnormal white blood cell count. At home dose of lithium 750 mg twice daily. Lithium level of 4.5 today. Plan:  -Toxicology and nephrology were consulted  -Plan for urgent hemodialysis for total 4 hours today. -Recheck lithium after dialysis and if elevated repeat session of dialysis versus CRRT  -Check ammonia level, and if elevated give L-carnitine for valproate toxicity  -Continue to monitor electrolyte levels-replete as needed  -Continue aggressive IV hydration  -Continue to monitor neurological status    Intellectual disability  Assessment & Plan  -Patient has history of congenital intellectual disability. [place] at baseline patient is very active alert and able to maintain conversation and generally oriented.    -Although as per her sister at times she can have periods of manic episodes, acting out behavior, hypersexual behavior and rapid pressured speech  -Her acute behavioral dysfunction in the past 2 weeks is likely due to lithium toxicity    Plan:  -Toxicology and nephrology on board  -she will be getting emergent dialysis 8/30  -Continue to monitor her neurological status    Unspecified mood (affective) disorder ([address])  Assessment & Plan  She has a history of schizoaffective and bipolar disorder. She takes Remeron 50 mg and lithium 750 twice daily-currently held  Continue to monitor her neurological status.       -------------------------------------------------------------------------------------------------------------  Chief Complaint: Acute encephalopathy 2/2 lithium toxicity    History of Present Illness   HX and PE limited by: Patient scared and agitated  Blas Wasserman is a 28 y.o. female who presents with altered mental status. She has a past medical history significant for congenital intellectual disability and psychiatric disorders. She was brought into the ED by her mother due to concerns for her progressive behavioral dysfunction for the past 2 weeks. Patient is currently very lethargic and unable to answer questions so most of the history was taken from her sister Ashlyn Garcia ([phone]). According to the sister, patient at baseline is usually very active alert and oriented and able to carry out her daily ADLs and speak in full sentences. Although at times she can have periods of manic episodes, acting out behaviors, hypersexual behavior and rapid pressured speech. At this time she seems to be indicating some discomfort and pain in her abdomen and back. She was recently stopped from taking olanzapine by her PCP due to concerns of her white cell count. Otherwise no other significant complaints on her ROS and from the mother or sister. In the ED patient was started on Isolyte and given a dose of IM Haldol. She is found to have elevated lithium levels 4.6. Nephrology was consulted and recommended starting her on dialysis. She was then transferred to the ICU for her line to be placed. Her CT abdomen pelvis showed a nonobstructing 2 mm calculus in the interpolar right kidney. And fluid-filled loops of hyperemic small bowel in the right lower quadrant which could represent a nonspecific enteritis.   Her CT head without contrast showed no acute intracranial abnormalities. History obtained from sibling.  -------------------------------------------------------------------------------------------------------------  Dispo: Transfer to Critical Care     Code Status: Level 1 - Full Code  --------------------------------------------------------------------------------------------------------------  Review of Systems    Review of systems was unable to be performed secondary to Patient nonresponsive and agitated    Physical Exam  Constitutional:       Comments: Appears confused   HENT:      Head: Normocephalic and atraumatic. Mouth/Throat:      Mouth: Mucous membranes are moist.   Cardiovascular:      Rate and Rhythm: Normal rate and regular rhythm. Heart sounds: Normal heart sounds. Pulmonary:      Effort: Pulmonary effort is normal.      Breath sounds: Normal breath sounds. Abdominal:      Palpations: Abdomen is soft. Comments: Tenderness   Skin:     General: Skin is warm. Neurological:      Mental Status: She is alert. Psychiatric:         Mood and Affect: Mood is anxious.       --------------------------------------------------------------------------------------------------------------  Vitals:   Vitals:    08/30/23 1113 08/30/23 1312 08/30/23 1415 08/30/23 1515   BP: 115/82 135/86 116/71 128/78   BP Location: Left arm Left arm Right arm Right arm   Pulse: 96 96 96 100   Resp: 18 18 18 18   Temp:  98.9 °F (37.2 °C)     TempSrc:  Oral     SpO2: 98% 98% 98% 97%     Temp  Min: 98.9 °F (37.2 °C)  Max: 98.9 °F (37.2 °C)        There is no height or weight on file to calculate BMI.     Laboratory and Diagnostics:  Results from last 7 days   Lab Units 08/30/23  1257   WBC Thousand/uL 17.41*   HEMOGLOBIN g/dL 14.5   HEMATOCRIT % 45.2   PLATELETS Thousands/uL 163   NEUTROS PCT % 84*   MONOS PCT % 8   EOS PCT % 1     Results from last 7 days   Lab Units 08/30/23  1257   SODIUM mmol/L 137   POTASSIUM mmol/L 4.5   CHLORIDE mmol/L 108   CO2 mmol/L 30   ANION GAP mmol/L -1   BUN mg/dL 7   CREATININE mg/dL 0.66   CALCIUM mg/dL 9.8   GLUCOSE RANDOM mg/dL 93   ALT U/L 16   AST U/L 14   ALK PHOS U/L 110*   ALBUMIN g/dL 3.9   TOTAL BILIRUBIN mg/dL 0.29                       ABG:    VBG:          Micro:        EKG: NSR heart rate 85  Imaging: I have personally reviewed pertinent reports. Historical Information   Past Medical History:   Diagnosis Date   • Psychiatric disorder      No past surgical history on file. Social History   Social History     Substance and Sexual Activity   Alcohol Use Yes   • Alcohol/week: 3.0 standard drinks of alcohol   • Types: 3 Glasses of wine per week    Comment: sips of wine     Social History     Substance and Sexual Activity   Drug Use Yes   • Frequency: 1.0 times per week   • Types: Marijuana     Social History     Tobacco Use   Smoking Status Former   • Packs/day: 0.50   • Years: 10.00   • Total pack years: 5.00   • Types: Cigarettes   Smokeless Tobacco Never     Exercise History:   Family History:   No family history on file. I have reviewed this patient's family history and commented on sigificant items within the HPI      Medications:  Current Facility-Administered Medications   Medication Dose Route Frequency   • chlorhexidine (PERIDEX) 0.12 % oral rinse 15 mL  15 mL Mouth/Throat Q12H [address]   • fentanyl citrate (PF) 100 MCG/2ML **ADS Override Pull**       • heparin (porcine) subcutaneous injection 5,000 Units  5,000 Units Subcutaneous Q8H [address]   • midazolam (VERSED) 2 mg/2 mL injection **ADS Override Pull**       • multi-electrolyte (PLASMALYTE-A/ISOLYTE-S PH 7.4) IV solution  200 mL/hr Intravenous Continuous     Home medications:  Prior to Admission Medications   Prescriptions Last Dose Informant Patient Reported?  Taking?   divalproex sodium (DEPAKOTE) 500 mg DR tablet  Mother No Yes   Sig: Take 2 tablets (1,000 mg total) by mouth every 12 (twelve) hours   lithium carbonate (LITHOBID) 300 mg CR tablet  Mother No Yes   Sig: Take 1 tablet (300 mg total) by mouth 2 (two) times a day Total should be 750 mg two times a day   lithium carbonate (LITHOBID) 450 mg CR tablet  Mother No Yes   Sig: Take 1 tablet (450 mg total) by mouth 2 (two) times a day Total dose should be Lithium 750mg two times daily   mirtazapine (REMERON) 15 mg tablet  Mother No Yes   Sig: Take 1 tablet (15 mg total) by mouth daily at bedtime   propranolol (INDERAL) 10 mg tablet  Mother No Yes   Sig: Take 1 tablet (10 mg total) by mouth 3 (three) times a day   senna-docusate sodium (SENOKOT S) 8.6-50 mg per tablet  Mother No Yes   Sig: Take 2 tablets by mouth 2 (two) times a day      Facility-Administered Medications: None     Allergies:  No Known Allergies    ------------------------------------------------------------------------------------------------------------  Advance Directive and Living Will:      Power of :    POLST:    ------------------------------------------------------------------------------------------------------------  Anticipated Length of Stay is > 2 midnights    Care Time Delivered:         Lory Pandya MD        Portions of the record may have been created with voice recognition software. Occasional wrong word or "sound a like" substitutions may have occurred due to the inherent limitations of voice recognition software.   Read the chart carefully and recognize, using context, where substitutions have occurred

## 2023-08-30 NOTE — ASSESSMENT & PLAN NOTE
She has a history of schizoaffective and bipolar disorder. She takes Remeron 50 mg and lithium 750 twice daily-currently held  Continue to monitor her neurological status.

## 2023-08-30 NOTE — CONSULTS
Consultation - Medical Toxicology  Raysa Deleon 28 y.o. female MRN: [id number]  Unit/Bed#: ICU 15 Encounter: [id number]    Reason for Consult / Principal Problem: Lithium/valproic acid toxicity    Inpatient consult to Toxicology  Consult performed by: Lisy Medina MD  Consult ordered by: Jonnie Marie MD        08/30/23    ASSESSMENT:  28year old female with AMS in the setting of   - Lithium toxicity 4.6, normal renal function  - Supratherapeutic Valproic Acid  - QTc 545     RECOMMENDATIONS  -  Start 1.5x NS for enhanced clearance of lithium  -  With patient's alteration of mentation and [Li+] > 4.6, recommend hemodialysis. Consult nephrology. -  Follow up Ammonia level, and if elevated give L-carnitine for valproate toxicity  -  Assess home safety given that mother dispenses medications, and patient has clear signs of supratherapeutic ingestion  -  QTc noted to be 545. Avoid QTc prolonging agents, give 2 g IV MgSO4 over 1 hour, replete electrolytes; serial monitoring until QTc < 500   - Continuous cardiac monitoring    For further questions, please call [facility]  Service or Patient Access Center to reach the medical  on call. Please see additional teaching note below (if available)    Discussion: Lithium Toxicity  Medical Toxicology   [address] [facility]    Uses    Lithium is primarily used as a medication to treat psychiatric diseases such as bipolar disorder. Other uses include prophylaxis for cluster headaches. Lithium formulations include immediate release tablets and capsules (Carbolith, Eskalith, [place]) and sustained-release preparations (Duralith, Eskalith CR, Lithobid). Mechanism of toxicity Lithium is a cation and behaves like Na or K, however, the exact mechanism by which lithium exerts its effects is unknown.  Some studies have suggested it affects second messenger communications, and it may down regulate neurotransmitters such as dopamine or increase serontonin. Lithium is concentrated in the thyroid, and it may cause hypothyroidism by various mechanisms. Lithium may also cause nephrogenic diabetes insipidus via blockage of ADH. In supratherapeutic doses it may depress neural excitation and synaptic transmission. Pharmacokinetics Immediate-release preparations are absorbed within 1-2 hours with peak levels at 2-4 hours. Sustained-release preparations are absorbed slowly, reaching steady-state serum levels at 6-8 hours after ingestion. In overdose, absorption and peak levels may be longer. Lithium is not metabolized; it is eliminated largely via the kidneys and is dependent on a patient’s GFR. The initial volume of distribution is 0.5L/kg, increasing to 0.7-0. 9L/kg as lithium slowly moves into the brain, muscle, and bone. The elimination half-life with therapeutic doses is 20-24 hours but is increased in renal insufficiency and in overdose. Levels The therapeutic serum level for bipolar disorder is 0.6-1. 2mEq/L. The trough level is the most accurate measurement of lithium. This occurs about 6 hours after ingesting a regular formulation, and 12 hours after sustained release. In overdose, absorption may be delayed, and peak serum levels may not be reached until 24 hours post-ingestion. Levels need to be correlated with the type of ingestion and time of ingestion. In an acute intoxication, the serum level may be elevated but the patient is able to eliminate the lithium before it diffuses into the CNS. In chronic intoxication, levels slightly above the upper limit of normal may result in toxicity. Lithium levels are falsely elevated when placed in a green-top tube (lithium heparin). Check with your lab if you suspect a level is falsely elevated. Toxic Dose The usual daily dose is 300-2400mg. Supratherapeutic levels may be reached with ingestions of >40mg/kg or when patients are unable to excrete their usual therapeutic doses.     Clinical presentation The expected affects from lithium intoxication depend on whether the ingestion is acute, acute-on-chronic or a chronic ingestion. Acute Ingestion. This type of ingestion describes patients who do not normally take lithium but acutely ingest a large quantity. Acute ingestions in lithium naïve patients may result in gastrointestinal irritation (N/V/D). Progression to CNS effects is uncommon. Lithium levels in this setting are often elevated, signifying a pre-distribution level. CNS manifestations are typically mild, such as tremors. If the ingestion is severe, more profound CNS effects- hyperreflexia, clonus, agitation, altered mental status, and seizures- may occur. Because lithium slowly distributes from the blood into other tissues such as the CNS, lithium naïve persons typically eliminate the lithium in the urine before it diffuses into the CNS. Acute-on-Chronic or Chronic Ingestion. An acute-on-chronic ingestion refers to a patient chronically taking lithium who acutely overdoses. A chronic ingestion refers to a patient on lithium who develops elevated levels. In this setting, common etiologies for increased levels include increased reabsorption of lithium in the kidneys during dehydrated states (decreased oral fluid intake, gastroenteritis, diuretic use), decreased elimination (renal insufficiency, use of NSAIDS, ACEI), or drug-drug interactions with SSRIs and antipsychotic agents. Patients on chronic lithium who develop supratherapeutic levels typically do not develop gastrointestinal irritation. CNS alterations dominate their clinical picture. (See Table 1 below) Because a person chronically taking lithium already has a body burden of lithium, the additional drug moves more quickly into the CNS.  Mild to moderate intoxication causes fasciculations (most often noticeable in the tongue), myoclonus, tremor, lethargy, muscular weakness, and cerebellar signs such as nystagmus, slurred speech, ataxia. Severe intoxication may cause delirium, seizure, coma, and hyperthermia. The EKG may show prolonged QTc , nonspecific ST-T wave changes, and bradycardia. Sinus node arrest has been reported. A leukocytosis is common. The morbidity of lithium toxicity is due to prolonged CNS alterations such as memory impairment, muscle weakness, or tremors. Recovery from neurologic symptoms may take days to weeks. Emergency and supportive measures  1) Attend to the A,B,Cs. 2) Laboratories. Serial lithium levels are essential to establish a trend. Relative indications for hemodialysis include a lithium level> 4 mEq/L in an acute ingestion, or a level >2.5mEq/L in acute-on-chronic, or chronic ingestion (see Hemodialysis below). The BUN/Cr helps to assess hydration status and renal function. Consider thyroid function tests when indicated. In the setting of nephrogenic diabetes insipidus, serum Osms, urine Osms and urine electrolytes should be assessed. 3) Decontamination. Charcoal does not bind lithium. Gastric lavage may be indicated in early presenters at risk for CNS effects. However, extended release pills are large and may not fit through the lavage tube. Whole-bowel irrigation may be effective to eliminate extended release preparations. Verta Shook administration has been shown to decrease serum lithium levels due to lithium’s similarity to potassium, but its effect on outcome is uncertain. 4) Enhance urinary excretion. The mainstay of therapy in patients with normal renal function is hydration with . 9NS at 1.5-2x maintenance to enhance urinary excretion of lithium. The use of osmotically active agents such as mannitol to increase diuresis has not been proven to be effective and may cause dehydration. In the setting of diabetes insipidus, amilioride has been shown to decrease polyuria. 5) Hemodialysis. Lithium is effectively removed by hemodialysis because it is small and not protein bound.  The serum half-life can be reduced from about 24 hrs to 3-6 hrs. The decision to dialyze a lithium toxic patient is based on many variables. Indications may include: 1) renal insufficiency 2) inability to handle aggressive hydration due to CHF 3) CNS toxicity 4) level >4. 0mEq/L in an acute, or level >2.5mEq/L in acute-on-chronic, chronic ingestion in conjunction with CNS effects. Because HD removes lithium only from the plasma, a “rebound lithium level” may occur. Levels should be checked after HD and 6 hours later due to lithium flowing from the intracellular space back into the plasma. Prognosis Death after lithium overdose is uncommon. Lithium naïve patients with normal renal function rarely develop serious neurologic symptoms or sequalea because they eliminate the lithium before it enters the CNS. Patients on lithium who take an overdose or who develop elevated levels are at risk for serious morbidity related to CNS dysfunction. Sequalea such as cognitive impairment, confusion, temor may take months to resolve. References    Barbra Nelson. [place]. In [address] [facility]’s Toxicologic Emergencies 7th edition. [address]: [facility], 2002: pp.[phone]. Ellen Farmer MA, Carmen NL. Lithium. In  Eric , ed. [address]: [facility], 2004: pp. Kandace Cronin. Melly Eye. [place]. In CristianaDax aguilera EM, [address] et nathalie Yu. Medical Toxicology 3rd edition. [place]: [facility], 2004: pp.[phone]. Consult Discussion: Valproic Acid  Medical Toxicology   [address]  Last updated 3/17/19    Introduction  1. Valproate (Depakene, Depakote etc.) short-branched chained fatty acid anticonvulsant  2. Also used in treatment of bipolar disorder, chronic pain and as prophylaxis for migraine headaches    Pharmacology/Pathophysiology  1. 100% absorbed. Absorbed rapidly except in delayed-release formulation. Peak serum level 6hrs.  90% protein bound (this decreases in overdose). Vd 0.5L/kg. 2. 95% metabolized via liver. T1/2 elimination 10hrs, prolonged to 30hrs in overdose. Metabolites are active and can contribute to toxicity in overdose. 3. Acts as anticonvulsant via multiple mechanisms   a. Membrane stabilizing effect by inhibition of voltage-gated Na channel  b. Inhibition of T-type calcium channel (excessive conductance will cause seizure)  c. Competitive antagonism of NMDA (excitatory) neuroreceptor  d. Inhibition of HASEEB transaminase hence increasing HASEEB (inhibitory neurotransmitter) in the brain  4. Causes depletion of carnitine stores in liver  a. Forms valproylcarnitine which is excreted  b. Inhibits carnitine transporter  5. Fatty acid cannot be metabolized due to lack of carnitine resulting in chronic fatty liver  6. Depletion of carnitine also disrupts incorporation of ammonia into urea cycle resulting in hyperammonemia (>80 mcg/dl)    Potential toxic dose  1. >200 mg/kg risk of CNS depression  2. >400 mg/kg risk of multiorgan system toxicities  3. >750 mg/kg potentially lethal    Clinical Manifestation  1. Acute overdose  a. CNS - Lethargy, sedation progressing to coma with cerebral edema as severity of overdose increase (ataxia, nystagmus and tremors typical of other anticonvulsant toxicities are not usually seen)  b. Respiratory depression and miotic pupils can mimic opioid toxicity  c. Metabolic (in severe toxicity) - metabolic acidosis (high gap), hyperammonemia, hypernatremia, hypocalcemia  d. Hepatotoxicity  e. Bone marrow suppression at 3-5 days and usually resolves spontaneously  f. Others - renal insufficiency, pancreatitis, acute lung injury   g. CV - Tachycardia with QTc prolongation and hypotension have been reported  2.  Valproate-induced hyperammonemic encephalopathy (VHE)  a. CNS dysfunction with raised ammonia level (Note that ammonia level is often raised in Valproate therapy, so CNS dysfunction should be present for this diagnosis)  b. Spectrum of CNS dysfunction from deterioration of mental function to coma; one case report of aggression as presentation  c. Only abnormal laboratory finding is that of high ammonia level  d. Is seen with both elevated (overdose) or normal (therapeutic) valproate level  e. May have concomitant hepatotoxicity  3. Valproate is a known human teratogen. Diagnosis  1. Valproate level should be measured (therapeutic level  mg/L) and followed until downward trend is established  2. Serum level >450 mg/L associated with drowsiness/obtundation  3. Serum level >850 mg/L associated with coma  4. Blood gas, liver function test, ammonia level, electrolytes  5. Valproate causes false elevation of urine ketones    Management  1. Resuscitation, symptomatic and supportive care in all patients  2. Acute overdose  a. Asymptomatic patients should have serum concentrations checked Q2-3 hours until [Va] has clearly peaked and downtrended. Immediate release requires 6 hours observation (2-3 lab draws) and extended release (divalproex) requires 12 hours observation (4-6 lab draws). b. Multidose activated charcoal may be considered - valproate undergoes enterohepatic recirculation  c. L-Carnitine -  mg/kg followed by 50 mg/kg Q6-8 hours  3. VHE  a. Stop valproate  b. L-Carnitine -  mg/kg followed by 50 mg/kg Q6-8 hours  4. Hemodialysis and hemoperfusion is reserved for severe toxicity with failure of improvement or deterioration despite management especially with concomitant severe metabolic disturbance. HD is indicated for serum concentrations of greater than 850 mg/L    References  1. Doyon S. Anticonvulsants. In: [employer]frank’s Toxicologic Emergencies. 8th ed. 2. Valproic Acid. In: Poisoning and Drug Overdose. 5th ed. 3. Valproic Acid. In: Toxicology Handbook. Keri BRASHER, et. al. Valproate-associated Hyperammonemic Encephalopathy. J Am Board Fam Med 2007; 20: [phone])        4. Clement C, Paige ABEL. Anticonvulsants Toxicity. [Updated 2019 Jan 7]. In: [employer] [Internet]. [facility] [employer]; 2019 Jan-. Available from: [email or web address]          Hx and PE limited by: altered mentation    HPI: America Bryan is a 28y.o. year old female who presents with AMS x2weeks. Per sister and mother, she has become more lethargic, and non-communicative in the past two weeks, which is out of the norm for her. At baseline, she is alert, conversational, active. She has an intellectual disability, and per sister, acts at the level of a 11year old. She ambulates independently, and can perform ADLs with some assistance. She takes valproic acid, lithium, and mirtazapine. The only recent change to her medications was the discontinuation of clozapine. She lives with her mother and father, and is hardly left unsupervised. Her mother is the only one who accesses and gives the patient her medication. Review of Systems   Unable to perform ROS: Patient nonverbal     Historical Information   Past Medical History:   Diagnosis Date   • Psychiatric disorder      No past surgical history on file. Social History   Social History     Substance and Sexual Activity   Alcohol Use Yes   • Alcohol/week: 3.0 standard drinks of alcohol   • Types: 3 Glasses of wine per week    Comment: sips of wine     Social History     Substance and Sexual Activity   Drug Use Yes   • Frequency: 1.0 times per week   • Types: Marijuana     Social History     Tobacco Use   Smoking Status Former   • Packs/day: 0.50   • Years: 10.00   • Total pack years: 5.00   • Types: Cigarettes   Smokeless Tobacco Never     No family history on file. Prior to Admission medications    Medication Sig Start Date End Date Taking?  Authorizing Provider   divalproex sodium (DEPAKOTE) 500 mg DR tablet Take 2 tablets (1,000 mg total) by mouth every 12 (twelve) hours 7/19/23 9/17/23 Yes Adrienne León, DO   lithium carbonate (LITHOBID) 300 mg CR tablet Take 1 tablet (300 mg total) by mouth 2 (two) times a day Total should be 750 mg two times a day 8/19/23 10/18/23 Yes Zaida Barron PA-C   lithium carbonate (LITHOBID) 450 mg CR tablet Take 1 tablet (450 mg total) by mouth 2 (two) times a day Total dose should be Lithium 750mg two times daily 8/19/23 10/18/23 Yes Zaida Barron PA-C   mirtazapine (REMERON) 15 mg tablet Take 1 tablet (15 mg total) by mouth daily at bedtime 8/19/23 10/18/23 Yes Zaida Barron PA-C   propranolol (INDERAL) 10 mg tablet Take 1 tablet (10 mg total) by mouth 3 (three) times a day 7/19/23 9/17/23 Yes Ginny Palumbo DO   senna-docusate sodium (SENOKOT S) 8.6-50 mg per tablet Take 2 tablets by mouth 2 (two) times a day 7/19/23 9/17/23 Yes Ginny Palumbo DO   benztropine (COGENTIN) 2 mg tablet Take 2 mg by mouth 2 (two) times a day  Patient not taking: Reported on 8/22/2023 8/18/23   Historical Provider, MD   cloZAPine (CLOZARIL) 100 mg tablet Take 1.5 tablets (150 mg total) by mouth 2 (two) times a day  Patient not taking: Reported on 8/22/2023 8/19/23 10/18/23  Zaida Barron PA-C     Current Facility-Administered Medications   Medication Dose Route Frequency   • chlorhexidine (PERIDEX) 0.12 % oral rinse 15 mL  15 mL Mouth/Throat Q12H [address]   • fentanyl citrate (PF) 100 MCG/2ML **ADS Override Pull**       • heparin (porcine) subcutaneous injection 5,000 Units  5,000 Units Subcutaneous Q8H [address]   • midazolam (VERSED) 2 mg/2 mL injection **ADS Override Pull**       • multi-electrolyte (PLASMALYTE-A/ISOLYTE-S PH 7.4) IV solution  200 mL/hr Intravenous Continuous     No Known Allergies  Objective   No intake or output data in the 24 hours ending 08/30/23 7428    Invasive Devices:   Peripheral IV 08/30/23 Left;Proximal;Ventral (anterior) Forearm (Active)   Site Assessment WDL; Clean;Dry; Intact 08/30/23 1110   Dressing Type Transparent 08/30/23 1110   Line Status Flushed 08/30/23 1110   Dressing Status Clean;Dry; Intact 08/30/23 1110       Peripheral IV 08/30/23 Right Antecubital (Active)   Site Assessment WDL; Clean;Dry; Intact 08/30/23 1256   Dressing Type Transparent 08/30/23 1256   Line Status Flushed 08/30/23 1256   Dressing Status Clean;Dry; Intact 08/30/23 1256       Vitals   Vitals:    08/30/23 1113 08/30/23 1312 08/30/23 1415 08/30/23 1515   BP: 115/82 135/86 116/71 128/78   TempSrc:  Oral     Pulse: 96 96 96 100   Resp: 18 18 18 18   Patient Position - Orthostatic VS: Lying Lying Lying Lying   Temp:  98.9 °F (37.2 °C)         Physical Exam  Vitals reviewed. Constitutional:       Appearance: She is ill-appearing and toxic-appearing. She is not diaphoretic. HENT:      Head: Normocephalic and atraumatic. Nose: Nose normal.      Mouth/Throat:      Mouth: Mucous membranes are moist.   Eyes:      Extraocular Movements: Extraocular movements intact. Conjunctiva/sclera: Conjunctivae normal.      Pupils: Pupils are equal, round, and reactive to light. Cardiovascular:      Rate and Rhythm: Normal rate and regular rhythm. Pulmonary:      Effort: No respiratory distress. Abdominal:      General: Abdomen is flat. There is no distension. Palpations: Abdomen is soft. Tenderness: There is no guarding or rebound. Musculoskeletal:      Cervical back: Neck supple. Right lower leg: No edema. Skin:     General: Skin is warm and dry. Capillary Refill: Capillary refill takes less than 2 seconds. Neurological:      Mental Status: She is disoriented. Motor: No tremor. Comments: (-) clonus, (-) rigidity           EKG, Pathology, and Other Studies: I have personally reviewed pertinent reports. NSR 85, QRS 82, QTc 545, no terminal R, no SHANKAR or STD, no ecopty    Lab Results: I have personally reviewed pertinent reports.       Labs:  Results from last 7 days   Lab Units 08/30/23  1257   WBC Thousand/uL 17.41*   HEMOGLOBIN g/dL 14.5   HEMATOCRIT % 45.2   PLATELETS Thousands/uL 163   NEUTROS PCT % 84*   LYMPHS PCT % 6*   MONOS PCT % 8   EOS PCT % 1      Results from last 7 days   Lab Units 08/30/23  1257   SODIUM mmol/L 137   POTASSIUM mmol/L 4.5   CHLORIDE mmol/L 108   CO2 mmol/L 30   BUN mg/dL 7   CREATININE mg/dL 0.66   CALCIUM mg/dL 9.8   ALK PHOS U/L 110*   ALT U/L 16   AST U/L 14              No results found for: "TROPONINI"      Results from last 7 days   Lab Units 08/30/23  1458   ACETAMINOPHEN LVL ug/mL <10*   ETHANOL LVL mg/dL <29   SALICYLATE LVL mg/dL <5     Invalid input(s): "EXTPREGUR"    Imaging Studies: I have personally reviewed pertinent reports. Counseling / Coordination of Care  Total floor / unit time spent today 45 minutes. Greater than 50% of total time was spent with the patient and / or family counseling and / or coordination of care.

## 2023-08-30 NOTE — TELEPHONE ENCOUNTER
Patient contacted the office seeking to schedule an appt. Writer informed the patient that she is on our [address] list , once an opening becomes available the  will be in contact to assist with scheduling. Patient verbalized understanding.

## 2023-08-30 NOTE — PLAN OF CARE
Post-Dialysis RN Treatment Note    Blood Pressure:  Pre 134/76 mm/Hg  Post 121/78 mmHg   EDW  TBD kg    Weight:  Pre 52.9 kg   Post 51.6 kg   Mode of weight measurement: Bed Scale   Volume Removed  0 ml    Treatment duration 240 minutes    NS given  No    Treatment shortened? No   Medications given during Rx None Reported   Estimated Kt/V  None Reported   Access type: Temporary HD catheter   Access Issues: No    Report called to primary nurse   Yes Avila Spears RN    Tolerated treatment with no issues. No hypotension, nausea or cramping noted. Current hemodialysis plan of care is to remove a total of 500 ml of fluid over a 4 hour treatment for a net of 0 liters to keep even. Monitor vital signs every 15 minutes while on treatment for patient safety while on treatment. Maintain cardiac monitoring while on treatment for patient safety while on treatment. Utilize a 3 K+ bath for serum potassium of 4.5 to maintain electrolyte balance. Report received from [facility]. Plan reviewed with Dr Lillian Castanon at bedside.       Problem: METABOLIC, FLUID AND ELECTROLYTES - ADULT  Goal: Electrolytes maintained within normal limits  Description: INTERVENTIONS:  - Monitor labs and assess patient for signs and symptoms of electrolyte imbalances  - Administer electrolyte replacement as ordered  - Monitor response to electrolyte replacements, including repeat lab results as appropriate  - Instruct patient on fluid and nutrition as appropriate  Outcome: Progressing  Goal: Fluid balance maintained  Description: INTERVENTIONS:  - Monitor labs   - Monitor I/O and WT  - Instruct patient on fluid and nutrition as appropriate  - Assess for signs & symptoms of volume excess or deficit  Outcome: Progressing

## 2023-08-30 NOTE — PROGRESS NOTES
Dialysis note:    I saw and examined patient during 1st hemodialysis treatment at 6:04 PM on 8/30/2023. The patient was receiving hemodialysis for treatment of lithium toxicity. I also reviewed vital signs, intake and output, lab results and recent events, and agree with dialysis order. Tolerating HD.  BP acceptable

## 2023-08-30 NOTE — CONSULTS
Consultation - Nephrology   Sandor Duncan 28 y.o. female MRN: [id number]  Unit/Bed#: ED-41 Encounter: [id number]    ASSESSMENT AND PLAN:  Patient is 21-year-old female with significant psych issues including schizoaffective disorder, intellectual disability, bipolar disorder, presented with worsening encephalopathy, lethargy. We are consulted for lithium toxicity. Severe symptomatic lithium toxicity  -Lithium level 4.6  -As per family patient has significantly worsening encephalopathy, now more lethargic, nonverbal, not answering any questions which is significantly worsened from her usual baseline. As per patient's sister, at her baseline she is generally very active, alert, able to maintain conversation, generally oriented despite having intellectual disability. -Given significant encephalopathy with elevated lithium level, will plan for urgent hemodialysis for total 4 hours today.  -I had detailed discussion with patient's sister Panchito (over the speaker phone) and mother Sol at bedside regarding need for dialysis, mother has provided dialysis consent which is in the chart.  -Temporary HD catheter to be placed by ICU team.  -Also discussed with toxicology  -Recommend to check lithium level immediately after dialysis and again in 4 hours afterwards to ensure if there is rebound elevated lithium level in which case may need to consider repeat session of dialysis versus CRRT  -Fortunately creatinine remains stable 0.6  -Agree with aggressive IV hydration  -Closely monitor neurological status  -Avoid lithium for now  -Patient is urinary incontinent  -UA this admission bland without hematuria or proteinuria. Nonobstructing right renal calculus on CT scan. No hydro-    Discussed above plan in detail with ER team, ICU team and toxicology regarding plan for HD, monitoring closely lithium level and they agree with above recommendations.       HISTORY OF PRESENT ILLNESS:  Requesting Physician: Brittnee Ornelas Roderick Oakley MD  Reason for Consult: Lithium toxicity    Dixon Alfred is a 28y.o. year old female who was admitted to [facility] after presenting with severe encephalopathy. A renal consultation is requested today for assistance in the management of lithium toxicity. Patient is unable to answer any questions. She is overall very lethargic. All the history is obtained from reviewing medical records, talking to patient's sister Felix Villanueva (over the phone who also helps with translation as per mother request) and mother Sol at bedside. As per patient's sister, patient at baseline is very active, alert, able to maintain conversation but does get agitated frequently. She has significant psych issues. She was recently started on lithium as per sister. Upon arrival to the hospital patient was found to have elevated lithium level. PAST MEDICAL HISTORY:  Past Medical History:   Diagnosis Date   • Psychiatric disorder        PAST SURGICAL HISTORY:  No past surgical history on file. ALLERGIES:  No Known Allergies    SOCIAL HISTORY:  Social History     Substance and Sexual Activity   Alcohol Use Yes   • Alcohol/week: 3.0 standard drinks of alcohol   • Types: 3 Glasses of wine per week    Comment: sips of wine     Social History     Substance and Sexual Activity   Drug Use Yes   • Frequency: 1.0 times per week   • Types: Marijuana     Social History     Tobacco Use   Smoking Status Former   • Packs/day: 0.50   • Years: 10.00   • Total pack years: 5.00   • Types: Cigarettes   Smokeless Tobacco Never       FAMILY HISTORY:  No family history on file.     MEDICATIONS:    Current Facility-Administered Medications:   •  multi-electrolyte (PLASMALYTE-A/ISOLYTE-S PH 7.4) IV solution, 200 mL/hr, Intravenous, Continuous, Bruno Babinski, MD, Last Rate: 200 mL/hr at 08/30/23 1503, 200 mL/hr at 08/30/23 1503  •  sodium chloride 0.9 % bolus 1,000 mL, 1,000 mL, Intravenous, Once, Bruno Babinski, MD, Last Rate: 1,000 mL/hr at 08/30/23 1501, 1,000 mL at 08/30/23 1501    Current Outpatient Medications:   •  divalproex sodium (DEPAKOTE) 500 mg DR tablet, Take 2 tablets (1,000 mg total) by mouth every 12 (twelve) hours, Disp: 120 tablet, Rfl: 1  •  lithium carbonate (LITHOBID) 300 mg CR tablet, Take 1 tablet (300 mg total) by mouth 2 (two) times a day Total should be 750 mg two times a day, Disp: 60 tablet, Rfl: 0  •  lithium carbonate (LITHOBID) 450 mg CR tablet, Take 1 tablet (450 mg total) by mouth 2 (two) times a day Total dose should be Lithium 750mg two times daily, Disp: 60 tablet, Rfl: 0  •  mirtazapine (REMERON) 15 mg tablet, Take 1 tablet (15 mg total) by mouth daily at bedtime, Disp: 30 tablet, Rfl: 0  •  propranolol (INDERAL) 10 mg tablet, Take 1 tablet (10 mg total) by mouth 3 (three) times a day, Disp: 90 tablet, Rfl: 1  •  senna-docusate sodium (SENOKOT S) 8.6-50 mg per tablet, Take 2 tablets by mouth 2 (two) times a day, Disp: 120 tablet, Rfl: 1  •  benztropine (COGENTIN) 2 mg tablet, Take 2 mg by mouth 2 (two) times a day (Patient not taking: Reported on 8/22/2023), Disp: , Rfl:   •  cloZAPine (CLOZARIL) 100 mg tablet, Take 1.5 tablets (150 mg total) by mouth 2 (two) times a day (Patient not taking: Reported on 8/22/2023), Disp: 90 tablet, Rfl: 0    REVIEW OF SYSTEMS:  Unable to obtain any review of system as patient is encephalopathic, lethargic    PHYSICAL EXAM:  Current Weight:    First Weight:    Vitals:    08/30/23 1415   BP: 116/71   Pulse: 96   Resp: 18   Temp:    SpO2: 98%     No intake or output data in the 24 hours ending 08/30/23 1532  Wt Readings from Last 3 Encounters:   08/22/23 52.6 kg (116 lb)   08/19/23 49.4 kg (108 lb 14.5 oz)     Temp Readings from Last 3 Encounters:   08/30/23 98.9 °F (37.2 °C) (Oral)   08/19/23 98.4 °F (36.9 °C) (Tympanic)     BP Readings from Last 3 Encounters:   08/30/23 116/71   08/22/23 100/60   08/19/23 114/83     Pulse Readings from Last 3 Encounters:   08/30/23 96   08/22/23 87 08/19/23 (!) 117        Physical Examination:  Eyes: No conjunctival pallor present  ENT: External examination of ear and nose unremarkable  Neck: No obvious lymphadenopathy appreciated  Respiratory: Bilateral air entry present  CVS: No significant edema  GI: Soft, nondistended  CNS: Lethargic, does not answer any questions  Skin: No new rash  Musculoskeletal: No obvious new gross deformity noted    Invasive Devices:      Lab Results:   Results from last 7 days   Lab Units 08/30/23  1257   WBC Thousand/uL 17.41*   HEMOGLOBIN g/dL 14.5   HEMATOCRIT % 45.2   PLATELETS Thousands/uL 163   POTASSIUM mmol/L 4.5   CHLORIDE mmol/L 108   CO2 mmol/L 30   BUN mg/dL 7   CREATININE mg/dL 0.66   CALCIUM mg/dL 9.8       Other Studies:   CT abdomen pelvis with contrast   Final Result by Jasmin Pepe MD (08/30 1438)      1. Top-normal fluid-filled loops of hyperemic small bowel in the right lower quadrant, which could represent a nonspecific enteritis in the appropriate clinical context. 2.  Nonobstructing 2 mm calculus in the interpolar right kidney. The study was marked in EPIC for significant notification. Workstation performed: [id number]         CT head without contrast   Final Result by Chapo Fam MD (08/30 1433)      No acute intracranial abnormality. Workstation performed: [id number]              Portions of the record may have been created with voice recognition software. Occasional wrong word or "sound a like" substitutions may have occurred due to the inherent limitations of voice recognition software. Read the chart carefully and recognize, using context, where substitutions have occurred.

## 2023-08-30 NOTE — ED PROVIDER NOTES
History  Chief Complaint   Patient presents with   • Altered Mental Status     Per pt's sister, pt has been inc. in lethargy, confusion, not being able to respond and communicate clearly past 2 weeks. Pt was taking clozapine but stopped due to insurance purposes about 2 weeks ago. Pt only able to shout. Pt also been having diarrhea for days. 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 At her baseline, she can ambulate, speak, manage ADLs, while her behavioral condition can manifests as rapid pressured, speech, manic periods, acting out behavior, hypersexual behavior, aggression. However, over the two weeks, she has become dependent on her caretakers for ADLs, and has stopped speaking with any clarity, rather is only shouting or moaning. She seems to be indicating discomfort or complaining of pain, clutching at her mid to lower abdomen. Her mother has tried to understand what pain she is having, and has only been able to understand her saying "heart" but recognizes she is not in any respiratory distress, nor is she indicating chest pain when she is moaning or crying out. Near the onset of behavioral changes, they were at her PCP and there was concern that she was not taking clozapine:  Per PCP 8/22/23 "Spoke directly to the pharmacist at [facility].  There is a concern about the white count with Clozaril. It has to be followed every month, and uploaded to a website for that. Certain pharmacies are allowed to prescribe it. When she (pharmacist) plugged in the most recent data that was given to her, the white count was not appropriate for Clozaril. History limited by:  Patient nonverbal and psychiatric disorder   used: Yes ([employer] [id number])        Prior to Admission Medications   Prescriptions Last Dose Informant Patient Reported? Taking? benztropine (COGENTIN) 2 mg tablet Not Taking Mother Yes No   Sig: Take 2 mg by mouth 2 (two) times a day   Patient not taking: Reported on 8/22/2023   cloZAPine (CLOZARIL) 100 mg tablet Not Taking Mother No No   Sig: Take 1.5 tablets (150 mg total) by mouth 2 (two) times a day   Patient not taking: Reported on 8/22/2023   divalproex sodium (DEPAKOTE) 500 mg DR tablet  Mother No Yes   Sig: Take 2 tablets (1,000 mg total) by mouth every 12 (twelve) hours   lithium carbonate (LITHOBID) 300 mg CR tablet  Mother No Yes   Sig: Take 1 tablet (300 mg total) by mouth 2 (two) times a day Total should be 750 mg two times a day   lithium carbonate (LITHOBID) 450 mg CR tablet  Mother No Yes   Sig: Take 1 tablet (450 mg total) by mouth 2 (two) times a day Total dose should be Lithium 750mg two times daily   mirtazapine (REMERON) 15 mg tablet  Mother No Yes   Sig: Take 1 tablet (15 mg total) by mouth daily at bedtime   propranolol (INDERAL) 10 mg tablet  Mother No Yes   Sig: Take 1 tablet (10 mg total) by mouth 3 (three) times a day   senna-docusate sodium (SENOKOT S) 8.6-50 mg per tablet  Mother No Yes   Sig: Take 2 tablets by mouth 2 (two) times a day      Facility-Administered Medications: None       Past Medical History:   Diagnosis Date   • Psychiatric disorder        No past surgical history on file. No family history on file. I have reviewed and agree with the history as documented. E-Cigarette/Vaping   • E-Cigarette Use Never User      E-Cigarette/Vaping Substances   • Nicotine No    • THC No    • Flavoring No    • Other No      Social History     Tobacco Use   • Smoking status: Former     Packs/day: 0.50     Years: 10.00     Total pack years: 5.00     Types: Cigarettes   • Smokeless tobacco: Never   Vaping Use   • Vaping Use: Never used   Substance Use Topics   • Alcohol use: Yes     Alcohol/week: 3.0 standard drinks of alcohol     Types: 3 Glasses of wine per week     Comment: sips of wine   • Drug use:  Yes Frequency: 1.0 times per week     Types: Marijuana       Review of Systems    Physical Exam  Physical Exam  Vitals and nursing note reviewed. Constitutional:       General: She is in acute distress. Appearance: She is well-developed and well-groomed. HENT:      Head: Normocephalic and atraumatic. Mouth/Throat:      Mouth: Mucous membranes are dry. Eyes:      Pupils: Pupils are equal, round, and reactive to light. Cardiovascular:      Rate and Rhythm: Normal rate and regular rhythm. Pulmonary:      Effort: Pulmonary effort is normal.      Breath sounds: No decreased air movement. Abdominal:      General: There is no distension. Palpations: Abdomen is soft. Skin:     General: Skin is dry. Capillary Refill: Capillary refill takes less than 2 seconds. Neurological:      Mental Status: She is confused. GCS: GCS eye subscore is 4. GCS verbal subscore is 4. GCS motor subscore is 5. Deep Tendon Reflexes:      Reflex Scores:       Patellar reflexes are 3+ on the right side and 3+ on the left side. Comments: Moves all extremities, no clonus,    Psychiatric:         Speech: She is noncommunicative. Behavior: Behavior is agitated. Cognition and Memory: Cognition is impaired.       Comments: She appears uncomfortable or distressed, she is not communicating verbally with anything understandable in Dutch, but instead just cries out and did intelligibly say "Ow",  But she does not respond to her mother verbally         Vital Signs  ED Triage Vitals   Temperature Pulse Respirations Blood Pressure SpO2   08/30/23 1312 08/30/23 1113 08/30/23 1113 08/30/23 1113 08/30/23 1113   98.9 °F (37.2 °C) 96 18 115/82 98 %      Temp Source Heart Rate Source Patient Position - Orthostatic VS BP Location FiO2 (%)   08/30/23 1312 08/30/23 1113 08/30/23 1113 08/30/23 1113 --   Oral Monitor Lying Left arm       Pain Score       --                  Vitals:    08/30/23 1113 08/30/23 1312 08/30/23 1415 08/30/23 1515   BP: 115/82 135/86 116/71 128/78   Pulse: 96 96 96 100   Patient Position - Orthostatic VS: Lying Lying Lying Lying         Visual Acuity      ED Medications  Medications   multi-electrolyte (PLASMALYTE-A/ISOLYTE-S PH 7.4) IV solution (200 mL/hr Intravenous New Bag 8/30/23 1503)   haloperidol lactate (HALDOL) injection 5 mg (5 mg Intramuscular Given 8/30/23 1225)   iohexol (OMNIPAQUE) 350 MG/ML injection (SINGLE-DOSE) 85 mL (85 mL Intravenous Given 8/30/23 1354)   sodium chloride 0.9 % bolus 1,000 mL (1,000 mL Intravenous New Bag 8/30/23 1501)       Diagnostic Studies  Results Reviewed     Procedure Component Value Units Date/Time    Hepatitis Panel (IP Renal Unit) [[id number]]     Lab Status: No result Specimen: Blood     Ammonia [[id number]]  (Normal) Collected: 08/30/23 1458    Lab Status: Final result Specimen: Blood from Arm, Right Updated: 08/30/23 1518     Ammonia 21 umol/L     Ethanol [[id number]]  (Normal) Collected: 08/30/23 1458    Lab Status: Final result Specimen: Blood from Arm, Right Updated: 08/30/23 1518     Ethanol Lvl <34 mg/dL     Salicylate level [[id number]]  (Normal) Collected: 08/30/23 1458    Lab Status: Final result Specimen: Blood from Arm, Right Updated: 87/83/34 3880     Salicylate Lvl <5 mg/dL     Acetaminophen level-If concentration is detectable, please discuss with medical  on call.  [[id number]]  (Abnormal) Collected: 08/30/23 1458    Lab Status: Final result Specimen: Blood from Arm, Right Updated: 08/30/23 1518     Acetaminophen Level <10 ug/mL     Urine Microscopic [[id number]]  (Normal) Collected: 08/30/23 1312    Lab Status: Final result Specimen: Urine, Other Updated: 08/30/23 1346     RBC, UA None Seen /hpf      WBC, UA 1-2 /hpf      Epithelial Cells Occasional /hpf      Bacteria, UA None Seen /hpf     TSH, 3rd generation with Free T4 reflex [[id number]]  (Abnormal) Collected: 08/30/23 1257    Lab Status: Final result Specimen: Blood from Arm, Right Updated: 08/30/23 1340     TSH 3RD GENERATON 9.488 uIU/mL     T4, free [[id number]] Collected: 08/30/23 1257    Lab Status:  In process Specimen: Blood from Arm, Right Updated: 08/30/23 1340    Lithium level [[id number]]  (Abnormal) Collected: 08/30/23 1257    Lab Status: Final result Specimen: Blood from Arm, Right Updated: 08/30/23 1333     Lithium Lvl 4.6 mmol/L     Comprehensive metabolic panel [[id number]]  (Abnormal) Collected: 08/30/23 1257    Lab Status: Final result Specimen: Blood from Arm, Right Updated: 08/30/23 1328     Sodium 137 mmol/L      Potassium 4.5 mmol/L      Chloride 108 mmol/L      CO2 30 mmol/L      ANION GAP -1 mmol/L      BUN 7 mg/dL      Creatinine 0.66 mg/dL      Glucose 93 mg/dL      Calcium 9.8 mg/dL      AST 14 U/L      ALT 16 U/L      Alkaline Phosphatase 110 U/L      Total Protein 6.3 g/dL      Albumin 3.9 g/dL      Total Bilirubin 0.29 mg/dL      eGFR 115 ml/min/1.73sq m     Narrative:      [place] guidelines for Chronic Kidney Disease (CKD):   •  Stage 1 with normal or high GFR (GFR > 90 mL/min/1.73 square meters)  •  Stage 2 Mild CKD (GFR = 60-89 mL/min/1.73 square meters)  •  Stage 3A Moderate CKD (GFR = 45-59 mL/min/1.73 square meters)  •  Stage 3B Moderate CKD (GFR = 30-44 mL/min/1.73 square meters)  •  Stage 4 Severe CKD (GFR = 15-29 mL/min/1.73 square meters)  •  Stage 5 End Stage CKD (GFR <15 mL/min/1.73 square meters)  Note: GFR calculation is accurate only with a steady state creatinine    Valproic acid level, total [[id number]]  (Abnormal) Collected: 08/30/23 1257    Lab Status: Final result Specimen: Blood from Arm, Right Updated: 08/30/23 1327     Valproic Acid, Total 141 ug/mL     POCT pregnancy, urine [[id number]]  (Normal) Resulted: 08/30/23 1314    Lab Status: Final result Updated: 08/30/23 1314     EXT Preg Test, Ur Negative     Control Valid    Urine Macroscopic, POC [[id number]]  (Abnormal) Collected: 08/30/23 1312    Lab Status: Final result Specimen: Urine Updated: 08/30/23 1313     Color, UA Yellow     Clarity, UA Clear     pH, UA 7.5     Leukocytes, UA Negative     Nitrite, UA Negative     Protein, UA Negative mg/dl      Glucose, UA Negative mg/dl      Ketones, UA Negative mg/dl      Urobilinogen, UA 0.2 E.U./dl      Bilirubin, UA Negative     Occult Blood, UA Trace     Specific Gravity, UA 1.015    Narrative:      CLINITEK RESULT    CBC and differential [[id number]]  (Abnormal) Collected: 08/30/23 1257    Lab Status: Final result Specimen: Blood from Arm, Right Updated: 08/30/23 1304     WBC 17.41 Thousand/uL      RBC 4.89 Million/uL      Hemoglobin 14.5 g/dL      Hematocrit 45.2 %      MCV 92 fL      MCH 29.7 pg      MCHC 32.1 g/dL      RDW 13.6 %      MPV 9.5 fL      Platelets 349 Thousands/uL      nRBC 0 /100 WBCs      Neutrophils Relative 84 %      Immat GRANS % 1 %      Lymphocytes Relative 6 %      Monocytes Relative 8 %      Eosinophils Relative 1 %      Basophils Relative 0 %      Neutrophils Absolute 14.88 Thousands/µL      Immature Grans Absolute 0.08 Thousand/uL      Lymphocytes Absolute 1.00 Thousands/µL      Monocytes Absolute 1.34 Thousand/µL      Eosinophils Absolute 0.09 Thousand/µL      Basophils Absolute 0.02 Thousands/µL                  CT abdomen pelvis with contrast   Final Result by Natalie Mann MD (08/30 1438)      1. Top-normal fluid-filled loops of hyperemic small bowel in the right lower quadrant, which could represent a nonspecific enteritis in the appropriate clinical context. 2.  Nonobstructing 2 mm calculus in the interpolar right kidney. The study was marked in EPIC for significant notification. Workstation performed: [id number]         CT head without contrast   Final Result by Carlos Temple MD (08/30 1433)      No acute intracranial abnormality.                   Workstation performed: [id number]                    Procedures  ECG 12 Lead Documentation Only    Date/Time: 8/30/2023 1:01 PM    Performed by: Umesh Lara MD  Authorized by: Umesh Lara MD    Rate:     ECG rate:  85  Rhythm:     Rhythm: sinus rhythm    Ectopy:     Ectopy: none      CriticalCare Time    Date/Time: 8/30/2023 3:13 PM    Performed by: Umesh Lara MD  Authorized by: Umesh Lara MD    Critical care provider statement:     Critical care time (minutes):  45    Critical care time was exclusive of:  Separately billable procedures and treating other patients and teaching time    Critical care was necessary to treat or prevent imminent or life-threatening deterioration of the following conditions:  Toxidrome and metabolic crisis    Critical care was time spent personally by me on the following activities:  Blood draw for specimens, obtaining history from patient or surrogate, development of treatment plan with patient or surrogate, discussions with consultants, evaluation of patient's response to treatment, examination of patient, interpretation of cardiac output measurements, ordering and performing treatments and interventions, ordering and review of laboratory studies, ordering and review of radiographic studies, re-evaluation of patient's condition and review of old charts    I assumed direction of critical care for this patient from another provider in my specialty: no               ED Course  ED Course as of 08/30/23 1616   Wed Aug 30, 2023   1242 I have independently reviewed external records are available to me to the level of detail possible within the time constraints of my patient care responsibilities in the ED. According to her recent psychiatric records, she should be interactive and understandable even with her psychiatric condition, which is not her appearance today.      1322 WBC(!): 17.41  Mild elevation, no definitive source of infection at this time     1346 VALPROIC ACID TOTAL(!): 141  elevated   1346 LITHIUM LEVEL(!!): 4.6  Severely elevated   1400 D/w toxicology for lithium toxicity, recommendation to speak with nephrology for urgent dialysis, add ammonia, aggressive fluids   1442 CT abdomen pelvis with contrast  Imaging reviewed and interpreted myself and concur with radiologist:   nonspecific enteritis possible, no surgical findings   1442 CT head without contrast  No acute findings      1445 I spoke with patient's sister who speaks Brielle Chow on the phone to help communicate findings. She provided helpful information, that patient's baseline is "like a hyperactive 1-11 year old child", and affirmed that she is normally verbal, communicative, ambulatory. I explained the likely need for urgent dialysis, dialysis access, and the critical nature of diagnosis. She understands and communicated with her mother. 0 TT with Dr. Rickie Holman who had been made aware by Dr. Stephen Llanos and agrees admission to [facility] for urgent HD. SBIRT 20yo+    Flowsheet Row Most Recent Value   Initial Alcohol Screen: US AUDIT-C     1. How often do you have a drink containing alcohol? 0 Filed at: 08/30/2023 1110   2. How many drinks containing alcohol do you have on a typical day you are drinking? 0 Filed at: 08/30/2023 1110   3b. FEMALE Any Age, or MALE 65+: How often do you have 4 or more drinks on one occassion? 0 Filed at: 08/30/2023 1110   Audit-C Score 0 Filed at: 08/30/2023 1110   NISA: How many times in the past year have you. .. Used an illegal drug or used a prescription medication for non-medical reasons? Never Filed at: 08/30/2023 1110                    Medical Decision Making  ED workup for acute causes of delirium exacerbating her baseline psychiatric condition, UTI, abdominal concerns, appendicitis, occult head injury, drug interaction, infra or supratherapeutic drug levels. Amount and/or Complexity of Data Reviewed  Labs: ordered. Radiology: ordered. Risk  Prescription drug management.           Disposition  Final diagnoses: Delirium   Lithium toxicity   Valproic acid toxicity     Time reflects when diagnosis was documented in both MDM as applicable and the Disposition within this note     Time User Action Codes Description Comment    8/30/2023  2:08 PM Rochele Patron Add [R41.0] Delirium     8/30/2023  2:10 PM Rochele Patron Add [P56.701W] Lithium toxicity     8/30/2023  2:22 PM Rochele Patron Add [E26.3Z8R] Valproic acid toxicity       ED Disposition     ED Disposition   Admit    Condition   Critical    Date/Time   Wed Aug 30, 2023  3:19 PM    Comment   Case was discussed with Dr. Cannon Lefort and the patient's admission status was agreed to be Admission Status: inpatient status to the service of Dr. Cannon Lefort . Follow-up Information    None         Patient's Medications   Discharge Prescriptions    No medications on file       No discharge procedures on file.     PDMP Review       Value Time User    PDMP Reviewed  Yes 8/22/2023  9:14 AM Tierra Toussaint MD          ED Provider  Electronically Signed by           Leah Krishnan MD  08/30/23 [id number]

## 2023-08-30 NOTE — ASSESSMENT & PLAN NOTE
Assessment:  -She has a history of schizoaffective disorder, intellectual disability, bipolar disorder, presented with worsening encephalopathy, lethargy for the past two weeks.   -As per patient's sister, at her baseline she is generally very active, alert, able to maintain conversation, generally oriented despite having intellectual disability.  -Patients family reports symptoms started approximately two weeks when Clozaril was discontinued for abnormal white blood cell count. At home dose of lithium 750 mg twice daily. Lithium level of 4.5 today. Plan:  -Toxicology and nephrology were consulted  -Plan for urgent hemodialysis for total 4 hours today.   -Recheck lithium after dialysis and if elevated repeat session of dialysis versus CRRT  -Check ammonia level, and if elevated give L-carnitine for valproate toxicity  -Continue to monitor electrolyte levels-replete as needed  -Continue aggressive IV hydration  -Continue to monitor neurological status

## 2023-08-30 NOTE — PROCEDURES
Temporary HD Catheter    Date/Time: 8/30/2023 10:57 AM    Performed by: PERLA Nix  Authorized by: PERLA Nix    Consent:     Consent obtained:  Written    Consent given by:  Parent    Risks discussed:  Arterial puncture, bleeding and infection  Universal protocol:     Immediately prior to procedure, a time out was called: yes      Patient identity confirmed:  Arm band  Pre-procedure details:     Hand hygiene: Hand hygiene performed prior to insertion      Skin preparation:  2% chlorhexidine    Skin preparation agent: Skin preparation agent completely dried prior to procedure    Indications:     Central line indications: dialysis    Anesthesia (see MAR for exact dosages): Anesthesia method:  Local infiltration    Local anesthetic:  Lidocaine 1% w/o epi  Procedure details:     Location:  Right femoral    Vessel type: vein      Laterality:  Right    Approach: percutaneous technique used      Patient position:  Flat    Catheter type:  Triple lumen    Catheter length:  20 cm    Landmarks identified: yes      Ultrasound guidance: yes      Ultrasound image availability:  Not saved    Sterile ultrasound techniques: Sterile gel and sterile probe covers were used      Number of attempts:  1    Successful placement: yes    Post-procedure details:     Post-procedure:  Line sutured    Assessment:  Blood return through all ports    Patient tolerance of procedure:   Tolerated well, no immediate complications

## 2023-08-30 NOTE — PLAN OF CARE
Problem: Potential for Falls  Goal: Patient will remain free of falls  Description: INTERVENTIONS:  - Educate patient/family on patient safety including physical limitations  - Instruct patient to call for assistance with activity   - Consult OT/PT to assist with strengthening/mobility   - Keep Call bell within reach  - Keep bed low and locked with side rails adjusted as appropriate  - Keep care items and personal belongings within reach  - Initiate and maintain comfort rounds  - Make Fall Risk Sign visible to staff  - Offer Toileting every 2 Hours, in advance of need  - Initiate/Maintain bed alarm  - Obtain necessary fall risk management equipment: bed alarm, yellow bracelet, yellow socks   - Apply yellow socks and bracelet for high fall risk patients  - Consider moving patient to room near nurses station  Outcome: Progressing     Problem: MOBILITY - ADULT  Goal: Maintain or return to baseline ADL function  Description: INTERVENTIONS:  -  Assess patient's ability to carry out ADLs; assess patient's baseline for ADL function and identify physical deficits which impact ability to perform ADLs (bathing, care of mouth/teeth, toileting, grooming, dressing, etc.)  - Assess/evaluate cause of self-care deficits   - Assess range of motion  - Assess patient's mobility; develop plan if impaired  - Assess patient's need for assistive devices and provide as appropriate  - Encourage maximum independence but intervene and supervise when necessary  - Involve family in performance of ADLs  - Assess for home care needs following discharge   - Consider OT consult to assist with ADL evaluation and planning for discharge  - Provide patient education as appropriate  Outcome: Progressing  Goal: Maintains/Returns to pre admission functional level  Description: INTERVENTIONS:  - Perform BMAT or MOVE assessment daily.   - Set and communicate daily mobility goal to care team and patient/family/caregiver.    - Collaborate with rehabilitation services on mobility goals if consulted  - Perform Range of Motion 3 times a day. - Reposition patient every 2 hours. - Dangle patient 3 times a day  - Stand patient 3 times a day  - Ambulate patient 3 times a day  - Out of bed to chair 3 times a day   - Out of bed for meals 3 times a day  - Out of bed for toileting  - Record patient progress and toleration of activity level   Outcome: Progressing     Problem: NEUROSENSORY - ADULT  Goal: Achieves maximal functionality and self care  Description: INTERVENTIONS  - Monitor swallowing and airway patency with patient fatigue and changes in neurological status  - Encourage and assist patient to increase activity and self care.    - Encourage visually impaired, hearing impaired and aphasic patients to use assistive/communication devices  Outcome: Progressing     Problem: CARDIOVASCULAR - ADULT  Goal: Maintains optimal cardiac output and hemodynamic stability  Description: INTERVENTIONS:  - Monitor I/O, vital signs and rhythm  - Monitor for S/S and trends of decreased cardiac output  - Administer and titrate ordered vasoactive medications to optimize hemodynamic stability  - Assess quality of pulses, skin color and temperature  - Assess for signs of decreased coronary artery perfusion  - Instruct patient to report change in severity of symptoms  Outcome: Progressing  Goal: Absence of cardiac dysrhythmias or at baseline rhythm  Description: INTERVENTIONS:  - Continuous cardiac monitoring, vital signs, obtain 12 lead EKG if ordered  - Administer antiarrhythmic and heart rate control medications as ordered  - Monitor electrolytes and administer replacement therapy as ordered  Outcome: Progressing     Problem: GASTROINTESTINAL - ADULT  Goal: Maintains or returns to baseline bowel function  Description: INTERVENTIONS:  - Assess bowel function  - Encourage oral fluids to ensure adequate hydration  - Administer IV fluids if ordered to ensure adequate hydration  - Administer ordered medications as needed  - Encourage mobilization and activity  - Consider nutritional services referral to assist patient with adequate nutrition and appropriate food choices  Outcome: Progressing  Goal: Maintains adequate nutritional intake  Description: INTERVENTIONS:  - Monitor percentage of each meal consumed  - Identify factors contributing to decreased intake, treat as appropriate  - Assist with meals as needed  - Monitor I&O, weight, and lab values if indicated  - Obtain nutrition services referral as needed  Outcome: Progressing     Problem: GENITOURINARY - ADULT  Goal: Maintains or returns to baseline urinary function  Description: INTERVENTIONS:  - Assess urinary function  - Encourage oral fluids to ensure adequate hydration if ordered  - Administer IV fluids as ordered to ensure adequate hydration  - Administer ordered medications as needed  - Offer frequent toileting  - Follow urinary retention protocol if ordered  Outcome: Progressing     Problem: METABOLIC, FLUID AND ELECTROLYTES - ADULT  Goal: Electrolytes maintained within normal limits  Description: INTERVENTIONS:  - Monitor labs and assess patient for signs and symptoms of electrolyte imbalances  - Administer electrolyte replacement as ordered  - Monitor response to electrolyte replacements, including repeat lab results as appropriate  - Instruct patient on fluid and nutrition as appropriate  Outcome: Progressing  Goal: Fluid balance maintained  Description: INTERVENTIONS:  - Monitor labs   - Monitor I/O and WT  - Instruct patient on fluid and nutrition as appropriate  - Assess for signs & symptoms of volume excess or deficit  Outcome: Progressing     Problem: SKIN/TISSUE INTEGRITY - ADULT  Goal: Skin Integrity remains intact(Skin Breakdown Prevention)  Description: Assess:  -Perform [place] assessment every shift  -Clean and moisturize skin every shift and as needed   -Inspect skin when repositioning, toileting, and assisting with ADLS  -Assess under medical devices such as tubes and wires every shift and as needed  -Assess extremities for adequate circulation and sensation     Bed Management:  -Have minimal linens on bed & keep smooth, unwrinkled  -Change linens as needed when moist or perspiring  -Avoid sitting or lying in one position for more than 2 hours while in bed  -Keep HOB at 45degrees     Toileting:  -Offer bedside commode  -Assess for incontinence every 2 hours  -Use incontinent care products after each incontinent episode    Activity:  -Mobilize patient 3 times a day  -Encourage activity and walks on unit  -Encourage or provide ROM exercises   -Turn and reposition patient every 2 Hours  -Use appropriate equipment to lift or move patient in bed  -Instruct/ Assist with weight shifting every 3 when out of bed in chair  -Consider limitation of chair time 3 hour intervals    Skin Care:  -Avoid use of baby powder, tape, friction and shearing, hot water or constrictive clothing  -Relieve pressure over bony prominences using wedges and pillows   -Do not massage red bony areas    Next Steps:  -Teach patient strategies to minimize risks    -Consider consults to  interdisciplinary teams   Outcome: Progressing     Problem: MUSCULOSKELETAL - ADULT  Goal: Maintain or return mobility to safest level of function  Description: INTERVENTIONS:  - Assess patient's ability to carry out ADLs; assess patient's baseline for ADL function and identify physical deficits which impact ability to perform ADLs (bathing, care of mouth/teeth, toileting, grooming, dressing, etc.)  - Assess/evaluate cause of self-care deficits   - Assess range of motion  - Assess patient's mobility  - Assess patient's need for assistive devices and provide as appropriate  - Encourage maximum independence but intervene and supervise when necessary  - Involve family in performance of ADLs  - Assess for home care needs following discharge   - Consider OT consult to assist with ADL evaluation and planning for discharge  - Provide patient education as appropriate  Outcome: Progressing     Problem: PAIN - ADULT  Goal: Verbalizes/displays adequate comfort level or baseline comfort level  Description: Interventions:  - Encourage patient to monitor pain and request assistance  - Assess pain using appropriate pain scale  - Administer analgesics based on type and severity of pain and evaluate response  - Implement non-pharmacological measures as appropriate and evaluate response  - Consider cultural and social influences on pain and pain management  - Notify physician/advanced practitioner if interventions unsuccessful or patient reports new pain  Outcome: Progressing     Problem: SAFETY ADULT  Goal: Patient will remain free of falls  Description: INTERVENTIONS:  - Educate patient/family on patient safety including physical limitations  - Instruct patient to call for assistance with activity   - Consult OT/PT to assist with strengthening/mobility   - Keep Call bell within reach  - Keep bed low and locked with side rails adjusted as appropriate  - Keep care items and personal belongings within reach  - Initiate and maintain comfort rounds  - Make Fall Risk Sign visible to staff  - Offer Toileting every 2 Hours, in advance of need  - Initiate/Maintain bed alarm  - Obtain necessary fall risk management equipment: yellow bracelet, bed alarm  - Apply yellow socks and bracelet for high fall risk patients  - Consider moving patient to room near nurses station  Outcome: Progressing  Goal: Maintain or return to baseline ADL function  Description: INTERVENTIONS:  -  Assess patient's ability to carry out ADLs; assess patient's baseline for ADL function and identify physical deficits which impact ability to perform ADLs (bathing, care of mouth/teeth, toileting, grooming, dressing, etc.)  - Assess/evaluate cause of self-care deficits   - Assess range of motion  - Assess patient's mobility; develop plan if impaired  - Assess patient's need for assistive devices and provide as appropriate  - Encourage maximum independence but intervene and supervise when necessary  - Involve family in performance of ADLs  - Assess for home care needs following discharge   - Consider OT consult to assist with ADL evaluation and planning for discharge  - Provide patient education as appropriate  Outcome: Progressing  Goal: Maintains/Returns to pre admission functional level  Description: INTERVENTIONS:  - Perform BMAT or MOVE assessment daily.   - Set and communicate daily mobility goal to care team and patient/family/caregiver. - Collaborate with rehabilitation services on mobility goals if consulted  - Perform Range of Motion 3 times a day. - Reposition patient every 2 hours.   - Dangle patient 3 times a day  - Stand patient 3 times a day  - Ambulate patient 3 times a day  - Out of bed to chair 3 times a day   - Out of bed for meals 3 times a day  - Out of bed for toileting  - Record patient progress and toleration of activity level   Outcome: Progressing     Problem: DISCHARGE PLANNING  Goal: Discharge to home or other facility with appropriate resources  Description: INTERVENTIONS:  - Identify barriers to discharge w/patient and caregiver  - Arrange for needed discharge resources and transportation as appropriate  - Identify discharge learning needs (meds, wound care, etc.)  - Arrange for interpretive services to assist at discharge as needed  - Refer to Case Management Department for coordinating discharge planning if the patient needs post-hospital services based on physician/advanced practitioner order or complex needs related to functional status, cognitive ability, or social support system  Outcome: Progressing     Problem: Knowledge Deficit  Goal: Patient/family/caregiver demonstrates understanding of disease process, treatment plan, medications, and discharge instructions  Description: Complete learning assessment and assess knowledge base.   Interventions:  - Provide teaching at level of understanding  - Provide teaching via preferred learning methods  Outcome: Progressing

## 2023-08-31 ENCOUNTER — APPOINTMENT (INPATIENT)
Dept: DIALYSIS | Facility: HOSPITAL | Age: 33
DRG: 816 | End: 2023-08-31
Payer: COMMERCIAL

## 2023-08-31 LAB
ALBUMIN SERPL BCP-MCNC: 3.3 G/DL (ref 3.5–5)
ALP SERPL-CCNC: 109 U/L (ref 34–104)
ALT SERPL W P-5'-P-CCNC: 13 U/L (ref 7–52)
ANION GAP SERPL CALCULATED.3IONS-SCNC: 6 MMOL/L
AST SERPL W P-5'-P-CCNC: 13 U/L (ref 13–39)
ATRIAL RATE: 102 BPM
ATRIAL RATE: 108 BPM
BASOPHILS # BLD AUTO: 0.03 THOUSANDS/ÂΜL (ref 0–0.1)
BASOPHILS NFR BLD AUTO: 0 % (ref 0–1)
BILIRUB SERPL-MCNC: 0.49 MG/DL (ref 0.2–1)
BUN SERPL-MCNC: 5 MG/DL (ref 5–25)
CALCIUM ALBUM COR SERPL-MCNC: 9 MG/DL (ref 8.3–10.1)
CALCIUM SERPL-MCNC: 8.4 MG/DL (ref 8.4–10.2)
CHLORIDE SERPL-SCNC: 105 MMOL/L (ref 96–108)
CO2 SERPL-SCNC: 29 MMOL/L (ref 21–32)
CREAT SERPL-MCNC: 0.59 MG/DL (ref 0.6–1.3)
EOSINOPHIL # BLD AUTO: 0.05 THOUSAND/ÂΜL (ref 0–0.61)
EOSINOPHIL NFR BLD AUTO: 0 % (ref 0–6)
ERYTHROCYTE [DISTWIDTH] IN BLOOD BY AUTOMATED COUNT: 13.6 % (ref 11.6–15.1)
GFR SERPL CREATININE-BSD FRML MDRD: 119 ML/MIN/1.73SQ M
GLUCOSE SERPL-MCNC: 128 MG/DL (ref 65–140)
HBV CORE AB SER QL: NORMAL
HBV CORE IGM SER QL: NORMAL
HBV SURFACE AB SER-ACNC: 193 MIU/ML
HBV SURFACE AG SER QL: NORMAL
HCT VFR BLD AUTO: 41.7 % (ref 34.8–46.1)
HCV AB SER QL: NORMAL
HGB BLD-MCNC: 13.4 G/DL (ref 11.5–15.4)
IMM GRANULOCYTES # BLD AUTO: 0.05 THOUSAND/UL (ref 0–0.2)
IMM GRANULOCYTES NFR BLD AUTO: 0 % (ref 0–2)
INR PPP: 1.01 (ref 0.84–1.19)
LITHIUM SERPL-SCNC: 0.8 MMOL/L (ref 0.6–1.2)
LITHIUM SERPL-SCNC: 2 MMOL/L (ref 0.6–1.2)
LITHIUM SERPL-SCNC: 2 MMOL/L (ref 0.6–1.2)
LYMPHOCYTES # BLD AUTO: 1.17 THOUSANDS/ÂΜL (ref 0.6–4.47)
LYMPHOCYTES NFR BLD AUTO: 8 % (ref 14–44)
MAGNESIUM SERPL-MCNC: 2.3 MG/DL (ref 1.9–2.7)
MCH RBC QN AUTO: 29.6 PG (ref 26.8–34.3)
MCHC RBC AUTO-ENTMCNC: 32.1 G/DL (ref 31.4–37.4)
MCV RBC AUTO: 92 FL (ref 82–98)
MONOCYTES # BLD AUTO: 1.33 THOUSAND/ÂΜL (ref 0.17–1.22)
MONOCYTES NFR BLD AUTO: 9 % (ref 4–12)
MRSA NOSE QL CULT: NORMAL
NEUTROPHILS # BLD AUTO: 12.94 THOUSANDS/ÂΜL (ref 1.85–7.62)
NEUTS SEG NFR BLD AUTO: 83 % (ref 43–75)
NRBC BLD AUTO-RTO: 0 /100 WBCS
P AXIS: 67 DEGREES
P AXIS: 67 DEGREES
PHOSPHATE SERPL-MCNC: 3.2 MG/DL (ref 2.7–4.5)
PLATELET # BLD AUTO: 134 THOUSANDS/UL (ref 149–390)
PMV BLD AUTO: 9.2 FL (ref 8.9–12.7)
POTASSIUM SERPL-SCNC: 3.5 MMOL/L (ref 3.5–5.3)
PR INTERVAL: 129 MS
PR INTERVAL: 146 MS
PROT SERPL-MCNC: 5.5 G/DL (ref 6.4–8.4)
PROTHROMBIN TIME: 13.3 SECONDS (ref 11.6–14.5)
QRS AXIS: 6 DEGREES
QRS AXIS: 6 DEGREES
QRSD INTERVAL: 75 MS
QRSD INTERVAL: 83 MS
QT INTERVAL: 317 MS
QT INTERVAL: 333 MS
QTC INTERVAL: 425 MS
QTC INTERVAL: 434 MS
RBC # BLD AUTO: 4.53 MILLION/UL (ref 3.81–5.12)
SODIUM SERPL-SCNC: 140 MMOL/L (ref 135–147)
T WAVE AXIS: 178 DEGREES
T WAVE AXIS: 224 DEGREES
VENTRICULAR RATE: 102 BPM
VENTRICULAR RATE: 108 BPM
WBC # BLD AUTO: 15.57 THOUSAND/UL (ref 4.31–10.16)

## 2023-08-31 PROCEDURE — 80178 ASSAY OF LITHIUM: CPT | Performed by: NURSE PRACTITIONER

## 2023-08-31 PROCEDURE — 93005 ELECTROCARDIOGRAM TRACING: CPT

## 2023-08-31 PROCEDURE — 90935 HEMODIALYSIS ONE EVALUATION: CPT | Performed by: INTERNAL MEDICINE

## 2023-08-31 PROCEDURE — 84100 ASSAY OF PHOSPHORUS: CPT | Performed by: PHYSICIAN ASSISTANT

## 2023-08-31 PROCEDURE — 80053 COMPREHEN METABOLIC PANEL: CPT | Performed by: PHYSICIAN ASSISTANT

## 2023-08-31 PROCEDURE — 83735 ASSAY OF MAGNESIUM: CPT | Performed by: PHYSICIAN ASSISTANT

## 2023-08-31 PROCEDURE — 80178 ASSAY OF LITHIUM: CPT | Performed by: PHYSICIAN ASSISTANT

## 2023-08-31 PROCEDURE — 93010 ELECTROCARDIOGRAM REPORT: CPT | Performed by: STUDENT IN AN ORGANIZED HEALTH CARE EDUCATION/TRAINING PROGRAM

## 2023-08-31 PROCEDURE — 93010 ELECTROCARDIOGRAM REPORT: CPT | Performed by: INTERNAL MEDICINE

## 2023-08-31 PROCEDURE — 5A1D70Z PERFORMANCE OF URINARY FILTRATION, INTERMITTENT, LESS THAN 6 HOURS PER DAY: ICD-10-PCS | Performed by: INTERNAL MEDICINE

## 2023-08-31 PROCEDURE — 85025 COMPLETE CBC W/AUTO DIFF WBC: CPT | Performed by: PHYSICIAN ASSISTANT

## 2023-08-31 PROCEDURE — 85610 PROTHROMBIN TIME: CPT | Performed by: PHYSICIAN ASSISTANT

## 2023-08-31 PROCEDURE — 99233 SBSQ HOSP IP/OBS HIGH 50: CPT | Performed by: INTERNAL MEDICINE

## 2023-08-31 RX ORDER — LORAZEPAM 2 MG/ML
2 INJECTION INTRAMUSCULAR ONCE
Status: COMPLETED | OUTPATIENT
Start: 2023-08-31 | End: 2023-08-31

## 2023-08-31 RX ORDER — LORAZEPAM 2 MG/ML
INJECTION INTRAMUSCULAR
Status: COMPLETED
Start: 2023-08-31 | End: 2023-08-31

## 2023-08-31 RX ORDER — DIVALPROEX SODIUM 500 MG/1
1000 TABLET, DELAYED RELEASE ORAL 2 TIMES DAILY
Status: DISCONTINUED | OUTPATIENT
Start: 2023-08-31 | End: 2023-08-31

## 2023-08-31 RX ADMIN — LORAZEPAM 2 MG: 2 INJECTION INTRAMUSCULAR at 17:42

## 2023-08-31 RX ADMIN — SODIUM CHLORIDE, SODIUM GLUCONATE, SODIUM ACETATE, POTASSIUM CHLORIDE, MAGNESIUM CHLORIDE, SODIUM PHOSPHATE, DIBASIC, AND POTASSIUM PHOSPHATE 200 ML/HR: .53; .5; .37; .037; .03; .012; .00082 INJECTION, SOLUTION INTRAVENOUS at 00:17

## 2023-08-31 RX ADMIN — CHLORHEXIDINE GLUCONATE 15 ML: 1.2 RINSE ORAL at 22:00

## 2023-08-31 RX ADMIN — SODIUM CHLORIDE 500 MG: 9 INJECTION, SOLUTION INTRAVENOUS at 13:18

## 2023-08-31 RX ADMIN — SODIUM CHLORIDE 500 MG: 9 INJECTION, SOLUTION INTRAVENOUS at 23:52

## 2023-08-31 RX ADMIN — HEPARIN SODIUM 5000 UNITS: 5000 INJECTION INTRAVENOUS; SUBCUTANEOUS at 22:00

## 2023-08-31 RX ADMIN — SODIUM CHLORIDE 500 MG: 9 INJECTION, SOLUTION INTRAVENOUS at 17:53

## 2023-08-31 RX ADMIN — HEPARIN SODIUM 5000 UNITS: 5000 INJECTION INTRAVENOUS; SUBCUTANEOUS at 05:09

## 2023-08-31 RX ADMIN — SODIUM CHLORIDE, SODIUM GLUCONATE, SODIUM ACETATE, POTASSIUM CHLORIDE, MAGNESIUM CHLORIDE, SODIUM PHOSPHATE, DIBASIC, AND POTASSIUM PHOSPHATE 200 ML/HR: .53; .5; .37; .037; .03; .012; .00082 INJECTION, SOLUTION INTRAVENOUS at 17:25

## 2023-08-31 RX ADMIN — LORAZEPAM 2 MG: 2 INJECTION, SOLUTION INTRAMUSCULAR; INTRAVENOUS at 17:42

## 2023-08-31 NOTE — UTILIZATION REVIEW
NOTIFICATION OF INPATIENT ADMISSION   AUTHORIZATION REQUEST   SERVICING FACILITY:   [address] [place] [address]  Tax ID: [id number]  NPI: [id number] ATTENDING PROVIDER:  Attending Name and NPI#: Olesya Tinoco Md [[id number]]  Address: [address]  Phone: [phone]     ADMISSION INFORMATION:  Place of Service: Inpatient [address] [facility]   Place of Service Code: 21  Inpatient Admission Date/Time: 8/30/23  3:19 PM  Discharge Date/Time: No discharge date for patient encounter. Admitting Diagnosis Code/Description:  Delirium [R41.0]  Altered mental state [R41.82]  Intellectual disability [F79]  Valproic acid toxicity [T42.6X1A]  Lithium toxicity [T56.891A]  Lithium toxicity, undetermined intent, initial encounter [F12.176L]     UTILIZATION REVIEW CONTACT:  Elly Luna, Utilization   Network Utilization Review Department  Phone: [phone]  Fax [phone]  Email: [email or web address]  Contact for approvals/pending authorizations, clinical reviews, and discharge. PHYSICIAN ADVISORY SERVICES:  Medical Necessity Denial & [facility] Review  Phone: [phone]  Fax: [phone]  Email: [email or web address]

## 2023-08-31 NOTE — QUICK NOTE
Med Tox Follow Up    The patient underwent dialysis, which improved the Li level, but it rebounded. Rebound Li elevations are common after dialysis as redistribution occurs. The patient is noted to remain encephalopathic. Improvement in encephalopathy is expected to lag behind improvement in levels. Continue current supportive care.

## 2023-08-31 NOTE — QUICK NOTE
Progress Note - ICU Transfer to Step down/med. surg. - Ambar Clonts 28 y.o. female MRN: [id number]    Unit/Bed#: ICU 15 Encounter: [id number]      Code Status: Level 1 - Full Code    Date of ICU admission: 8/30/2023    Reason for ICU adm: Delirium [R41.0]  Altered mental state [R41.82]  Intellectual disability [F79]  Valproic acid toxicity [T42.6X1A]  Lithium toxicity [T56.891A]  Lithium toxicity, undetermined intent, initial encounter [T56.894A]    Active problems:   Patient Active Problem List   Diagnosis   • Medical clearance for psychiatric admission   • Intellectual disability   • Unspecified mood (affective) disorder ([address])   • Psychosis ([address])   • Dysuria   • Vaginal lesion   • Cough   • Chronic schizoaffective disorder (HCC)   • Impaired ability to use community resources due to language barrier   • Hyperlipidemia, mixed   • Family history of cardiovascular disorder   • Acute encephalopathy 2/2 lithium toxicity       Summary of clinical course: Patient underwent HD x 2. Had breakthough seizure around 12:00 pm today likely in the setting of Depakote being held on admission due to high levels. Per discussion with family, patient received last Depakote dose shortly prior to admission. Patient started on IV Valproate 500mg q6h. Recent or scheduled procedures: HD earlier today. For more information, please see progress note from 8/31/23.

## 2023-08-31 NOTE — ASSESSMENT & PLAN NOTE
-Patient has history of congenital intellectual disability. [place] at baseline patient is very active alert and able to maintain conversation and generally oriented.    -Although as per her sister at times she can have periods of manic episodes, acting out behavior, hypersexual behavior and rapid pressured speech  -Her acute behavioral dysfunction in the past 2 weeks is likely due to lithium toxicity    Plan:  -Toxicology and nephrology on board  -Continue to monitor her neurological status

## 2023-08-31 NOTE — ASSESSMENT & PLAN NOTE
Assessment:  -She has a history of schizoaffective disorder, intellectual disability, bipolar disorder, presented with worsening encephalopathy, lethargy for the past two weeks.   -As per patient's sister, at her baseline she is generally very active, alert, able to maintain conversation, generally oriented despite having intellectual disability.  -Patients family reports symptoms started approximately two weeks when Clozaril was discontinued for abnormal white blood cell count. At home dose of lithium 750 mg twice daily.   - Underwent HD yesterday. - Ammonia level WNL - no L-carnitine requirement   Lithium level of 1.4 overnight, increased to 2.0 this morning.     Plan:  -Toxicology and nephrology were consulted - recommendations appreciated  -Given rising Li levels, patient will likely require HD today  - Continue monitoring lithium levels  -Continue to monitor electrolyte levels-replete as needed  -Continue aggressive IV hydration  -Continue to monitor neurological status

## 2023-08-31 NOTE — PROGRESS NOTES
[address] [facility]  Progress Note  Name: Ok Vargas  MRN: [id number]  Unit/Bed#: ICU 15 I Date of Admission: 8/30/2023   Date of Service: 8/31/2023 I Hospital Day: 1    Assessment/Plan   Acute encephalopathy 2/2 lithium toxicity  Assessment & Plan  Assessment:  -She has a history of schizoaffective disorder, intellectual disability, bipolar disorder, presented with worsening encephalopathy, lethargy for the past two weeks.   -As per patient's sister, at her baseline she is generally very active, alert, able to maintain conversation, generally oriented despite having intellectual disability.  -Patients family reports symptoms started approximately two weeks when Clozaril was discontinued for abnormal white blood cell count. At home dose of lithium 750 mg twice daily.   - Underwent HD yesterday. - Ammonia level WNL - no L-carnitine requirement   Lithium level of 1.4 overnight, increased to 2.0 this morning. Plan:  -Toxicology and nephrology were consulted - recommendations appreciated  -Given rising Li levels, patient will likely require HD today  - Continue monitoring lithium levels  -Continue to monitor electrolyte levels-replete as needed  -Continue aggressive IV hydration  -Continue to monitor neurological status    Unspecified mood (affective) disorder ([address])  Assessment & Plan  She has a history of schizoaffective and bipolar disorder. She takes Remeron 50 mg and lithium 750 twice daily-currently held  Continue to monitor her neurological status. Intellectual disability  Assessment & Plan  -Patient has history of congenital intellectual disability. [place] at baseline patient is very active alert and able to maintain conversation and generally oriented.    -Although as per her sister at times she can have periods of manic episodes, acting out behavior, hypersexual behavior and rapid pressured speech  -Her acute behavioral dysfunction in the past 2 weeks is likely due to lithium toxicity    Plan:  -Toxicology and nephrology on board  -Continue to monitor her neurological status           ICU Core Measures     A: Assess, Prevent, and Manage Pain · Has pain been assessed? Yes  · Need for changes to pain regimen? No   B: Both SAT/SAT  · N/A   C: Choice of Sedation · RASS Goal: N/A patient not on sedation  · Need for changes to sedation or analgesia regimen? NA   D: Delirium · CAM-ICU: Negative   E: Early Mobility  · Plan for early mobility? Yes   F: Family Engagement · Plan for family engagement today? Yes       Review of Invasive Devices:      Central access plan: HD cath in place. Plan for likely repeat HD today. Prophylaxis:  VTE VTE covered by:  heparin (porcine), Subcutaneous, 5,000 Units at 08/31/23 5928       Stress Ulcer  not ordered       Subjective   HPI/24hr events: Patient underwent HD x4h yesterday. Today she continues to be somnolent, however she endorses chest and hip pain that according to her mom had been present since before coming to the hospital. She has been incontinent during this admission. ROS: Unable to obtain due to patient mental status.         Objective                            Vitals I/O      Most Recent Min/Max in 24hrs   Temp 99 °F (37.2 °C) Temp  Min: 97.4 °F (36.3 °C)  Max: 100.4 °F (38 °C)   Pulse 98 Pulse  Min: 96  Max: 118   Resp (!) 34 Resp  Min: 16  Max: 39   /84 BP  Min: 109/86  Max: 135/86   O2 Sat 99 % SpO2  Min: 95 %  Max: 100 %      Intake/Output Summary (Last 24 hours) at 8/31/2023 1974  Last data filed at 8/31/2023 0600  Gross per 24 hour   Intake 3490 ml   Output 503 ml   Net 2987 ml         Diet Regular; Regular House     Invasive Monitoring Physical exam   Arterial Line  Poth BP    No data recorded   MAP    No data recorded     PA Catheter   Most Recent  Min/Max in 24hrs    PAP   No data recorded   CVP   No data recorded   CI    No data recorded   SVR   No data recorded          Physical Exam  Eyes:      Extraocular Movements: Extraocular movements intact. Conjunctiva/sclera: Conjunctivae normal.   Skin:     General: Skin is warm and dry. Coloration: Skin is not jaundiced or pale. HENT:      Head: Normocephalic and atraumatic. Nose: No congestion. Mouth/Throat:      Mouth: Mucous membranes are moist.   Cardiovascular:      Rate and Rhythm: Regular rhythm. Tachycardia present. Heart sounds: No murmur heard. No friction rub. No gallop. Musculoskeletal:         General: No swelling. Right lower leg: No edema. Left lower leg: No edema. Abdominal:      General: There is no distension. Palpations: Abdomen is soft. Tenderness: There is no abdominal tenderness. There is no guarding. Constitutional:       General: She is not in acute distress. Appearance: She is well-developed and well-nourished. She is not ill-appearing or toxic-appearing. Pulmonary:      Effort: Pulmonary effort is normal. No accessory muscle usage, respiratory distress or accessory muscle usage. Breath sounds: Normal breath sounds. No wheezing, rhonchi or rales. Neurological:      Mental Status: She is easily aroused. She is lethargic and somnolent. She is not agitated. GCS: GCS eye subscore is 4. GCS verbal subscore is 2. GCS motor subscore is 6. Cranial Nerves: No facial asymmetry. Motor: No abnormal muscle tone (no clonus present). Diagnostic Studies      EKG: Sinus tachycardia,   Imaging:  I have personally reviewed pertinent reports.    and I have personally reviewed pertinent films in PACS     Medications:  Scheduled PRN   chlorhexidine, 15 mL, Q12H [facility]  heparin (porcine), 5,000 Units, Q8H [facility]          Continuous    multi-electrolyte, 200 mL/hr, Last Rate: 200 mL/hr (08/31/23 0017)         Labs:    CBC    Recent Labs     08/30/23  1257 08/30/23  1813 08/31/23  0350   WBC 17.41*  --  15.57*   HGB 14.5  --  13.4   HCT 45.2  --  41.7    145* 134*     BMP    Recent Labs 08/30/23  1257 08/31/23  0350   SODIUM 137 140   K 4.5 3.5    105   CO2 30 29   AGAP -1 6   BUN 7 5   CREATININE 0.66 0.59*   CALCIUM 9.8 8.4       Coags    Recent Labs     08/31/23  0350   INR 1.01        Additional Electrolytes  Recent Labs     08/30/23  2241 08/31/23  0350   MG  --  2.3   PHOS  --  3.2   CAIONIZED 1.11*  --           Blood Gas    No recent results  No recent results LFTs  Recent Labs     08/30/23  1257 08/31/23  0350   ALT 16 13   AST 14 13   ALKPHOS 110* 109*   ALB 3.9 3.3*   TBILI 0.29 0.49       Infectious  No recent results  Glucose  Recent Labs     08/30/23  1257 08/31/23  0350   GLUC 93 [place] Errol Zee MD

## 2023-08-31 NOTE — PLAN OF CARE
Target UF Goal  Net even  as tolerated. Patient dialyzing for 4 hours on 4 K bath for serum K of  3.5  per protocol. Treatment plan reviewed with Nephrology. Post-Dialysis RN Treatment Note    Blood Pressure:  Pre 128/86 mm/Hg  Post 115/70 mmHg   EDW  TBD   Weight:  Pre 53.7 kg   Post 55.1 kg   Mode of weight measurement: Bed Scale   Volume Removed  0 ml    Treatment duration 3 hours and 15 minutes    NS given  No    Treatment shortened? Yes, describe: pt began to have seizure, tx stopped, critical care team called to the bedside.     Medications given during Rx None Reported   Estimated Kt/V  1.51   Access type: Temporary HD catheter   Access Issues: Yes, describe: tego caps removed for sluggish bloodflow, flushed, catheter patent      Report called to primary nurse   Yes, Katie Alfaro RN        Problem: METABOLIC, FLUID AND ELECTROLYTES - ADULT  Goal: Electrolytes maintained within normal limits  Description: INTERVENTIONS:  - Monitor labs and assess patient for signs and symptoms of electrolyte imbalances  - Administer electrolyte replacement as ordered  - Monitor response to electrolyte replacements, including repeat lab results as appropriate  - Instruct patient on fluid and nutrition as appropriate  Outcome: Progressing  Goal: Fluid balance maintained  Description: INTERVENTIONS:  - Monitor labs   - Monitor I/O and WT  - Instruct patient on fluid and nutrition as appropriate  - Assess for signs & symptoms of volume excess or deficit  Outcome: Progressing

## 2023-08-31 NOTE — PLAN OF CARE
Problem: Potential for Falls  Goal: Patient will remain free of falls  Description: INTERVENTIONS:  - Educate patient/family on patient safety including physical limitations  - Instruct patient to call for assistance with activity   - Consult OT/PT to assist with strengthening/mobility   - Keep Call bell within reach  - Keep bed low and locked with side rails adjusted as appropriate  - Keep care items and personal belongings within reach  - Initiate and maintain comfort rounds  - Make Fall Risk Sign visible to staff  - Offer Toileting every 2 Hours, in advance of need  - Initiate/Maintain bed alarm  - Obtain necessary fall risk management equipment: yellow bracelet, yellow socks   - Apply yellow socks and bracelet for high fall risk patients  - Consider moving patient to room near nurses station  8/31/2023 0734 by Guru Pang RN  Outcome: Progressing  8/30/2023 1831 by Guru Pang RN  Outcome: Progressing     Problem: MOBILITY - ADULT  Goal: Maintain or return to baseline ADL function  Description: INTERVENTIONS:  -  Assess patient's ability to carry out ADLs; assess patient's baseline for ADL function and identify physical deficits which impact ability to perform ADLs (bathing, care of mouth/teeth, toileting, grooming, dressing, etc.)  - Assess/evaluate cause of self-care deficits   - Assess range of motion  - Assess patient's mobility; develop plan if impaired  - Assess patient's need for assistive devices and provide as appropriate  - Encourage maximum independence but intervene and supervise when necessary  - Involve family in performance of ADLs  - Assess for home care needs following discharge   - Consider OT consult to assist with ADL evaluation and planning for discharge  - Provide patient education as appropriate  8/31/2023 0734 by Guru Pang RN  Outcome: Progressing  8/30/2023 1831 by Guru Pang RN  Outcome: Progressing  Goal: Maintains/Returns to pre admission functional level  Description: INTERVENTIONS:  - Perform BMAT or MOVE assessment daily.   - Set and communicate daily mobility goal to care team and patient/family/caregiver. - Collaborate with rehabilitation services on mobility goals if consulted  - Perform Range of Motion 3 times a day. - Reposition patient every 2 hours. - Dangle patient 3 times a day  - Stand patient 3 times a day  - Ambulate patient 3 times a day  - Out of bed to chair 3 times a day   - Out of bed for meals 3 times a day  - Out of bed for toileting  - Record patient progress and toleration of activity level   8/31/2023 0734 by Black Estimable, RN  Outcome: Progressing  8/30/2023 1831 by Black Estimable, RN  Outcome: Progressing     Problem: NEUROSENSORY - ADULT  Goal: Achieves maximal functionality and self care  Description: INTERVENTIONS  - Monitor swallowing and airway patency with patient fatigue and changes in neurological status  - Encourage and assist patient to increase activity and self care.    - Encourage visually impaired, hearing impaired and aphasic patients to use assistive/communication devices  8/31/2023 0734 by Major Estimable, RN  Outcome: Progressing  8/30/2023 1831 by Black Estimable, RN  Outcome: Progressing     Problem: CARDIOVASCULAR - ADULT  Goal: Maintains optimal cardiac output and hemodynamic stability  Description: INTERVENTIONS:  - Monitor I/O, vital signs and rhythm  - Monitor for S/S and trends of decreased cardiac output  - Administer and titrate ordered vasoactive medications to optimize hemodynamic stability  - Assess quality of pulses, skin color and temperature  - Assess for signs of decreased coronary artery perfusion  - Instruct patient to report change in severity of symptoms  8/31/2023 0734 by Major Estimable, RN  Outcome: Progressing  8/30/2023 1831 by Major Estimable, RN  Outcome: Progressing  Goal: Absence of cardiac dysrhythmias or at baseline rhythm  Description: INTERVENTIONS:  - Continuous cardiac monitoring, vital signs, obtain 12 lead EKG if ordered  - Administer antiarrhythmic and heart rate control medications as ordered  - Monitor electrolytes and administer replacement therapy as ordered  8/31/2023 0734 by Darwin Peace RN  Outcome: Progressing  8/30/2023 1831 by Darwin Peace RN  Outcome: Progressing     Problem: GASTROINTESTINAL - ADULT  Goal: Maintains or returns to baseline bowel function  Description: INTERVENTIONS:  - Assess bowel function  - Encourage oral fluids to ensure adequate hydration  - Administer IV fluids if ordered to ensure adequate hydration  - Administer ordered medications as needed  - Encourage mobilization and activity  - Consider nutritional services referral to assist patient with adequate nutrition and appropriate food choices  8/31/2023 0734 by Darwin Peace RN  Outcome: Progressing  8/30/2023 1831 by Darwin Peace RN  Outcome: Progressing  Goal: Maintains adequate nutritional intake  Description: INTERVENTIONS:  - Monitor percentage of each meal consumed  - Identify factors contributing to decreased intake, treat as appropriate  - Assist with meals as needed  - Monitor I&O, weight, and lab values if indicated  - Obtain nutrition services referral as needed  8/31/2023 0734 by Darwin Peace RN  Outcome: Progressing  8/30/2023 1831 by Darwin Peace RN  Outcome: Progressing     Problem: GENITOURINARY - ADULT  Goal: Maintains or returns to baseline urinary function  Description: INTERVENTIONS:  - Assess urinary function  - Encourage oral fluids to ensure adequate hydration if ordered  - Administer IV fluids as ordered to ensure adequate hydration  - Administer ordered medications as needed  - Offer frequent toileting  - Follow urinary retention protocol if ordered  8/31/2023 0734 by Darwin Peace RN  Outcome: Progressing  8/30/2023 1831 by Darwin Peace RN  Outcome: Progressing Problem: METABOLIC, FLUID AND ELECTROLYTES - ADULT  Goal: Electrolytes maintained within normal limits  Description: INTERVENTIONS:  - Monitor labs and assess patient for signs and symptoms of electrolyte imbalances  - Administer electrolyte replacement as ordered  - Monitor response to electrolyte replacements, including repeat lab results as appropriate  - Instruct patient on fluid and nutrition as appropriate  8/31/2023 0734 by Jason Michel RN  Outcome: Progressing  8/30/2023 1831 by Jason Michel RN  Outcome: Progressing  Goal: Fluid balance maintained  Description: INTERVENTIONS:  - Monitor labs   - Monitor I/O and WT  - Instruct patient on fluid and nutrition as appropriate  - Assess for signs & symptoms of volume excess or deficit  8/31/2023 0734 by Jason Michel RN  Outcome: Progressing  8/30/2023 1831 by Jason Michel RN  Outcome: Progressing     Problem: SKIN/TISSUE INTEGRITY - ADULT  Goal: Skin Integrity remains intact(Skin Breakdown Prevention)  Description: Assess:  -Perform [place] assessment every shift  -Clean and moisturize skin every shift and as needed   -Inspect skin when repositioning, toileting, and assisting with ADLS  -Assess under medical devices such as wires and tubes every shift and as needed  -Assess extremities for adequate circulation and sensation     Bed Management:  -Have minimal linens on bed & keep smooth, unwrinkled  -Change linens as needed when moist or perspiring  -Avoid sitting or lying in one position for more than 2 hours while in bed  -Keep HOB at 45degrees     Toileting:  -Offer bedside commode  -Assess for incontinence every two hours   -Use incontinent care products after each incontinent episode    Activity:  -Mobilize patient 3 times a day  -Encourage activity and walks on unit  -Encourage or provide ROM exercises   -Turn and reposition patient every 2 Hours  -Use appropriate equipment to lift or move patient in bed  -Instruct/ Assist with weight shifting every 3 when out of bed in chair  -Consider limitation of chair time 3 hour intervals    Skin Care:  -Avoid use of baby powder, tape, friction and shearing, hot water or constrictive clothing  -Relieve pressure over bony prominences using wedges and pillows  -Do not massage red bony areas    Next Steps:  -Teach patient strategies to minimize risks such    -Consider consults to  interdisciplinary teams   8/31/2023 0734 by Naheed Bundy RN  Outcome: Progressing  8/30/2023 1831 by Naheed Bundy RN  Outcome: Progressing     Problem: MUSCULOSKELETAL - ADULT  Goal: Maintain or return mobility to safest level of function  Description: INTERVENTIONS:  - Assess patient's ability to carry out ADLs; assess patient's baseline for ADL function and identify physical deficits which impact ability to perform ADLs (bathing, care of mouth/teeth, toileting, grooming, dressing, etc.)  - Assess/evaluate cause of self-care deficits   - Assess range of motion  - Assess patient's mobility  - Assess patient's need for assistive devices and provide as appropriate  - Encourage maximum independence but intervene and supervise when necessary  - Involve family in performance of ADLs  - Assess for home care needs following discharge   - Consider OT consult to assist with ADL evaluation and planning for discharge  - Provide patient education as appropriate  8/31/2023 0734 by Naheed Bundy RN  Outcome: Progressing  8/30/2023 1831 by Naheed Bundy RN  Outcome: Progressing     Problem: PAIN - ADULT  Goal: Verbalizes/displays adequate comfort level or baseline comfort level  Description: Interventions:  - Encourage patient to monitor pain and request assistance  - Assess pain using appropriate pain scale  - Administer analgesics based on type and severity of pain and evaluate response  - Implement non-pharmacological measures as appropriate and evaluate response  - Consider cultural and social influences on pain and pain management  - Notify physician/advanced practitioner if interventions unsuccessful or patient reports new pain  8/31/2023 0734 by Naheed Bundy RN  Outcome: Progressing  8/30/2023 1831 by Naheed Bundy RN  Outcome: Progressing     Problem: SAFETY ADULT  Goal: Patient will remain free of falls  Description: INTERVENTIONS:  - Educate patient/family on patient safety including physical limitations  - Instruct patient to call for assistance with activity   - Consult OT/PT to assist with strengthening/mobility   - Keep Call bell within reach  - Keep bed low and locked with side rails adjusted as appropriate  - Keep care items and personal belongings within reach  - Initiate and maintain comfort rounds  - Make Fall Risk Sign visible to staff  - Offer Toileting every 2 Hours, in advance of need  - Initiate/Maintain bed alarm  - Obtain necessary fall risk management equipment: fall bracelet, yellow socks, bed alarm  - Apply yellow socks and bracelet for high fall risk patients  - Consider moving patient to room near nurses station  8/31/2023 0734 by Naheed Bundy RN  Outcome: Progressing  8/30/2023 1831 by Naheed Bundy RN  Outcome: Progressing  Goal: Maintain or return to baseline ADL function  Description: INTERVENTIONS:  -  Assess patient's ability to carry out ADLs; assess patient's baseline for ADL function and identify physical deficits which impact ability to perform ADLs (bathing, care of mouth/teeth, toileting, grooming, dressing, etc.)  - Assess/evaluate cause of self-care deficits   - Assess range of motion  - Assess patient's mobility; develop plan if impaired  - Assess patient's need for assistive devices and provide as appropriate  - Encourage maximum independence but intervene and supervise when necessary  - Involve family in performance of ADLs  - Assess for home care needs following discharge   - Consider OT consult to assist with ADL evaluation and planning for discharge  - Provide patient education as appropriate  8/31/2023 0734 by Black Bryan RN  Outcome: Progressing  8/30/2023 1831 by Black Bryan RN  Outcome: Progressing  Goal: Maintains/Returns to pre admission functional level  Description: INTERVENTIONS:  - Perform BMAT or MOVE assessment daily.   - Set and communicate daily mobility goal to care team and patient/family/caregiver. - Collaborate with rehabilitation services on mobility goals if consulted  - Perform Range of Motion 3 times a day. - Reposition patient every 2 hours.   - Dangle patient 3 times a day  - Stand patient 3 times a day  - Ambulate patient 3 times a day  - Out of bed to chair 3 times a day   - Out of bed for meals 3 times a day  - Out of bed for toileting  - Record patient progress and toleration of activity level   8/31/2023 0734 by Black Bryan RN  Outcome: Progressing  8/30/2023 1831 by Black Bryan RN  Outcome: Progressing     Problem: DISCHARGE PLANNING  Goal: Discharge to home or other facility with appropriate resources  Description: INTERVENTIONS:  - Identify barriers to discharge w/patient and caregiver  - Arrange for needed discharge resources and transportation as appropriate  - Identify discharge learning needs (meds, wound care, etc.)  - Arrange for interpretive services to assist at discharge as needed  - Refer to Case Management Department for coordinating discharge planning if the patient needs post-hospital services based on physician/advanced practitioner order or complex needs related to functional status, cognitive ability, or social support system  8/31/2023 0734 by Black Bryan RN  Outcome: Progressing  8/30/2023 1831 by Black Bryan RN  Outcome: Progressing     Problem: Knowledge Deficit  Goal: Patient/family/caregiver demonstrates understanding of disease process, treatment plan, medications, and discharge instructions  Description: Complete learning assessment and assess knowledge base.   Interventions:  - Provide teaching at level of understanding  - Provide teaching via preferred learning methods  8/31/2023 0734 by Black Bryan, RN  Outcome: Progressing  8/30/2023 1831 by Black Bryan, RN  Outcome: Progressing

## 2023-08-31 NOTE — UTILIZATION REVIEW
Initial Clinical Review    Admission: Date/Time/Statement:   Admission Orders (From admission, onward)     Ordered        08/30/23 1519  INPATIENT ADMISSION  Once                      Orders Placed This Encounter   Procedures   • INPATIENT ADMISSION     Standing Status:   Standing     Number of Occurrences:   1     Order Specific Question:   Level of Care     Answer:   Level 1 Stepdown [13]     Order Specific Question:   Estimated length of stay     Answer:   More than 2 Midnights     Order Specific Question:   Certification     Answer:   I certify that inpatient services are medically necessary for this patient for a duration of greater than two midnights. See H&P and MD Progress Notes for additional information about the patient's course of treatment. ED Arrival Information     Expected   -    Arrival   8/30/2023 10:56    Acuity   Emergent            Means of arrival   Ambulance    Escorted by   [facility] ([address] [place] [address])    Service   Critical Care/ICU    Admission type   Emergency            Arrival complaint   altered mental status           Chief Complaint   Patient presents with   • Altered Mental Status     Per pt's sister, pt has been inc. in lethargy, confusion, not being able to respond and communicate clearly past 2 weeks. Pt was taking clozapine but stopped due to insurance purposes about 2 weeks ago. Pt only able to shout. Pt also been having diarrhea for days.        Initial Presentation: 28 y.o. female to ED by EMS presents scared & agitated w progressive Behavioral dysfunction, lethargy  for 2 WKS as Inpatient SD1 ICU admission due to acute encephalopathy 2ndary to Lithium toxicity    pmh schizoaffective disorder, intellectual disability, bipolar disorder, SZ disorder  Patient described as wise @ baseline, active, alert, performs ADLs, able to maintain conversation, gen oriented per sister occasional periods of Maniac episodes, acting out, hyper sexual behavior w pressured speech, Family reports symptom onset two weeks when Clozaril was discontinued for abn WBC count    EXAM   GCS 4/4/5= 14;  Indicating discomfort w pain in ABD & back, lethargic, unable to answer questions. Labs w ABN Lithium toxicity, leukocytosis; ecg QTc 545      Cont ICU management, Neuro checks, recs  per Toxciology /Nephrology consult Urgent HD for 4 HR today, monitor Lithium level post HD,  IVF  Toxicology   AMS in setting of Lithium Toxicity w normal renal function; Supra therapeutic Valproic Acid  Start 1.5x NS for enhanced clearance of lithium  -With patient's alteration of mentation and [Li+] > 4.6, recommend hemodialysis. Consult nephrology. Follow up Ammonia level, and if elevated give L-carnitine for valproate toxicity   Assess home safety given that mother dispenses medications, and patient has clear signs of supratherapeutic ingestion  - QTc noted to be 545. Avoid QTc prolonging agents, give 2 g IV MgSO4 over 1 hour, replete electrolytes; serial monitoring until QTc < 500 ,  Continuous cardiac monitoring   Nephrology  HD w Temporary cath by ICU Team; urgent hemodialysis for total 4 hours, urgent hemodialysis for total 4 hours, neuro checks; avoid Lithium for now  Date: 8/31/2023   Day 2:   Critical Care  Noted for seizure while on dialysis. Has known seizure disorder. Depakote level previously was elevated but not a trough level. Recovered without incident but back dialysis treatment  cut short by about 45 minutes, status post dialysis x2. We will continue to follow lithium level  Depakote may have been dialyzed off and even though she does not have true seizure history, withdrawal or significant reduction of Depakote level with dialysis may have triggered seizure-like activity  Remained hemodynamic stable , Maintain level 1 stepdown status for now   Nephrology  [place] 4.6 on admit; post HD Lithium @ 1.4 w recent level now 2.0,  No improvement in mental status.   Still remains overall very encephalopathic, does not follow commands or answer any questions. Recommend second session of HD now via temporary HD cath. -Repeat lithium level after dialysis today and again 4 hours afterwards. Creatinine remains stable 0.5, cont IV fluid. Monitor neurological status  Continue to hold lithium   Toxicology  Rebound Li elevations are common after dialysis as redistribution occurs. The patient is noted to remain encephalopathic. Improvement in encephalopathy is expected to lag behind improvement in levels.  Continue current supportive care  ED Triage Vitals   Temperature Pulse Respirations Blood Pressure SpO2   08/30/23 1312 08/30/23 1113 08/30/23 1113 08/30/23 1113 08/30/23 1113   98.9 °F (37.2 °C) 96 18 115/82 98 %      Temp Source Heart Rate Source Patient Position - Orthostatic VS BP Location FiO2 (%)   08/30/23 1312 08/30/23 1113 08/30/23 1113 08/30/23 1113 --   Oral Monitor Lying Left arm       Pain Score       08/30/23 1812       No Pain          Wt Readings from Last 1 Encounters:   08/30/23 52.9 kg (116 lb 10 oz)     Additional Vital Signs: & GCS=  Trauma Secondary Assessment - [place] Coma Scale    Date and Time Eye Opening Best Verbal Response Best Motor Response Pradeep Coma Scale Score User   08/31/23 0715 4 2 5 11 AC   08/31/23 0338 4 2 5 11 VS   08/30/23 2256 4 2 5 11 VS   08/30/23 1930 4 2 5 11 VS   08/30/23 1700 4 2 5 11 AC   08/30/23 1200 4 2 6 12      Date/Time Temp Pulse Resp BP MAP (mmHg) SpO2 O2 Device Patient Position - Orthostatic VS   08/31/23 1225 -- 96 28 Abnormal  115/70 84 98 % -- --   08/31/23 1220 -- 118 Abnormal  31 Abnormal  -- -- 95 % -- --   08/31/23 1215 -- 110 Abnormal  23 Abnormal  123/85 102 95 % -- --   08/31/23 1200 -- 104 24 Abnormal  117/84 94 95 % -- --   08/31/23 1145 -- 106 Abnormal  29 Abnormal  116/85 97 95 % -- --   08/31/23 1130 -- 106 Abnormal  20 128/81 96 95 % -- --   08/31/23 1126 100 °F (37.8 °C) -- -- -- -- -- -- --   08/31/23 1115 -- 106 Abnormal  36 Abnormal  123/83 96 97 % -- --   08/31/23 1100 -- 106 Abnormal  18 127/86 96 96 % -- --   08/31/23 1045 -- 114 Abnormal  20 131/93 106 96 % -- --   08/31/23 1030 -- 108 Abnormal  19 127/92 105 97 % -- --   08/31/23 1015 -- 114 Abnormal  28 Abnormal  134/87 102 95 % -- --   08/31/23 1000 -- 106 Abnormal  19 128/89 102 96 % -- --   08/31/23 0945 -- 104 31 Abnormal  131/88 102 97 % -- --   08/31/23 0930 -- 104 35 Abnormal  126/86 99 99 % -- --   08/31/23 0915 -- 102 33 Abnormal  130/84 95 100 % -- --   08/31/23 0906 -- 108 Abnormal  27 Abnormal  127/83 97 100 % -- --   08/31/23 0903 -- 106 Abnormal  27 Abnormal  128/86 103 100 % -- Lying   08/31/23 0700 -- 98 34 Abnormal  127/84 97 99 % -- --   08/31/23 0600 -- 96 24 Abnormal  120/88 98 99 % -- --   08/31/23 0500 -- 100 36 Abnormal  133/86 103 97 % -- --   08/31/23 0400 99 °F (37.2 °C) 100 18 125/82 94 97 % None (Room air) Lying   08/31/23 0300 -- 104 25 Abnormal  122/86 98 98 % -- --   08/31/23 0200 100.4 °F (38 °C) 106 Abnormal  24 Abnormal  113/82 92 96 % None (Room air) Lying   08/31/23 0100 -- 108 Abnormal  20 119/76 89 95 % -- --   08/31/23 0000 -- 108 Abnormal  26 Abnormal  117/75 88 96 % -- --   08/30/23 2215 98.1 °F (36.7 °C) 110 Abnormal  21 121/78 92 99 % -- Lying   08/30/23 2200 -- 112 Abnormal   30 Abnormal   119/88  98  99 %  None (Room air) Lying   Pulse: Simultaneous filing. User may not have seen previous data. at 08/30/23 2200   Resp: Simultaneous filing. User may not have seen previous data. at 08/30/23 2200   BP: Simultaneous filing. User may not have seen previous data. at 08/30/23 2200   MAP (mmHg): Simultaneous filing. User may not have seen previous data. at 08/30/23 2200   SpO2: Simultaneous filing. User may not have seen previous data.  at 08/30/23 2200 08/30/23 2145 -- 114 Abnormal  20 121/83 98 98 % -- --   08/30/23 2130 -- 112 Abnormal  23 Abnormal  120/84 97 100 % -- --   08/30/23 2115 -- 110 Abnormal  23 Abnormal  124/85 95 100 % -- --   08/30/23 2100 -- 106 Abnormal  19 118/82 96 96 % -- --   08/30/23 2045 -- 106 Abnormal  16 119/81 92 100 % -- --   08/30/23 2030 -- 110 Abnormal  18 117/86 98 100 % -- --   08/30/23 2015 -- 110 Abnormal  21 109/86 92 99 % -- --   08/30/23 2000 -- 114 Abnormal  39 Abnormal  112/83 91 97 % -- --   08/30/23 1955 98.1 °F (36.7 °C) -- -- -- -- -- -- --   08/30/23 1945 -- 118 Abnormal  34 Abnormal  120/100 116 97 % -- --   08/30/23 1937 -- 118 Abnormal  26 Abnormal  116/81 91 97 % -- --   08/30/23 1915 -- 106 Abnormal  28 Abnormal  114/81 93 -- -- --   08/30/23 1900 -- 104 29 Abnormal  119/76 90 -- -- --   08/30/23 1830 -- 98 25 Abnormal  118/79 91 -- -- --   08/30/23 1815 -- 102 26 Abnormal  122/76 90 -- -- --   08/30/23 1812 97.4 °F (36.3 °C) Abnormal  98 25 Abnormal  134/76 92 97 % None (Room air) Lying   08/30/23 1801 97.4 °F (36.3 °C) Abnormal  96 17 134/76 -- -- -- Lying   08/30/23 1515 -- 100 18 128/78 99 97 % None (Room air) Lying   08/30/23 1415 -- 96 18 116/71 90 98 % None (Room air) Lying   08/30/23 1312 98.9 °F (37.2 °C) 96 18 135/86 -- 98 % None (Room air) Lying   08/30/23 1113 -- 96 18 115/82 -- 98 % None (Room air) Lying     Weights (last 14 days)    Date/Time Weight Weight Method Dialysis Post-Treatment Weight Height   08/31/23 1215 -- -- 55.1 kg (121 lb 7.6 oz) --   08/30/23 2212 -- -- 51.6 kg (113 lb 12.1 oz) --   08/30/23 1815 52.9 kg (116 lb 10 oz) Bed scale -- 5' 4" (1.626 m)         Pertinent Labs/Diagnostic Test Results:   8/30 ecg=   Age and gender specific ECG analysis   Normal sinus rhythm   Possible Left atrial enlargement   Cannot rule out Anterior infarct (cited on or before 18-JUL-2023)   Prolonged QT   Abnormal ECG    CT abdomen pelvis with contrast   Final Result by Gemma Pike MD (08/30 0532)      1. Top-normal fluid-filled loops of hyperemic small bowel in the right lower quadrant, which could represent a nonspecific enteritis in the appropriate clinical context.    2.  Nonobstructing 2 mm calculus in the interpolar right kidney. The study was marked in EPIC for significant notification. Workstation performed: [id number]         CT head without contrast   Final Result by Audra Frank MD (08/30 1433)      No acute intracranial abnormality.          Results from last 7 days   Lab Units 08/31/23  0350 08/30/23  1813 08/30/23  1257   WBC Thousand/uL 15.57*  --  17.41*   HEMOGLOBIN g/dL 13.4  --  14.5   HEMATOCRIT % 41.7  --  45.2   PLATELETS Thousands/uL 134* 145* 163   NEUTROS ABS Thousands/µL 12.94*  --  14.88*         Results from last 7 days   Lab Units 08/31/23  0350 08/30/23 2241 08/30/23  1257   SODIUM mmol/L 140  --  137   POTASSIUM mmol/L 3.5  --  4.5   CHLORIDE mmol/L 105  --  108   CO2 mmol/L 29  --  30   ANION GAP mmol/L 6  --  -1   BUN mg/dL 5  --  7   CREATININE mg/dL 0.59*  --  0.66   EGFR ml/min/1.73sq m 119  --  115   CALCIUM mg/dL 8.4  --  9.8   CALCIUM, IONIZED mmol/L  --  1.11*  --    MAGNESIUM mg/dL 2.3  --   --    PHOSPHORUS mg/dL 3.2  --   --      Results from last 7 days   Lab Units 08/31/23  0350 08/30/23 2241 08/30/23  1458 08/30/23  1257   AST U/L 13  --   --  14   ALT U/L 13  --   --  16   ALK PHOS U/L 109*  --   --  110*   TOTAL PROTEIN g/dL 5.5*  --   --  6.3*   ALBUMIN g/dL 3.3*  --   --  3.9   TOTAL BILIRUBIN mg/dL 0.49  --   --  0.29   AMMONIA umol/L  --  22 21  --          Results from last 7 days   Lab Units 08/31/23  0350 08/30/23  1257   GLUCOSE RANDOM mg/dL 128 93             No results found for: "BETA-HYDROXYBUTYRATE"                           Results from last 7 days   Lab Units 08/31/23  0350   PROTIME seconds 13.3   INR  1.01     Results from last 7 days   Lab Units 08/30/23  1257   TSH 3RD GENERATON uIU/mL 9.488*                                     Results from last 7 days   Lab Units 08/30/23  1813   HEP B S AG  Non-reactive   HEP C AB  Non-reactive   HEP B C IGM  Non-reactive   HEP B C TOTAL AB  Non-reactive Results from last 7 days   Lab Units 08/30/23  1312   CLARITY UA  Clear   COLOR UA  Yellow   SPEC GRAV UA  1.015   PH UA  7.5   GLUCOSE UA mg/dl Negative   KETONES UA mg/dl Negative   BLOOD UA  Trace*   PROTEIN UA mg/dl Negative   NITRITE UA  Negative   BILIRUBIN UA  Negative   UROBILINOGEN UA E.U./dl 0.2   LEUKOCYTES UA  Negative   WBC UA /hpf 1-2   RBC UA /hpf None Seen   BACTERIA UA /hpf None Seen   EPITHELIAL CELLS WET PREP /hpf Occasional                 Results from last 7 days   Lab Units 08/30/23  1458   ETHANOL LVL mg/dL <10   ACETAMINOPHEN LVL ug/mL <58*   SALICYLATE LVL mg/dL <5         ED Treatment:   Medication Administration from 08/30/2023 1056 to 08/30/2023 1636       Date/Time Order Dose Route Action     08/30/2023 1225 EDT haloperidol lactate (HALDOL) injection 5 mg 5 mg Intramuscular Given     08/30/2023 1503 EDT multi-electrolyte (PLASMALYTE-A/ISOLYTE-S PH 7.4) IV solution 200 mL/hr Intravenous New Bag     08/30/2023 1501 EDT sodium chloride 0.9 % bolus 1,000 mL 1,000 mL Intravenous New Bag        Past Medical History:   Diagnosis Date   • Psychiatric disorder      Present on Admission:  • Intellectual disability  • Unspecified mood (affective) disorder (HCC)      Admitting Diagnosis: Delirium [R41.0]  Altered mental state [R41.82]  Intellectual disability [F79]  Valproic acid toxicity [T42.6X1A]  Lithium toxicity [T56.891A]  Lithium toxicity, undetermined intent, initial encounter [T56.894A]  Age/Sex: 28 y.o. female  Admission Orders:   continuous cardiopulmonary & pulse oximetry  Neuro checks  HD adult  Nursing dysphagia screen  Daily wt  I/O  SCD    Scheduled Medications:  chlorhexidine, 15 mL, Mouth/Throat, Q12H COLT  heparin (porcine), 5,000 Units, Subcutaneous, Q8H [facility]      Continuous IV Infusions:  multi-electrolyte, 200 mL/hr, Intravenous, Continuous      PRN Meds:       IP CONSULT TO TOXICOLOGY  IP CONSULT TO NEPHROLOGY  IP CONSULT TO CASE MANAGEMENT    Network Utilization Review Department  ATTENTION: Please call with any questions or concerns to [phone] and carefully listen to the prompts so that you are directed to the right person. All voicemails are confidential.  Ethan Robles all requests for admission clinical reviews, approved or denied determinations and any other requests to dedicated fax number below belonging to the campus where the patient is receiving treatment.  List of dedicated fax numbers for the Facilities:  [place] DENIALS (Administrative/Medical Necessity) [phone]   [address] [facility] (Maternity/NICU/Pediatrics) [phone]   [address] [place] [address] [phone]   [facility] [address] [facility] [phone]   [address] [place] [address] [address] [phone]   [address] [facility] [address] [facility]  [facility] [phone]

## 2023-08-31 NOTE — PROGRESS NOTES
NEPHROLOGY PROGRESS NOTE   Daina Bueno 28 y.o. female MRN: [id number]  Unit/Bed#: ICU 13 Encounter: [id number]  Reason for Consult: Lithium toxicity    ASSESSMENT AND PLAN:  Patient is 21-year-old female with significant psych issues including schizoaffective disorder, intellectual disability, bipolar disorder, presented with worsening encephalopathy, lethargy. We are consulted for lithium toxicity.     Severe symptomatic lithium toxicity  -Lithium level 4.6 on admission  -Due to significant worsening encephalopathy, lethargy along with elevated lithium level, patient had a first session of dialysis on 8/30/2023.   -Post HD lithium level had improved 1.4 although most recent level has again increasing up to 2.0. No improvement in mental status. Still remains overall very encephalopathic, does not follow commands or answer any questions.   -Will do second session of HD now. -Currently has temporary femoral HD catheter.  -Repeat lithium level after dialysis today and again 4 hours afterwards.   -Fortunately creatinine remains stable 0.5  -Continue ongoing IV fluid.   -Closely monitor neurological status  -Continue to hold lithium  -Patient is urinary incontinent, consider purewick for more accurate UO measurement  -UA this admission bland without hematuria or proteinuria. Addendum:  I saw and examined patient during hemodialysis treatment at 9:50 AM on 8/31/2023. The patient was receiving hemodialysis for treatment of lithium toxicity. I also reviewed vital signs, intake and output, lab results and recent events, and agree with dialysis order. Tolerating HD. BP acceptable     Nonobstructing right renal calculus on CT scan. No hydro-     Discussed above plan in detail with  ICU team regarding plan for HD, monitoring closely lithium level and they agree with above recommendations. SUBJECTIVE:  Patient seen and examined at bedside.   Still remains very encephalopathic, nonverbal, does not answer questions.     OBJECTIVE:  Current Weight: Weight - Scale: 52.9 kg (116 lb 10 oz)  Vitals:    08/31/23 0700   BP: 127/84   Pulse: 98   Resp: (!) 34   Temp:    SpO2: 99%       Intake/Output Summary (Last 24 hours) at 8/31/2023 0838  Last data filed at 8/31/2023 0600  Gross per 24 hour   Intake 3490 ml   Output 503 ml   Net 2987 ml     Wt Readings from Last 3 Encounters:   08/30/23 52.9 kg (116 lb 10 oz)   08/22/23 52.6 kg (116 lb)   08/19/23 49.4 kg (108 lb 14.5 oz)     Temp Readings from Last 3 Encounters:   08/31/23 99 °F (37.2 °C) (Axillary)   08/19/23 98.4 °F (36.9 °C) (Tympanic)   07/19/23 97.5 °F (36.4 °C) (Temporal)     BP Readings from Last 3 Encounters:   08/31/23 127/84   08/22/23 100/60   08/19/23 114/83     Pulse Readings from Last 3 Encounters:   08/31/23 98   08/22/23 87   08/19/23 (!) 117        Physical Examination:  Eyes: No conjunctival pallor present  Neck: No obvious lymphadenopathy appreciated  Respiratory: Bilateral air entry present  CVS: No significant edema  GI: Soft, nondistended  CNS: Opens eyes, does not answer questions, does not follow commands  Skin: No new rash  Musculoskeletal: No obvious new gross deformity noted    Medications:    Current Facility-Administered Medications:   •  chlorhexidine (PERIDEX) 0.12 % oral rinse 15 mL, 15 mL, Mouth/Throat, Q12H [address], Nathaniel Sullivan MD  •  heparin (porcine) subcutaneous injection 5,000 Units, 5,000 Units, Subcutaneous, Q8H [address], 5,000 Units at 08/31/23 0509 **AND** [COMPLETED] Platelet count, , , Once, Nathaniel Sullivan MD  •  multi-electrolyte (PLASMALYTE-A/ISOLYTE-S PH 7.4) IV solution, 200 mL/hr, Intravenous, Continuous, Gem Blel MD, Last Rate: 200 mL/hr at 08/31/23 0017, 200 mL/hr at 08/31/23 0017    Laboratory Results:  Results from last 7 days   Lab Units 08/31/23  0350 08/30/23  1813 08/30/23  1257   WBC Thousand/uL 15.57*  --  17.41*   HEMOGLOBIN g/dL 13.4  --  14.5   HEMATOCRIT % 41.7  --  45.2   PLATELETS Thousands/uL 134* 145* 163 SODIUM mmol/L 140  --  137   POTASSIUM mmol/L 3.5  --  4.5   CHLORIDE mmol/L 105  --  108   CO2 mmol/L 29  --  30   BUN mg/dL 5  --  7   CREATININE mg/dL 0.59*  --  0.66   CALCIUM mg/dL 8.4  --  9.8   MAGNESIUM mg/dL 2.3  --   --    PHOSPHORUS mg/dL 3.2  --   --        CT abdomen pelvis with contrast   Final Result by Rosalind Pop MD (08/30 1524)      1. Top-normal fluid-filled loops of hyperemic small bowel in the right lower quadrant, which could represent a nonspecific enteritis in the appropriate clinical context. 2.  Nonobstructing 2 mm calculus in the interpolar right kidney. The study was marked in EPIC for significant notification. Workstation performed: [id number]         CT head without contrast   Final Result by Kendra Escobar MD (08/30 5140)      No acute intracranial abnormality. Workstation performed: [id number]             Portions of the record may have been created with voice recognition software. Occasional wrong word or "sound a like" substitutions may have occurred due to the inherent limitations of voice recognition software. Read the chart carefully and recognize, using context, where substitutions have occurred.

## 2023-08-31 NOTE — CASE MANAGEMENT
Case Management Assessment & Discharge Planning Note    Patient name Ann Show  Location ICU 15/ICU 15 MRN [id number]  : 1990 Date 2023       Current Admission Date: 2023  Current Admission Diagnosis:Acute encephalopathy 2/2 lithium toxicity   Patient Active Problem List    Diagnosis Date Noted   • Acute encephalopathy 2/2 lithium toxicity 2023   • Cough 2023   • Dysuria 2023   • Vaginal lesion 2023   • Medical clearance for psychiatric admission 2023   • Unspecified mood (affective) disorder ([address]) 2023   • Psychosis ([address]) 2023   • Hyperlipidemia, mixed 2022   • Impaired ability to use community resources due to language barrier 2022   • Family history of cardiovascular disorder 2022   • Intellectual disability 10/25/2021   • Chronic schizoaffective disorder ([address]) 10/25/2021      LOS (days): 1  Geometric Mean LOS (GMLOS) (days):   Days to GMLOS:     OBJECTIVE:    Risk of Unplanned Readmission Score: 15.74         Current admission status: Inpatient       Preferred Pharmacy:   [address] [place] [address]  Phone: [phone] Fax: [phone] - [place], [address] [address]  Phone: [phone] Fax: [phone]    Primary Care Provider: No primary care provider on file. Primary Insurance: [facility]  Secondary Insurance:     ASSESSMENT:  Active Health Care Proxies    There are no active Health Care Proxies on file.        Advance Directives  Primary Contact: Inga Alexander (sister) [phone]              Patient Information  Admitted from[de-identified] Home  Mental Status: Alert  During Assessment patient was accompanied by: Not accompanied during assessment  Assessment information provided by[de-identified] Sister  Primary Caregiver: Self  Support Systems: Family members  [place] of Residence: [address] [facility] do you live in?: [address]: Lives w/ Parent(s)    Activities of Daily Living Prior to Admission  Functional Status: Independent  Completes ADLs independently?: Yes  Ambulates independently?: Yes  Does patient use assisted devices?: No  Does patient currently own DME?: No  Does patient have a history of Outpatient Therapy (PT/OT)?: No  Does the patient have a history of Short-Term Rehab?: No  Does patient have a history of HHC?: No  Does patient currently have [address]?: No         Patient Information Continued  Does patient have prescription coverage?: Yes  Food insecurity resource given?: N/A  Does patient receive dialysis treatments?: No  Does patient have a history of substance abuse?: No  Does patient have a history of Mental Health Diagnosis?: Yes (schizoaffective disorder, intellectual disability)  Is patient receiving treatment for mental health?: Yes (medication management through PCP)  Has patient received inpatient treatment related to mental health in the last 2 years?: Yes (June 2023)         Means of Transportation  Means [facility] Transport to [facility][de-identified] Family transport (sister or uber)  Was application for public transport provided?: N/A        DISCHARGE DETAILS:    Discharge planning discussed with[de-identified] Sister        CM contacted family/caregiver?: Yes             Contacts  Patient Contacts: Charo (sister)  Relationship to Patient[de-identified] Family  Contact Method: Phone  Phone Number: [phone]  Reason/Outcome: Emergency Contact, Discharge Planning              Other Referral/Resources/Interventions Provided:  Interventions: None Indicated                                                      Additional Comments: CM spoke with  Charo via phone. Charo stated pt was active with medication management through PCP.  Pt attempted to go to [facility] to follow up after IP psych stay; however, pt and mother would be waiting for awhile and they left due to not being seen for scheduled appointment. Sister stated pt has a psychiatry appointment on 9/13 with Maggie Siddiqui. Sister stated she found this psychiatrist on her own and she's based out of [place]. Pt was IPTA with mother managing the medication. Pt has supports coordinator through [facility].  dept to continue to follow.

## 2023-09-01 PROBLEM — E87.0 HYPERNATREMIA: Status: ACTIVE | Noted: 2023-09-01

## 2023-09-01 LAB
ALBUMIN SERPL BCP-MCNC: 3.5 G/DL (ref 3.5–5)
ALP SERPL-CCNC: 114 U/L (ref 34–104)
ALT SERPL W P-5'-P-CCNC: 14 U/L (ref 7–52)
ANION GAP SERPL CALCULATED.3IONS-SCNC: 3 MMOL/L
ANION GAP SERPL CALCULATED.3IONS-SCNC: 3 MMOL/L
ANION GAP SERPL CALCULATED.3IONS-SCNC: 5 MMOL/L
AST SERPL W P-5'-P-CCNC: 14 U/L (ref 13–39)
ATRIAL RATE: 140 BPM
BASOPHILS # BLD AUTO: 0.02 THOUSANDS/ÂΜL (ref 0–0.1)
BASOPHILS NFR BLD AUTO: 0 % (ref 0–1)
BILIRUB SERPL-MCNC: 0.29 MG/DL (ref 0.2–1)
BUN SERPL-MCNC: 4 MG/DL (ref 5–25)
BUN SERPL-MCNC: 6 MG/DL (ref 5–25)
BUN SERPL-MCNC: 6 MG/DL (ref 5–25)
CALCIUM SERPL-MCNC: 10.2 MG/DL (ref 8.4–10.2)
CALCIUM SERPL-MCNC: 10.3 MG/DL (ref 8.4–10.2)
CALCIUM SERPL-MCNC: 9.6 MG/DL (ref 8.4–10.2)
CHLORIDE SERPL-SCNC: 123 MMOL/L (ref 96–108)
CHLORIDE SERPL-SCNC: 124 MMOL/L (ref 96–108)
CHLORIDE SERPL-SCNC: 129 MMOL/L (ref 96–108)
CO2 SERPL-SCNC: 29 MMOL/L (ref 21–32)
CO2 SERPL-SCNC: 32 MMOL/L (ref 21–32)
CO2 SERPL-SCNC: 34 MMOL/L (ref 21–32)
CREAT SERPL-MCNC: 0.45 MG/DL (ref 0.6–1.3)
CREAT SERPL-MCNC: 0.48 MG/DL (ref 0.6–1.3)
CREAT SERPL-MCNC: 0.54 MG/DL (ref 0.6–1.3)
EOSINOPHIL # BLD AUTO: 0 THOUSAND/ÂΜL (ref 0–0.61)
EOSINOPHIL NFR BLD AUTO: 0 % (ref 0–6)
ERYTHROCYTE [DISTWIDTH] IN BLOOD BY AUTOMATED COUNT: 13.6 % (ref 11.6–15.1)
GFR SERPL CREATININE-BSD FRML MDRD: 123 ML/MIN/1.73SQ M
GFR SERPL CREATININE-BSD FRML MDRD: 128 ML/MIN/1.73SQ M
GFR SERPL CREATININE-BSD FRML MDRD: 131 ML/MIN/1.73SQ M
GLUCOSE SERPL-MCNC: 128 MG/DL (ref 65–140)
GLUCOSE SERPL-MCNC: 160 MG/DL (ref 65–140)
GLUCOSE SERPL-MCNC: 187 MG/DL (ref 65–140)
HCT VFR BLD AUTO: 45.8 % (ref 34.8–46.1)
HGB BLD-MCNC: 14.4 G/DL (ref 11.5–15.4)
IMM GRANULOCYTES # BLD AUTO: 0.04 THOUSAND/UL (ref 0–0.2)
IMM GRANULOCYTES NFR BLD AUTO: 0 % (ref 0–2)
LITHIUM SERPL-SCNC: 0.5 MMOL/L (ref 0.6–1.2)
LYMPHOCYTES # BLD AUTO: 1.12 THOUSANDS/ÂΜL (ref 0.6–4.47)
LYMPHOCYTES NFR BLD AUTO: 10 % (ref 14–44)
MAGNESIUM SERPL-MCNC: 3.1 MG/DL (ref 1.9–2.7)
MCH RBC QN AUTO: 29.4 PG (ref 26.8–34.3)
MCHC RBC AUTO-ENTMCNC: 31.4 G/DL (ref 31.4–37.4)
MCV RBC AUTO: 94 FL (ref 82–98)
MONOCYTES # BLD AUTO: 1.1 THOUSAND/ÂΜL (ref 0.17–1.22)
MONOCYTES NFR BLD AUTO: 10 % (ref 4–12)
NEUTROPHILS # BLD AUTO: 8.93 THOUSANDS/ÂΜL (ref 1.85–7.62)
NEUTS SEG NFR BLD AUTO: 80 % (ref 43–75)
NRBC BLD AUTO-RTO: 0 /100 WBCS
OSMOLALITY UR: 103 MMOL/KG
P AXIS: 77 DEGREES
PHOSPHATE SERPL-MCNC: 2.1 MG/DL (ref 2.7–4.5)
PLATELET # BLD AUTO: 145 THOUSANDS/UL (ref 149–390)
PMV BLD AUTO: 9.5 FL (ref 8.9–12.7)
POTASSIUM SERPL-SCNC: 3.9 MMOL/L (ref 3.5–5.3)
POTASSIUM SERPL-SCNC: 4.2 MMOL/L (ref 3.5–5.3)
POTASSIUM SERPL-SCNC: 4.3 MMOL/L (ref 3.5–5.3)
PR INTERVAL: 188 MS
PROT SERPL-MCNC: 5.9 G/DL (ref 6.4–8.4)
QRS AXIS: 3 DEGREES
QRSD INTERVAL: 83 MS
QT INTERVAL: 275 MS
QTC INTERVAL: 420 MS
RBC # BLD AUTO: 4.9 MILLION/UL (ref 3.81–5.12)
SODIUM 24H UR-SCNC: 20 MOL/L
SODIUM SERPL-SCNC: 157 MMOL/L (ref 135–147)
SODIUM SERPL-SCNC: 161 MMOL/L (ref 135–147)
SODIUM SERPL-SCNC: 163 MMOL/L (ref 135–147)
SODIUM SERPL-SCNC: 164 MMOL/L (ref 135–147)
T WAVE AXIS: 220 DEGREES
VENTRICULAR RATE: 140 BPM
WBC # BLD AUTO: 11.21 THOUSAND/UL (ref 4.31–10.16)

## 2023-09-01 PROCEDURE — 84300 ASSAY OF URINE SODIUM: CPT | Performed by: INTERNAL MEDICINE

## 2023-09-01 PROCEDURE — 99232 SBSQ HOSP IP/OBS MODERATE 35: CPT | Performed by: INTERNAL MEDICINE

## 2023-09-01 PROCEDURE — 93005 ELECTROCARDIOGRAM TRACING: CPT

## 2023-09-01 PROCEDURE — 80048 BASIC METABOLIC PNL TOTAL CA: CPT | Performed by: INTERNAL MEDICINE

## 2023-09-01 PROCEDURE — 83735 ASSAY OF MAGNESIUM: CPT

## 2023-09-01 PROCEDURE — 85025 COMPLETE CBC W/AUTO DIFF WBC: CPT

## 2023-09-01 PROCEDURE — 80053 COMPREHEN METABOLIC PANEL: CPT

## 2023-09-01 PROCEDURE — 84295 ASSAY OF SERUM SODIUM: CPT | Performed by: NURSE PRACTITIONER

## 2023-09-01 PROCEDURE — 80178 ASSAY OF LITHIUM: CPT | Performed by: INTERNAL MEDICINE

## 2023-09-01 PROCEDURE — 93010 ELECTROCARDIOGRAM REPORT: CPT

## 2023-09-01 PROCEDURE — 99255 IP/OBS CONSLTJ NEW/EST HI 80: CPT | Performed by: PSYCHIATRY & NEUROLOGY

## 2023-09-01 PROCEDURE — 84100 ASSAY OF PHOSPHORUS: CPT

## 2023-09-01 PROCEDURE — 83935 ASSAY OF URINE OSMOLALITY: CPT | Performed by: INTERNAL MEDICINE

## 2023-09-01 RX ORDER — DIVALPROEX SODIUM 500 MG/1
1000 TABLET, DELAYED RELEASE ORAL 2 TIMES DAILY
Status: DISCONTINUED | OUTPATIENT
Start: 2023-09-01 | End: 2023-09-03

## 2023-09-01 RX ORDER — MIRTAZAPINE 15 MG/1
15 TABLET, FILM COATED ORAL
Status: DISCONTINUED | OUTPATIENT
Start: 2023-09-01 | End: 2023-09-08 | Stop reason: HOSPADM

## 2023-09-01 RX ORDER — PROPRANOLOL HYDROCHLORIDE 10 MG/1
10 TABLET ORAL 3 TIMES DAILY
Status: DISCONTINUED | OUTPATIENT
Start: 2023-09-01 | End: 2023-09-08 | Stop reason: HOSPADM

## 2023-09-01 RX ORDER — LORAZEPAM 2 MG/ML
0.5 INJECTION INTRAMUSCULAR EVERY 6 HOURS PRN
Status: DISCONTINUED | OUTPATIENT
Start: 2023-09-01 | End: 2023-09-02

## 2023-09-01 RX ORDER — DEXTROSE MONOHYDRATE 50 MG/ML
200 INJECTION, SOLUTION INTRAVENOUS CONTINUOUS
Status: DISCONTINUED | OUTPATIENT
Start: 2023-09-01 | End: 2023-09-02

## 2023-09-01 RX ADMIN — PROPRANOLOL HYDROCHLORIDE 10 MG: 10 TABLET ORAL at 04:30

## 2023-09-01 RX ADMIN — DEXTROSE 100 ML/HR: 5 SOLUTION INTRAVENOUS at 18:49

## 2023-09-01 RX ADMIN — MIRTAZAPINE 15 MG: 15 TABLET, FILM COATED ORAL at 04:30

## 2023-09-01 RX ADMIN — HEPARIN SODIUM 5000 UNITS: 5000 INJECTION INTRAVENOUS; SUBCUTANEOUS at 21:35

## 2023-09-01 RX ADMIN — MIRTAZAPINE 15 MG: 15 TABLET, FILM COATED ORAL at 21:37

## 2023-09-01 RX ADMIN — PROPRANOLOL HYDROCHLORIDE 10 MG: 10 TABLET ORAL at 21:36

## 2023-09-01 RX ADMIN — DIVALPROEX SODIUM 1000 MG: 500 TABLET, DELAYED RELEASE ORAL at 14:11

## 2023-09-01 RX ADMIN — PROPRANOLOL HYDROCHLORIDE 10 MG: 10 TABLET ORAL at 18:37

## 2023-09-01 RX ADMIN — SODIUM CHLORIDE 500 MG: 9 INJECTION, SOLUTION INTRAVENOUS at 05:32

## 2023-09-01 RX ADMIN — CHLORHEXIDINE GLUCONATE 15 ML: 1.2 RINSE ORAL at 10:23

## 2023-09-01 RX ADMIN — POTASSIUM & SODIUM PHOSPHATES POWDER PACK 280-160-250 MG 1 PACKET: 280-160-250 PACK at 10:23

## 2023-09-01 RX ADMIN — POTASSIUM & SODIUM PHOSPHATES POWDER PACK 280-160-250 MG 1 PACKET: 280-160-250 PACK at 21:35

## 2023-09-01 RX ADMIN — CHLORHEXIDINE GLUCONATE 15 ML: 1.2 RINSE ORAL at 21:35

## 2023-09-01 RX ADMIN — SODIUM CHLORIDE, SODIUM GLUCONATE, SODIUM ACETATE, POTASSIUM CHLORIDE, MAGNESIUM CHLORIDE, SODIUM PHOSPHATE, DIBASIC, AND POTASSIUM PHOSPHATE 200 ML/HR: .53; .5; .37; .037; .03; .012; .00082 INJECTION, SOLUTION INTRAVENOUS at 01:39

## 2023-09-01 RX ADMIN — LORAZEPAM 0.5 MG: 2 INJECTION INTRAMUSCULAR; INTRAVENOUS at 15:49

## 2023-09-01 RX ADMIN — HEPARIN SODIUM 5000 UNITS: 5000 INJECTION INTRAVENOUS; SUBCUTANEOUS at 16:52

## 2023-09-01 RX ADMIN — DEXTROSE 500 ML: 5 SOLUTION INTRAVENOUS at 14:11

## 2023-09-01 RX ADMIN — HEPARIN SODIUM 5000 UNITS: 5000 INJECTION INTRAVENOUS; SUBCUTANEOUS at 05:32

## 2023-09-01 RX ADMIN — DEXTROSE 100 ML/HR: 5 SOLUTION INTRAVENOUS at 08:05

## 2023-09-01 RX ADMIN — PROPRANOLOL HYDROCHLORIDE 10 MG: 10 TABLET ORAL at 10:23

## 2023-09-01 RX ADMIN — DIVALPROEX SODIUM 1000 MG: 500 TABLET, DELAYED RELEASE ORAL at 21:35

## 2023-09-01 RX ADMIN — POTASSIUM & SODIUM PHOSPHATES POWDER PACK 280-160-250 MG 1 PACKET: 280-160-250 PACK at 16:58

## 2023-09-01 NOTE — PLAN OF CARE
Problem: NEUROSENSORY - ADULT  Goal: Achieves maximal functionality and self care  Description: INTERVENTIONS  - Monitor swallowing and airway patency with patient fatigue and changes in neurological status  - Encourage and assist patient to increase activity and self care.    - Encourage visually impaired, hearing impaired and aphasic patients to use assistive/communication devices  Outcome: Progressing     Problem: CARDIOVASCULAR - ADULT  Goal: Maintains optimal cardiac output and hemodynamic stability  Description: INTERVENTIONS:  - Monitor I/O, vital signs and rhythm  - Monitor for S/S and trends of decreased cardiac output  - Administer and titrate ordered vasoactive medications to optimize hemodynamic stability  - Assess quality of pulses, skin color and temperature  - Assess for signs of decreased coronary artery perfusion  - Instruct patient to report change in severity of symptoms  Outcome: Progressing     Problem: METABOLIC, FLUID AND ELECTROLYTES - ADULT  Goal: Electrolytes maintained within normal limits  Description: INTERVENTIONS:  - Monitor labs and assess patient for signs and symptoms of electrolyte imbalances  - Administer electrolyte replacement as ordered  - Monitor response to electrolyte replacements, including repeat lab results as appropriate  - Instruct patient on fluid and nutrition as appropriate  Outcome: Progressing     Problem: MUSCULOSKELETAL - ADULT  Goal: Maintain or return mobility to safest level of function  Description: INTERVENTIONS:  - Assess patient's ability to carry out ADLs; assess patient's baseline for ADL function and identify physical deficits which impact ability to perform ADLs (bathing, care of mouth/teeth, toileting, grooming, dressing, etc.)  - Assess/evaluate cause of self-care deficits   - Assess range of motion  - Assess patient's mobility  - Assess patient's need for assistive devices and provide as appropriate  - Encourage maximum independence but intervene and supervise when necessary  - Involve family in performance of ADLs  - Assess for home care needs following discharge   - Consider OT consult to assist with ADL evaluation and planning for discharge  - Provide patient education as appropriate  Outcome: Progressing     Problem: PAIN - ADULT  Goal: Verbalizes/displays adequate comfort level or baseline comfort level  Description: Interventions:  - Encourage patient to monitor pain and request assistance  - Assess pain using appropriate pain scale  - Administer analgesics based on type and severity of pain and evaluate response  - Implement non-pharmacological measures as appropriate and evaluate response  - Consider cultural and social influences on pain and pain management  - Notify physician/advanced practitioner if interventions unsuccessful or patient reports new pain  Outcome: Progressing

## 2023-09-01 NOTE — ASSESSMENT & PLAN NOTE
Patient has profound intellectual disability; on exam was able to say hello, but afterwards proceeded to make monkey noises  Unclear baseline, will discuss with patient's family  Continue supportive care; monitor off lithium; correct metabolic deficiencies

## 2023-09-01 NOTE — PROGRESS NOTES
[facility]  Progress Note  Name: Jb Miller  MRN: [id number]  Unit/Bed#: ICU 15 I Date of Admission: 8/30/2023   Date of Service: 9/1/2023 I Hospital Day: 2    Assessment/Plan   Hypernatremia  Assessment & Plan  Patient noted to have sodium of 164 on daily labs  Followed by nephrology; started on D5 drip  Every 4 hour sodium levels  Awaiting urine studies to determine if lithium-induced DI versus result of aggressive fluid resuscitation in the setting of lithium overdose    Unspecified mood (affective) disorder ([address])  Assessment & Plan  Continue home medications  Monitor off lithium    Intellectual disability  Assessment & Plan  Patient has profound intellectual disability; on exam was able to say hello, but afterwards proceeded to make monkey noises  Unclear baseline, will discuss with patient's family  Continue supportive care; monitor off lithium; correct metabolic deficiencies    * Acute encephalopathy 2/2 lithium toxicity  Assessment & Plan  -She has a history of schizoaffective disorder, intellectual disability, bipolar disorder, presented with worsening encephalopathy, lethargy for the past two weeks.   -As per patient's sister, at her baseline she is generally very active, alert, able to maintain conversation, generally oriented despite having intellectual disability.  -Patients family reports symptoms started approximately two weeks when Clozaril was discontinued for abnormal white blood cell count. At home dose of lithium 750 mg twice daily.   - Underwent HD yesterday.    - Ammonia level WNL - no L-carnitine requirement   Lithium level of 1.4 overnight, increased to 2.0 this morning.     Plan:  -Toxicology and nephrology were consulted - recommendations appreciated  -Given rising Li levels, patient required 2 rounds of HD thus far  -Lithium levels at 0.5 today; no need to further monitor  -Continue to monitor electrolyte levels-replete as needed  -Continue aggressive IV hydration  -Continue to monitor neurological status  -Appreciate behavioral health recommendations, continue home medications except for lithium, remain off lithium             VTE Pharmacologic Prophylaxis:   Pharmacologic: Heparin  Mechanical VTE Prophylaxis in Place: Yes    Patient Centered Rounds: I have performed bedside rounds with nursing staff today. Discussions with Specialists or Other Care Team Provider: Nephrology    Education and Discussions with Family / Patient: Discussed treatment plan with family and patient who agree with current plan; encouraged to ask questions and participate. Time Spent for Care: 45 minutes. More than 50% of total time spent on counseling and coordination of care as described above. Current Length of Stay: 2 day(s)    Current Patient Status: Inpatient   Certification Statement: The patient will continue to require additional inpatient hospital stay due to Treatment of lithium overdose and hypernatremia    Discharge Plan: To be determined    Code Status: Level 1 - Full Code      Subjective:   Patient seen and examined at bedside, unclear cognitive baseline. Appeared comfortable although restless. Currently sodium levels highly elevated, on D5W drip with repeat sodium labs scheduled. Nephrology following along. Discussed with behavioral health, appreciate recommendations and will monitor off lithium, but continue other medications. Follow-up on morning labs; monitor vitals and discharge once sodium levels appropriate. Objective:     Vitals:   Temp (24hrs), Av.9 °F (37.2 °C), Min:97.9 °F (36.6 °C), Max:99.7 °F (37.6 °C)    Temp:  [97.9 °F (36.6 °C)-99.7 °F (37.6 °C)] 97.9 °F (36.6 °C)  HR:  [] 88  Resp:  [11-40] 35  BP: (106-160)/() 120/86  SpO2:  [93 %-100 %] 98 %  Body mass index is 19.38 kg/m². Input and Output Summary (last 24 hours):        Intake/Output Summary (Last 24 hours) at 2023 0657  Last data filed at 2023 1411  Gross per 24 hour   Intake 2768.34 ml   Output --   Net 2768.34 ml       Physical Exam:     Physical Exam  Vitals and nursing note reviewed. Constitutional:       General: She is not in acute distress. Appearance: She is well-developed. HENT:      Head: Normocephalic and atraumatic. Eyes:      Conjunctiva/sclera: Conjunctivae normal.   Cardiovascular:      Rate and Rhythm: Normal rate. Heart sounds: No murmur heard. Pulmonary:      Effort: Pulmonary effort is normal. No respiratory distress. Abdominal:      Palpations: Abdomen is soft. Tenderness: There is no abdominal tenderness. Musculoskeletal:         General: No swelling. Cervical back: Neck supple. Skin:     General: Skin is warm and dry. Neurological:      Mental Status: She is alert. Comments: Alert and oriented 0   Psychiatric:      Comments: Unclear baseline as patient with profound intellectual disability           Additional Data:     Labs:    Results from last 7 days   Lab Units 09/01/23  0530   WBC Thousand/uL 11.21*   HEMOGLOBIN g/dL 14.4   HEMATOCRIT % 45.8   PLATELETS Thousands/uL 145*   NEUTROS PCT % 80*   LYMPHS PCT % 10*   MONOS PCT % 10   EOS PCT % 0     Results from last 7 days   Lab Units 09/01/23  1241 09/01/23  0619 09/01/23  0530   SODIUM mmol/L 164*   < > 161*   POTASSIUM mmol/L 4.3  --  3.9   CHLORIDE mmol/L 129*  --  124*   CO2 mmol/L 32  --  34*   BUN mg/dL 6  --  4*   CREATININE mg/dL 0.48*  --  0.45*   ANION GAP mmol/L 3  --  3   CALCIUM mg/dL 10.2  --  10.3*   ALBUMIN g/dL  --   --  3.5   TOTAL BILIRUBIN mg/dL  --   --  0.29   ALK PHOS U/L  --   --  114*   ALT U/L  --   --  14   AST U/L  --   --  14   GLUCOSE RANDOM mg/dL 128  --  160*    < > = values in this interval not displayed. Results from last 7 days   Lab Units 08/31/23  0350   INR  1.01                       * I Have Reviewed All Lab Data Listed Above. * Additional Pertinent Lab Tests Reviewed:  All Labs Within Last 24 Hours Reviewed    Imaging:    Imaging Reports Reviewed Today Include:   Imaging Personally Reviewed by Myself Includes:      Recent Cultures (last 7 days):           Last 24 Hours Medication List:   Current Facility-Administered Medications   Medication Dose Route Frequency Provider Last Rate   • chlorhexidine  15 mL Mouth/Throat Q12H 900 Sg Emery MD     • dextrose  500 mL Intravenous Once Rebecca Mcmullen MD     • dextrose  175 mL/hr Intravenous Continuous He Jones  mL/hr (09/01/23 1330)   • divalproex sodium  1,000 mg Oral BID Nathaniel Sullivan MD     • heparin (porcine)  5,000 Units Subcutaneous Q8H 900 Sg Emery MD     • LORazepam  0.5 mg Intravenous Q6H PRN Bobbi Beckett MD     • mirtazapine  15 mg Oral HS PERLA Ramirez     • potassium-sodium phosphates  1 packet Oral TID He Jones MD     • propranolol  10 mg Oral TID PERLA Ramirez          Today, Patient Was Seen By: No Galaviz MD    ** Please Note: Dictation voice to text software may have been used in the creation of this document.  **

## 2023-09-01 NOTE — QUICK NOTE
RN reports tachycardia    EKG S tach 140  Patient seen and examined, mother at bedside. Patient denies cardiac symptoms, reports insomnia. Per mother, she has not slept all night    · Outpatient patient is prescribed Propanolol 10 mg TID for tachycardia. Will resume  · Resume Mirtazepine 15mg qhs, 1st dose now  · Psychiatry consult for psych medication regimen    ------------  Addendum: Notified of critical lab result, hypernatremia 161. Will discontinue Plasma-Lyte and start IV Dextrose. Encourage oral hydration  Close BMP monitoring.   Nephrology on board

## 2023-09-01 NOTE — PLAN OF CARE
Problem: Potential for Falls  Goal: Patient will remain free of falls  Description: INTERVENTIONS:  - Educate patient/family on patient safety including physical limitations  - Instruct patient to call for assistance with activity   - Consult OT/PT to assist with strengthening/mobility   - Keep Call bell within reach  - Keep bed low and locked with side rails adjusted as appropriate  - Keep care items and personal belongings within reach  - Initiate and maintain comfort rounds  - Make Fall Risk Sign visible to staff  - Apply yellow socks and bracelet for high fall risk patients  - Consider moving patient to room near nurses station  Outcome: Progressing     Problem: MOBILITY - ADULT  Goal: Maintain or return to baseline ADL function  Description: INTERVENTIONS:  -  Assess patient's ability to carry out ADLs; assess patient's baseline for ADL function and identify physical deficits which impact ability to perform ADLs (bathing, care of mouth/teeth, toileting, grooming, dressing, etc.)  - Assess/evaluate cause of self-care deficits   - Assess range of motion  - Assess patient's mobility; develop plan if impaired  - Assess patient's need for assistive devices and provide as appropriate  - Encourage maximum independence but intervene and supervise when necessary  - Involve family in performance of ADLs  - Assess for home care needs following discharge   - Consider OT consult to assist with ADL evaluation and planning for discharge  - Provide patient education as appropriate  Outcome: Progressing     Problem: NEUROSENSORY - ADULT  Goal: Achieves maximal functionality and self care  Description: INTERVENTIONS  - Monitor swallowing and airway patency with patient fatigue and changes in neurological status  - Encourage and assist patient to increase activity and self care.    - Encourage visually impaired, hearing impaired and aphasic patients to use assistive/communication devices  Outcome: Progressing     Problem: METABOLIC, FLUID AND ELECTROLYTES - ADULT  Goal: Electrolytes maintained within normal limits  Description: INTERVENTIONS:  - Monitor labs and assess patient for signs and symptoms of electrolyte imbalances  - Administer electrolyte replacement as ordered  - Monitor response to electrolyte replacements, including repeat lab results as appropriate  - Instruct patient on fluid and nutrition as appropriate  Outcome: Progressing     Problem: SAFETY ADULT  Goal: Patient will remain free of falls  Description: INTERVENTIONS:  - Educate patient/family on patient safety including physical limitations  - Instruct patient to call for assistance with activity   - Consult OT/PT to assist with strengthening/mobility   - Keep Call bell within reach  - Keep bed low and locked with side rails adjusted as appropriate  - Keep care items and personal belongings within reach  - Initiate and maintain comfort rounds  - Make Fall Risk Sign visible to staff  - Offer Toileting every 2 Hours, in advance of need  - Initiate/Maintain bed alarm  - Obtain necessary fall risk management equipment: yellow sock  - Apply yellow socks and bracelet for high fall risk patients  - Consider moving patient to room near nurses station  Outcome: Progressing

## 2023-09-01 NOTE — PROGRESS NOTES
NEPHROLOGY PROGRESS NOTE   Jeff Arvizu 28 y.o. female MRN: [id number]  Unit/Bed#: ICU 13 Encounter: [id number]  Reason for Consult: Lithium toxicity    ASSESSMENT AND PLAN:  Patient is 79-year-old female with significant psych issues including schizoaffective disorder, intellectual disability, bipolar disorder, presented with worsening encephalopathy, lethargy.  We are consulted for lithium toxicity.     Initial severe symptomatic lithium toxicity  -Lithium level 4.6 on admission  -Status post 2 sessions of HD (last yesterday), last lithium level yesterday 0.8.  -Repeat lithium level today.  -Overall much more awake, alert today.  -Currently has temporary femoral HD catheter. If repeat lithium level continue to remain lower, may consider discontinue temporary HD catheter in next 24 hours. -creatinine remains stable 0.4  -IV fluid changed as below.  -Continue to hold lithium  -Patient is urinary incontinent, consider purewick for more accurate UO measurement (this may remain challenging given agitated at times)  -UA this admission bland without hematuria or proteinuria.  -Noted patient had episode of seizure yesterday during dialysis and dialysis was shortened after 3.15 hours. Discussed with ICU team, suspected secondary to decreased Depakote    Acute hypernatremia  -Sodium level significantly increased from 140 yesterday to 163 this morning.  -No acute urine output available.  -Suspect secondary to poor free water intake given encephalopathy, receiving aggressive IV hydration with Plasma-Lyte.  -Agree with changing IV fluid to D5W at 100 mill per hour  -Since patient is more awake and alert, encourage p.o. free water intake as much as possible.  -Closely monitor BMP every 6 hourly.     Hypophosphatemia, serum phosphorus 2.1 lower  -We will start p.o. phosphorus supplements 1 packet p.o. 3 times daily for 2 days.     Nonobstructing right renal calculus on CT scan.  No hydro-    Discussed above plan in detail with primary team regarding closely monitoring renal function, continuing IV hypotonic fluid, repeating lithium level and they agree with above recommendations. SUBJECTIVE:  Patient seen and examined at bedside. Patient seems to be much more awake, alert and agitated at times. Also discussed with patient's mother at bedside and Panchito (over the speaker phone who helped with translation).     OBJECTIVE:  Current Weight: Weight - Scale: 51.2 kg (112 lb 14 oz)  Vitals:    09/01/23 0600   BP: 132/95   Pulse: 88   Resp:    Temp:    SpO2: 94%       Intake/Output Summary (Last 24 hours) at 9/1/2023 0852  Last data filed at 9/1/2023 0600  Gross per 24 hour   Intake 2768.34 ml   Output 399 ml   Net 2369.34 ml     Wt Readings from Last 3 Encounters:   09/01/23 51.2 kg (112 lb 14 oz)   08/22/23 52.6 kg (116 lb)   08/19/23 49.4 kg (108 lb 14.5 oz)     Temp Readings from Last 3 Encounters:   09/01/23 97.9 °F (36.6 °C) (Oral)   08/19/23 98.4 °F (36.9 °C) (Tympanic)   07/19/23 97.5 °F (36.4 °C) (Temporal)     BP Readings from Last 3 Encounters:   09/01/23 132/95   08/22/23 100/60   08/19/23 114/83     Pulse Readings from Last 3 Encounters:   09/01/23 88   08/22/23 87   08/19/23 (!) 117        Physical Examination:  Eyes: No conjunctival pallor present  Neck: No obvious lymphadenopathy appreciated  Respiratory: Bilateral air entry present  CVS: No significant edema  GI: Soft, nondistended  CNS: Much more awake, active, alert, still confused  Skin: No new rash  Musculoskeletal: No obvious new gross deformity noted    Medications:    Current Facility-Administered Medications:   •  chlorhexidine (PERIDEX) 0.12 % oral rinse 15 mL, 15 mL, Mouth/Throat, Q12H [address], Shivani Estrella MD, 15 mL at 08/31/23 2200  •  dextrose 5 % infusion, 100 mL/hr, Intravenous, Continuous, PERLA Junior, Last Rate: 100 mL/hr at 09/01/23 0805, 100 mL/hr at 09/01/23 0805  •  divalproex sodium (DEPAKOTE) DR tablet 1,000 mg, 1,000 mg, Oral, BID, Yaw Bonner MD  •  heparin (porcine) subcutaneous injection 5,000 Units, 5,000 Units, Subcutaneous, Q8H [address], 5,000 Units at 09/01/23 0532 **AND** [COMPLETED] Platelet count, , , Once, Nathaniel Sullivan MD  •  mirtazapine (REMERON) tablet 15 mg, 15 mg, Oral, HS, Santo Dadds, CRNP, 15 mg at 09/01/23 0430  •  propranolol (INDERAL) tablet 10 mg, 10 mg, Oral, TID, Santo Dadds, CRNP, 10 mg at 09/01/23 0430    Laboratory Results:  Results from last 7 days   Lab Units 09/01/23  0619 09/01/23  0530 08/31/23  0350 08/30/23  1813 08/30/23  1257   WBC Thousand/uL  --  11.21* 15.57*  --  17.41*   HEMOGLOBIN g/dL  --  14.4 13.4  --  14.5   HEMATOCRIT %  --  45.8 41.7  --  45.2   PLATELETS Thousands/uL  --  145* 134* 145* 163   SODIUM mmol/L 163* 161* 140  --  137   POTASSIUM mmol/L  --  3.9 3.5  --  4.5   CHLORIDE mmol/L  --  124* 105  --  108   CO2 mmol/L  --  34* 29  --  30   BUN mg/dL  --  4* 5  --  7   CREATININE mg/dL  --  0.45* 0.59*  --  0.66   CALCIUM mg/dL  --  10.3* 8.4  --  9.8   MAGNESIUM mg/dL  --  3.1* 2.3  --   --    PHOSPHORUS mg/dL  --  2.1* 3.2  --   --        CT abdomen pelvis with contrast   Final Result by Uday Leonard MD (08/30 1438)      1. Top-normal fluid-filled loops of hyperemic small bowel in the right lower quadrant, which could represent a nonspecific enteritis in the appropriate clinical context. 2.  Nonobstructing 2 mm calculus in the interpolar right kidney. The study was marked in EPIC for significant notification. Workstation performed: [id number]         CT head without contrast   Final Result by Garry Graham MD (08/30 1433)      No acute intracranial abnormality. Workstation performed: [id number]             Portions of the record may have been created with voice recognition software. Occasional wrong word or "sound a like" substitutions may have occurred due to the inherent limitations of voice recognition software.  Read the chart carefully and recognize, using context, where substitutions have occurred.

## 2023-09-01 NOTE — ASSESSMENT & PLAN NOTE
-She has a history of schizoaffective disorder, intellectual disability, bipolar disorder, presented with worsening encephalopathy, lethargy for the past two weeks.   -As per patient's sister, at her baseline she is generally very active, alert, able to maintain conversation, generally oriented despite having intellectual disability.  -Patients family reports symptoms started approximately two weeks when Clozaril was discontinued for abnormal white blood cell count. At home dose of lithium 750 mg twice daily.   - Underwent HD yesterday.    - Ammonia level WNL - no L-carnitine requirement   Lithium level of 1.4 overnight, increased to 2.0 this morning.     Plan:  -Toxicology and nephrology were consulted - recommendations appreciated  -Given rising Li levels, patient required 2 rounds of HD thus far  -Lithium levels at 0.5 today; no need to further monitor  -Continue to monitor electrolyte levels-replete as needed  -Continue aggressive IV hydration  -Continue to monitor neurological status  -Appreciate behavioral health recommendations, continue home medications except for lithium, remain off lithium

## 2023-09-01 NOTE — ASSESSMENT & PLAN NOTE
Patient noted to have sodium of 164 on daily labs  Followed by nephrology; started on D5 drip  Every 4 hour sodium levels  Awaiting urine studies to determine if lithium-induced DI versus result of aggressive fluid resuscitation in the setting of lithium overdose

## 2023-09-01 NOTE — TELEMEDICINE
TeleConsultation - [address] 28 y.o. female MRN: [id number]  Unit/Bed#: ICU 15 Encounter: [id number]        REQUIRED DOCUMENTATION:     1. This service was provided via Telemedicine. 2. Provider located at [facility].  3. TeleMed provider: Merlin Simpler, MD.  4. Identify all parties in room with patient during tele consult:  pt  5. Patient was then informed that this was a Telemedicine visit and that the exam was being conducted confidentially over secure lines. My office door was closed. No one else was in the room. Patient acknowledged consent and understanding of privacy and security of the Telemedicine visit, and gave us permission to have the assistant stay in the room in order to assist with the history and to conduct the exam.  I informed the patient that I have reviewed their record in Epic and presented the opportunity for them to ask any questions regarding the visit today. The patient agreed to participate. Assessment/Plan     Present on Admission:  • Intellectual disability  • Unspecified mood (affective) disorder (HCC)    Assessment:    44-year-old female presenting with lithium toxicity. Acute metabolic encephalopathy/delirium (lithium toxicity, hypernatremia); schizoaffective disorder by history; intellectual disability by history    Treatment Plan:    Recommend no psychiatric medication changes at this time while correcting hyponatremia. Recommend against restarting lithium at this time. May consider Ativan 1 mg p.o. or IV every 4 hours as needed agitation/anxiety. No suicide precautions are indicated. Reconsult psychiatry as needed to reassess medication regimen.     Current Medications:     Current Facility-Administered Medications   Medication Dose Route Frequency Provider Last Rate   • chlorhexidine  15 mL Mouth/Throat Q12H [address] Jennifer Morris MD     • dextrose  125 mL/hr Intravenous Continuous He Varma Caro,  mL/hr (09/01/23 1011)   • divalproex sodium 1,000 mg Oral BID Cherelle Benavidez MD     • heparin (porcine)  5,000 Units Subcutaneous [facility] Jennifer Morris MD     • mirtazapine  15 mg Oral HS PERLA Milian     • potassium-sodium phosphates  1 packet Oral TID Swetha Kwan MD     • propranolol  10 mg Oral TID PERLA Milian         Risks / Benefits of Treatment:    Risks, benefits, and possible side effects of medications explained to patient and patient verbalizes understanding. Other treatment modalities recommended as indicated:    · To be determined      Inpatient consult to Psychiatry  Consult performed by: Merlin Simpler, MD  Consult ordered by: Dannie Churchill, [address] [place] [address]        Physician Requesting Consult: Mika Camargo MD  Principal Problem:Lithium toxicity    Reason for Consult: Lithium toxicity; schizoaffective disorder      History of Present Illness      28-year-old female who presented to the hospital where the emergency department physician documented the followin yo F with congenital intellectual disability, associated psychiatric disorder, accompanied in the ED by her mother, with concern for progressive behavioral dysfunction for 2 weeks. At her baseline, she can ambulate, speak, manage ADLs, while her behavioral condition can manifests as rapid pressured, speech, manic periods, acting out behavior, hypersexual behavior, aggression. However, over the two weeks, she has become dependent on her caretakers for ADLs, and has stopped speaking with any clarity, rather is only shouting or moaning. She seems to be indicating discomfort or complaining of pain, clutching at her mid to lower abdomen. Her mother has tried to understand what pain she is having, and has only been able to understand her saying "heart" but recognizes she is not in any respiratory distress, nor is she indicating chest pain when she is moaning or crying out.   Near the onset of behavioral changes, they were at her PCP and there was concern that she was not taking clozapine:  Per PCP 8/22/23 "Spoke directly to the pharmacist at [facility].  There is a concern about the white count with Clozaril. It has to be followed every month, and uploaded to a website for that. Certain pharmacies are allowed to prescribe it. When she (pharmacist) plugged in the most recent data that was given to her, the white count was not appropriate for Clozaril.        History limited by:  Patient nonverbal and psychiatric disorder   used: Yes ([email or web address])          Prior to Admission Medications   Prescriptions Last Dose Informant Patient Reported?  Taking?   benztropine (COGENTIN) 2 mg tablet Not Taking Mother Yes No   Sig: Take 2 mg by mouth 2 (two) times a day   Patient not taking: Reported on 8/22/2023   cloZAPine (CLOZARIL) 100 mg tablet Not Taking Mother No No   Sig: Take 1.5 tablets (150 mg total) by mouth 2 (two) times a day   Patient not taking: Reported on 8/22/2023   divalproex sodium (DEPAKOTE) 500 mg DR tablet   Mother No Yes   Sig: Take 2 tablets (1,000 mg total) by mouth every 12 (twelve) hours   lithium carbonate (LITHOBID) 300 mg CR tablet   Mother No Yes   Sig: Take 1 tablet (300 mg total) by mouth 2 (two) times a day Total should be 750 mg two times a day   lithium carbonate (LITHOBID) 450 mg CR tablet   Mother No Yes   Sig: Take 1 tablet (450 mg total) by mouth 2 (two) times a day Total dose should be Lithium 750mg two times daily   mirtazapine (REMERON) 15 mg tablet   Mother No Yes   Sig: Take 1 tablet (15 mg total) by mouth daily at bedtime   propranolol (INDERAL) 10 mg tablet   Mother No Yes   Sig: Take 1 tablet (10 mg total) by mouth 3 (three) times a day   senna-docusate sodium (SENOKOT S) 8.6-50 mg per tablet   Mother No Yes   Sig: Take 2 tablets by mouth 2 (two) times a day      Facility-Administered Medications: None         Medical History[]Expand by Default        Past Medical History:   Diagnosis Date   • Psychiatric disorder              Surgical History[]Expand by Default   No past surgical history on file.        Family History[]Expand by Default   No family history on file. I have reviewed and agree with the history as documented.           E-Cigarette/Vaping   • E-Cigarette Use Never User              E-Cigarette/Vaping Substances   • Nicotine No     • THC No     • Flavoring No     • Other No        Social History            Tobacco Use   • Smoking status: Former       Packs/day: 0.50       Years: 10.00       Total pack years: 5.00       Types: Cigarettes   • Smokeless tobacco: Never   Vaping Use   • Vaping Use: Never used   Substance Use Topics   • Alcohol use: Yes       Alcohol/week: 3.0 standard drinks of alcohol       Types: 3 Glasses of wine per week       Comment: sips of wine   • Drug use: Yes       Frequency: 1.0 times per week       Types: Marijuana       Nursing reports the patient was dialyzed yesterday. She had 2 seizures yesterday. She has frequently been restless. Mother has reported patient is verbal at baseline at home. She has been nonverbal here. [place] suicide severity risk scale: Patient was nonverbal and unable to participate. In meeting with the patient she was resting in bed. She made eye contact and smiled. No apparent distress was evident. She occasionally verbalized a single syllable and otherwise was nonverbal.    Past Medical History:   Diagnosis Date   • Psychiatric disorder        Medical Review Of Systems:    Review of Systems    Meds/Allergies     all current active meds have been reviewed  No Known Allergies    Objective     Vital signs in last 24 hours:  Temp:  [97.9 °F (36.6 °C)-99.7 °F (37.6 °C)] 97.9 °F (36.6 °C)  HR:  [] 103  Resp:  [11-40] 19  BP: (106-160)/() 144/103      Intake/Output Summary (Last 24 hours) at 9/1/2023 1154  Last data filed at 9/1/2023 0600  Gross per 24 hour   Intake 2568.34 ml   Output 399 ml   Net 2169.34 ml           Lab Results:  I have personally reviewed all pertinent laboratory/tests results. Imaging Studies: CT abdomen pelvis with contrast    Result Date: 8/30/2023  Narrative: CT ABDOMEN AND PELVIS WITH IV CONTRAST INDICATION:   Abdominal pain, acute, nonlocalized abdominal pain COMPARISON: None. TECHNIQUE:  CT examination of the abdomen and pelvis was performed. Axial, sagittal, and coronal 2D reformatted images were created from the source data and submitted for interpretation. Radiation dose length product (DLP) for this visit:  421 mGy-cm . This examination, like all CT scans performed in the [facility], was performed utilizing techniques to minimize radiation dose exposure, including the use of iterative reconstruction and automated exposure control. IV Contrast:  85 mL of iohexol (OMNIPAQUE) Enteric Contrast: None. FINDINGS: ABDOMEN LOWER CHEST: Small hiatal hernia noted. No other clinically significant abnormality identified in the visualized lower chest. No consolidation or effusion. LIVER: Normal size and morphology. No suspicious lesion. BILIARY: No intrahepatic biliary ductal dilatation. Normal caliber common bile duct. GALLBLADDER: No calcified gallstones. Normal wall thickness. No pericholecystic inflammatory changes. SPLEEN: Within normal limits. No suspicious lesion. Normal spleen size. PANCREAS: Pancreatic parenchyma is within normal limits. No main pancreatic ductal dilatation. No pancreatic/peripancreatic inflammation. ADRENAL GLANDS: Within normal limits. KIDNEYS/URETERS: Normal size and position. Symmetric enhancement. One or more simple renal cyst(s) noted. No suspicious solid lesion. No hydronephrosis. Nonobstructing right interpolar calculus measuring 2 mm. Ureters within normal limits. STOMACH AND BOWEL: Stomach is underdistended limiting evaluation, but grossly within normal limits. The majority of the small bowel is collapsed.  There are multiple loops of top-normal-caliber distal small bowel in the right lower quadrant with fluid levels and hyperemia. Normal caliber large bowel. Large stool burden in the ascending colon. APPENDIX: Normal appendix. ABDOMINOPELVIC CAVITY: No ascites. No intraperitoneal free air. No lymphadenopathy. No retroperitoneal hematoma. VESSELS: Normal caliber abdominal aorta with no detectable atherosclerotic plaque. The celiac, SMA, and RITU are patent. The SMV and splenic vein are patent. The portal veins are patent. The hepatic veins are patent. PELVIS REPRODUCTIVE ORGANS: Within normal limits for patient's age. URINARY BLADDER: Within normal limits. No calculi. ABDOMINAL WALL/INGUINAL REGIONS: Tiny fat-containing umbilical hernia. BONES: Vertebral body height is maintained. No acute fracture or destructive osseous lesion. No significant degenerative changes. Impression: 1. Top-normal fluid-filled loops of hyperemic small bowel in the right lower quadrant, which could represent a nonspecific enteritis in the appropriate clinical context. 2.  Nonobstructing 2 mm calculus in the interpolar right kidney. The study was marked in EPIC for significant notification. Workstation performed: [id number]     CT head without contrast    Result Date: 8/30/2023  Narrative: CT BRAIN - WITHOUT CONTRAST INDICATION:   Mental status change, persistent or worsening altered mental status. COMPARISON:  None. TECHNIQUE:  CT examination of the brain was performed. Multiplanar 2D reformatted images were created from the source data. Radiation dose length product (DLP) for this visit:  881 mGy-cm . This examination, like all CT scans performed in the [facility], was performed utilizing techniques to minimize radiation dose exposure, including the use of iterative reconstruction and automated exposure control. IMAGE QUALITY:  Diagnostic.  FINDINGS: PARENCHYMA: Decreased attenuation is noted in periventricular and subcortical white matter demonstrating an appearance that is statistically most likely to represent mild microangiopathic change; this appearance is similar when compared to most recent prior examination. No CT signs of acute infarction. No intracranial mass, mass effect or midline shift. No acute parenchymal hemorrhage. VENTRICLES AND EXTRA-AXIAL SPACES:  Normal for the patient's age. VISUALIZED ORBITS: Normal visualized orbits. PARANASAL SINUSES: Normal visualized paranasal sinuses. CALVARIUM AND EXTRACRANIAL SOFT TISSUES:  Normal.     Impression: No acute intracranial abnormality. Workstation performed: [id number]     EKG/Pathology/Other Studies:   Lab Results   Component Value Date    VENTRATE 140 09/01/2023    ATRIALRATE 140 09/01/2023    PRINT 188 09/01/2023    QRSDINT 83 09/01/2023    QTINT 275 09/01/2023    QTCINT 420 09/01/2023    PAXIS 77 09/01/2023    QRSAXIS 3 09/01/2023    TWAVEAXIS 220 09/01/2023        Code Status: Level 1 - Full Code  Advance Directive and Living Will:      Power of :    POLST:      Screenings:    1. Nutrition Screening  · Not available on chart    2. Pain Screening  Not available on chart    3. Suicide Screening  Not available on chart    Counseling / Coordination of Care: Total floor / unit time spent today 30 minutes. Greater than 50% of total time was spent with the patient and / or family counseling and / or coordination of care. A description of the counseling / coordination of care: Chart review, patient evaluation, coordination communication with staff, nursing and provider.

## 2023-09-02 LAB
ANION GAP SERPL CALCULATED.3IONS-SCNC: 3 MMOL/L
ANION GAP SERPL CALCULATED.3IONS-SCNC: 4 MMOL/L
ANION GAP SERPL CALCULATED.3IONS-SCNC: 5 MMOL/L
ANION GAP SERPL CALCULATED.3IONS-SCNC: 6 MMOL/L
BASOPHILS # BLD AUTO: 0.05 THOUSANDS/ÂΜL (ref 0–0.1)
BASOPHILS NFR BLD AUTO: 0 % (ref 0–1)
BUN SERPL-MCNC: 10 MG/DL (ref 5–25)
BUN SERPL-MCNC: 6 MG/DL (ref 5–25)
BUN SERPL-MCNC: 8 MG/DL (ref 5–25)
BUN SERPL-MCNC: 9 MG/DL (ref 5–25)
CALCIUM SERPL-MCNC: 10.3 MG/DL (ref 8.4–10.2)
CALCIUM SERPL-MCNC: 10.4 MG/DL (ref 8.4–10.2)
CALCIUM SERPL-MCNC: 10.5 MG/DL (ref 8.4–10.2)
CALCIUM SERPL-MCNC: 9.5 MG/DL (ref 8.4–10.2)
CHLORIDE SERPL-SCNC: 114 MMOL/L (ref 96–108)
CHLORIDE SERPL-SCNC: 123 MMOL/L (ref 96–108)
CHLORIDE SERPL-SCNC: 124 MMOL/L (ref 96–108)
CHLORIDE SERPL-SCNC: 127 MMOL/L (ref 96–108)
CO2 SERPL-SCNC: 31 MMOL/L (ref 21–32)
CO2 SERPL-SCNC: 35 MMOL/L (ref 21–32)
CO2 SERPL-SCNC: 36 MMOL/L (ref 21–32)
CO2 SERPL-SCNC: 37 MMOL/L (ref 21–32)
CREAT SERPL-MCNC: 0.49 MG/DL (ref 0.6–1.3)
CREAT SERPL-MCNC: 0.52 MG/DL (ref 0.6–1.3)
CREAT SERPL-MCNC: 0.52 MG/DL (ref 0.6–1.3)
CREAT SERPL-MCNC: 0.57 MG/DL (ref 0.6–1.3)
EOSINOPHIL # BLD AUTO: 0.07 THOUSAND/ÂΜL (ref 0–0.61)
EOSINOPHIL NFR BLD AUTO: 1 % (ref 0–6)
ERYTHROCYTE [DISTWIDTH] IN BLOOD BY AUTOMATED COUNT: 14 % (ref 11.6–15.1)
GFR SERPL CREATININE-BSD FRML MDRD: 121 ML/MIN/1.73SQ M
GFR SERPL CREATININE-BSD FRML MDRD: 125 ML/MIN/1.73SQ M
GFR SERPL CREATININE-BSD FRML MDRD: 125 ML/MIN/1.73SQ M
GFR SERPL CREATININE-BSD FRML MDRD: 127 ML/MIN/1.73SQ M
GLUCOSE SERPL-MCNC: 105 MG/DL (ref 65–140)
GLUCOSE SERPL-MCNC: 229 MG/DL (ref 65–140)
GLUCOSE SERPL-MCNC: 421 MG/DL (ref 65–140)
GLUCOSE SERPL-MCNC: 91 MG/DL (ref 65–140)
HCT VFR BLD AUTO: 45.9 % (ref 34.8–46.1)
HGB BLD-MCNC: 14.5 G/DL (ref 11.5–15.4)
IMM GRANULOCYTES # BLD AUTO: 0.05 THOUSAND/UL (ref 0–0.2)
IMM GRANULOCYTES NFR BLD AUTO: 0 % (ref 0–2)
LYMPHOCYTES # BLD AUTO: 1.86 THOUSANDS/ÂΜL (ref 0.6–4.47)
LYMPHOCYTES NFR BLD AUTO: 14 % (ref 14–44)
MAGNESIUM SERPL-MCNC: 2.6 MG/DL (ref 1.9–2.7)
MCH RBC QN AUTO: 29.7 PG (ref 26.8–34.3)
MCHC RBC AUTO-ENTMCNC: 31.6 G/DL (ref 31.4–37.4)
MCV RBC AUTO: 94 FL (ref 82–98)
MONOCYTES # BLD AUTO: 1.79 THOUSAND/ÂΜL (ref 0.17–1.22)
MONOCYTES NFR BLD AUTO: 14 % (ref 4–12)
NEUTROPHILS # BLD AUTO: 9.11 THOUSANDS/ÂΜL (ref 1.85–7.62)
NEUTS SEG NFR BLD AUTO: 71 % (ref 43–75)
NRBC BLD AUTO-RTO: 0 /100 WBCS
PHOSPHATE SERPL-MCNC: 3.3 MG/DL (ref 2.7–4.5)
PLATELET # BLD AUTO: 130 THOUSANDS/UL (ref 149–390)
PMV BLD AUTO: 10.9 FL (ref 8.9–12.7)
POTASSIUM SERPL-SCNC: 3.7 MMOL/L (ref 3.5–5.3)
POTASSIUM SERPL-SCNC: 3.7 MMOL/L (ref 3.5–5.3)
POTASSIUM SERPL-SCNC: 4 MMOL/L (ref 3.5–5.3)
POTASSIUM SERPL-SCNC: 4.5 MMOL/L (ref 3.5–5.3)
RBC # BLD AUTO: 4.89 MILLION/UL (ref 3.81–5.12)
SODIUM SERPL-SCNC: 150 MMOL/L (ref 135–147)
SODIUM SERPL-SCNC: 164 MMOL/L (ref 135–147)
SODIUM SERPL-SCNC: 164 MMOL/L (ref 135–147)
SODIUM SERPL-SCNC: 167 MMOL/L (ref 135–147)
WBC # BLD AUTO: 12.93 THOUSAND/UL (ref 4.31–10.16)

## 2023-09-02 PROCEDURE — 99232 SBSQ HOSP IP/OBS MODERATE 35: CPT | Performed by: INTERNAL MEDICINE

## 2023-09-02 PROCEDURE — NC001 PR NO CHARGE: Performed by: INTERNAL MEDICINE

## 2023-09-02 PROCEDURE — 80048 BASIC METABOLIC PNL TOTAL CA: CPT

## 2023-09-02 PROCEDURE — 80048 BASIC METABOLIC PNL TOTAL CA: CPT | Performed by: INTERNAL MEDICINE

## 2023-09-02 PROCEDURE — 84100 ASSAY OF PHOSPHORUS: CPT | Performed by: INTERNAL MEDICINE

## 2023-09-02 PROCEDURE — 83735 ASSAY OF MAGNESIUM: CPT | Performed by: INTERNAL MEDICINE

## 2023-09-02 PROCEDURE — 85025 COMPLETE CBC W/AUTO DIFF WBC: CPT | Performed by: INTERNAL MEDICINE

## 2023-09-02 RX ORDER — LORAZEPAM 2 MG/ML
1 INJECTION INTRAMUSCULAR EVERY 6 HOURS PRN
Status: DISCONTINUED | OUTPATIENT
Start: 2023-09-02 | End: 2023-09-05

## 2023-09-02 RX ORDER — HYDROXYZINE HYDROCHLORIDE 25 MG/1
50 TABLET, FILM COATED ORAL ONCE
Status: COMPLETED | OUTPATIENT
Start: 2023-09-02 | End: 2023-09-02

## 2023-09-02 RX ORDER — DEXTROSE MONOHYDRATE 50 MG/ML
150 INJECTION, SOLUTION INTRAVENOUS CONTINUOUS
Status: DISCONTINUED | OUTPATIENT
Start: 2023-09-02 | End: 2023-09-06

## 2023-09-02 RX ORDER — OLANZAPINE 5 MG/1
2.5 TABLET ORAL
Status: DISCONTINUED | OUTPATIENT
Start: 2023-09-02 | End: 2023-09-06

## 2023-09-02 RX ADMIN — PROPRANOLOL HYDROCHLORIDE 10 MG: 10 TABLET ORAL at 09:20

## 2023-09-02 RX ADMIN — OLANZAPINE 2.5 MG: 5 TABLET, FILM COATED ORAL at 17:46

## 2023-09-02 RX ADMIN — HEPARIN SODIUM 5000 UNITS: 5000 INJECTION INTRAVENOUS; SUBCUTANEOUS at 05:20

## 2023-09-02 RX ADMIN — CHLORHEXIDINE GLUCONATE 15 ML: 1.2 RINSE ORAL at 23:40

## 2023-09-02 RX ADMIN — CHLORHEXIDINE GLUCONATE 15 ML: 1.2 RINSE ORAL at 09:20

## 2023-09-02 RX ADMIN — DIVALPROEX SODIUM 1000 MG: 500 TABLET, DELAYED RELEASE ORAL at 09:20

## 2023-09-02 RX ADMIN — LORAZEPAM 0.5 MG: 2 INJECTION INTRAMUSCULAR; INTRAVENOUS at 09:19

## 2023-09-02 RX ADMIN — DIVALPROEX SODIUM 1000 MG: 500 TABLET, DELAYED RELEASE ORAL at 23:40

## 2023-09-02 RX ADMIN — LORAZEPAM 1 MG: 2 INJECTION INTRAMUSCULAR; INTRAVENOUS at 20:47

## 2023-09-02 RX ADMIN — LORAZEPAM 0.5 MG: 2 INJECTION INTRAMUSCULAR; INTRAVENOUS at 00:43

## 2023-09-02 RX ADMIN — HYDROXYZINE HYDROCHLORIDE 50 MG: 25 TABLET, FILM COATED ORAL at 16:19

## 2023-09-02 RX ADMIN — HEPARIN SODIUM 5000 UNITS: 5000 INJECTION INTRAVENOUS; SUBCUTANEOUS at 23:40

## 2023-09-02 RX ADMIN — HEPARIN SODIUM 5000 UNITS: 5000 INJECTION INTRAVENOUS; SUBCUTANEOUS at 15:05

## 2023-09-02 RX ADMIN — DEXTROSE 200 ML/HR: 5 SOLUTION INTRAVENOUS at 15:04

## 2023-09-02 RX ADMIN — PROPRANOLOL HYDROCHLORIDE 10 MG: 10 TABLET ORAL at 15:05

## 2023-09-02 RX ADMIN — DEXTROSE 200 ML/HR: 5 SOLUTION INTRAVENOUS at 23:00

## 2023-09-02 RX ADMIN — DEXTROSE 150 ML/HR: 5 SOLUTION INTRAVENOUS at 08:39

## 2023-09-02 RX ADMIN — PROPRANOLOL HYDROCHLORIDE 10 MG: 10 TABLET ORAL at 23:40

## 2023-09-02 RX ADMIN — MIRTAZAPINE 15 MG: 15 TABLET, FILM COATED ORAL at 23:40

## 2023-09-02 RX ADMIN — LORAZEPAM 1 MG: 2 INJECTION INTRAMUSCULAR; INTRAVENOUS at 11:28

## 2023-09-02 NOTE — ASSESSMENT & PLAN NOTE
-She has a history of schizoaffective disorder, intellectual disability, bipolar disorder, presented with worsening encephalopathy, lethargy for the past two weeks.   -As per patient's sister, at her baseline she is generally very active, alert, able to maintain conversation, generally oriented despite having intellectual disability.  -Patients family reports symptoms started approximately two weeks when Clozaril was discontinued for abnormal white blood cell count. At home dose of lithium 750 mg twice daily.   - Underwent HD yesterday.    - Ammonia level WNL - no L-carnitine requirement   Lithium level of 1.4 overnight, increased to 2.0 this morning.     Plan:  -Toxicology and nephrology were consulted - recommendations appreciated  -Given rising Li levels, patient required 2 rounds of HD thus far  -Lithium levels at 0.5 today; no need to further monitor  -Continue to monitor electrolyte levels-replete as needed  -Continue aggressive IV hydration  -Continue to monitor neurological status  -Appreciate behavioral health recommendations, continue home medications except for lithium, remain off lithium  - Patient's sporadic agitation refractory to Ativan; will trial hydroxyzine and as needed Zyprexa

## 2023-09-02 NOTE — PLAN OF CARE
Problem: Potential for Falls  Goal: Patient will remain free of falls  Description: INTERVENTIONS:  - Educate patient/family on patient safety including physical limitations  - Instruct patient to call for assistance with activity   - Consult OT/PT to assist with strengthening/mobility   - Keep Call bell within reach  - Keep bed low and locked with side rails adjusted as appropriate  - Keep care items and personal belongings within reach  - Initiate and maintain comfort rounds  - Make Fall Risk Sign visible to staff  - Offer Toileting every 2 Hours, in advance of need  - Initiate/Maintain BED alarm  - Obtain necessary fall risk management equipment: bed alarm, 1:1 observation.  - Apply yellow socks and bracelet for high fall risk patients  - Consider moving patient to room near nurses station  Outcome: Progressing     Problem: MOBILITY - ADULT  Goal: Maintain or return to baseline ADL function  Description: INTERVENTIONS:  -  Assess patient's ability to carry out ADLs; assess patient's baseline for ADL function and identify physical deficits which impact ability to perform ADLs (bathing, care of mouth/teeth, toileting, grooming, dressing, etc.)  - Assess/evaluate cause of self-care deficits   - Assess range of motion  - Assess patient's mobility; develop plan if impaired  - Assess patient's need for assistive devices and provide as appropriate  - Encourage maximum independence but intervene and supervise when necessary  - Involve family in performance of ADLs  - Assess for home care needs following discharge   - Consider OT consult to assist with ADL evaluation and planning for discharge  - Provide patient education as appropriate  Outcome: Progressing  Goal: Maintains/Returns to pre admission functional level  Description: INTERVENTIONS:  - Perform BMAT or MOVE assessment daily.   - Set and communicate daily mobility goal to care team and patient/family/caregiver.    - Collaborate with rehabilitation services on mobility goals if consulted  - Perform Range of Motion 4 times a day. - Reposition patient every 2 hours. - Dangle patient 0 times a day  - Stand patient 0 times a day  - Ambulate patient 0 times a day  - Out of bed to chair 0 times a day   - Out of bed for meals 0 times a day  - Out of bed for toileting  - Record patient progress and toleration of activity level   Outcome: Progressing     Problem: NEUROSENSORY - ADULT  Goal: Achieves maximal functionality and self care  Description: INTERVENTIONS  - Monitor swallowing and airway patency with patient fatigue and changes in neurological status  - Encourage and assist patient to increase activity and self care.    - Encourage visually impaired, hearing impaired and aphasic patients to use assistive/communication devices  Outcome: Progressing     Problem: CARDIOVASCULAR - ADULT  Goal: Maintains optimal cardiac output and hemodynamic stability  Description: INTERVENTIONS:  - Monitor I/O, vital signs and rhythm  - Monitor for S/S and trends of decreased cardiac output  - Administer and titrate ordered vasoactive medications to optimize hemodynamic stability  - Assess quality of pulses, skin color and temperature  - Assess for signs of decreased coronary artery perfusion  - Instruct patient to report change in severity of symptoms  Outcome: Progressing  Goal: Absence of cardiac dysrhythmias or at baseline rhythm  Description: INTERVENTIONS:  - Continuous cardiac monitoring, vital signs, obtain 12 lead EKG if ordered  - Administer antiarrhythmic and heart rate control medications as ordered  - Monitor electrolytes and administer replacement therapy as ordered  Outcome: Progressing     Problem: GASTROINTESTINAL - ADULT  Goal: Maintains or returns to baseline bowel function  Description: INTERVENTIONS:  - Assess bowel function  - Encourage oral fluids to ensure adequate hydration  - Administer IV fluids if ordered to ensure adequate hydration  - Administer ordered medications as needed  - Encourage mobilization and activity  - Consider nutritional services referral to assist patient with adequate nutrition and appropriate food choices  Outcome: Progressing  Goal: Maintains adequate nutritional intake  Description: INTERVENTIONS:  - Monitor percentage of each meal consumed  - Identify factors contributing to decreased intake, treat as appropriate  - Assist with meals as needed  - Monitor I&O, weight, and lab values if indicated  - Obtain nutrition services referral as needed  Outcome: Progressing     Problem: GENITOURINARY - ADULT  Goal: Maintains or returns to baseline urinary function  Description: INTERVENTIONS:  - Assess urinary function  - Encourage oral fluids to ensure adequate hydration if ordered  - Administer IV fluids as ordered to ensure adequate hydration  - Administer ordered medications as needed  - Offer frequent toileting  - Follow urinary retention protocol if ordered  Outcome: Progressing     Problem: METABOLIC, FLUID AND ELECTROLYTES - ADULT  Goal: Electrolytes maintained within normal limits  Description: INTERVENTIONS:  - Monitor labs and assess patient for signs and symptoms of electrolyte imbalances  - Administer electrolyte replacement as ordered  - Monitor response to electrolyte replacements, including repeat lab results as appropriate  - Instruct patient on fluid and nutrition as appropriate  Outcome: Progressing  Goal: Fluid balance maintained  Description: INTERVENTIONS:  - Monitor labs   - Monitor I/O and WT  - Instruct patient on fluid and nutrition as appropriate  - Assess for signs & symptoms of volume excess or deficit  Outcome: Progressing     Problem: SKIN/TISSUE INTEGRITY - ADULT  Goal: Skin Integrity remains intact(Skin Breakdown Prevention)  Description: Assess:  -Perform [place] assessment every 12 hours.  -Clean and moisturize skin as needed.  -Inspect skin when repositioning, toileting, and assisting with ADLS  -Assess under medical devices such as mitts every 4 hours.  -Assess extremities for adequate circulation and sensation     Bed Management:  -Have minimal linens on bed & keep smooth, unwrinkled  -Change linens as needed when moist or perspiring  -Avoid sitting or lying in one position for more than 2 hours while in bed  -Keep HOB at 30 degrees     Toileting:  -Offer bedside commode  -Assess for incontinence every 2 hours. -Use incontinent care products after each incontinent episode such as wipes, cleanser, barrier cream.      Activity:  -Mobilize patient 0 times a day  -Encourage activity and walks on unit  -Encourage or provide ROM exercises   -Turn and reposition patient every 2 Hours  -Use appropriate equipment to lift or move patient in bed  -Instruct/ Assist with weight shifting every 2 when out of bed in chair  -Consider limitation of chair time 2 hour intervals    Skin Care:  -Avoid use of baby powder, tape, friction and shearing, hot water or constrictive clothing  -Relieve pressure over bony prominences using wedges and pillows.  -Do not massage red bony areas    Next Steps:  -Teach patient strategies to minimize risks such as n/a   -Consider consults to  interdisciplinary teams such as pt/ot.   Outcome: Progressing     Problem: MUSCULOSKELETAL - ADULT  Goal: Maintain or return mobility to safest level of function  Description: INTERVENTIONS:  - Assess patient's ability to carry out ADLs; assess patient's baseline for ADL function and identify physical deficits which impact ability to perform ADLs (bathing, care of mouth/teeth, toileting, grooming, dressing, etc.)  - Assess/evaluate cause of self-care deficits   - Assess range of motion  - Assess patient's mobility  - Assess patient's need for assistive devices and provide as appropriate  - Encourage maximum independence but intervene and supervise when necessary  - Involve family in performance of ADLs  - Assess for home care needs following discharge   - Consider OT consult to assist with ADL evaluation and planning for discharge  - Provide patient education as appropriate  Outcome: Progressing     Problem: PAIN - ADULT  Goal: Verbalizes/displays adequate comfort level or baseline comfort level  Description: Interventions:  - Encourage patient to monitor pain and request assistance  - Assess pain using appropriate pain scale  - Administer analgesics based on type and severity of pain and evaluate response  - Implement non-pharmacological measures as appropriate and evaluate response  - Consider cultural and social influences on pain and pain management  - Notify physician/advanced practitioner if interventions unsuccessful or patient reports new pain  Outcome: Progressing     Goal: Maintain or return to baseline ADL function  Description: INTERVENTIONS:  -  Assess patient's ability to carry out ADLs; assess patient's baseline for ADL function and identify physical deficits which impact ability to perform ADLs (bathing, care of mouth/teeth, toileting, grooming, dressing, etc.)  - Assess/evaluate cause of self-care deficits   - Assess range of motion  - Assess patient's mobility; develop plan if impaired  - Assess patient's need for assistive devices and provide as appropriate  - Encourage maximum independence but intervene and supervise when necessary  - Involve family in performance of ADLs  - Assess for home care needs following discharge   - Consider OT consult to assist with ADL evaluation and planning for discharge  - Provide patient education as appropriate  Outcome: Progressing     Problem: DISCHARGE PLANNING  Goal: Discharge to home or other facility with appropriate resources  Description: INTERVENTIONS:  - Identify barriers to discharge w/patient and caregiver  - Arrange for needed discharge resources and transportation as appropriate  - Identify discharge learning needs (meds, wound care, etc.)  - Arrange for interpretive services to assist at discharge as needed  - Refer to Case Management Department for coordinating discharge planning if the patient needs post-hospital services based on physician/advanced practitioner order or complex needs related to functional status, cognitive ability, or social support system  Outcome: Progressing     Problem: Knowledge Deficit  Goal: Patient/family/caregiver demonstrates understanding of disease process, treatment plan, medications, and discharge instructions  Description: Complete learning assessment and assess knowledge base.   Interventions:  - Provide teaching at level of understanding  - Provide teaching via preferred learning methods  Outcome: Progressing     Problem: SAFETY,RESTRAINT: NV/NON-SELF DESTRUCTIVE BEHAVIOR  Goal: Remains free of harm/injury (restraint for non violent/non self-detsructive behavior)  Description: INTERVENTIONS:  - Instruct patient/family regarding restraint use   - Assess and monitor physiologic and psychological status   - Provide interventions and comfort measures to meet assessed patient needs   - Identify and implement measures to help patient regain control  - Assess readiness for release of restraint   Outcome: Progressing  Goal: Returns to optimal restraint-free functioning  Description: INTERVENTIONS:  - Assess the patient's behavior and symptoms that indicate continued need for restraint  - Identify and implement measures to help patient regain control  - Assess readiness for release of restraint   Outcome: Progressing     Problem: Prexisting or High Potential for Compromised Skin Integrity  Goal: Skin integrity is maintained or improved  Description: INTERVENTIONS:  - Identify patients at risk for skin breakdown  - Assess and monitor skin integrity  - Assess and monitor nutrition and hydration status  - Monitor labs   - Assess for incontinence   - Turn and reposition patient  - Assist with mobility/ambulation  - Relieve pressure over bony prominences  - Avoid friction and shearing  - Provide appropriate hygiene as needed including keeping skin clean and dry  - Evaluate need for skin moisturizer/barrier cream  - Collaborate with interdisciplinary team   - Patient/family teaching  - Consider wound care consult   Outcome: Progressing

## 2023-09-02 NOTE — ASSESSMENT & PLAN NOTE
Patient noted to have sodium of 164 on daily labs  Followed by nephrology; started on D5 drip  Every 4 hour sodium levels  Awaiting urine studies to determine if lithium-induced DI versus result of aggressive fluid resuscitation in the setting of lithium overdose    9/2- sodium dropped overnight; became back up and is now 167 on morning labs. 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

## 2023-09-02 NOTE — PROGRESS NOTES
NEPHROLOGY PROGRESS NOTE   America Lose 28 y.o. female MRN: [id number]  Unit/Bed#: ICU 15 Encounter: [id number]      ASSESSMENT/PLAN:  1. Lithium toxicity: Lithium level 4.6 on admission. Status post HD x2 (last treatment 8/31). · Lithium level today down to 0.5  · Remove temporary femoral HD catheter  · Creatinine remains stable at 0.5  · Continue to hold lithium  2. Acute hypernatremia: sodium normal at baseline and acutely increased to 167 yesterday . · Currently on D5W at 150cc/h   · Sodium today 164  · Encourage po free water intake although patient refusing per staff   · Urine osm 103, urine sodium 20  · Consider DDAVP if no improvement   3. Hypophosphatemia: improved to 3.3   4. Nonobstructing R renal calculus   5. Schizoaffective disorder and intellectual disability: psych consulted. At baseline can ambulate, speak and manage ADLs per psych note     Plan Summary:   • Remove temporary HD catheter   • Increase D5W to 200cc/h   • If no improvement in next sodium will give DDAVP  • Continue sodium q8h      SUBJECTIVE:  Patient confused and agitated. Drink a small glass of orange juice but otherwise refusing to drink.     OBJECTIVE:  Current Weight: Weight - Scale: 51.2 kg (112 lb 14 oz)  Vitals:    09/02/23 1245   BP: 116/83   Pulse: 90   Resp: (!) 31   Temp:    SpO2: 97%       Intake/Output Summary (Last 24 hours) at 9/2/2023 1257  Last data filed at 9/2/2023 1201  Gross per 24 hour   Intake 6730 ml   Output 4275 ml   Net 2455 ml       General: Ill-appearing  Skin:  No rash  Eyes:  Sclerae anicteric, no periorbital edema   ENT:  Moist mucous membranes  Neck: Dry mucous membranes  Chest:  Clear to auscultation bilaterally with no wheezes, rales or rhonchi  CVS: Tachycardic  Abdomen:  Soft,  nondistended  Neuro: Confused, in restraints  Extremities: no lower extremity edema   : mayer in place with clear urine output      Medications:  Scheduled Meds:  Current Facility-Administered Medications Medication Dose Route Frequency Provider Last Rate   • chlorhexidine  15 mL Mouth/Throat Q12H [address] Sandra Ahn MD     • dextrose  150 mL/hr Intravenous Continuous Silvino Marino PA-C 150 mL/hr (09/02/23 2063)   • divalproex sodium  1,000 mg Oral BID Taqueria Moe MD     • heparin (porcine)  5,000 Units Subcutaneous [facility] Sandra Ahn MD     • LORazepam  1 mg Intravenous Q6H PRN Renetta Preciado MD     • mirtazapine  15 mg Oral HS PERLA Hall     • propranolol  10 mg Oral TID PERLA Hall         PRN Meds:.•  LORazepam    Continuous Infusions:dextrose, 150 mL/hr, Last Rate: 150 mL/hr (09/02/23 0839)        Laboratory Results:  Results from last 7 days   Lab Units 09/02/23  0842 09/02/23  0512 09/01/23  2350 09/01/23  1657 09/01/23  1241 09/01/23  0619 09/01/23  0530 08/31/23  0350 08/30/23  1813 08/30/23  1257   WBC Thousand/uL  --  12.93*  --   --   --   --  11.21* 15.57*  --  17.41*   HEMOGLOBIN g/dL  --  14.5  --   --   --   --  14.4 13.4  --  14.5   HEMATOCRIT %  --  45.9  --   --   --   --  45.8 41.7  --  45.2   PLATELETS Thousands/uL  --  130*  --   --   --   --  145* 134* 145* 163   SODIUM mmol/L 164*  --  167* 157* 164* 163* 161* 140  --  137   POTASSIUM mmol/L 3.7  --  4.0 4.2 4.3  --  3.9 3.5  --  4.5   CHLORIDE mmol/L 124*  --  127* 123* 129*  --  124* 105  --  108   CO2 mmol/L 37*  --  36* 29 32  --  34* 29  --  30   BUN mg/dL 6  --  8 6 6  --  4* 5  --  7   CREATININE mg/dL 0.57*  --  0.52* 0.54* 0.48*  --  0.45* 0.59*  --  0.66   CALCIUM mg/dL 10.3*  --  10.5* 9.6 10.2  --  10.3* 8.4  --  9.8   MAGNESIUM mg/dL  --  2.6  --   --   --   --  3.1* 2.3  --   --    PHOSPHORUS mg/dL  --  3.3  --   --   --   --  2.1* 3.2  --   --

## 2023-09-02 NOTE — PROGRESS NOTES
[facility]  Progress Note  Name: Prisca Landa  MRN: [id number]  Unit/Bed#: ICU 15 I Date of Admission: 8/30/2023   Date of Service: 9/2/2023 I Hospital Day: 3    Assessment/Plan   Hypernatremia  Assessment & Plan  Patient noted to have sodium of 164 on daily labs  Followed by nephrology; started on D5 drip  Every 4 hour sodium levels  Awaiting urine studies to determine if lithium-induced DI versus result of aggressive fluid resuscitation in the setting of lithium overdose    9/2- sodium dropped overnight; became back up and is now 167 on morning labs. 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 Unspecified mood (affective) disorder (HCC)  Assessment & Plan  Continue home medications  Monitor off lithium    Intellectual disability  Assessment & Plan  Patient has profound intellectual disability; on exam was able to say hello, but afterwards proceeded to make monkey noises  Unclear baseline, will discuss with patient's family  Continue supportive care; monitor off lithium; correct metabolic deficiencies    * Acute encephalopathy 2/2 lithium toxicity  Assessment & Plan  -She has a history of schizoaffective disorder, intellectual disability, bipolar disorder, presented with worsening encephalopathy, lethargy for the past two weeks.   -As per patient's sister, at her baseline she is generally very active, alert, able to maintain conversation, generally oriented despite having intellectual disability.  -Patients family reports symptoms started approximately two weeks when Clozaril was discontinued for abnormal white blood cell count. At home dose of lithium 750 mg twice daily.   - Underwent HD yesterday.    - Ammonia level WNL - no L-carnitine requirement   Lithium level of 1.4 overnight, increased to 2.0 this morning.     Plan:  -Toxicology and nephrology were consulted - recommendations appreciated  -Given rising Katlyn Quigley levels, patient required 2 rounds of HD thus far  -Lithium levels at 0.5 today; no need to further monitor  -Continue to monitor electrolyte levels-replete as needed  -Continue aggressive IV hydration  -Continue to monitor neurological status  -Appreciate behavioral health recommendations, continue home medications except for lithium, remain off lithium  - Patient's sporadic agitation refractory to Ativan; will trial hydroxyzine and as needed Zyprexa             VTE Pharmacologic Prophylaxis:   Pharmacologic: Heparin  Mechanical VTE Prophylaxis in Place: Yes    Patient Centered Rounds: I have performed bedside rounds with nursing staff today. Discussions with Specialists or Other Care Team Provider: Nephrology    Education and Discussions with Family / Patient: We will attempt to discuss treatment plan with family and encourage them to ask questions and participate. Time Spent for Care: 45 minutes. More than 50% of total time spent on counseling and coordination of care as described above. Current Length of Stay: 3 day(s)    Current Patient Status: Inpatient   Certification Statement: The patient will continue to require additional inpatient hospital stay due to Treatment of lithium induced diabetes insipidus    Discharge Plan: To be determined    Code Status: Level 1 - Full Code      Subjective:   Patient seen and examined bedside, has sporadic agitation and will treat with hydroxyzine and Zyprexa (appears refractory to Ativan). This appears to be lithium induced diabetes insipidus; continue IV fluids as per nephrology. Objective:     Vitals:   Temp (24hrs), Av.4 °F (36.9 °C), Min:98 °F (36.7 °C), Max:98.8 °F (37.1 °C)    Temp:  [98 °F (36.7 °C)-98.8 °F (37.1 °C)] 98.4 °F (36.9 °C)  HR:  [] 116  Resp:  [22-46] 38  BP: (116-142)/() 139/82  SpO2:  [94 %-98 %] 98 %  Body mass index is 19.38 kg/m². Input and Output Summary (last 24 hours):        Intake/Output Summary (Last 24 hours) at 9/2/2023 1627  Last data filed at 9/2/2023 1401  Gross per 24 hour   Intake 4267.5 ml   Output 4575 ml   Net -307.5 ml       Physical Exam:     Physical Exam  Vitals and nursing note reviewed. Constitutional:       General: She is not in acute distress. Appearance: She is well-developed. HENT:      Head: Normocephalic and atraumatic. Eyes:      Conjunctiva/sclera: Conjunctivae normal.   Cardiovascular:      Rate and Rhythm: Normal rate. Heart sounds: No murmur heard. Pulmonary:      Effort: Pulmonary effort is normal. No respiratory distress. Abdominal:      Palpations: Abdomen is soft. Tenderness: There is no abdominal tenderness. Musculoskeletal:         General: No swelling. Cervical back: Neck supple. Skin:     General: Skin is warm and dry. Neurological:      Mental Status: She is alert. Comments: Alert and oriented 0   Psychiatric:      Comments: Unclear baseline as patient with profound intellectual disability           Additional Data:     Labs:    Results from last 7 days   Lab Units 09/02/23  0512   WBC Thousand/uL 12.93*   HEMOGLOBIN g/dL 14.5   HEMATOCRIT % 45.9   PLATELETS Thousands/uL 130*   NEUTROS PCT % 71   LYMPHS PCT % 14   MONOS PCT % 14*   EOS PCT % 1     Results from last 7 days   Lab Units 09/02/23  0842 09/01/23  0619 09/01/23  0530   SODIUM mmol/L 164*   < > 161*   POTASSIUM mmol/L 3.7   < > 3.9   CHLORIDE mmol/L 124*   < > 124*   CO2 mmol/L 37*   < > 34*   BUN mg/dL 6   < > 4*   CREATININE mg/dL 0.57*   < > 0.45*   ANION GAP mmol/L 3   < > 3   CALCIUM mg/dL 10.3*   < > 10.3*   ALBUMIN g/dL  --   --  3.5   TOTAL BILIRUBIN mg/dL  --   --  0.29   ALK PHOS U/L  --   --  114*   ALT U/L  --   --  14   AST U/L  --   --  14   GLUCOSE RANDOM mg/dL 229*   < > 160*    < > = values in this interval not displayed. Results from last 7 days   Lab Units 08/31/23  0350   INR  1.01                       * I Have Reviewed All Lab Data Listed Above.   * Additional Pertinent Lab Tests Reviewed: All Labs Within Last 24 Hours Reviewed    Imaging:    Imaging Reports Reviewed Today Include:   Imaging Personally Reviewed by Myself Includes:      Recent Cultures (last 7 days):           Last 24 Hours Medication List:   Current Facility-Administered Medications   Medication Dose Route Frequency Provider Last Rate   • chlorhexidine  15 mL Mouth/Throat Q12H [address] Christina Gonzalez MD     • dextrose  200 mL/hr Intravenous Continuous Olvin Pilon, PA-C 200 mL/hr (09/02/23 1504)   • divalproex sodium  1,000 mg Oral BID Nathaniel Sullivan MD     • heparin (porcine)  5,000 Units Subcutaneous Q8H [address] Adamaris Humphrey MD     • LORazepam  1 mg Intravenous Q6H PRN Irving Mora MD     • mirtazapine  15 mg Oral HS PERLA Rice     • OLANZapine  2.5 mg Oral Q3H PRN Irving Mora MD     • propranolol  10 mg Oral TID PERLA Rice          Today, Patient Was Seen By: Regan Chew MD    ** Please Note: Dictation voice to text software may have been used in the creation of this document.  **

## 2023-09-03 LAB
ANION GAP SERPL CALCULATED.3IONS-SCNC: 2 MMOL/L
ANION GAP SERPL CALCULATED.3IONS-SCNC: 3 MMOL/L
ANION GAP SERPL CALCULATED.3IONS-SCNC: 7 MMOL/L
BUN SERPL-MCNC: 10 MG/DL (ref 5–25)
BUN SERPL-MCNC: 6 MG/DL (ref 5–25)
BUN SERPL-MCNC: 9 MG/DL (ref 5–25)
CALCIUM SERPL-MCNC: 10.1 MG/DL (ref 8.4–10.2)
CALCIUM SERPL-MCNC: 9.2 MG/DL (ref 8.4–10.2)
CALCIUM SERPL-MCNC: 9.7 MG/DL (ref 8.4–10.2)
CHLORIDE SERPL-SCNC: 110 MMOL/L (ref 96–108)
CHLORIDE SERPL-SCNC: 115 MMOL/L (ref 96–108)
CHLORIDE SERPL-SCNC: 122 MMOL/L (ref 96–108)
CO2 SERPL-SCNC: 32 MMOL/L (ref 21–32)
CO2 SERPL-SCNC: 36 MMOL/L (ref 21–32)
CO2 SERPL-SCNC: 36 MMOL/L (ref 21–32)
CREAT SERPL-MCNC: 0.47 MG/DL (ref 0.6–1.3)
CREAT SERPL-MCNC: 0.48 MG/DL (ref 0.6–1.3)
CREAT SERPL-MCNC: 0.5 MG/DL (ref 0.6–1.3)
GFR SERPL CREATININE-BSD FRML MDRD: 126 ML/MIN/1.73SQ M
GFR SERPL CREATININE-BSD FRML MDRD: 128 ML/MIN/1.73SQ M
GFR SERPL CREATININE-BSD FRML MDRD: 129 ML/MIN/1.73SQ M
GLUCOSE SERPL-MCNC: 102 MG/DL (ref 65–140)
GLUCOSE SERPL-MCNC: 135 MG/DL (ref 65–140)
GLUCOSE SERPL-MCNC: 150 MG/DL (ref 65–140)
POTASSIUM SERPL-SCNC: 3.2 MMOL/L (ref 3.5–5.3)
POTASSIUM SERPL-SCNC: 3.7 MMOL/L (ref 3.5–5.3)
POTASSIUM SERPL-SCNC: 4.1 MMOL/L (ref 3.5–5.3)
SODIUM SERPL-SCNC: 148 MMOL/L (ref 135–147)
SODIUM SERPL-SCNC: 154 MMOL/L (ref 135–147)
SODIUM SERPL-SCNC: 161 MMOL/L (ref 135–147)

## 2023-09-03 PROCEDURE — 99232 SBSQ HOSP IP/OBS MODERATE 35: CPT | Performed by: INTERNAL MEDICINE

## 2023-09-03 PROCEDURE — 80048 BASIC METABOLIC PNL TOTAL CA: CPT | Performed by: INTERNAL MEDICINE

## 2023-09-03 PROCEDURE — 80048 BASIC METABOLIC PNL TOTAL CA: CPT

## 2023-09-03 RX ORDER — DIVALPROEX SODIUM 125 MG/1
1000 CAPSULE, COATED PELLETS ORAL EVERY 12 HOURS SCHEDULED
Status: DISCONTINUED | OUTPATIENT
Start: 2023-09-03 | End: 2023-09-08 | Stop reason: HOSPADM

## 2023-09-03 RX ADMIN — DEXTROSE 175 ML/HR: 5 SOLUTION INTRAVENOUS at 21:16

## 2023-09-03 RX ADMIN — CHLORHEXIDINE GLUCONATE 15 ML: 1.2 RINSE ORAL at 09:09

## 2023-09-03 RX ADMIN — MIRTAZAPINE 15 MG: 15 TABLET, FILM COATED ORAL at 21:17

## 2023-09-03 RX ADMIN — PROPRANOLOL HYDROCHLORIDE 10 MG: 10 TABLET ORAL at 09:09

## 2023-09-03 RX ADMIN — DIVALPROEX SODIUM 1000 MG: 125 CAPSULE, COATED PELLETS ORAL at 21:19

## 2023-09-03 RX ADMIN — CHLORHEXIDINE GLUCONATE 15 ML: 1.2 RINSE ORAL at 21:17

## 2023-09-03 RX ADMIN — HEPARIN SODIUM 5000 UNITS: 5000 INJECTION INTRAVENOUS; SUBCUTANEOUS at 16:26

## 2023-09-03 RX ADMIN — PROPRANOLOL HYDROCHLORIDE 10 MG: 10 TABLET ORAL at 16:27

## 2023-09-03 RX ADMIN — HEPARIN SODIUM 5000 UNITS: 5000 INJECTION INTRAVENOUS; SUBCUTANEOUS at 06:56

## 2023-09-03 RX ADMIN — DIVALPROEX SODIUM 1000 MG: 500 TABLET, DELAYED RELEASE ORAL at 09:09

## 2023-09-03 NOTE — ASSESSMENT & PLAN NOTE
Patient noted to have sodium of 164 on daily labs  Followed by nephrology; started on D5 drip  Every 4 hour sodium levels  Awaiting urine studies to determine if lithium-induced DI versus result of aggressive fluid resuscitation in the setting of lithium overdose    9/2- sodium dropped overnight; became back up and is now 167 on morning labs. 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 9/3-sodium down to 154 most recent labs, continue IV fluids. Unfortunately, lithium induced DI may take prolonged time to improve.

## 2023-09-03 NOTE — ASSESSMENT & PLAN NOTE
Patient has profound intellectual disability; on exam was able to say hello, but afterwards proceeded to make monkey noises  Unclear baseline, will discuss with patient's family  Continue supportive care; monitor off lithium; correct metabolic deficiencies  As needed Zyprexa for acute agitation, appears improved today.

## 2023-09-03 NOTE — NURSING NOTE
Pt lost IV access and multiple attempts were made before access was reestablished. IVF were restarted approx 0330. AM labs were drawn at time of IV insertion.

## 2023-09-03 NOTE — PLAN OF CARE
Problem: Potential for Falls  Goal: Patient will remain free of falls  Description: INTERVENTIONS:  - Educate patient/family on patient safety including physical limitations  - Instruct patient to call for assistance with activity   - Consult OT/PT to assist with strengthening/mobility   - Keep Call bell within reach  - Keep bed low and locked with side rails adjusted as appropriate  - Keep care items and personal belongings within reach  - Initiate and maintain comfort rounds  - Make Fall Risk Sign visible to staff  - Apply yellow socks and bracelet for high fall risk patients  - Consider moving patient to room near nurses station  Outcome: Progressing     Problem: MOBILITY - ADULT  Goal: Maintain or return to baseline ADL function  Description: INTERVENTIONS:  -  Assess patient's ability to carry out ADLs; assess patient's baseline for ADL function and identify physical deficits which impact ability to perform ADLs (bathing, care of mouth/teeth, toileting, grooming, dressing, etc.)  - Assess/evaluate cause of self-care deficits   - Assess range of motion  - Assess patient's mobility; develop plan if impaired  - Assess patient's need for assistive devices and provide as appropriate  - Encourage maximum independence but intervene and supervise when necessary  - Involve family in performance of ADLs  - Assess for home care needs following discharge   - Consider OT consult to assist with ADL evaluation and planning for discharge  - Provide patient education as appropriate  Outcome: Progressing  Goal: Maintains/Returns to pre admission functional level  Description: INTERVENTIONS:  - Perform BMAT or MOVE assessment daily.   - Set and communicate daily mobility goal to care team and patient/family/caregiver.    - Collaborate with rehabilitation services on mobility goals if consulted  - Out of bed for toileting  - Record patient progress and toleration of activity level   Outcome: Progressing     Problem: NEUROSENSORY - ADULT  Goal: Achieves maximal functionality and self care  Description: INTERVENTIONS  - Monitor swallowing and airway patency with patient fatigue and changes in neurological status  - Encourage and assist patient to increase activity and self care.    - Encourage visually impaired, hearing impaired and aphasic patients to use assistive/communication devices  Outcome: Progressing     Problem: CARDIOVASCULAR - ADULT  Goal: Maintains optimal cardiac output and hemodynamic stability  Description: INTERVENTIONS:  - Monitor I/O, vital signs and rhythm  - Monitor for S/S and trends of decreased cardiac output  - Administer and titrate ordered vasoactive medications to optimize hemodynamic stability  - Assess quality of pulses, skin color and temperature  - Assess for signs of decreased coronary artery perfusion  - Instruct patient to report change in severity of symptoms  Outcome: Progressing  Goal: Absence of cardiac dysrhythmias or at baseline rhythm  Description: INTERVENTIONS:  - Continuous cardiac monitoring, vital signs, obtain 12 lead EKG if ordered  - Administer antiarrhythmic and heart rate control medications as ordered  - Monitor electrolytes and administer replacement therapy as ordered  Outcome: Progressing     Problem: GASTROINTESTINAL - ADULT  Goal: Maintains or returns to baseline bowel function  Description: INTERVENTIONS:  - Assess bowel function  - Encourage oral fluids to ensure adequate hydration  - Administer IV fluids if ordered to ensure adequate hydration  - Administer ordered medications as needed  - Encourage mobilization and activity  - Consider nutritional services referral to assist patient with adequate nutrition and appropriate food choices  Outcome: Progressing  Goal: Maintains adequate nutritional intake  Description: INTERVENTIONS:  - Monitor percentage of each meal consumed  - Identify factors contributing to decreased intake, treat as appropriate  - Assist with meals as needed  - Monitor I&O, weight, and lab values if indicated  - Obtain nutrition services referral as needed  Outcome: Progressing     Problem: GENITOURINARY - ADULT  Goal: Maintains or returns to baseline urinary function  Description: INTERVENTIONS:  - Assess urinary function  - Encourage oral fluids to ensure adequate hydration if ordered  - Administer IV fluids as ordered to ensure adequate hydration  - Administer ordered medications as needed  - Offer frequent toileting  - Follow urinary retention protocol if ordered  Outcome: Progressing     Problem: METABOLIC, FLUID AND ELECTROLYTES - ADULT  Goal: Electrolytes maintained within normal limits  Description: INTERVENTIONS:  - Monitor labs and assess patient for signs and symptoms of electrolyte imbalances  - Administer electrolyte replacement as ordered  - Monitor response to electrolyte replacements, including repeat lab results as appropriate  - Instruct patient on fluid and nutrition as appropriate  Outcome: Progressing  Goal: Fluid balance maintained  Description: INTERVENTIONS:  - Monitor labs   - Monitor I/O and WT  - Instruct patient on fluid and nutrition as appropriate  - Assess for signs & symptoms of volume excess or deficit  Outcome: Progressing     Problem: SKIN/TISSUE INTEGRITY - ADULT  Goal: Skin Integrity remains intact(Skin Breakdown Prevention)  Description: Assess:  -Assess extremities for adequate circulation and sensation     Bed Management:  -Have minimal linens on bed & keep smooth, unwrinkled  -Change linens as needed when moist or perspiring    Toileting:  -Offer bedside commode    Skin Care:  -Avoid use of baby powder, tape, friction and shearing, hot water or constrictive clothing  Outcome: Progressing     Problem: MUSCULOSKELETAL - ADULT  Goal: Maintain or return mobility to safest level of function  Description: INTERVENTIONS:  - Assess patient's ability to carry out ADLs; assess patient's baseline for ADL function and identify physical deficits which impact ability to perform ADLs (bathing, care of mouth/teeth, toileting, grooming, dressing, etc.)  - Assess/evaluate cause of self-care deficits   - Assess range of motion  - Assess patient's mobility  - Assess patient's need for assistive devices and provide as appropriate  - Encourage maximum independence but intervene and supervise when necessary  - Involve family in performance of ADLs  - Assess for home care needs following discharge   - Consider OT consult to assist with ADL evaluation and planning for discharge  - Provide patient education as appropriate  Outcome: Progressing     Problem: PAIN - ADULT  Goal: Verbalizes/displays adequate comfort level or baseline comfort level  Description: Interventions:  - Encourage patient to monitor pain and request assistance  - Assess pain using appropriate pain scale  - Administer analgesics based on type and severity of pain and evaluate response  - Implement non-pharmacological measures as appropriate and evaluate response  - Consider cultural and social influences on pain and pain management  - Notify physician/advanced practitioner if interventions unsuccessful or patient reports new pain  Outcome: Progressing     Problem: SAFETY ADULT  Goal: Patient will remain free of falls  Description: INTERVENTIONS:  - Educate patient/family on patient safety including physical limitations  - Instruct patient to call for assistance with activity   - Consult OT/PT to assist with strengthening/mobility   - Keep Call bell within reach  - Keep bed low and locked with side rails adjusted as appropriate  - Keep care items and personal belongings within reach  - Initiate and maintain comfort rounds  - Make Fall Risk Sign visible to staff  - Apply yellow socks and bracelet for high fall risk patients  - Consider moving patient to room near nurses station  Outcome: Progressing  Goal: Maintain or return to baseline ADL function  Description: INTERVENTIONS:  -  Assess patient's ability to carry out ADLs; assess patient's baseline for ADL function and identify physical deficits which impact ability to perform ADLs (bathing, care of mouth/teeth, toileting, grooming, dressing, etc.)  - Assess/evaluate cause of self-care deficits   - Assess range of motion  - Assess patient's mobility; develop plan if impaired  - Assess patient's need for assistive devices and provide as appropriate  - Encourage maximum independence but intervene and supervise when necessary  - Involve family in performance of ADLs  - Assess for home care needs following discharge   - Consider OT consult to assist with ADL evaluation and planning for discharge  - Provide patient education as appropriate  Outcome: Progressing  Goal: Maintains/Returns to pre admission functional level  Description: INTERVENTIONS:  - Perform BMAT or MOVE assessment daily.   - Set and communicate daily mobility goal to care team and patient/family/caregiver.    - Collaborate with rehabilitation services on mobility goals if consulted  - Out of bed for toileting  - Record patient progress and toleration of activity level   Outcome: Progressing     Problem: DISCHARGE PLANNING  Goal: Discharge to home or other facility with appropriate resources  Description: INTERVENTIONS:  - Identify barriers to discharge w/patient and caregiver  - Arrange for needed discharge resources and transportation as appropriate  - Identify discharge learning needs (meds, wound care, etc.)  - Arrange for interpretive services to assist at discharge as needed  - Refer to Case Management Department for coordinating discharge planning if the patient needs post-hospital services based on physician/advanced practitioner order or complex needs related to functional status, cognitive ability, or social support system  Outcome: Progressing     Problem: Knowledge Deficit  Goal: Patient/family/caregiver demonstrates understanding of disease process, treatment plan, medications, and discharge instructions  Description: Complete learning assessment and assess knowledge base.   Interventions:  - Provide teaching at level of understanding  - Provide teaching via preferred learning methods  Outcome: Progressing     Problem: SAFETY,RESTRAINT: NV/NON-SELF DESTRUCTIVE BEHAVIOR  Goal: Remains free of harm/injury (restraint for non violent/non self-detsructive behavior)  Description: INTERVENTIONS:  - Instruct patient/family regarding restraint use   - Assess and monitor physiologic and psychological status   - Provide interventions and comfort measures to meet assessed patient needs   - Identify and implement measures to help patient regain control  - Assess readiness for release of restraint   Outcome: Progressing  Goal: Returns to optimal restraint-free functioning  Description: INTERVENTIONS:  - Assess the patient's behavior and symptoms that indicate continued need for restraint  - Identify and implement measures to help patient regain control  - Assess readiness for release of restraint   Outcome: Progressing     Problem: Prexisting or High Potential for Compromised Skin Integrity  Goal: Skin integrity is maintained or improved  Description: INTERVENTIONS:  - Identify patients at risk for skin breakdown  - Assess and monitor skin integrity  - Assess and monitor nutrition and hydration status  - Monitor labs   - Assess for incontinence   - Turn and reposition patient  - Assist with mobility/ambulation  - Relieve pressure over bony prominences  - Avoid friction and shearing  - Provide appropriate hygiene as needed including keeping skin clean and dry  - Evaluate need for skin moisturizer/barrier cream  - Collaborate with interdisciplinary team   - Patient/family teaching  - Consider wound care consult   Outcome: Progressing     Problem: Nutrition/Hydration-ADULT  Goal: Nutrient/Hydration intake appropriate for improving, restoring or maintaining nutritional needs  Description: Monitor and assess patient's nutrition/hydration status for malnutrition. Collaborate with interdisciplinary team and initiate plan and interventions as ordered. Monitor patient's weight and dietary intake as ordered or per policy. Utilize nutrition screening tool and intervene as necessary. Determine patient's food preferences and provide high-protein, high-caloric foods as appropriate.      INTERVENTIONS:  - Monitor oral intake, urinary output, labs, and treatment plans  - Assess nutrition and hydration status and recommend course of action  - Evaluate amount of meals eaten  - Assist patient with eating if necessary   - Allow adequate time for meals  - Recommend/ encourage appropriate diets, oral nutritional supplements, and vitamin/mineral supplements  - Order, calculate, and assess calorie counts as needed  - Recommend, monitor, and adjust tube feedings and TPN/PPN based on assessed needs  - Assess need for intravenous fluids  - Provide specific nutrition/hydration education as appropriate  - Include patient/family/caregiver in decisions related to nutrition  Outcome: Progressing

## 2023-09-03 NOTE — PROGRESS NOTES
[facility]  Progress Note  Name: Sergio Garay  MRN: [id number]  Unit/Bed#: [address] [address] -01 I Date of Admission: 8/30/2023   Date of Service: 9/3/2023 I Hospital Day: 4    Assessment/Plan   Hypernatremia  Assessment & Plan  Patient noted to have sodium of 164 on daily labs  Followed by nephrology; started on D5 drip  Every 4 hour sodium levels  Awaiting urine studies to determine if lithium-induced DI versus result of aggressive fluid resuscitation in the setting of lithium overdose    9/2- sodium dropped overnight; became back up and is now 167 on morning labs. 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 9/3-sodium down to 154 most recent labs, continue IV fluids. Unfortunately, lithium induced DI may take prolonged time to improve. Unspecified mood (affective) disorder (HCC)  Assessment & Plan  Continue home medications  Monitor off lithium    Intellectual disability  Assessment & Plan  Patient has profound intellectual disability; on exam was able to say hello, but afterwards proceeded to make monkey noises  Unclear baseline, will discuss with patient's family  Continue supportive care; monitor off lithium; correct metabolic deficiencies  As needed Zyprexa for acute agitation, appears improved today. * Acute encephalopathy 2/2 lithium toxicity  Assessment & Plan  -She has a history of schizoaffective disorder, intellectual disability, bipolar disorder, presented with worsening encephalopathy, lethargy for the past two weeks.   -As per patient's sister, at her baseline she is generally very active, alert, able to maintain conversation, generally oriented despite having intellectual disability.  -Patients family reports symptoms started approximately two weeks when Clozaril was discontinued for abnormal white blood cell count. At home dose of lithium 750 mg twice daily.   - Underwent HD yesterday.    - Ammonia level WNL - no L-carnitine requirement   Lithium level of 1.4 overnight, increased to 2.0 this morning.     Plan:  -Toxicology and nephrology were consulted - recommendations appreciated  -Given rising Li levels, patient required 2 rounds of HD thus far  -Lithium levels at 0.5 today; no need to further monitor  -Continue to monitor electrolyte levels-replete as needed  -Continue aggressive IV hydration  -Continue to monitor neurological status  -Appreciate behavioral health recommendations, continue home medications except for lithium, remain off lithium  - Patient's sporadic agitation refractory to Ativan; will trial hydroxyzine and as needed Zyprexa         VTE Pharmacologic Prophylaxis:   Pharmacologic: Pharmacologic VTE Prophylaxis contraindicated due to Low risk/early ambulation  Mechanical VTE Prophylaxis in Place: Yes    Patient Centered Rounds: I have performed bedside rounds with nursing staff today. Discussions with Specialists or Other Care Team Provider: Nephrology    Education and Discussions with Family / Patient: We will attempt to reach family again today    Time Spent for Care: 45 minutes. More than 50% of total time spent on counseling and coordination of care as described above. Current Length of Stay: 4 day(s)    Current Patient Status: Inpatient   Certification Statement: The patient will continue to require additional inpatient hospital stay due to Lithium-induced diabetes insipidus    Discharge Plan: To be determined    Code Status: Level 1 - Full Code      Subjective:   Patient seen and examined at bedside, appears much less agitated than on previous exams. Continue present therapy with IV fluids and scheduled sodium testing. Nephrology following along; as per nursing, patient may have removed catheter. Continue one-to-one; continue meds as patient pulls at her IV catheter. Ativan appeared and effective; Zyprexa 2.5 mg tablet as needed for severe agitation.     Objective: Vitals:   Temp (24hrs), Av.2 °F (35.7 °C), Min:87 °F (30.6 °C), Max:99.3 °F (37.4 °C)    Temp:  [87 °F (30.6 °C)-99.3 °F (37.4 °C)] 97.7 °F (36.5 °C)  HR:  [] 85  Resp:  [15-43] 15  BP: (104-141)/() 127/74  SpO2:  [96 %-99 %] 96 %  Body mass index is 19.38 kg/m². Input and Output Summary (last 24 hours): Intake/Output Summary (Last 24 hours) at 9/3/2023 1801  Last data filed at 9/3/2023 1000  Gross per 24 hour   Intake --   Output 325 ml   Net -325 ml       Physical Exam:     Physical Exam  Vitals and nursing note reviewed. Constitutional:       General: She is not in acute distress. Appearance: She is well-developed. HENT:      Head: Normocephalic and atraumatic. Eyes:      Conjunctiva/sclera: Conjunctivae normal.   Cardiovascular:      Rate and Rhythm: Normal rate and regular rhythm. Heart sounds: No murmur heard. Pulmonary:      Effort: Pulmonary effort is normal. No respiratory distress. Breath sounds: Normal breath sounds. Abdominal:      Palpations: Abdomen is soft. Tenderness: There is no abdominal tenderness. Musculoskeletal:         General: No swelling. Cervical back: Neck supple. Skin:     General: Skin is warm and dry. Findings: Bruising present. Comments: Bruising bilateral shins   Neurological:      Mental Status: She is alert.    Psychiatric:         Mood and Affect: Mood normal.           Additional Data:     Labs:    Results from last 7 days   Lab Units 23  0512   WBC Thousand/uL 12.93*   HEMOGLOBIN g/dL 14.5   HEMATOCRIT % 45.9   PLATELETS Thousands/uL 130*   NEUTROS PCT % 71   LYMPHS PCT % 14   MONOS PCT % 14*   EOS PCT % 1     Results from last 7 days   Lab Units 23  0910 23  0619 23  0530   SODIUM mmol/L 154*   < > 161*   POTASSIUM mmol/L 3.7   < > 3.9   CHLORIDE mmol/L 115*   < > 124*   CO2 mmol/L 36*   < > 34*   BUN mg/dL 9   < > 4*   CREATININE mg/dL 0.50*   < > 0.45*   ANION GAP mmol/L 3   < > 3   CALCIUM mg/dL 9.7   < > 10.3*   ALBUMIN g/dL  --   --  3.5   TOTAL BILIRUBIN mg/dL  --   --  0.29   ALK PHOS U/L  --   --  114*   ALT U/L  --   --  14   AST U/L  --   --  14   GLUCOSE RANDOM mg/dL 150*   < > 160*    < > = values in this interval not displayed. Results from last 7 days   Lab Units 08/31/23  0350   INR  1.01                       * I Have Reviewed All Lab Data Listed Above. * Additional Pertinent Lab Tests Reviewed: All Labs Within Last 24 Hours Reviewed    Imaging:    Imaging Reports Reviewed Today Include:   Imaging Personally Reviewed by Myself Includes:      Recent Cultures (last 7 days):           Last 24 Hours Medication List:   Current Facility-Administered Medications   Medication Dose Route Frequency Provider Last Rate   • chlorhexidine  15 mL Mouth/Throat Q12H [address] Dayami Castillo PA-C     • dextrose  175 mL/hr Intravenous Continuous Gita Waddell  mL/hr (09/03/23 1304)   • divalproex sodium  1,000 mg Oral BID Dayami Castillo PA-C     • heparin (porcine)  5,000 Units Subcutaneous [facility] Dayami Castillo PA-C     • LORazepam  1 mg Intravenous Q6H PRN Dayami Castillo PA-C     • mirtazapine  15 mg Oral HS Dayami Castillo PA-C     • OLANZapine  2.5 mg Oral Q3H PRN Dayami Castillo PA-C     • propranolol  10 mg Oral TID Dayami Castillo PA-C          Today, Patient Was Seen By: Negra Escobedo MD    ** Please Note: Dictation voice to text software may have been used in the creation of this document.  **

## 2023-09-03 NOTE — PROGRESS NOTES
NEPHROLOGY PROGRESS NOTE   Jeff Arvizu 28 y.o. female MRN: [id number]  Unit/Bed#: [address] [address] -[id number] Encounter: [id number]  Reason for Consult: Lithium toxicity      SUMMARY:    68-year-old female with significant psych issues including schizoaffective disorder, intellectual disability, bipolar disorder, presented with worsening encephalopathy, lethargy.  We are consulted for lithium toxicity    ASSESSMENT and PLAN:    Lithium toxicity  -- Lithium level was 4.6 on admission  -- Renal function remains normal with a creatinine of 0.5 mg/dL  -- Underwent hemodialysis x2 treatments last treatment on August 31. Femoral dialysis catheter was removed yesterday  -- Lithium level remains normal at 0.5 yesterday no further dialysis required  -- Plan of care discussed with the primary team Dr. 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 12  --Sodium level improved to 154  -- Lawson catheter removed      SUBJECTIVE / INTERVAL HISTORY:    Transferred from the intensive care unit to the medical surgical floor now. Currently resting comfortably with her one-to-one. OBJECTIVE:  Current Weight: Weight - Scale: 51.2 kg (112 lb 14 oz)  Vitals:    09/03/23 0400 09/03/23 0721 09/03/23 1100 09/03/23 1101   BP:  125/88 122/86 122/86   Pulse:  86  80   Resp:  15 15    Temp:  97.6 °F (36.4 °C) 97.8 °F (36.6 °C) 97.8 °F (36.6 °C)   TempSrc:       SpO2: 96% 97%  98%   Weight:       Height:           Intake/Output Summary (Last 24 hours) at 9/3/2023 1227  Last data filed at 9/3/2023 1000  Gross per 24 hour   Intake 1205 ml   Output 950 ml   Net 255 ml       Review of Systems:    Unable to get review systems. Due to mental status    Physical Exam  Vitals and nursing note reviewed. Exam conducted with a chaperone present. Constitutional:       General: She is not in acute distress. Appearance: Normal appearance.  She is not ill-appearing, toxic-appearing or diaphoretic. HENT:      Head: Normocephalic and atraumatic. Mouth/Throat:      Mouth: Mucous membranes are dry. Eyes:      General:         Right eye: No discharge. Left eye: No discharge. Cardiovascular:      Rate and Rhythm: Normal rate. Pulmonary:      Effort: No respiratory distress. Abdominal:      General: There is no distension. Tenderness: There is no guarding. Musculoskeletal:         General: No swelling. Right lower leg: No edema. Left lower leg: No edema. Skin:     Coloration: Skin is not jaundiced or pale. Findings: No bruising, erythema, lesion or rash.          Medications:    Current Facility-Administered Medications:   •  chlorhexidine (PERIDEX) 0.12 % oral rinse 15 mL, 15 mL, Mouth/Throat, Q12H [facility], Yessica Sue PA-C, 15 mL at 09/03/23 0909  •  dextrose 5 % infusion, 175 mL/hr, Intravenous, Continuous, Savanna Orourke MD, Last Rate: 200 mL/hr at 09/03/23 0330, 200 mL/hr at 09/03/23 0330  •  divalproex sodium (DEPAKOTE) DR tablet 1,000 mg, 1,000 mg, Oral, BID, Yessica Sue PA-C, 1,000 mg at 09/03/23 3716  •  heparin (porcine) subcutaneous injection 5,000 Units, 5,000 Units, Subcutaneous, Q8H [facility], 5,000 Units at 09/03/23 0656 **AND** [COMPLETED] Platelet count, , , Once, Nathaniel Sullivan MD  •  LORazepam (ATIVAN) injection 1 mg, 1 mg, Intravenous, Q6H PRN, Yessica Sue PA-C, 1 mg at 09/02/23 2047  •  mirtazapine (REMERON) tablet 15 mg, 15 mg, Oral, HS, Yessica Sue PA-C, 15 mg at 09/02/23 2340  •  OLANZapine (ZyPREXA) tablet 2.5 mg, 2.5 mg, Oral, Q3H PRN, Yessica Sue PA-C, 2.5 mg at 09/02/23 1746  •  propranolol (INDERAL) tablet 10 mg, 10 mg, Oral, TID, Yessica Sue PA-C, 10 mg at 09/03/23 0338    Laboratory Results:  Results from last 7 days   Lab Units 09/03/23  0910 09/03/23  0340 09/02/23  2123 09/02/23  1636 09/02/23  3720 09/02/23  0512 09/01/23  2350 09/01/23  1657 09/01/23  1241 09/01/23  0530 08/31/23  0350 08/30/23  1813 08/30/23  1257   WBC Thousand/uL  --   --   --   --   --  12.93*  --   --   --  11.21* 15.57*  --  17.41*   HEMOGLOBIN g/dL  --   --   --   --   --  14.5  --   --   --  14.4 13.4  --  14.5   HEMATOCRIT %  --   --   --   --   --  45.9  --   --   --  45.8 41.7  --  45.2   PLATELETS Thousands/uL  --   --   --   --   --  130*  --   --   --  145* 134* 145* 163   POTASSIUM mmol/L 3.7 4.1 4.5 3.7 3.7  --  4.0 4.2   < > 3.9 3.5  --  4.5   CHLORIDE mmol/L 115* 122* 123* 114* 124*  --  127* 123*   < > 124* 105  --  108   CO2 mmol/L 36* 32 35* 31 37*  --  36* 29   < > 34* 29  --  30   BUN mg/dL 9 10 10 9 6  --  8 6   < > 4* 5  --  7   CREATININE mg/dL 0.50* 0.48* 0.49* 0.52* 0.57*  --  0.52* 0.54*   < > 0.45* 0.59*  --  0.66   CALCIUM mg/dL 9.7 10.1 10.4* 9.5 10.3*  --  10.5* 9.6   < > 10.3* 8.4  --  9.8   MAGNESIUM mg/dL  --   --   --   --   --  2.6  --   --   --  3.1* 2.3  --   --    PHOSPHORUS mg/dL  --   --   --   --   --  3.3  --   --   --  2.1* 3.2  --   --     < > = values in this interval not displayed. PLEASE NOTE:  This encounter was completed utilizing the [employer] Direct Speech Voice Recognition Software. Grammatical errors, random word insertions, pronoun errors and incomplete sentences are occasional consequences of the system due to software limitations, ambient noise and hardware issues. These may be missed by proof reading prior to affixing electronic signature. Any questions or concerns about the content, text or information contained within the body of this dictation should be directly addressed to the physician for clarification. Please do not hesitate to call me directly if you have any any questions or concerns.

## 2023-09-03 NOTE — PROGRESS NOTES
Patient had BMP tonight at 8 PM which showed a sodium of 150. D5W was discontinued repeat sodium showed that it was 164.   We will restart D5W at 200 cc an hour continue BMPs every 8 hours with further recommendations ongoing

## 2023-09-04 LAB
ANION GAP SERPL CALCULATED.3IONS-SCNC: 4 MMOL/L
BUN SERPL-MCNC: 5 MG/DL (ref 5–25)
CALCIUM SERPL-MCNC: 9 MG/DL (ref 8.4–10.2)
CHLORIDE SERPL-SCNC: 108 MMOL/L (ref 96–108)
CO2 SERPL-SCNC: 35 MMOL/L (ref 21–32)
CREAT SERPL-MCNC: 0.45 MG/DL (ref 0.6–1.3)
GFR SERPL CREATININE-BSD FRML MDRD: 131 ML/MIN/1.73SQ M
GLUCOSE SERPL-MCNC: 106 MG/DL (ref 65–140)
POTASSIUM SERPL-SCNC: 3.5 MMOL/L (ref 3.5–5.3)
SODIUM SERPL-SCNC: 147 MMOL/L (ref 135–147)
VALPROATE SERPL-MCNC: 65 UG/ML (ref 50–100)

## 2023-09-04 PROCEDURE — 99232 SBSQ HOSP IP/OBS MODERATE 35: CPT | Performed by: INTERNAL MEDICINE

## 2023-09-04 PROCEDURE — 80048 BASIC METABOLIC PNL TOTAL CA: CPT | Performed by: INTERNAL MEDICINE

## 2023-09-04 PROCEDURE — 80164 ASSAY DIPROPYLACETIC ACD TOT: CPT | Performed by: INTERNAL MEDICINE

## 2023-09-04 RX ORDER — OLANZAPINE 10 MG/1
5 INJECTION, POWDER, LYOPHILIZED, FOR SOLUTION INTRAMUSCULAR ONCE
Status: COMPLETED | OUTPATIENT
Start: 2023-09-04 | End: 2023-09-04

## 2023-09-04 RX ORDER — POTASSIUM CHLORIDE 20 MEQ/1
40 TABLET, EXTENDED RELEASE ORAL ONCE
Status: COMPLETED | OUTPATIENT
Start: 2023-09-04 | End: 2023-09-04

## 2023-09-04 RX ORDER — WATER 1000 ML/1000ML
INJECTION, SOLUTION INTRAVENOUS
Status: COMPLETED
Start: 2023-09-04 | End: 2023-09-04

## 2023-09-04 RX ADMIN — DEXTROSE 150 ML/HR: 5 SOLUTION INTRAVENOUS at 13:58

## 2023-09-04 RX ADMIN — MIRTAZAPINE 15 MG: 15 TABLET, FILM COATED ORAL at 21:36

## 2023-09-04 RX ADMIN — DEXTROSE 150 ML/HR: 5 SOLUTION INTRAVENOUS at 07:22

## 2023-09-04 RX ADMIN — DIVALPROEX SODIUM 1000 MG: 125 CAPSULE, COATED PELLETS ORAL at 08:40

## 2023-09-04 RX ADMIN — DIVALPROEX SODIUM 1000 MG: 125 CAPSULE, COATED PELLETS ORAL at 21:37

## 2023-09-04 RX ADMIN — CHLORHEXIDINE GLUCONATE 15 ML: 1.2 RINSE ORAL at 21:36

## 2023-09-04 RX ADMIN — DEXTROSE 175 ML/HR: 5 SOLUTION INTRAVENOUS at 02:17

## 2023-09-04 RX ADMIN — POTASSIUM CHLORIDE 40 MEQ: 1500 TABLET, EXTENDED RELEASE ORAL at 08:39

## 2023-09-04 RX ADMIN — DEXTROSE 150 ML/HR: 5 SOLUTION INTRAVENOUS at 21:40

## 2023-09-04 RX ADMIN — WATER 10 ML: 1 INJECTION INTRAMUSCULAR; INTRAVENOUS; SUBCUTANEOUS at 23:58

## 2023-09-04 RX ADMIN — OLANZAPINE 5 MG: 10 INJECTION, POWDER, LYOPHILIZED, FOR SOLUTION INTRAMUSCULAR at 23:58

## 2023-09-04 NOTE — ASSESSMENT & PLAN NOTE
· Patient noted to have sodium of 164 on daily labs   · Secondary to lithium induced diabetes insipidus  · Nephrology follow-up appreciated  · Continue D5W at 150 cc an hour  · Monitor BMP

## 2023-09-04 NOTE — PLAN OF CARE
Problem: MOBILITY - ADULT  Goal: Maintain or return to baseline ADL function  Description: INTERVENTIONS:  -  Assess patient's ability to carry out ADLs; assess patient's baseline for ADL function and identify physical deficits which impact ability to perform ADLs (bathing, care of mouth/teeth, toileting, grooming, dressing, etc.)  - Assess/evaluate cause of self-care deficits   - Assess range of motion  - Assess patient's mobility; develop plan if impaired  - Assess patient's need for assistive devices and provide as appropriate  - Encourage maximum independence but intervene and supervise when necessary  - Involve family in performance of ADLs  - Assess for home care needs following discharge   - Consider OT consult to assist with ADL evaluation and planning for discharge  - Provide patient education as appropriate  Outcome: Progressing       Problem: NEUROSENSORY - ADULT  Goal: Achieves maximal functionality and self care  Description: INTERVENTIONS  - Monitor swallowing and airway patency with patient fatigue and changes in neurological status  - Encourage and assist patient to increase activity and self care.    - Encourage visually impaired, hearing impaired and aphasic patients to use assistive/communication devices  Outcome: Progressing     Problem: CARDIOVASCULAR - ADULT  Goal: Maintains optimal cardiac output and hemodynamic stability  Description: INTERVENTIONS:  - Monitor I/O, vital signs and rhythm  - Monitor for S/S and trends of decreased cardiac output  - Administer and titrate ordered vasoactive medications to optimize hemodynamic stability  - Assess quality of pulses, skin color and temperature  - Assess for signs of decreased coronary artery perfusion  - Instruct patient to report change in severity of symptoms  Outcome: Progressing  Goal: Absence of cardiac dysrhythmias or at baseline rhythm  Description: INTERVENTIONS:  - Continuous cardiac monitoring, vital signs, obtain 12 lead EKG if ordered  - Administer antiarrhythmic and heart rate control medications as ordered  - Monitor electrolytes and administer replacement therapy as ordered  Outcome: Progressing     Problem: GASTROINTESTINAL - ADULT  Goal: Maintains or returns to baseline bowel function  Description: INTERVENTIONS:  - Assess bowel function  - Encourage oral fluids to ensure adequate hydration  - Administer IV fluids if ordered to ensure adequate hydration  - Administer ordered medications as needed  - Encourage mobilization and activity  - Consider nutritional services referral to assist patient with adequate nutrition and appropriate food choices  Outcome: Progressing  Goal: Maintains adequate nutritional intake  Description: INTERVENTIONS:  - Monitor percentage of each meal consumed  - Identify factors contributing to decreased intake, treat as appropriate  - Assist with meals as needed  - Monitor I&O, weight, and lab values if indicated  - Obtain nutrition services referral as needed  Outcome: Progressing     Problem: GENITOURINARY - ADULT  Goal: Maintains or returns to baseline urinary function  Description: INTERVENTIONS:  - Assess urinary function  - Encourage oral fluids to ensure adequate hydration if ordered  - Administer IV fluids as ordered to ensure adequate hydration  - Administer ordered medications as needed  - Offer frequent toileting  - Follow urinary retention protocol if ordered  Outcome: Progressing     Problem: METABOLIC, FLUID AND ELECTROLYTES - ADULT  Goal: Electrolytes maintained within normal limits  Description: INTERVENTIONS:  - Monitor labs and assess patient for signs and symptoms of electrolyte imbalances  - Administer electrolyte replacement as ordered  - Monitor response to electrolyte replacements, including repeat lab results as appropriate  - Instruct patient on fluid and nutrition as appropriate  Outcome: Progressing  Goal: Fluid balance maintained  Description: INTERVENTIONS:  - Monitor labs   - Monitor I/O and WT  - Instruct patient on fluid and nutrition as appropriate  - Assess for signs & symptoms of volume excess or deficit  Outcome: Progressing          Problem: MUSCULOSKELETAL - ADULT  Goal: Maintain or return mobility to safest level of function  Description: INTERVENTIONS:  - Assess patient's ability to carry out ADLs; assess patient's baseline for ADL function and identify physical deficits which impact ability to perform ADLs (bathing, care of mouth/teeth, toileting, grooming, dressing, etc.)  - Assess/evaluate cause of self-care deficits   - Assess range of motion  - Assess patient's mobility  - Assess patient's need for assistive devices and provide as appropriate  - Encourage maximum independence but intervene and supervise when necessary  - Involve family in performance of ADLs  - Assess for home care needs following discharge   - Consider OT consult to assist with ADL evaluation and planning for discharge  - Provide patient education as appropriate  Outcome: Progressing     Problem: PAIN - ADULT  Goal: Verbalizes/displays adequate comfort level or baseline comfort level  Description: Interventions:  - Encourage patient to monitor pain and request assistance  - Assess pain using appropriate pain scale  - Administer analgesics based on type and severity of pain and evaluate response  - Implement non-pharmacological measures as appropriate and evaluate response  - Consider cultural and social influences on pain and pain management  - Notify physician/advanced practitioner if interventions unsuccessful or patient reports new pain  Outcome: Progressing     Problem: SAFETY ADULT  Goal: Patient will remain free of falls  Description: INTERVENTIONS:  - Educate patient/family on patient safety including physical limitations  - Instruct patient to call for assistance with activity   - Consult OT/PT to assist with strengthening/mobility   - Keep Call bell within reach  - Keep bed low and locked with side rails adjusted as appropriate  - Keep care items and personal belongings within reach  - Initiate and maintain comfort rounds  - Make Fall Risk Sign visible to staff  - Offer Toileting every 2 Hours, in advance of need  - Initiate/Maintain bed alarm  - Obtain necessary fall risk management equipment: yellow socks, yellow bracelet  - Apply yellow socks and bracelet for high fall risk patients  - Consider moving patient to room near nurses station  Outcome: Progressing  Goal: Maintain or return to baseline ADL function  Description: INTERVENTIONS:  -  Assess patient's ability to carry out ADLs; assess patient's baseline for ADL function and identify physical deficits which impact ability to perform ADLs (bathing, care of mouth/teeth, toileting, grooming, dressing, etc.)  - Assess/evaluate cause of self-care deficits   - Assess range of motion  - Assess patient's mobility; develop plan if impaired  - Assess patient's need for assistive devices and provide as appropriate  - Encourage maximum independence but intervene and supervise when necessary  - Involve family in performance of ADLs  - Assess for home care needs following discharge   - Consider OT consult to assist with ADL evaluation and planning for discharge  - Provide patient education as appropriate  Outcome: Progressing

## 2023-09-04 NOTE — PROGRESS NOTES
[facility]  Progress Note  Name: Maris Chow  MRN: [id number]  Unit/Bed#: [place] 2 -01 I Date of Admission: 8/30/2023   Date of Service: 9/4/2023 I Hospital Day: 5    Assessment/Plan   * Acute encephalopathy 2/2 lithium toxicity  Assessment & Plan  · She has a history of schizoaffective disorder, intellectual disability, bipolar disorder, presented with worsening encephalopathy, lethargy for the past two weeks. · As per patient's sister, at her baseline she is generally very active, alert, able to maintain conversation, generally oriented despite having intellectual disability. · Patients family reports symptoms started approximately two weeks when Clozaril was discontinued for abnormal white blood cell count. At home dose of lithium 750 mg twice daily. · Patient underwent hemodialysis x2 last treatment on 8/31/2023  · Toxicology and nephrology consultations and recommendations appreciated  · Psychiatry input appreciated  · Lithium levels improved  · Continue aggressive IV hydration   ·  Patient's sporadic agitation refractory to Ativan; will trial hydroxyzine and as needed Zyprexa    Hypernatremia  Assessment & Plan  · Patient noted to have sodium of 164 on daily labs   · Secondary to lithium induced diabetes insipidus  · Nephrology follow-up appreciated  · Continue D5W at 150 cc an hour  · Monitor BMP    Unspecified mood (affective) disorder (HCC)  Assessment & Plan  · Continue home medications  · Monitor off lithium    Intellectual disability  Assessment & Plan  · Patient has profound intellectual disability;  · Continue supportive care; monitor off lithium; correct metabolic deficiencies  · As needed Zyprexa for acute agitation               VTE Pharmacologic Prophylaxis:   Pharmacologic: low risk, early ambulation    Patient Centered Rounds: I have performed bedside rounds with nursing staff today.     Education and Discussions with Family / Patient: Updated patients sister    Time Spent for Care: More than 50% of total time spent on counseling and coordination of care as described above. Current Length of Stay: 5 day(s)    Current Patient Status: Inpatient   Certification Statement: The patient will continue to require additional inpatient hospital stay due to Hypernatremia, IV fluids    Discharge Plan / Estimated Discharge Date: TBD    Code Status: Level 1 - Full Code      Subjective:   Patient seen and examined at bedside, sitting comfortably,    Objective:     Vitals:   Temp (24hrs), Av °F (36.7 °C), Min:97.8 °F (36.6 °C), Max:98.6 °F (37 °C)    Temp:  [97.8 °F (36.6 °C)-98.6 °F (37 °C)] 97.8 °F (36.6 °C)  HR:  [68-90] 90  Resp:  [16-20] 18  BP: ()/(60-71) 92/60  SpO2:  [97 %-98 %] 98 %  Body mass index is 20.09 kg/m². Input and Output Summary (last 24 hours): Intake/Output Summary (Last 24 hours) at 2023 1641  Last data filed at 2023 1358  Gross per 24 hour   Intake 1900 ml   Output --   Net 1900 ml       Physical Exam:    Constitutional: Patient is oriented to self  HEENT:  Normocephalic, atraumatic  Cardiovascular: Normal S1S2, RRR, No murmurs/rubs/gallops appreciated. Pulmonary:  Bilateral air entry, No rhonchi/rales/wheezing appreciated  Abdominal: Soft, Bowel sounds present, Non-tender, Non-distended  Extremities:  No cyanosis, clubbing or edema.    Neurological: awake, alert  Skin:  Warm, dry    Additional Data:     Labs:    Results from last 7 days   Lab Units 23  0512   WBC Thousand/uL 12.93*   HEMOGLOBIN g/dL 14.5   HEMATOCRIT % 45.9   PLATELETS Thousands/uL 130*   NEUTROS PCT % 71   LYMPHS PCT % 14   MONOS PCT % 14*   EOS PCT % 1     Results from last 7 days   Lab Units 23  0857 23  1241 23  0530   POTASSIUM mmol/L 3.5   < > 3.9   CHLORIDE mmol/L 108   < > 124*   CO2 mmol/L 35*   < > 34*   BUN mg/dL 5   < > 4*   CREATININE mg/dL 0.45*   < > 0.45*   CALCIUM mg/dL 9.0   < > 10.3*   ALK PHOS U/L  --   --  114* ALT U/L  --   --  14   AST U/L  --   --  14    < > = values in this interval not displayed. Results from last 7 days   Lab Units 08/31/23  0350   INR  1.01        I Have Reviewed All Lab Data Listed Above.     Invasive Devices     Peripheral Intravenous Line  Duration           Peripheral IV 09/03/23 Distal;Left;Dorsal (posterior) Forearm 1 day                     Recent Cultures (last 7 days):           Last 24 Hours Medication List:   Current Facility-Administered Medications   Medication Dose Route Frequency Provider Last Rate   • chlorhexidine  15 mL Mouth/Throat Q12H [address] Tammy Will PA-C     • dextrose  150 mL/hr Intravenous Continuous Carlyle Sheets  mL/hr (09/04/23 1358)   • divalproex sodium  1,000 mg Oral Q12H [address] Sony Maher PA-C     • LORazepam  1 mg Intravenous Q6H PRN Tammy Will PA-C     • mirtazapine  15 mg Oral HS Tammy Will PA-C     • OLANZapine  2.5 mg Oral Q3H PRN Tammy Will PA-C     • propranolol  10 mg Oral TID Tammy Will PA-C          Today, Patient Was Seen By: Sean Lockwood MD

## 2023-09-04 NOTE — PROGRESS NOTES
Follow up Consultation    Nephrology   Balaji Looney 28 y.o. female MRN: [id number]  Unit/Bed#: [place] 2 [address] [facility] 217-01 Encounter: [id number]      Physician Requesting Consult: Francis Singh MD        ASSESSMENT/PLAN:   25-year-old female with significant psychiatric issues including schizoaffective disorder and bipolar disorder presented with worsening mental status and lethargy. Nephrology consulted for lithium toxicity. Lithium toxicity:  Lithium level was 4.6 on admission  Status post dialysis treatments x2 treatment on August 31  Dialysis catheter has been removed  Lithium level down 0.5  No longer on lithium      Acid-base electrolytes:    Electrolytes:      Hypernatremia:   Patient admitted on 8/30/2023 with a serum sodium 137 mEq  Peak serum sodium at 167 mEq on 9/1/2023  Most recent sodium at 152 mEq  Most likely in the setting of lithium induced diabetes insipidus nephrogenic versus possibly some component of central DI. Continue D5 water at 150 cc an hour for now  Check BMP every 12  Consider imaging of brain to rule out central DI  Most recent urine osmolality 103 and urine sodium 20 as of 9/1/2023 we will recheck urine osmolality and urine sodium today as well as order for plasma osmolality in a.m. We will give patient DDAVP 10 mcg intranasal x1 now and check urine osmolality every 2 hours x 4 to test for central versus nephrogenic DI to see if there is appropriate response  Get strict I's and O's. May need to consider giving DDAVP and need for endocrinology consultation versus usage of amiloride. Acid-base:    Most recent bicarb at 33    H/H/anemia:  most recent hemoglobin at 14.5 g/dL  maintain hemoglobin greater than 8 grams/deciliter    Blood pressure/normotensive:  current medications: None  recommendations: Avoid diuretics for now  Optimize hemodynamics. Maintain MAP > 65mmHg  Avoid BP fluctuations.     Other medical problems:  PMetabolic encephalopathy, schizoaffective disorder/intellectual disability:  Management per primary team.  No longer on lithium. On Depakote      Thanks for the consult  Will continue to follow  Please call with questions/ concerns. Above-mentioned orders and Plan check blood work now give DDAVP test to see central DI versus nephrogenic DI were discussed with the team in depth and they agree. Aracelis Pcaheco MD, [facility]23, 12:59 PM              Objective :   Patient seen and examined in room no overnight events he medically stable remains afebrile, urine output not adequately documented. As per nursing aides patient has been drinking more water      PHYSICAL EXAM  /76   Pulse (!) 110   Temp 97.5 °F (36.4 °C)   Resp 14   Ht 5' 4" (1.626 m)   Wt 53.1 kg (117 lb 1 oz)   SpO2 99%   BMI 20.09 kg/m²   Temp (24hrs), Av.8 °F (36.6 °C), Min:97.4 °F (36.3 °C), Max:98.2 °F (36.8 °C)        Intake/Output Summary (Last 24 hours) at 2023 1259  Last data filed at 2023 2139  Gross per 24 hour   Intake 2102.5 ml   Output --   Net 2102.5 ml       I/O last 24 hours: In: 3052.5 [I.V.:3052.5]  Out: -       Current Weight: Weight - Scale: 53.1 kg (117 lb 1 oz)  First Weight: Weight - Scale: 52.9 kg (116 lb 10 oz)  Physical Exam  Vitals and nursing note reviewed. Constitutional:       General: She is not in acute distress. Appearance: Normal appearance. She is normal weight. She is not ill-appearing, toxic-appearing or diaphoretic. HENT:      Head: Normocephalic and atraumatic. Mouth/Throat:      Pharynx: No oropharyngeal exudate. Eyes:      General: No scleral icterus. Conjunctiva/sclera: Conjunctivae normal.   Cardiovascular:      Rate and Rhythm: Normal rate. Pulses: Normal pulses. Pulmonary:      Effort: No respiratory distress. Breath sounds: Normal breath sounds. No stridor. Abdominal:      General: There is no distension. Palpations: Abdomen is soft. There is no mass. Tenderness:  There is no abdominal tenderness. Musculoskeletal:         General: No swelling. Cervical back: Normal range of motion. No rigidity. Skin:     Coloration: Skin is not jaundiced. Neurological:      General: No focal deficit present. Mental Status: She is alert. She is disoriented. Psychiatric:         Mood and Affect: Mood normal.             Review of Systems   Constitutional: Negative for chills and fatigue. HENT: Negative for congestion. Respiratory: Negative for cough and shortness of breath. Cardiovascular: Negative for leg swelling. Gastrointestinal: Negative for abdominal pain and diarrhea. Genitourinary: Negative for flank pain. Musculoskeletal: Negative for back pain. Neurological: Negative for headaches. Psychiatric/Behavioral: Negative for agitation. All other systems reviewed and are negative. Scheduled Meds:  Current Facility-Administered Medications   Medication Dose Route Frequency Provider Last Rate   • chlorhexidine  15 mL Mouth/Throat Q12H [address] Mohan Washington PA-C     • desmopressin  10 mcg Nasal Once Dio Baumann MD     • dextrose  150 mL/hr Intravenous Continuous Almaz Hayes  mL/hr (09/05/23 1121)   • divalproex sodium  1,000 mg Oral Q12H [address] Meg Guy PA-C     • LORazepam  1 mg Intravenous Q6H PRN Mohan Washington PA-C     • mirtazapine  15 mg Oral HS Mohan Washington PA-C     • OLANZapine  2.5 mg Oral Q3H PRN Mohan Washington PA-C     • propranolol  10 mg Oral TID Mohan Washington PA-C         PRN Meds:.•  LORazepam  •  OLANZapine    Continuous Infusions:dextrose, 150 mL/hr, Last Rate: 150 mL/hr (09/05/23 1121)          Invasive Devices: Invasive Devices     Peripheral Intravenous Line  Duration           Peripheral IV 09/05/23 Dorsal (posterior); Right Hand <1 day                  LABORATORY:    Results from last 7 days   Lab Units 09/05/23  0557 09/04/23  0857 09/03/23  1956 09/03/23  0910 09/03/23  0340 09/02/23  2123 09/02/23  1636 09/02/23  4204 09/02/23  0512 09/01/23  1241 09/01/23  0530 08/31/23  0350 08/30/23  1813 08/30/23  1257   WBC Thousand/uL  --   --   --   --   --   --   --   --  12.93*  --  11.21* 15.57*  --  17.41*   HEMOGLOBIN g/dL  --   --   --   --   --   --   --   --  14.5  --  14.4 13.4  --  14.5   HEMATOCRIT %  --   --   --   --   --   --   --   --  45.9  --  45.8 41.7  --  45.2   PLATELETS Thousands/uL  --   --   --   --   --   --   --   --  130*  --  145* 134* 145* 163   POTASSIUM mmol/L 4.3 3.5 3.2* 3.7 4.1 4.5 3.7   < >  --    < > 3.9 3.5  --  4.5   CHLORIDE mmol/L 115* 108 110* 115* 122* 123* 114*   < >  --    < > 124* 105  --  108   CO2 mmol/L 33* 35* 36* 36* 32 35* 31   < >  --    < > 34* 29  --  30   BUN mg/dL 7 5 6 9 10 10 9   < >  --    < > 4* 5  --  7   CREATININE mg/dL 0.37* 0.45* 0.47* 0.50* 0.48* 0.49* 0.52*   < >  --    < > 0.45* 0.59*  --  0.66   CALCIUM mg/dL 9.4 9.0 9.2 9.7 10.1 10.4* 9.5   < >  --    < > 10.3* 8.4  --  9.8   MAGNESIUM mg/dL  --   --   --   --   --   --   --   --  2.6  --  3.1* 2.3  --   --    PHOSPHORUS mg/dL  --   --   --   --   --   --   --   --  3.3  --  2.1* 3.2  --   --     < > = values in this interval not displayed. rest all reviewed    RADIOLOGY:  CT abdomen pelvis with contrast   Final Result by Georgina Sanchez MD (08/30 1438)      1. Top-normal fluid-filled loops of hyperemic small bowel in the right lower quadrant, which could represent a nonspecific enteritis in the appropriate clinical context. 2.  Nonobstructing 2 mm calculus in the interpolar right kidney. The study was marked in EPIC for significant notification. Workstation performed: [id number]         CT head without contrast   Final Result by Gini Pickard MD (08/30 1433)      No acute intracranial abnormality. Workstation performed: [id number]           Rest all reviewed    Portions of the record may have been created with voice recognition software.  Occasional wrong word or "sound a like" substitutions may have occurred due to the inherent limitations of voice recognition software. Read the chart carefully and recognize, using context, where substitutions have occurred. If you have any questions, please contact the dictating provider.

## 2023-09-04 NOTE — PROGRESS NOTES
NEPHROLOGY PROGRESS NOTE   Renetta Egan 28 y.o. female MRN: [id number]  Unit/Bed#: 1575 [address] 217-01 Encounter: [id number]  Reason for Consult: Hypernatremia      SUMMARY:    76-year-old female with significant psych issues including schizoaffective disorder, intellectual disability, bipolar disorder, presented with worsening encephalopathy, lethargy.  We are consulted for lithium toxicity     ASSESSMENT and PLAN:     Lithium toxicity  -- Lithium level was 4.6 on admission  -- Renal function remains normal with a creatinine of 0.5 mg/dL  -- Underwent hemodialysis x2 treatments last treatment on August 31. Femoral dialysis catheter was removed  --Her last lithium level was normal at 0.5.  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 If the sodium is normal can reduce the BMP to daily. SUBJECTIVE / INTERVAL HISTORY:    Patient appears to be more calm and relaxed this morning. Not as agitated. More friendly    OBJECTIVE:  Current Weight: Weight - Scale: 51.2 kg (112 lb 14 oz)  Vitals:    09/03/23 1630 09/03/23 1905 09/03/23 1906 09/03/23 2123   BP: 127/74 105/66 105/66 103/67   Pulse: 85 69 73 73   Resp:  20 20 20   Temp:  97.8 °F (36.6 °C) 97.8 °F (36.6 °C)    TempSrc:       SpO2: 96% 97% 98% 97%   Weight:       Height:           Intake/Output Summary (Last 24 hours) at 9/4/2023 1416  Last data filed at 9/3/2023 1000  Gross per 24 hour   Intake --   Output 325 ml   Net -325 ml       Review of Systems:    Unable to get a good review of systems from the patient due to her mental status    Physical Exam  Vitals and nursing note reviewed. Exam conducted with a chaperone present. Constitutional:       General: She is not in acute distress. Appearance: Normal appearance. She is well-developed. She is not toxic-appearing or diaphoretic. HENT:      Head: Normocephalic and atraumatic. Eyes:      General: No scleral icterus. Right eye: No discharge. Left eye: No discharge. Cardiovascular:      Rate and Rhythm: Normal rate. Heart sounds: S1 normal and S2 normal.   Pulmonary:      Effort: Pulmonary effort is normal. No respiratory distress. Breath sounds: Normal breath sounds. No wheezing or rales. Abdominal:      General: There is no distension. Tenderness: There is no abdominal tenderness. There is no guarding or rebound. Musculoskeletal:         General: Normal range of motion. Cervical back: Normal range of motion and neck supple. Right lower leg: No edema. Left lower leg: No edema. Skin:     General: Skin is dry. Coloration: Skin is pale. Findings: No rash. Neurological:      General: No focal deficit present. Mental Status: She is alert.          Medications:    Current Facility-Administered Medications:   •  chlorhexidine (PERIDEX) 0.12 % oral rinse 15 mL, 15 mL, Mouth/Throat, Q12H [address], Tammy Will PA-C, 15 mL at 09/03/23 2117  •  dextrose 5 % infusion, 150 mL/hr, Intravenous, Continuous, Carlyle Sheets MD, Last Rate: 175 mL/hr at 09/04/23 0217, 175 mL/hr at 09/04/23 0217  •  divalproex sodium (DEPAKOTE SPRINKLE) capsule 1,000 mg, 1,000 mg, Oral, Q12H [address] PA-C, 1,000 mg at 09/03/23 2119  •  LORazepam (ATIVAN) injection 1 mg, 1 mg, Intravenous, Q6H PRN, Tammy Will PA-C, 1 mg at 09/02/23 2047  •  mirtazapine (REMERON) tablet 15 mg, 15 mg, Oral, HS, Tammy Will PA-C, 15 mg at 09/03/23 2117  •  OLANZapine (ZyPREXA) tablet 2.5 mg, 2.5 mg, Oral, Q3H PRN, Tammy Will PA-C, 2.5 mg at 09/02/23 1746  •  propranolol (INDERAL) tablet 10 mg, 10 mg, Oral, TID, Tammy Will PA-C, 10 mg at 09/03/23 8245    Laboratory Results:  Results from last 7 days   Lab Units 09/03/23  1956 09/03/23  0910 09/03/23  0340 09/02/23  2123 09/02/23  1636 09/02/23  0842 09/02/23  0512 09/01/23  2350 09/01/23  1241 09/01/23  0530 08/31/23  0350 08/30/23  1813 08/30/23  1257   WBC Thousand/uL  --   --   --   --   --   --  12.93*  --   --  11.21* 15.57*  --  17.41*   HEMOGLOBIN g/dL  --   --   --   --   --   --  14.5  --   --  14.4 13.4  --  14.5   HEMATOCRIT %  --   --   --   --   --   --  45.9  --   --  45.8 41.7  --  45.2   PLATELETS Thousands/uL  --   --   --   --   --   --  130*  --   --  145* 134* 145* 163   POTASSIUM mmol/L 3.2* 3.7 4.1 4.5 3.7 3.7  --  4.0   < > 3.9 3.5  --  4.5   CHLORIDE mmol/L 110* 115* 122* 123* 114* 124*  --  127*   < > 124* 105  --  108   CO2 mmol/L 36* 36* 32 35* 31 37*  --  36*   < > 34* 29  --  30   BUN mg/dL 6 9 10 10 9 6  --  8   < > 4* 5  --  7   CREATININE mg/dL 0.47* 0.50* 0.48* 0.49* 0.52* 0.57*  --  0.52*   < > 0.45* 0.59*  --  0.66   CALCIUM mg/dL 9.2 9.7 10.1 10.4* 9.5 10.3*  --  10.5*   < > 10.3* 8.4  --  9.8   MAGNESIUM mg/dL  --   --   --   --   --   --  2.6  --   --  3.1* 2.3  --   --    PHOSPHORUS mg/dL  --   --   --   --   --   --  3.3  --   --  2.1* 3.2  --   --     < > = values in this interval not displayed. PLEASE NOTE:  This encounter was completed utilizing the [employer] Direct Speech Voice Recognition Software. Grammatical errors, random word insertions, pronoun errors and incomplete sentences are occasional consequences of the system due to software limitations, ambient noise and hardware issues. These may be missed by proof reading prior to affixing electronic signature. Any questions or concerns about the content, text or information contained within the body of this dictation should be directly addressed to the physician for clarification. Please do not hesitate to call me directly if you have any any questions or concerns.

## 2023-09-04 NOTE — ASSESSMENT & PLAN NOTE
· She has a history of schizoaffective disorder, intellectual disability, bipolar disorder, presented with worsening encephalopathy, lethargy for the past two weeks. · As per patient's sister, at her baseline she is generally very active, alert, able to maintain conversation, generally oriented despite having intellectual disability. · Patients family reports symptoms started approximately two weeks when Clozaril was discontinued for abnormal white blood cell count. At home dose of lithium 750 mg twice daily.    · Patient underwent hemodialysis x2 last treatment on 8/31/2023  · Toxicology and nephrology consultations and recommendations appreciated  · Psychiatry input appreciated  · Lithium levels improved  · Continue aggressive IV hydration   ·  Patient's sporadic agitation refractory to Ativan; will trial hydroxyzine and as needed Zyprexa

## 2023-09-04 NOTE — ASSESSMENT & PLAN NOTE
· Patient has profound intellectual disability;  · Continue supportive care; monitor off lithium; correct metabolic deficiencies  · As needed Zyprexa for acute agitation

## 2023-09-04 NOTE — PLAN OF CARE
Problem: Potential for Falls  Goal: Patient will remain free of falls  Description: INTERVENTIONS:  - Educate patient/family on patient safety including physical limitations  - Instruct patient to call for assistance with activity   - Consult OT/PT to assist with strengthening/mobility   - Keep Call bell within reach  - Keep bed low and locked with side rails adjusted as appropriate  - Keep care items and personal belongings within reach  - Initiate and maintain comfort rounds  - Make Fall Risk Sign visible to staff  - Offer Toileting every  Hours, in advance of need  - Initiate/Maintain alarm  - Obtain necessary fall risk management equipment:   - Apply yellow socks and bracelet for high fall risk patients  - Consider moving patient to room near nurses station  Outcome: Progressing     Problem: MOBILITY - ADULT  Goal: Maintain or return to baseline ADL function  Description: INTERVENTIONS:  -  Assess patient's ability to carry out ADLs; assess patient's baseline for ADL function and identify physical deficits which impact ability to perform ADLs (bathing, care of mouth/teeth, toileting, grooming, dressing, etc.)  - Assess/evaluate cause of self-care deficits   - Assess range of motion  - Assess patient's mobility; develop plan if impaired  - Assess patient's need for assistive devices and provide as appropriate  - Encourage maximum independence but intervene and supervise when necessary  - Involve family in performance of ADLs  - Assess for home care needs following discharge   - Consider OT consult to assist with ADL evaluation and planning for discharge  - Provide patient education as appropriate  Outcome: Progressing  Goal: Maintains/Returns to pre admission functional level  Description: INTERVENTIONS:  - Perform BMAT or MOVE assessment daily.   - Set and communicate daily mobility goal to care team and patient/family/caregiver.    - Collaborate with rehabilitation services on mobility goals if consulted  - Perform Range of Motion  times a day. - Reposition patient every  hours. - Dangle patient  times a day  - Stand patient  times a day  - Ambulate patient  times a day  - Out of bed to chair  times a day   - Out of bed for meals  times a day  - Out of bed for toileting  - Record patient progress and toleration of activity level   Outcome: Progressing     Problem: NEUROSENSORY - ADULT  Goal: Achieves maximal functionality and self care  Description: INTERVENTIONS  - Monitor swallowing and airway patency with patient fatigue and changes in neurological status  - Encourage and assist patient to increase activity and self care.    - Encourage visually impaired, hearing impaired and aphasic patients to use assistive/communication devices  Outcome: Progressing  Goal: Achieves stable or improved neurological status  Description: INTERVENTIONS  - Monitor and report changes in neurological status  - Monitor vital signs such as temperature, blood pressure, glucose, and any other labs ordered   - Initiate measures to prevent increased intracranial pressure  - Monitor for seizure activity and implement precautions if appropriate      Outcome: Progressing     Problem: CARDIOVASCULAR - ADULT  Goal: Maintains optimal cardiac output and hemodynamic stability  Description: INTERVENTIONS:  - Monitor I/O, vital signs and rhythm  - Monitor for S/S and trends of decreased cardiac output  - Administer and titrate ordered vasoactive medications to optimize hemodynamic stability  - Assess quality of pulses, skin color and temperature  - Assess for signs of decreased coronary artery perfusion  - Instruct patient to report change in severity of symptoms  Outcome: Progressing  Goal: Absence of cardiac dysrhythmias or at baseline rhythm  Description: INTERVENTIONS:  - Continuous cardiac monitoring, vital signs, obtain 12 lead EKG if ordered  - Administer antiarrhythmic and heart rate control medications as ordered  - Monitor electrolytes and administer replacement therapy as ordered  Outcome: Progressing     Problem: GASTROINTESTINAL - ADULT  Goal: Maintains or returns to baseline bowel function  Description: INTERVENTIONS:  - Assess bowel function  - Encourage oral fluids to ensure adequate hydration  - Administer IV fluids if ordered to ensure adequate hydration  - Administer ordered medications as needed  - Encourage mobilization and activity  - Consider nutritional services referral to assist patient with adequate nutrition and appropriate food choices  Outcome: Progressing  Goal: Maintains adequate nutritional intake  Description: INTERVENTIONS:  - Monitor percentage of each meal consumed  - Identify factors contributing to decreased intake, treat as appropriate  - Assist with meals as needed  - Monitor I&O, weight, and lab values if indicated  - Obtain nutrition services referral as needed  Outcome: Progressing     Problem: GENITOURINARY - ADULT  Goal: Maintains or returns to baseline urinary function  Description: INTERVENTIONS:  - Assess urinary function  - Encourage oral fluids to ensure adequate hydration if ordered  - Administer IV fluids as ordered to ensure adequate hydration  - Administer ordered medications as needed  - Offer frequent toileting  - Follow urinary retention protocol if ordered  Outcome: Progressing     Problem: METABOLIC, FLUID AND ELECTROLYTES - ADULT  Goal: Electrolytes maintained within normal limits  Description: INTERVENTIONS:  - Monitor labs and assess patient for signs and symptoms of electrolyte imbalances  - Administer electrolyte replacement as ordered  - Monitor response to electrolyte replacements, including repeat lab results as appropriate  - Instruct patient on fluid and nutrition as appropriate  Outcome: Progressing  Goal: Fluid balance maintained  Description: INTERVENTIONS:  - Monitor labs   - Monitor I/O and WT  - Instruct patient on fluid and nutrition as appropriate  - Assess for signs & symptoms of volume excess or deficit  Outcome: Progressing     Problem: SKIN/TISSUE INTEGRITY - ADULT  Goal: Skin Integrity remains intact(Skin Breakdown Prevention)  Description: Assess:  -Perform [place] assessment every   -Clean and moisturize skin every   -Inspect skin when repositioning, toileting, and assisting with ADLS  -Assess under medical devices such as  every   -Assess extremities for adequate circulation and sensation     Bed Management:  -Have minimal linens on bed & keep smooth, unwrinkled  -Change linens as needed when moist or perspiring  -Avoid sitting or lying in one position for more than  hours while in bed  -Keep HOB at degrees     Toileting:  -Offer bedside commode  -Assess for incontinence every   -Use incontinent care products after each incontinent episode such as     Activity:  -Mobilize patient  times a day  -Encourage activity and walks on unit  -Encourage or provide ROM exercises   -Turn and reposition patient every  Hours  -Use appropriate equipment to lift or move patient in bed  -Instruct/ Assist with weight shifting every  when out of bed in chair  -Consider limitation of chair time  hour intervals    Skin Care:  -Avoid use of baby powder, tape, friction and shearing, hot water or constrictive clothing  -Relieve pressure over bony prominences using   -Do not massage red bony areas    Next Steps:  -Teach patient strategies to minimize risks such as    -Consider consults to  interdisciplinary teams such as   Outcome: Progressing     Problem: MUSCULOSKELETAL - ADULT  Goal: Maintain or return mobility to safest level of function  Description: INTERVENTIONS:  - Assess patient's ability to carry out ADLs; assess patient's baseline for ADL function and identify physical deficits which impact ability to perform ADLs (bathing, care of mouth/teeth, toileting, grooming, dressing, etc.)  - Assess/evaluate cause of self-care deficits   - Assess range of motion  - Assess patient's mobility  - Assess patient's need for assistive devices and provide as appropriate  - Encourage maximum independence but intervene and supervise when necessary  - Involve family in performance of ADLs  - Assess for home care needs following discharge   - Consider OT consult to assist with ADL evaluation and planning for discharge  - Provide patient education as appropriate  Outcome: Progressing     Problem: PAIN - ADULT  Goal: Verbalizes/displays adequate comfort level or baseline comfort level  Description: Interventions:  - Encourage patient to monitor pain and request assistance  - Assess pain using appropriate pain scale  - Administer analgesics based on type and severity of pain and evaluate response  - Implement non-pharmacological measures as appropriate and evaluate response  - Consider cultural and social influences on pain and pain management  - Notify physician/advanced practitioner if interventions unsuccessful or patient reports new pain  Outcome: Progressing     Problem: SAFETY ADULT  Goal: Patient will remain free of falls  Description: INTERVENTIONS:  - Educate patient/family on patient safety including physical limitations  - Instruct patient to call for assistance with activity   - Consult OT/PT to assist with strengthening/mobility   - Keep Call bell within reach  - Keep bed low and locked with side rails adjusted as appropriate  - Keep care items and personal belongings within reach  - Initiate and maintain comfort rounds  - Make Fall Risk Sign visible to staff  - Offer Toileting every  Hours, in advance of need  - Initiate/Maintain alarm  - Obtain necessary fall risk management equipment:   - Apply yellow socks and bracelet for high fall risk patients  - Consider moving patient to room near nurses station  Outcome: Progressing  Goal: Maintain or return to baseline ADL function  Description: INTERVENTIONS:  -  Assess patient's ability to carry out ADLs; assess patient's baseline for ADL function and identify physical deficits which impact ability to perform ADLs (bathing, care of mouth/teeth, toileting, grooming, dressing, etc.)  - Assess/evaluate cause of self-care deficits   - Assess range of motion  - Assess patient's mobility; develop plan if impaired  - Assess patient's need for assistive devices and provide as appropriate  - Encourage maximum independence but intervene and supervise when necessary  - Involve family in performance of ADLs  - Assess for home care needs following discharge   - Consider OT consult to assist with ADL evaluation and planning for discharge  - Provide patient education as appropriate  Outcome: Progressing  Goal: Maintains/Returns to pre admission functional level  Description: INTERVENTIONS:  - Perform BMAT or MOVE assessment daily.   - Set and communicate daily mobility goal to care team and patient/family/caregiver. - Collaborate with rehabilitation services on mobility goals if consulted  - Perform Range of Motion  times a day. - Reposition patient every  hours.   - Dangle patient  times a day  - Stand patient  times a day  - Ambulate patient  times a day  - Out of bed to chair  times a day   - Out of bed for meals  times a day  - Out of bed for toileting  - Record patient progress and toleration of activity level   Outcome: Progressing     Problem: DISCHARGE PLANNING  Goal: Discharge to home or other facility with appropriate resources  Description: INTERVENTIONS:  - Identify barriers to discharge w/patient and caregiver  - Arrange for needed discharge resources and transportation as appropriate  - Identify discharge learning needs (meds, wound care, etc.)  - Arrange for interpretive services to assist at discharge as needed  - Refer to Case Management Department for coordinating discharge planning if the patient needs post-hospital services based on physician/advanced practitioner order or complex needs related to functional status, cognitive ability, or social support system  Outcome: Progressing     Problem: Knowledge Deficit  Goal: Patient/family/caregiver demonstrates understanding of disease process, treatment plan, medications, and discharge instructions  Description: Complete learning assessment and assess knowledge base.   Interventions:  - Provide teaching at level of understanding  - Provide teaching via preferred learning methods  Outcome: Progressing     Problem: SAFETY,RESTRAINT: NV/NON-SELF DESTRUCTIVE BEHAVIOR  Goal: Remains free of harm/injury (restraint for non violent/non self-detsructive behavior)  Description: INTERVENTIONS:  - Instruct patient/family regarding restraint use   - Assess and monitor physiologic and psychological status   - Provide interventions and comfort measures to meet assessed patient needs   - Identify and implement measures to help patient regain control  - Assess readiness for release of restraint   Outcome: Progressing  Goal: Returns to optimal restraint-free functioning  Description: INTERVENTIONS:  - Assess the patient's behavior and symptoms that indicate continued need for restraint  - Identify and implement measures to help patient regain control  - Assess readiness for release of restraint   Outcome: Progressing     Problem: Prexisting or High Potential for Compromised Skin Integrity  Goal: Skin integrity is maintained or improved  Description: INTERVENTIONS:  - Identify patients at risk for skin breakdown  - Assess and monitor skin integrity  - Assess and monitor nutrition and hydration status  - Monitor labs   - Assess for incontinence   - Turn and reposition patient  - Assist with mobility/ambulation  - Relieve pressure over bony prominences  - Avoid friction and shearing  - Provide appropriate hygiene as needed including keeping skin clean and dry  - Evaluate need for skin moisturizer/barrier cream  - Collaborate with interdisciplinary team   - Patient/family teaching  - Consider wound care consult   Outcome: Progressing Problem: Nutrition/Hydration-ADULT  Goal: Nutrient/Hydration intake appropriate for improving, restoring or maintaining nutritional needs  Description: Monitor and assess patient's nutrition/hydration status for malnutrition. Collaborate with interdisciplinary team and initiate plan and interventions as ordered. Monitor patient's weight and dietary intake as ordered or per policy. Utilize nutrition screening tool and intervene as necessary. Determine patient's food preferences and provide high-protein, high-caloric foods as appropriate.      INTERVENTIONS:  - Monitor oral intake, urinary output, labs, and treatment plans  - Assess nutrition and hydration status and recommend course of action  - Evaluate amount of meals eaten  - Assist patient with eating if necessary   - Allow adequate time for meals  - Recommend/ encourage appropriate diets, oral nutritional supplements, and vitamin/mineral supplements  - Order, calculate, and assess calorie counts as needed  - Recommend, monitor, and adjust tube feedings and TPN/PPN based on assessed needs  - Assess need for intravenous fluids  - Provide specific nutrition/hydration education as appropriate  - Include patient/family/caregiver in decisions related to nutrition  Outcome: Progressing

## 2023-09-05 PROBLEM — R05.9 COUGH: Status: RESOLVED | Noted: 2023-07-07 | Resolved: 2023-09-05

## 2023-09-05 LAB
ANION GAP SERPL CALCULATED.3IONS-SCNC: 4 MMOL/L
ANION GAP SERPL CALCULATED.3IONS-SCNC: 4 MMOL/L
BUN SERPL-MCNC: 6 MG/DL (ref 5–25)
BUN SERPL-MCNC: 7 MG/DL (ref 5–25)
CALCIUM SERPL-MCNC: 8.7 MG/DL (ref 8.4–10.2)
CALCIUM SERPL-MCNC: 9.4 MG/DL (ref 8.4–10.2)
CHLORIDE SERPL-SCNC: 110 MMOL/L (ref 96–108)
CHLORIDE SERPL-SCNC: 115 MMOL/L (ref 96–108)
CO2 SERPL-SCNC: 32 MMOL/L (ref 21–32)
CO2 SERPL-SCNC: 33 MMOL/L (ref 21–32)
CREAT SERPL-MCNC: 0.33 MG/DL (ref 0.6–1.3)
CREAT SERPL-MCNC: 0.37 MG/DL (ref 0.6–1.3)
GFR SERPL CREATININE-BSD FRML MDRD: 139 ML/MIN/1.73SQ M
GFR SERPL CREATININE-BSD FRML MDRD: 145 ML/MIN/1.73SQ M
GLUCOSE SERPL-MCNC: 87 MG/DL (ref 65–140)
GLUCOSE SERPL-MCNC: 95 MG/DL (ref 65–140)
OSMOLALITY UR: 85 MMOL/KG
POTASSIUM SERPL-SCNC: 3.3 MMOL/L (ref 3.5–5.3)
POTASSIUM SERPL-SCNC: 4.3 MMOL/L (ref 3.5–5.3)
SODIUM 24H UR-SCNC: 16 MOL/L
SODIUM SERPL-SCNC: 146 MMOL/L (ref 135–147)
SODIUM SERPL-SCNC: 152 MMOL/L (ref 135–147)

## 2023-09-05 PROCEDURE — 99232 SBSQ HOSP IP/OBS MODERATE 35: CPT | Performed by: INTERNAL MEDICINE

## 2023-09-05 PROCEDURE — 80048 BASIC METABOLIC PNL TOTAL CA: CPT | Performed by: INTERNAL MEDICINE

## 2023-09-05 PROCEDURE — 84300 ASSAY OF URINE SODIUM: CPT | Performed by: INTERNAL MEDICINE

## 2023-09-05 PROCEDURE — 83935 ASSAY OF URINE OSMOLALITY: CPT | Performed by: INTERNAL MEDICINE

## 2023-09-05 PROCEDURE — 99233 SBSQ HOSP IP/OBS HIGH 50: CPT | Performed by: PSYCHIATRY & NEUROLOGY

## 2023-09-05 RX ORDER — DESMOPRESSIN ACETATE 0.1 MG/ML
10 SOLUTION NASAL ONCE
Status: COMPLETED | OUTPATIENT
Start: 2023-09-05 | End: 2023-09-05

## 2023-09-05 RX ORDER — OLANZAPINE 5 MG/1
5 TABLET, ORALLY DISINTEGRATING ORAL DAILY
Status: DISCONTINUED | OUTPATIENT
Start: 2023-09-06 | End: 2023-09-08 | Stop reason: HOSPADM

## 2023-09-05 RX ADMIN — CHLORHEXIDINE GLUCONATE 15 ML: 1.2 RINSE ORAL at 08:30

## 2023-09-05 RX ADMIN — DIVALPROEX SODIUM 1000 MG: 125 CAPSULE, COATED PELLETS ORAL at 08:34

## 2023-09-05 RX ADMIN — DESMOPRESSIN ACETATE 10 MCG: 10 SPRAY NASAL at 14:56

## 2023-09-05 RX ADMIN — DIVALPROEX SODIUM 1000 MG: 125 CAPSULE, COATED PELLETS ORAL at 20:55

## 2023-09-05 RX ADMIN — LORAZEPAM 1 MG: 2 INJECTION INTRAMUSCULAR; INTRAVENOUS at 06:47

## 2023-09-05 RX ADMIN — DEXTROSE 150 ML/HR: 5 SOLUTION INTRAVENOUS at 21:46

## 2023-09-05 RX ADMIN — OLANZAPINE 2.5 MG: 5 TABLET, FILM COATED ORAL at 20:52

## 2023-09-05 RX ADMIN — MIRTAZAPINE 15 MG: 15 TABLET, FILM COATED ORAL at 20:53

## 2023-09-05 NOTE — PROGRESS NOTES
Follow up Consultation    Nephrology   Waldemar Blizzard 28 y.o. female MRN: [id number]  Unit/Bed#: 1575 [address] Encounter: [id number]      Physician Requesting Consult: Shira Jefferson MD        ASSESSMENT/PLAN:   65-year-old female with significant psychiatric issues including schizoaffective disorder and bipolar disorder presented with worsening mental status and lethargy. Nephrology consulted for lithium toxicity. Lithium toxicity:  Lithium level was 4.6 on admission  Status post dialysis treatments x2 treatment on August 31  Dialysis catheter has been removed  Lithium level down 0.5  No longer on lithium      Acid-base electrolytes:    Electrolytes:      Hypernatremia:   Patient admitted on 8/30/2023 with a serum sodium 137 mEq  Peak serum sodium at 167 mEq on 9/1/2023  Most recent sodium at 146 mEq stable and improving  Most likely in the setting of lithium induced diabetes insipidus nephrogenic versus possibly some component of central DI.  on D5 water at 150 cc an hour DC for now  Check BMP in a.m. Consider imaging of brain to rule out central DI  Initial urine osmolality 103 and urine sodium 20 as of 9/1/2023   Repeat urine osmolality on 9/5/2023 prior to DDAVP 10 mcg intranasal was 85 with urine sodium of 16  Urine osmolality on 9/5/2023 approximately 6 hours post DDAVP slightly increased to 93  At this time it is very difficult to say if patient has CDI or NDI however more likely to be NDI  Unable to get accurate urine outputs or urine osmolalities  For now we will give amiloride 5 mg p.o. bid today to see if patient has a response as it would help with MDI. I do not believe the patient would sit still for brain MRI to rule out CDI etiology  Get strict I's and O's. May need to consider giving DDAVP and need for endocrinology consultation v if no significant response with amiloride.     Hypokalemia:  Most recent serum potassium 3.3 mEq  Supplement and recheck BMP    Acid-base:    Most recent bicarb at 32    H/H/anemia:  most recent hemoglobin at 14.5 g/dL  maintain hemoglobin greater than 8 grams/deciliter    Blood pressure/normotensive:  current medications: None  recommendations: We will give amiloride 5 mg p.o.bid today  Optimize hemodynamics. Maintain MAP > 65mmHg  Avoid BP fluctuations. Other medical problems:  PMetabolic encephalopathy, schizoaffective disorder/intellectual disability:  Management per primary team.  No longer on lithium. On Depakote      Thanks for the consult  Will continue to follow  Please call with questions/ concerns. Above-mentioned orders and Plan we will give potassium supplementation give a dose of amiloride DC IV fluids check BMP in a.m. were discussed with the team in depth and they agree. Aracelis Pacheco MD, FASN, 2023, 10:53 AM              Objective :   Patient seen and examined earlier this a.m. one-to-one at bedside urine output 500 cc plus did get a dose of DDAVP yesterday however were not able to get timed urine osmolality levels as patient does not really cooperate. PHYSICAL EXAM  BP 97/68 (BP Location: Right arm)   Pulse 67   Temp 97.7 °F (36.5 °C) (Axillary)   Resp 16   Ht 5' 4" (1.626 m)   Wt 53.1 kg (117 lb 1 oz)   SpO2 98%   BMI 20.09 kg/m²   Temp (24hrs), Av.4 °F (36.9 °C), Min:97.5 °F (36.4 °C), Max:99.1 °F (37.3 °C)        Intake/Output Summary (Last 24 hours) at 2023 1053  Last data filed at 2023 2146  Gross per 24 hour   Intake --   Output 500 ml   Net -500 ml       I/O last 24 hours: In: -   Out: 500 [Urine:500]      Current Weight: Weight - Scale: 53.1 kg (117 lb 1 oz)  First Weight: Weight - Scale: 52.9 kg (116 lb 10 oz)  Physical Exam  Vitals and nursing note reviewed. Constitutional:       General: She is not in acute distress. Appearance: Normal appearance. She is normal weight. She is ill-appearing. She is not toxic-appearing or diaphoretic. HENT:      Head: Normocephalic and atraumatic. Mouth/Throat:      Pharynx: No oropharyngeal exudate. Eyes:      General: No scleral icterus. Cardiovascular:      Rate and Rhythm: Normal rate. Pulmonary:      Effort: Pulmonary effort is normal. No respiratory distress. Breath sounds: No stridor. No wheezing. Abdominal:      Palpations: Abdomen is soft. Tenderness: There is no abdominal tenderness. Musculoskeletal:         General: No swelling. Cervical back: Normal range of motion. No rigidity. Skin:     Coloration: Skin is not jaundiced. Neurological:      General: No focal deficit present. Mental Status: She is alert. Mental status is at baseline. Psychiatric:         Mood and Affect: Mood normal.             Review of Systems   Unable to perform ROS: Psychiatric disorder   Constitutional: Negative for fever. Respiratory: Negative for cough. Gastrointestinal: Negative for diarrhea. Musculoskeletal: Negative for back pain. Neurological: Negative for headaches. Psychiatric/Behavioral: Positive for confusion. Negative for agitation. All other systems reviewed and are negative. Scheduled Meds:  Current Facility-Administered Medications   Medication Dose Route Frequency Provider Last Rate   • AMILoride  5 mg Oral BID Alejandrina Rothman MD     • chlorhexidine  15 mL Mouth/Throat Q12H [facility] Pankaj Rowe PA-C     • divalproex sodium  1,000 mg Oral Q12H [facility]     • mirtazapine  15 mg Oral HS Pankaj Rowe PA-C     • OLANZapine  5 mg Oral Daily Camden Nation MD     • OLANZapine  2.5 mg Oral Q3H PRN Pankaj Rowe PA-C     • potassium chloride  40 mEq Oral Once Mary Veliz MD     • potassium chloride  40 mEq Oral Once Mary Veliz MD     • propranolol  10 mg Oral TID Pankaj Rowe PA-C         PRN Meds:.•  OLANZapine    Continuous Infusions:       Invasive Devices:      Invasive Devices     Peripheral Intravenous Line  Duration           Peripheral IV 09/05/23 Right;Ventral (anterior) Forearm <1 day LABORATORY:    Results from last 7 days   Lab Units 09/05/23  1757 09/05/23  0557 09/04/23  0857 09/03/23  1956 09/03/23  0910 09/03/23  0340 09/02/23  2123 09/02/23  0842 09/02/23  0512 09/01/23  1241 09/01/23  0530 08/31/23  0350 08/30/23  1813 08/30/23  1257   WBC Thousand/uL  --   --   --   --   --   --   --   --  12.93*  --  11.21* 15.57*  --  17.41*   HEMOGLOBIN g/dL  --   --   --   --   --   --   --   --  14.5  --  14.4 13.4  --  14.5   HEMATOCRIT %  --   --   --   --   --   --   --   --  45.9  --  45.8 41.7  --  45.2   PLATELETS Thousands/uL  --   --   --   --   --   --   --   --  130*  --  145* 134* 145* 163   POTASSIUM mmol/L 3.3* 4.3 3.5 3.2* 3.7 4.1 4.5   < >  --    < > 3.9 3.5  --  4.5   CHLORIDE mmol/L 110* 115* 108 110* 115* 122* 123*   < >  --    < > 124* 105  --  108   CO2 mmol/L 32 33* 35* 36* 36* 32 35*   < >  --    < > 34* 29  --  30   BUN mg/dL 6 7 5 6 9 10 10   < >  --    < > 4* 5  --  7   CREATININE mg/dL 0.33* 0.37* 0.45* 0.47* 0.50* 0.48* 0.49*   < >  --    < > 0.45* 0.59*  --  0.66   CALCIUM mg/dL 8.7 9.4 9.0 9.2 9.7 10.1 10.4*   < >  --    < > 10.3* 8.4  --  9.8   MAGNESIUM mg/dL  --   --   --   --   --   --   --   --  2.6  --  3.1* 2.3  --   --    PHOSPHORUS mg/dL  --   --   --   --   --   --   --   --  3.3  --  2.1* 3.2  --   --     < > = values in this interval not displayed. rest all reviewed    RADIOLOGY:  CT abdomen pelvis with contrast   Final Result by Chad Montaño MD (08/30 1438)      1. Top-normal fluid-filled loops of hyperemic small bowel in the right lower quadrant, which could represent a nonspecific enteritis in the appropriate clinical context. 2.  Nonobstructing 2 mm calculus in the interpolar right kidney. The study was marked in EPIC for significant notification.          Workstation performed: [id number]         CT head without contrast   Final Result by Dianna Garcia MD (08/30 1433)      No acute intracranial abnormality. Workstation performed: [id number]           Rest all reviewed    Portions of the record may have been created with voice recognition software. Occasional wrong word or "sound a like" substitutions may have occurred due to the inherent limitations of voice recognition software. Read the chart carefully and recognize, using context, where substitutions have occurred. If you have any questions, please contact the dictating provider.

## 2023-09-05 NOTE — TELEMEDICINE
TeleConsultation - [address] 28 y.o. female MRN: [id number]  Unit/Bed#: [place] [address] -01 Encounter: [id number]        REQUIRED DOCUMENTATION:     1. This service was provided via Telemedicine. 2. Provider located at [facility].  3. TeleMed provider: Talha Rosales MD.  4. Identify all parties in room with patient during tele consult:  pt  5. Patient was then informed that this was a Telemedicine visit and that the exam was being conducted confidentially over secure lines. My office door was closed. No one else was in the room. Patient acknowledged consent and understanding of privacy and security of the Telemedicine visit, and gave us permission to have the assistant stay in the room in order to assist with the history and to conduct the exam.  I informed the patient that I have reviewed their record in Epic and presented the opportunity for them to ask any questions regarding the visit today. The patient agreed to participate. Assessment/Plan     Present on Admission:  • Acute encephalopathy 2/2 lithium toxicity  • Intellectual disability  • Unspecified mood (affective) disorder (HCC)    Assessment:    56-year-old female presenting with lithium toxicity and hypernatremia. Acute metabolic encephalopathy/delirium (lithium toxicity, hypernatremia); schizoaffective disorder by history; intellectual disability by history. Treatment Plan:      Adding Zyprexa 5 mg p.o. daily a to serve as mood stabilizer in addition to the Depakote. Zyprexa can be further titrated to effect if indicated and as tolerated. As limited benefit has been seen with as needed Ativan, recommend replacing it with Zyprexa 5 mg p.o. or IM every 4 hours as needed agitation. Total Zyprexa should not exceed 30 mg per 24 hours. No additional psychiatric precautions are indicated at this time. Upon discharge the patient should follow-up with outpatient psychiatry for continuing evaluation and medication management. Reconsult psychiatry as needed. Recommend Delirium Precautions:  Maintain sleep-wake cycle, avoid nighttime interruptions  Provide adequate pain control  Avoid urinary retention and constipation  Provide frequent and early mobilization  Provide frequent redirection and reorientation as needed  Attempt to avoid medications that may worsen or precipitate delirium such as tramadol, benzodiazepines, anticholinergics, and Benadryl  Redirect unwanted behaviors as first-line therapy, avoid physical restraints as able to  Continue to monitor    Current Medications:     Current Facility-Administered Medications   Medication Dose Route Frequency Provider Last Rate   • chlorhexidine  15 mL Mouth/Throat Q12H [address] Marc Livingston PA-C     • dextrose  150 mL/hr Intravenous Continuous Niurka Hartley  mL/hr (23 1121)   • divalproex sodium  1,000 mg Oral Q12H [address] Anabella Gallegos PA-C     • LORazepam  1 mg Intravenous Q6H PRN Marc Livingston PA-C     • mirtazapine  15 mg Oral HS Marc Livingston PA-C     • OLANZapine  2.5 mg Oral Q3H PRN Marc Livingston PA-C     • propranolol  10 mg Oral TID Marc Livingston PA-C         Risks / Benefits of Treatment:    Risks, benefits, and possible side effects of medications explained to patient and patient verbalizes understanding. Other treatment modalities recommended as indicated:    · To be determined      Inpatient consult to Psychiatry  Consult performed by: Shankar Napier MD  Consult ordered by: Marika Espino MD        Physician Requesting Consult: Marika Espino MD  Principal Problem:Lithium toxicity    Reason for Consult: Delirium; lithium toxicity; schizoaffective disorder      History of Present Illness      This is a follow-up psychiatry consult to that provided 2023. Please see that consult for details.   In summary from my consult:  58-year-old female who presented to the hospital where the emergency department physician documented the followin yo F with congenital intellectual disability, associated psychiatric disorder, accompanied in the ED by her mother, with concern for progressive behavioral dysfunction for 2 weeks.  At her baseline, she can ambulate, speak, manage ADLs, while her behavioral condition can manifests as rapid pressured, speech, manic periods, acting out behavior, hypersexual behavior, aggression. Brandon April, over the two weeks, she has become dependent on her caretakers for ADLs, and has stopped speaking with any clarity, rather is only shouting or moaning.  She seems to be indicating discomfort or complaining of pain, clutching at her mid to lower abdomen.  Her mother has tried to understand what pain she is having, and has only been able to understand her saying "heart" but recognizes she is not in any respiratory distress, nor is she indicating chest pain when she is moaning or crying out.  Near the onset of behavioral changes, they were at her PCP and there was concern that she was not taking clozapine:  Per PCP 8/22/23 "Spoke directly to the pharmacist at [facility].  There is a concern about the white count with Clozaril. It has to be followed every month, and uploaded to a website for that. Certain pharmacies are allowed to prescribe it. When she (pharmacist) plugged in the most recent data that was given to her, the white count was not appropriate for Clozaril.        History limited by:  Patient nonverbal and psychiatric disorder   used: Yes ([facility] [id number])          Prior to Admission Medications   Prescriptions Last Dose Informant Patient Reported?  Taking?   benztropine (COGENTIN) 2 mg tablet Not Taking Mother Yes No   Sig: Take 2 mg by mouth 2 (two) times a day   Patient not taking: Reported on 8/22/2023   cloZAPine (CLOZARIL) 100 mg tablet Not Taking Mother No No   Sig: Take 1.5 tablets (150 mg total) by mouth 2 (two) times a day   Patient not taking: Reported on 8/22/2023   divalproex sodium (DEPAKOTE) 500 mg DR tablet   Mother No Yes   Sig: Take 2 tablets (1,000 mg total) by mouth every 12 (twelve) hours   lithium carbonate (LITHOBID) 300 mg CR tablet   Mother No Yes   Sig: Take 1 tablet (300 mg total) by mouth 2 (two) times a day Total should be 750 mg two times a day   lithium carbonate (LITHOBID) 450 mg CR tablet   Mother No Yes   Sig: Take 1 tablet (450 mg total) by mouth 2 (two) times a day Total dose should be Lithium 750mg two times daily   mirtazapine (REMERON) 15 mg tablet   Mother No Yes   Sig: Take 1 tablet (15 mg total) by mouth daily at bedtime   propranolol (INDERAL) 10 mg tablet   Mother No Yes   Sig: Take 1 tablet (10 mg total) by mouth 3 (three) times a day   senna-docusate sodium (SENOKOT S) 8.6-50 mg per tablet   Mother No Yes   Sig: Take 2 tablets by mouth 2 (two) times a day      Facility-Administered Medications: None         Medical History[]? Expand by Default           Past Medical History:   Diagnosis Date   • Psychiatric disorder              Surgical History[]? Expand by Default   No past surgical history on file.         Family History[]? Expand by Default   No family history on file.      I have reviewed and agree with the history as documented.               E-Cigarette/Vaping   • E-Cigarette Use Never User                  E-Cigarette/Vaping Substances   • Nicotine No     • THC No     • Flavoring No     • Other No        Social History                Tobacco Use   • Smoking status: Former       Packs/day: 0.50       Years: 10.00       Total pack years: 5.00       Types: Cigarettes   • Smokeless tobacco: Never   Vaping Use   • Vaping Use: Never used   Substance Use Topics   • Alcohol use: Yes       Alcohol/week: 3.0 standard drinks of alcohol       Types: 3 Glasses of wine per week       Comment: sips of wine   • Drug use: Yes       Frequency: 1.0 times per week       Types: Marijuana         Nursing reports the patient continues to have intermittent agitation.   Ativan has been minimally benefit. Nursing reports good food and fluid intake. The patient is very impulsive and will impulsively get out of bed.     [facility] suicide severity risk scale: Patient mumbled unintelligible responses. Mental status examination: I met with the patient with an  together with the assistance of nursing. The patient was demonstrating some restlessness in bed. She did appear to be smiling somewhat at times. She was able to tell me her name and when asked where she was responded [facility]. When asked how she has been doing she responded "it is just crazy". When asked how she was feeling she responded "good". When asked if anything was bothering her she pointed at her IV site. At that point she began to mumble unintelligible responses to questions that the  was not able to understand and interpret. Past Medical History:   Diagnosis Date   • Psychiatric disorder        Medical Review Of Systems:    Review of Systems    Meds/Allergies     all current active meds have been reviewed  No Known Allergies    Objective     Vital signs in last 24 hours:  Temp:  [97.4 °F (36.3 °C)-98.2 °F (36.8 °C)] 97.5 °F (36.4 °C)  HR:  [] 110  Resp:  [14-21] 14  BP: ()/() 116/76      Intake/Output Summary (Last 24 hours) at 9/5/2023 1156  Last data filed at 9/4/2023 2139  Gross per 24 hour   Intake 2102.5 ml   Output --   Net 2102.5 ml       Lab Results: I have personally reviewed all pertinent laboratory/tests results. Imaging Studies: CT abdomen pelvis with contrast    Result Date: 8/30/2023  Narrative: CT ABDOMEN AND PELVIS WITH IV CONTRAST INDICATION:   Abdominal pain, acute, nonlocalized abdominal pain COMPARISON: None. TECHNIQUE:  CT examination of the abdomen and pelvis was performed. Axial, sagittal, and coronal 2D reformatted images were created from the source data and submitted for interpretation.  Radiation dose length product (DLP) for this visit:  421 mGy-cm . This examination, like all CT scans performed in the [facility], was performed utilizing techniques to minimize radiation dose exposure, including the use of iterative reconstruction and automated exposure control. IV Contrast:  85 mL of iohexol (OMNIPAQUE) Enteric Contrast: None. FINDINGS: ABDOMEN LOWER CHEST: Small hiatal hernia noted. No other clinically significant abnormality identified in the visualized lower chest. No consolidation or effusion. LIVER: Normal size and morphology. No suspicious lesion. BILIARY: No intrahepatic biliary ductal dilatation. Normal caliber common bile duct. GALLBLADDER: No calcified gallstones. Normal wall thickness. No pericholecystic inflammatory changes. SPLEEN: Within normal limits. No suspicious lesion. Normal spleen size. PANCREAS: Pancreatic parenchyma is within normal limits. No main pancreatic ductal dilatation. No pancreatic/peripancreatic inflammation. ADRENAL GLANDS: Within normal limits. KIDNEYS/URETERS: Normal size and position. Symmetric enhancement. One or more simple renal cyst(s) noted. No suspicious solid lesion. No hydronephrosis. Nonobstructing right interpolar calculus measuring 2 mm. Ureters within normal limits. STOMACH AND BOWEL: Stomach is underdistended limiting evaluation, but grossly within normal limits. The majority of the small bowel is collapsed. There are multiple loops of top-normal-caliber distal small bowel in the right lower quadrant with fluid levels and hyperemia. Normal caliber large bowel. Large stool burden in the ascending colon. APPENDIX: Normal appendix. ABDOMINOPELVIC CAVITY: No ascites. No intraperitoneal free air. No lymphadenopathy. No retroperitoneal hematoma. VESSELS: Normal caliber abdominal aorta with no detectable atherosclerotic plaque. The celiac, SMA, and RITU are patent. The SMV and splenic vein are patent. The portal veins are patent. The hepatic veins are patent.  PELVIS REPRODUCTIVE ORGANS: Within normal limits for patient's age. URINARY BLADDER: Within normal limits. No calculi. ABDOMINAL WALL/INGUINAL REGIONS: Tiny fat-containing umbilical hernia. BONES: Vertebral body height is maintained. No acute fracture or destructive osseous lesion. No significant degenerative changes. Impression: 1. Top-normal fluid-filled loops of hyperemic small bowel in the right lower quadrant, which could represent a nonspecific enteritis in the appropriate clinical context. 2.  Nonobstructing 2 mm calculus in the interpolar right kidney. The study was marked in EPIC for significant notification. Workstation performed: [id number]     CT head without contrast    Result Date: 8/30/2023  Narrative: CT BRAIN - WITHOUT CONTRAST INDICATION:   Mental status change, persistent or worsening altered mental status. COMPARISON:  None. TECHNIQUE:  CT examination of the brain was performed. Multiplanar 2D reformatted images were created from the source data. Radiation dose length product (DLP) for this visit:  881 mGy-cm . This examination, like all CT scans performed in the [facility], was performed utilizing techniques to minimize radiation dose exposure, including the use of iterative reconstruction and automated exposure control. IMAGE QUALITY:  Diagnostic. FINDINGS: PARENCHYMA: Decreased attenuation is noted in periventricular and subcortical white matter demonstrating an appearance that is statistically most likely to represent mild microangiopathic change; this appearance is similar when compared to most recent prior examination. No CT signs of acute infarction. No intracranial mass, mass effect or midline shift. No acute parenchymal hemorrhage. VENTRICLES AND EXTRA-AXIAL SPACES:  Normal for the patient's age. VISUALIZED ORBITS: Normal visualized orbits. PARANASAL SINUSES: Normal visualized paranasal sinuses. CALVARIUM AND EXTRACRANIAL SOFT TISSUES:  Normal.     Impression: No acute intracranial abnormality. Workstation performed: [id number]     EKG/Pathology/Other Studies:   Lab Results   Component Value Date    VENTRATE 140 09/01/2023    ATRIALRATE 140 09/01/2023    PRINT 188 09/01/2023    QRSDINT 83 09/01/2023    QTINT 275 09/01/2023    QTCINT 420 09/01/2023    PAXIS 77 09/01/2023    QRSAXIS 3 09/01/2023    TWAVEAXIS 220 09/01/2023        Code Status: Level 1 - Full Code  Advance Directive and Living Will:      Power of :    POLST:      Screenings:    1. Nutrition Screening  · Not available on chart    2. Pain Screening  Not obtained    3. Suicide Screening  Not available on chart    Counseling / Coordination of Care: Total floor / unit time spent today 30 minutes. Greater than 50% of total time was spent with the patient and / or family counseling and / or coordination of care. A description of the counseling / coordination of care: Chart review, patient evaluation, coordination communication with staff, nursing and provider.

## 2023-09-05 NOTE — PROGRESS NOTES
[facility]  Progress Note  Name: Yenni Murphy  MRN: [id number]  Unit/Bed#: [address] 2 -01 I Date of Admission: 8/30/2023   Date of Service: 9/5/2023 I Hospital Day: 6    Assessment/Plan   * Acute encephalopathy 2/2 lithium toxicity  Assessment & Plan  · She has a history of schizoaffective disorder, intellectual disability, bipolar disorder, presented with worsening encephalopathy, lethargy for the past two weeks. · As per patient's sister, at her baseline she is generally very active, alert, able to maintain conversation, generally oriented despite having intellectual disability. · Patients family reports symptoms started approximately two weeks when Clozaril was discontinued for abnormal white blood cell count. At home dose of lithium 750 mg twice daily.    · Patient underwent hemodialysis x2 last treatment on 8/31/2023  · Toxicology and nephrology consultations and recommendations appreciated  · Psychiatry input appreciated  · Lithium levels improved  · Continue aggressive IV hydration   · as needed Zyprexa    Hypernatremia  Assessment & Plan  · Patient noted to have sodium of 164 on daily labs   · Secondary to lithium induced diabetes insipidus  · Nephrology follow-up appreciated  · Continue D5W at 150 cc an hour  · BMP every 12 hours  · Recheck urine osmolality and urine sodium  · Trial patient with DDAVP 10 mcg intranasal x1 and check urine osmolality every 2 hours x 4 to test for a central versus nephrogenic diabetes insipidus    Unspecified mood (affective) disorder (HCC)  Assessment & Plan  · Continue home medications  · Psychiatry evaluation appreciated   · Zyprexa 5 mg daily, continue Depakote 1 g every 12 hours  · Repeat valproic acid level 65  · Outpatient follow-up with psychiatry on discharge  · Monitor off lithium    Intellectual disability  Assessment & Plan  · Patient has profound intellectual disability; lives at home with parents, is usually oriented, does walk around. According to family patient does have shouting outburst  · Continue supportive care; monitor off lithium; correct metabolic deficiencies  · As needed Zyprexa for acute agitation               VTE Pharmacologic Prophylaxis:   Pharmacologic: Early ambulation    Patient Centered Rounds: I have performed bedside rounds with nursing staff today. Discussions with Specialists or Other Care Team Provider: Nephrologist    Education and Discussions with Family / Patient: Updated patient's sister    Time Spent for Care: More than 50% of total time spent on counseling and coordination of care as described above. Current Length of Stay: 6 day(s)    Current Patient Status: Inpatient   Certification Statement: The patient will continue to require additional inpatient hospital stay due to Hypernatremia    Discharge Plan / Estimated Discharge Date: TBD    Code Status: Level 1 - Full Code      Subjective:   Patient seen and examined at bedside, more interactive and alert today, answers questions, is restless and does have shouting outburst    Objective:     Vitals:   Temp (24hrs), Av.7 °F (36.5 °C), Min:97.4 °F (36.3 °C), Max:98.2 °F (36.8 °C)    Temp:  [97.4 °F (36.3 °C)-98.2 °F (36.8 °C)] 97.5 °F (36.4 °C)  HR:  [103-110] 110  Resp:  [14-21] 14  BP: ()/() 116/76  SpO2:  [99 %] 99 %  Body mass index is 20.09 kg/m². Input and Output Summary (last 24 hours): Intake/Output Summary (Last 24 hours) at 2023 1535  Last data filed at 2023 2139  Gross per 24 hour   Intake 1152.5 ml   Output --   Net 1152.5 ml       Physical Exam:    Constitutional: Patient is in no acute distress  HEENT:  Normocephalic, atraumatic  Cardiovascular: Normal S1S2, RRR, No murmurs/rubs/gallops appreciated. Pulmonary:  Bilateral air entry, No rhonchi/rales/wheezing appreciated  Abdominal: Soft, Bowel sounds present, Non-tender, Non-distended  Extremities:  No cyanosis, clubbing or edema.    Neurological: Awake, alert  Skin:  Warm, dry    Additional Data:     Labs:    Results from last 7 days   Lab Units 09/02/23  0512   WBC Thousand/uL 12.93*   HEMOGLOBIN g/dL 14.5   HEMATOCRIT % 45.9   PLATELETS Thousands/uL 130*   NEUTROS PCT % 71   LYMPHS PCT % 14   MONOS PCT % 14*   EOS PCT % 1     Results from last 7 days   Lab Units 09/05/23  0557 09/01/23  1241 09/01/23  0530   POTASSIUM mmol/L 4.3   < > 3.9   CHLORIDE mmol/L 115*   < > 124*   CO2 mmol/L 33*   < > 34*   BUN mg/dL 7   < > 4*   CREATININE mg/dL 0.37*   < > 0.45*   CALCIUM mg/dL 9.4   < > 10.3*   ALK PHOS U/L  --   --  114*   ALT U/L  --   --  14   AST U/L  --   --  14    < > = values in this interval not displayed. Results from last 7 days   Lab Units 08/31/23  0350   INR  1.01        I Have Reviewed All Lab Data Listed Above.     Invasive Devices     Peripheral Intravenous Line  Duration           Peripheral IV 09/05/23 Distal;Left;Ventral (anterior) Forearm <1 day                     Recent Cultures (last 7 days):           Last 24 Hours Medication List:   Current Facility-Administered Medications   Medication Dose Route Frequency Provider Last Rate   • chlorhexidine  15 mL Mouth/Throat Q12H [address] Kelley Duque PA-C     • dextrose  150 mL/hr Intravenous Continuous Johanna Aamya  mL/hr (09/05/23 1121)   • divalproex sodium  1,000 mg Oral Q12H [address] Shreyas Ramires PA-C     • mirtazapine  15 mg Oral HS Kelley Duque PA-C     • [START ON 9/6/2023] OLANZapine  5 mg Oral Daily Meño Hernandez MD     • OLANZapine  2.5 mg Oral Q3H PRN Kelley Duque PA-C     • propranolol  10 mg Oral TID Kelley Duque PA-C          Today, Patient Was Seen By: Meño Hernandez MD

## 2023-09-05 NOTE — ASSESSMENT & PLAN NOTE
· Patient has profound intellectual disability; lives at home with parents, is usually oriented, does walk around.   According to family patient does have shouting outburst  · Continue supportive care; monitor off lithium; correct metabolic deficiencies  · As needed Zyprexa for acute agitation

## 2023-09-05 NOTE — NURSING NOTE
Patient ripped IV tubing in half. Fluids put on hold at this time. IV removed due to safety risk. Patient very agitated, kicking at staff when changing brief. Per SLIM, to give patient IM Zyprexa 5mg now for agitation. Patient also intermittently screaming incomprehensible things. Will attempt to re-establish IV access when patient is calm and environment is deemed safe to do so. Telemetry discontinued due to patient constantly ripping leads off and throwing telemetry box on ground.     Montana Mina RN

## 2023-09-05 NOTE — ASSESSMENT & PLAN NOTE
· She has a history of schizoaffective disorder, intellectual disability, bipolar disorder, presented with worsening encephalopathy, lethargy for the past two weeks. · As per patient's sister, at her baseline she is generally very active, alert, able to maintain conversation, generally oriented despite having intellectual disability. · Patients family reports symptoms started approximately two weeks when Clozaril was discontinued for abnormal white blood cell count. At home dose of lithium 750 mg twice daily.    · Patient underwent hemodialysis x2 last treatment on 8/31/2023  · Toxicology and nephrology consultations and recommendations appreciated  · Psychiatry input appreciated  · Lithium levels improved  · Continue aggressive IV hydration   · as needed Zyprexa

## 2023-09-05 NOTE — NURSING NOTE
Patient finally settled in bed and sleeping comfortably. Will hold off vitals and AM labs/IV access until closer to change of shift to allow patient some rest. Patient stable.     Ritika Gallagher RN

## 2023-09-05 NOTE — PLAN OF CARE
Problem: Potential for Falls  Goal: Patient will remain free of falls  Description: INTERVENTIONS:  - Educate patient/family on patient safety including physical limitations  - Instruct patient to call for assistance with activity   - Consult OT/PT to assist with strengthening/mobility   - Keep Call bell within reach  - Keep bed low and locked with side rails adjusted as appropriate  - Keep care items and personal belongings within reach  - Initiate and maintain comfort rounds  - Make Fall Risk Sign visible to staff  - Offer Toileting every 2 Hours, in advance of need  - Initiate/Maintain alarm  - Obtain necessary fall risk management equipment:   - Apply yellow socks and bracelet for high fall risk patients  - Consider moving patient to room near nurses station  Outcome: Progressing     Problem: MOBILITY - ADULT  Goal: Maintain or return to baseline ADL function  Description: INTERVENTIONS:  -  Assess patient's ability to carry out ADLs; assess patient's baseline for ADL function and identify physical deficits which impact ability to perform ADLs (bathing, care of mouth/teeth, toileting, grooming, dressing, etc.)  - Assess/evaluate cause of self-care deficits   - Assess range of motion  - Assess patient's mobility; develop plan if impaired  - Assess patient's need for assistive devices and provide as appropriate  - Encourage maximum independence but intervene and supervise when necessary  - Involve family in performance of ADLs  - Assess for home care needs following discharge   - Consider OT consult to assist with ADL evaluation and planning for discharge  - Provide patient education as appropriate  Outcome: Progressing  Goal: Maintains/Returns to pre admission functional level  Description: INTERVENTIONS:  - Perform BMAT or MOVE assessment daily.   - Set and communicate daily mobility goal to care team and patient/family/caregiver.    - Collaborate with rehabilitation services on mobility goals if consulted  - Perform Range of [place] times a day. - Reposition patient every  hours. - Dangle patient  times a day  - Stand patient  times a day  - Ambulate patient  times a day  - Out of bed to chair  times a day   - Out of bed for meals  times a day  - Out of bed for toileting  - Record patient progress and toleration of activity level   Outcome: Progressing     Problem: NEUROSENSORY - ADULT  Goal: Achieves maximal functionality and self care  Description: INTERVENTIONS  - Monitor swallowing and airway patency with patient fatigue and changes in neurological status  - Encourage and assist patient to increase activity and self care.    - Encourage visually impaired, hearing impaired and aphasic patients to use assistive/communication devices  Outcome: Progressing  Goal: Achieves stable or improved neurological status  Description: INTERVENTIONS  - Monitor and report changes in neurological status  - Monitor vital signs such as temperature, blood pressure, glucose, and any other labs ordered   - Initiate measures to prevent increased intracranial pressure  - Monitor for seizure activity and implement precautions if appropriate      Outcome: Progressing     Problem: CARDIOVASCULAR - ADULT  Goal: Maintains optimal cardiac output and hemodynamic stability  Description: INTERVENTIONS:  - Monitor I/O, vital signs and rhythm  - Monitor for S/S and trends of decreased cardiac output  - Administer and titrate ordered vasoactive medications to optimize hemodynamic stability  - Assess quality of pulses, skin color and temperature  - Assess for signs of decreased coronary artery perfusion  - Instruct patient to report change in severity of symptoms  Outcome: Progressing  Goal: Absence of cardiac dysrhythmias or at baseline rhythm  Description: INTERVENTIONS:  - Continuous cardiac monitoring, vital signs, obtain 12 lead EKG if ordered  - Administer antiarrhythmic and heart rate control medications as ordered  - Monitor electrolytes and administer replacement therapy as ordered  Outcome: Progressing     Problem: GASTROINTESTINAL - ADULT  Goal: Maintains or returns to baseline bowel function  Description: INTERVENTIONS:  - Assess bowel function  - Encourage oral fluids to ensure adequate hydration  - Administer IV fluids if ordered to ensure adequate hydration  - Administer ordered medications as needed  - Encourage mobilization and activity  - Consider nutritional services referral to assist patient with adequate nutrition and appropriate food choices  Outcome: Progressing  Goal: Maintains adequate nutritional intake  Description: INTERVENTIONS:  - Monitor percentage of each meal consumed  - Identify factors contributing to decreased intake, treat as appropriate  - Assist with meals as needed  - Monitor I&O, weight, and lab values if indicated  - Obtain nutrition services referral as needed  Outcome: Progressing     Problem: GENITOURINARY - ADULT  Goal: Maintains or returns to baseline urinary function  Description: INTERVENTIONS:  - Assess urinary function  - Encourage oral fluids to ensure adequate hydration if ordered  - Administer IV fluids as ordered to ensure adequate hydration  - Administer ordered medications as needed  - Offer frequent toileting  - Follow urinary retention protocol if ordered  Outcome: Progressing     Problem: METABOLIC, FLUID AND ELECTROLYTES - ADULT  Goal: Electrolytes maintained within normal limits  Description: INTERVENTIONS:  - Monitor labs and assess patient for signs and symptoms of electrolyte imbalances  - Administer electrolyte replacement as ordered  - Monitor response to electrolyte replacements, including repeat lab results as appropriate  - Instruct patient on fluid and nutrition as appropriate  Outcome: Progressing  Goal: Fluid balance maintained  Description: INTERVENTIONS:  - Monitor labs   - Monitor I/O and WT  - Instruct patient on fluid and nutrition as appropriate  - Assess for signs & symptoms of volume excess or deficit  Outcome: Progressing     Problem: SKIN/TISSUE INTEGRITY - ADULT  Goal: Skin Integrity remains intact(Skin Breakdown Prevention)  Description: Assess:  -Perform [place] assessment every   -Clean and moisturize skin every   -Inspect skin when repositioning, toileting, and assisting with ADLS  -Assess under medical devices such as  every   -Assess extremities for adequate circulation and sensation     Bed Management:  -Have minimal linens on bed & keep smooth, unwrinkled  -Change linens as needed when moist or perspiring  -Avoid sitting or lying in one position for more than  hours while in bed  -Keep HOB at degrees     Toileting:  -Offer bedside commode  -Assess for incontinence every   -Use incontinent care products after each incontinent episode such as     Activity:  -Mobilize patient  times a day  -Encourage activity and walks on unit  -Encourage or provide ROM exercises   -Turn and reposition patient every  Hours  -Use appropriate equipment to lift or move patient in bed  -Instruct/ Assist with weight shifting every  when out of bed in chair  -Consider limitation of chair time  hour intervals    Skin Care:  -Avoid use of baby powder, tape, friction and shearing, hot water or constrictive clothing  -Relieve pressure over bony prominences using   -Do not massage red bony areas    Next Steps:  -Teach patient strategies to minimize risks such as    -Consider consults to  interdisciplinary teams such as   Outcome: Progressing     Problem: MUSCULOSKELETAL - ADULT  Goal: Maintain or return mobility to safest level of function  Description: INTERVENTIONS:  - Assess patient's ability to carry out ADLs; assess patient's baseline for ADL function and identify physical deficits which impact ability to perform ADLs (bathing, care of mouth/teeth, toileting, grooming, dressing, etc.)  - Assess/evaluate cause of self-care deficits   - Assess range of motion  - Assess patient's mobility  - Assess patient's need for assistive devices and provide as appropriate  - Encourage maximum independence but intervene and supervise when necessary  - Involve family in performance of ADLs  - Assess for home care needs following discharge   - Consider OT consult to assist with ADL evaluation and planning for discharge  - Provide patient education as appropriate  Outcome: Progressing     Problem: PAIN - ADULT  Goal: Verbalizes/displays adequate comfort level or baseline comfort level  Description: Interventions:  - Encourage patient to monitor pain and request assistance  - Assess pain using appropriate pain scale  - Administer analgesics based on type and severity of pain and evaluate response  - Implement non-pharmacological measures as appropriate and evaluate response  - Consider cultural and social influences on pain and pain management  - Notify physician/advanced practitioner if interventions unsuccessful or patient reports new pain  Outcome: Progressing     Problem: SAFETY ADULT  Goal: Patient will remain free of falls  Description: INTERVENTIONS:  - Educate patient/family on patient safety including physical limitations  - Instruct patient to call for assistance with activity   - Consult OT/PT to assist with strengthening/mobility   - Keep Call bell within reach  - Keep bed low and locked with side rails adjusted as appropriate  - Keep care items and personal belongings within reach  - Initiate and maintain comfort rounds  - Make Fall Risk Sign visible to staff  - Offer Toileting every  Hours, in advance of need  - Initiate/Maintain alarm  - Obtain necessary fall risk management equipment:   - Apply yellow socks and bracelet for high fall risk patients  - Consider moving patient to room near nurses station  Outcome: Progressing  Goal: Maintain or return to baseline ADL function  Description: INTERVENTIONS:  -  Assess patient's ability to carry out ADLs; assess patient's baseline for ADL function and identify physical deficits which impact ability to perform ADLs (bathing, care of mouth/teeth, toileting, grooming, dressing, etc.)  - Assess/evaluate cause of self-care deficits   - Assess range of motion  - Assess patient's mobility; develop plan if impaired  - Assess patient's need for assistive devices and provide as appropriate  - Encourage maximum independence but intervene and supervise when necessary  - Involve family in performance of ADLs  - Assess for home care needs following discharge   - Consider OT consult to assist with ADL evaluation and planning for discharge  - Provide patient education as appropriate  Outcome: Progressing  Goal: Maintains/Returns to pre admission functional level  Description: INTERVENTIONS:  - Perform BMAT or MOVE assessment daily.   - Set and communicate daily mobility goal to care team and patient/family/caregiver. - Collaborate with rehabilitation services on mobility goals if consulted  - Perform Range of Motion  times a day. - Reposition patient every  hours.   - Dangle patient  times a day  - Stand patient  times a day  - Ambulate patient  times a day  - Out of bed to chair  times a day   - Out of bed for meals  times a day  - Out of bed for toileting  - Record patient progress and toleration of activity level   Outcome: Progressing     Problem: DISCHARGE PLANNING  Goal: Discharge to home or other facility with appropriate resources  Description: INTERVENTIONS:  - Identify barriers to discharge w/patient and caregiver  - Arrange for needed discharge resources and transportation as appropriate  - Identify discharge learning needs (meds, wound care, etc.)  - Arrange for interpretive services to assist at discharge as needed  - Refer to Case Management Department for coordinating discharge planning if the patient needs post-hospital services based on physician/advanced practitioner order or complex needs related to functional status, cognitive ability, or social support system  Outcome: Progressing     Problem: Knowledge Deficit  Goal: Patient/family/caregiver demonstrates understanding of disease process, treatment plan, medications, and discharge instructions  Description: Complete learning assessment and assess knowledge base.   Interventions:  - Provide teaching at level of understanding  - Provide teaching via preferred learning methods  Outcome: Progressing     Problem: SAFETY,RESTRAINT: NV/NON-SELF DESTRUCTIVE BEHAVIOR  Goal: Remains free of harm/injury (restraint for non violent/non self-detsructive behavior)  Description: INTERVENTIONS:  - Instruct patient/family regarding restraint use   - Assess and monitor physiologic and psychological status   - Provide interventions and comfort measures to meet assessed patient needs   - Identify and implement measures to help patient regain control  - Assess readiness for release of restraint   Outcome: Progressing  Goal: Returns to optimal restraint-free functioning  Description: INTERVENTIONS:  - Assess the patient's behavior and symptoms that indicate continued need for restraint  - Identify and implement measures to help patient regain control  - Assess readiness for release of restraint   Outcome: Progressing     Problem: Prexisting or High Potential for Compromised Skin Integrity  Goal: Skin integrity is maintained or improved  Description: INTERVENTIONS:  - Identify patients at risk for skin breakdown  - Assess and monitor skin integrity  - Assess and monitor nutrition and hydration status  - Monitor labs   - Assess for incontinence   - Turn and reposition patient  - Assist with mobility/ambulation  - Relieve pressure over bony prominences  - Avoid friction and shearing  - Provide appropriate hygiene as needed including keeping skin clean and dry  - Evaluate need for skin moisturizer/barrier cream  - Collaborate with interdisciplinary team   - Patient/family teaching  - Consider wound care consult   Outcome: Progressing Problem: Nutrition/Hydration-ADULT  Goal: Nutrient/Hydration intake appropriate for improving, restoring or maintaining nutritional needs  Description: Monitor and assess patient's nutrition/hydration status for malnutrition. Collaborate with interdisciplinary team and initiate plan and interventions as ordered. Monitor patient's weight and dietary intake as ordered or per policy. Utilize nutrition screening tool and intervene as necessary. Determine patient's food preferences and provide high-protein, high-caloric foods as appropriate.      INTERVENTIONS:  - Monitor oral intake, urinary output, labs, and treatment plans  - Assess nutrition and hydration status and recommend course of action  - Evaluate amount of meals eaten  - Assist patient with eating if necessary   - Allow adequate time for meals  - Recommend/ encourage appropriate diets, oral nutritional supplements, and vitamin/mineral supplements  - Order, calculate, and assess calorie counts as needed  - Recommend, monitor, and adjust tube feedings and TPN/PPN based on assessed needs  - Assess need for intravenous fluids  - Provide specific nutrition/hydration education as appropriate  - Include patient/family/caregiver in decisions related to nutrition  Outcome: Progressing

## 2023-09-05 NOTE — PLAN OF CARE
Problem: Potential for Falls  Goal: Patient will remain free of falls  Description: INTERVENTIONS:  - Educate patient/family on patient safety including physical limitations  - Instruct patient to call for assistance with activity   - Consult OT/PT to assist with strengthening/mobility   - Keep Call bell within reach  - Keep bed low and locked with side rails adjusted as appropriate  - Keep care items and personal belongings within reach  - Initiate and maintain comfort rounds  - Make Fall Risk Sign visible to staff  - Apply yellow socks and bracelet for high fall risk patients  - Consider moving patient to room near nurses station  Outcome: Progressing    Problem: SAFETY ADULT  Goal: Patient will remain free of falls  Description: INTERVENTIONS:  - Educate patient/family on patient safety including physical limitations  - Instruct patient to call for assistance with activity   - Consult OT/PT to assist with strengthening/mobility   - Keep Call bell within reach  - Keep bed low and locked with side rails adjusted as appropriate  - Keep care items and personal belongings within reach  - Initiate and maintain comfort rounds  - Make Fall Risk Sign visible to staff  - Apply yellow socks and bracelet for high fall risk patients  - Consider moving patient to room near nurses station  Outcome: Progressing  Note: Fall mats and Low Boy bed added for fall prevention.

## 2023-09-05 NOTE — ASSESSMENT & PLAN NOTE
· Continue home medications  · Psychiatry evaluation appreciated   · Zyprexa 5 mg daily, continue Depakote 1 g every 12 hours  · Repeat valproic acid level 65  · Outpatient follow-up with psychiatry on discharge  · Monitor off lithium

## 2023-09-06 ENCOUNTER — APPOINTMENT (INPATIENT)
Dept: CT IMAGING | Facility: HOSPITAL | Age: 33
DRG: 816 | End: 2023-09-06
Payer: COMMERCIAL

## 2023-09-06 LAB
ANION GAP SERPL CALCULATED.3IONS-SCNC: 5 MMOL/L
BUN SERPL-MCNC: 5 MG/DL (ref 5–25)
CALCIUM SERPL-MCNC: 8.8 MG/DL (ref 8.4–10.2)
CHLORIDE SERPL-SCNC: 107 MMOL/L (ref 96–108)
CO2 SERPL-SCNC: 33 MMOL/L (ref 21–32)
CREAT SERPL-MCNC: 0.37 MG/DL (ref 0.6–1.3)
GFR SERPL CREATININE-BSD FRML MDRD: 139 ML/MIN/1.73SQ M
GLUCOSE SERPL-MCNC: 121 MG/DL (ref 65–140)
OSMOLALITY UR/SERPL-RTO: 304 MMOL/KG (ref 282–298)
OSMOLALITY UR: 93 MMOL/KG
POTASSIUM SERPL-SCNC: 3.4 MMOL/L (ref 3.5–5.3)
SODIUM SERPL-SCNC: 145 MMOL/L (ref 135–147)

## 2023-09-06 PROCEDURE — 70450 CT HEAD/BRAIN W/O DYE: CPT

## 2023-09-06 PROCEDURE — G1004 CDSM NDSC: HCPCS

## 2023-09-06 PROCEDURE — 83930 ASSAY OF BLOOD OSMOLALITY: CPT | Performed by: INTERNAL MEDICINE

## 2023-09-06 PROCEDURE — 99232 SBSQ HOSP IP/OBS MODERATE 35: CPT | Performed by: INTERNAL MEDICINE

## 2023-09-06 PROCEDURE — 97163 PT EVAL HIGH COMPLEX 45 MIN: CPT

## 2023-09-06 PROCEDURE — 80048 BASIC METABOLIC PNL TOTAL CA: CPT | Performed by: INTERNAL MEDICINE

## 2023-09-06 RX ORDER — AMILORIDE HYDROCHLORIDE 5 MG/1
5 TABLET ORAL 2 TIMES DAILY
Status: COMPLETED | OUTPATIENT
Start: 2023-09-06 | End: 2023-09-06

## 2023-09-06 RX ORDER — POTASSIUM CHLORIDE 20 MEQ/1
40 TABLET, EXTENDED RELEASE ORAL ONCE
Status: COMPLETED | OUTPATIENT
Start: 2023-09-06 | End: 2023-09-06

## 2023-09-06 RX ORDER — WATER 1000 ML/1000ML
INJECTION, SOLUTION INTRAVENOUS
Status: COMPLETED
Start: 2023-09-06 | End: 2023-09-06

## 2023-09-06 RX ORDER — OLANZAPINE 10 MG/1
2.5 INJECTION, POWDER, LYOPHILIZED, FOR SOLUTION INTRAMUSCULAR ONCE
Status: COMPLETED | OUTPATIENT
Start: 2023-09-06 | End: 2023-09-06

## 2023-09-06 RX ORDER — OLANZAPINE 10 MG/1
2.5 INJECTION, POWDER, LYOPHILIZED, FOR SOLUTION INTRAMUSCULAR
Status: DISCONTINUED | OUTPATIENT
Start: 2023-09-06 | End: 2023-09-08 | Stop reason: HOSPADM

## 2023-09-06 RX ADMIN — POTASSIUM CHLORIDE 40 MEQ: 1500 TABLET, EXTENDED RELEASE ORAL at 13:25

## 2023-09-06 RX ADMIN — OLANZAPINE 2.5 MG: 10 INJECTION, POWDER, FOR SOLUTION INTRAMUSCULAR at 16:57

## 2023-09-06 RX ADMIN — DIVALPROEX SODIUM 1000 MG: 125 CAPSULE, COATED PELLETS ORAL at 08:11

## 2023-09-06 RX ADMIN — AMILORIDE HYDROCLORIDE 5 MG: 5 TABLET ORAL at 21:08

## 2023-09-06 RX ADMIN — AMILORIDE HYDROCLORIDE 5 MG: 5 TABLET ORAL at 11:47

## 2023-09-06 RX ADMIN — WATER 10 ML: 1 INJECTION INTRAMUSCULAR; INTRAVENOUS; SUBCUTANEOUS at 16:57

## 2023-09-06 RX ADMIN — DIVALPROEX SODIUM 1000 MG: 125 CAPSULE, COATED PELLETS ORAL at 21:08

## 2023-09-06 RX ADMIN — OLANZAPINE 5 MG: 5 TABLET, ORALLY DISINTEGRATING ORAL at 08:11

## 2023-09-06 RX ADMIN — MIRTAZAPINE 15 MG: 15 TABLET, FILM COATED ORAL at 21:09

## 2023-09-06 RX ADMIN — OLANZAPINE 2.5 MG: 5 TABLET, FILM COATED ORAL at 15:42

## 2023-09-06 RX ADMIN — POTASSIUM CHLORIDE 40 MEQ: 1500 TABLET, EXTENDED RELEASE ORAL at 11:47

## 2023-09-06 RX ADMIN — CHLORHEXIDINE GLUCONATE 15 ML: 1.2 RINSE ORAL at 21:22

## 2023-09-06 NOTE — NURSING NOTE
The patient is restless in the room this afternoon. The patient is yelling out to staff in the hallway. The patient was in the bathroom just prior to 1500 and heard this nurse in the hallway. The 1:1/pca was with the patient in the bathroom and upon leaving the bathroom. The patient,turned quickly and attempted to look out the door and fell onto a footrest in the room next to the wall, this nurse heard a bump, the pca attempted to assist patient but quickly stood up and then leaped into the bed. The pca stated the patient bumped herself on the wall, possibly her head. The patient appears manic, yelling out to staff including yelling to a passing nurse "Ooh you butt is big". Pt denies any pain/injuries/denies hitting head. No apparent injuries observed when assessed. Vital signs stable. 09/06/23 15:00:43  VITAL SIGNS 98.6 °F (37 °C) 76 18 102/66       Dr Asher Ramsey made aware. Pt in bed, resting at present. 1:1 staff member in room with patient.

## 2023-09-06 NOTE — PROGRESS NOTES
[address] [facility]  Progress Note  Name: Maricarmen Quiros  MRN: [id number]  Unit/Bed#: [facility] 2 -01 I Date of Admission: 8/30/2023   Date of Service: 9/6/2023 I Hospital Day: 7    Assessment/Plan   * Acute encephalopathy 2/2 lithium toxicity  Assessment & Plan  · She has a history of schizoaffective disorder, intellectual disability, bipolar disorder, presented with worsening encephalopathy, lethargy for the past two weeks. · As per patient's sister, at her baseline she is generally very active, alert, able to maintain conversation, generally oriented despite having intellectual disability. · Patients family reports symptoms started approximately two weeks when Clozaril was discontinued for abnormal white blood cell count. At home dose of lithium 750 mg twice daily. · Patient underwent hemodialysis x2 last treatment on 8/31/2023  · Toxicology and nephrology consultations and recommendations appreciated  · Psychiatry input appreciated  · Lithium levels improved  · Continue aggressive IV hydration   · as needed Zyprexa    Hypernatremia  Assessment & Plan  · Patient noted to have sodium of 164 on daily labs   · Secondary to lithium induced diabetes insipidus  · Nephrology follow-up appreciated  · Continue D5W at 150 cc an hour  · Patient will be given amiloride 5 mg twice daily for today to monitor for response  · Monitor BMP    Unspecified mood (affective) disorder (HCC)  Assessment & Plan  · Continue home medications  · Psychiatry evaluation appreciated   · Zyprexa 5 mg daily, continue Depakote 1 g every 12 hours  · Repeat valproic acid level 65  · Outpatient follow-up with psychiatry on discharge  · Monitor off lithium    Intellectual disability  Assessment & Plan  · Patient has profound intellectual disability; lives at home with parents, is usually oriented, does walk around.   According to family patient does have shouting outburst  · Continue supportive care; monitor off lithium; correct metabolic deficiencies  · As needed Zyprexa for acute agitation               VTE Pharmacologic Prophylaxis:   Pharmacologic: Low risk, early ambulation    Patient Centered Rounds: I have performed bedside rounds with nursing staff today. Discussions with Specialists or Other Care Team Provider: Nephrologist    Education and Discussions with Family / Patient: Updated patient's sister    Time Spent for Care: More than 50% of total time spent on counseling and coordination of care as described above. Current Length of Stay: 7 day(s)    Current Patient Status: Inpatient   Certification Statement: The patient will continue to require additional inpatient hospital stay due to Hypernatremia    Discharge Plan / Estimated Discharge Date: TBD    Code Status: Level 1 - Full Code      Subjective:   Patient seen and examined at bedside, patient is awake, very energetic and hyper. Bouts of shouting    Objective:     Vitals:   Temp (24hrs), Av.2 °F (36.8 °C), Min:97.7 °F (36.5 °C), Max:98.6 °F (37 °C)    Temp:  [97.7 °F (36.5 °C)-98.6 °F (37 °C)] 98.6 °F (37 °C)  HR:  [67-84] 76  Resp:  [16-18] 18  BP: ()/(66-73) 102/66  SpO2:  [98 %-100 %] 98 %  Body mass index is 20.09 kg/m². Input and Output Summary (last 24 hours): Intake/Output Summary (Last 24 hours) at 2023 1548  Last data filed at 2023 1501  Gross per 24 hour   Intake 1960 ml   Output 900 ml   Net 1060 ml       Physical Exam:    Constitutional: Patient is in no acute distress  HEENT:  Normocephalic, atraumatic  Cardiovascular: Normal S1S2, RRR, No murmurs/rubs/gallops appreciated. Pulmonary:  Bilateral air entry, No rhonchi/rales/wheezing appreciated  Abdominal: Soft, Bowel sounds present, Non-tender, Non-distended  Extremities:  No cyanosis, clubbing or edema.    Neurological: awake, alert  Skin:  Warm, dry    Additional Data:     Labs:    Results from last 7 days   Lab Units 23  0512   WBC Thousand/uL 12.93* HEMOGLOBIN g/dL 14.5   HEMATOCRIT % 45.9   PLATELETS Thousands/uL 130*   NEUTROS PCT % 71   LYMPHS PCT % 14   MONOS PCT % 14*   EOS PCT % 1     Results from last 7 days   Lab Units 09/06/23  1114 09/01/23  1241 09/01/23  0530   POTASSIUM mmol/L 3.4*   < > 3.9   CHLORIDE mmol/L 107   < > 124*   CO2 mmol/L 33*   < > 34*   BUN mg/dL 5   < > 4*   CREATININE mg/dL 0.37*   < > 0.45*   CALCIUM mg/dL 8.8   < > 10.3*   ALK PHOS U/L  --   --  114*   ALT U/L  --   --  14   AST U/L  --   --  14    < > = values in this interval not displayed. Results from last 7 days   Lab Units 08/31/23  0350   INR  1.01        I Have Reviewed All Lab Data Listed Above.     Invasive Devices     Peripheral Intravenous Line  Duration           Peripheral IV 09/05/23 Right;Ventral (anterior) Forearm <1 day                     Recent Cultures (last 7 days):           Last 24 Hours Medication List:   Current Facility-Administered Medications   Medication Dose Route Frequency Provider Last Rate   • AMILoride  5 mg Oral BID Alejandrina Villarreal MD     • chlorhexidine  15 mL Mouth/Throat Q12H [facility] HOME Alban Barba PA-C     • divalproex sodium  1,000 mg Oral Q12H [facility] Maya Hansen PA-C     • mirtazapine  15 mg Oral HS Alban Barba PA-C     • OLANZapine  5 mg Oral Daily Suleman Herrera MD     • OLANZapine  2.5 mg Oral Q3H PRN Ablan Barba PA-C     • propranolol  10 mg Oral TID Alban Barba PA-C          Today, Patient Was Seen By: Suleman Herrera MD

## 2023-09-06 NOTE — ASSESSMENT & PLAN NOTE
· Patient noted to have sodium of 164 on daily labs   · Secondary to lithium induced diabetes insipidus  · Nephrology follow-up appreciated  · Continue D5W at 150 cc an hour  · Patient will be given amiloride 5 mg twice daily for today to monitor for response  · Monitor BMP

## 2023-09-06 NOTE — PLAN OF CARE
Problem: Potential for Falls  Goal: Patient will remain free of falls  Description: INTERVENTIONS:  - Educate patient/family on patient safety including physical limitations  - Instruct patient to call for assistance with activity   - Consult OT/PT to assist with strengthening/mobility   - Keep Call bell within reach  - Keep bed low and locked with side rails adjusted as appropriate  - Keep care items and personal belongings within reach  - Initiate and maintain comfort rounds  - Apply yellow socks and bracelet for high fall risk patients  - Consider moving patient to room near nurses station  Outcome: Progressing     Problem: MOBILITY - ADULT  Goal: Maintain or return to baseline ADL function  Description: INTERVENTIONS:  - Educate patient/family on patient safety including physical limitations  - Instruct patient to call for assistance with activity   - Consult OT/PT to assist with strengthening/mobility   - Keep Call bell within reach  - Keep bed low and locked with side rails adjusted as appropriate  - Keep care items and personal belongings within reach  - Initiate and maintain comfort rounds  - Apply yellow socks and bracelet for high fall risk patients  - Consider moving patient to room near nurses station  Outcome: Progressing  Goal: Maintains/Returns to pre admission functional level  Description: INTERVENTIONS:  - Perform BMAT or MOVE assessment daily.   - Set and communicate daily mobility goal to care team and patient/family/caregiver. - Collaborate with rehabilitation services on mobility goals if consulted  - Out of bed for toileting  - Record patient progress and toleration of activity level   Outcome: Progressing     Problem: NEUROSENSORY - ADULT  Goal: Achieves maximal functionality and self care  Description: INTERVENTIONS  - Monitor swallowing and airway patency with patient fatigue and changes in neurological status  - Encourage and assist patient to increase activity and self care.    - Encourage visually impaired, hearing impaired and aphasic patients to use assistive/communication devices  Outcome: Progressing  Goal: Achieves stable or improved neurological status  Description: INTERVENTIONS  - Monitor and report changes in neurological status  - Monitor vital signs such as temperature, blood pressure, glucose, and any other labs ordered   - Initiate measures to prevent increased intracranial pressure  - Monitor for seizure activity and implement precautions if appropriate      Outcome: Progressing     Problem: CARDIOVASCULAR - ADULT  Goal: Maintains optimal cardiac output and hemodynamic stability  Description: INTERVENTIONS:  - Monitor I/O, vital signs and rhythm  - Monitor for S/S and trends of decreased cardiac output  - Administer and titrate ordered vasoactive medications to optimize hemodynamic stability  - Assess quality of pulses, skin color and temperature  - Assess for signs of decreased coronary artery perfusion  - Instruct patient to report change in severity of symptoms  Outcome: Progressing  Goal: Absence of cardiac dysrhythmias or at baseline rhythm  Description: INTERVENTIONS:  - Continuous cardiac monitoring, vital signs, obtain 12 lead EKG if ordered  - Administer antiarrhythmic and heart rate control medications as ordered  - Monitor electrolytes and administer replacement therapy as ordered  Outcome: Progressing     Problem: GASTROINTESTINAL - ADULT  Goal: Maintains or returns to baseline bowel function  Description: INTERVENTIONS:  - Assess bowel function  - Encourage oral fluids to ensure adequate hydration  - Administer IV fluids if ordered to ensure adequate hydration  - Administer ordered medications as needed  - Encourage mobilization and activity  - Consider nutritional services referral to assist patient with adequate nutrition and appropriate food choices  Outcome: Progressing  Goal: Maintains adequate nutritional intake  Description: INTERVENTIONS:  - Monitor percentage of each meal consumed  - Identify factors contributing to decreased intake, treat as appropriate  - Assist with meals as needed  - Monitor I&O, weight, and lab values if indicated  - Obtain nutrition services referral as needed  Outcome: Progressing     Problem: GENITOURINARY - ADULT  Goal: Maintains or returns to baseline urinary function  Description: INTERVENTIONS:  - Assess urinary function  - Encourage oral fluids to ensure adequate hydration if ordered  - Administer IV fluids as ordered to ensure adequate hydration  - Administer ordered medications as needed  - Offer frequent toileting  - Follow urinary retention protocol if ordered  Outcome: Progressing     Problem: METABOLIC, FLUID AND ELECTROLYTES - ADULT  Goal: Electrolytes maintained within normal limits  Description: INTERVENTIONS:  - Monitor labs and assess patient for signs and symptoms of electrolyte imbalances  - Administer electrolyte replacement as ordered  - Monitor response to electrolyte replacements, including repeat lab results as appropriate  - Instruct patient on fluid and nutrition as appropriate  Outcome: Progressing  Goal: Fluid balance maintained  Description: INTERVENTIONS:  - Monitor labs   - Monitor I/O and WT  - Instruct patient on fluid and nutrition as appropriate  - Assess for signs & symptoms of volume excess or deficit  Outcome: Progressing     Problem: SKIN/TISSUE INTEGRITY - ADULT  Goal: Skin Integrity remains intact(Skin Breakdown Prevention)  Description: Assess:  -Perform [place] assessment every q 12 hr  -Clean and moisturize skin every day  -Inspect skin when repositioning, toileting, and assisting with ADLS  -Assess under medical devices such   -Assess extremities for adequate circulation and sensation     Bed Management:  -Have minimal linens on bed & keep smooth, unwrinkled  -Change linens as needed when moist or perspiring      Toileting:  -Offer bedside commode      Activity:  -Mobilize patient 3 times a day  -Encourage activity and walks on unit  -Encourage or provide ROM exercises       Skin Care:  -Avoid use of baby powder, tape, friction and shearing, hot water or constrictive clothing  -Relieve pressure over bony prominences  -Do not massage red bony areas    Next Steps:  -Teach patient strategies to minimize risks     Outcome: Progressing     Problem: MUSCULOSKELETAL - ADULT  Goal: Maintain or return mobility to safest level of function  Description: INTERVENTIONS:  - Assess patient's ability to carry out ADLs; assess patient's baseline for ADL function and identify physical deficits which impact ability to perform ADLs (bathing, care of mouth/teeth, toileting, grooming, dressing, etc.)  - Assess/evaluate cause of self-care deficits   - Assess range of motion  - Assess patient's mobility  - Assess patient's need for assistive devices and provide as appropriate  - Encourage maximum independence but intervene and supervise when necessary  - Involve family in performance of ADLs  - Assess for home care needs following discharge   - Consider OT consult to assist with ADL evaluation and planning for discharge  - Provide patient education as appropriate  Outcome: Progressing     Problem: PAIN - ADULT  Goal: Verbalizes/displays adequate comfort level or baseline comfort level  Description: Interventions:  - Encourage patient to monitor pain and request assistance  - Assess pain using appropriate pain scale  - Administer analgesics based on type and severity of pain and evaluate response  - Implement non-pharmacological measures as appropriate and evaluate response  - Consider cultural and social influences on pain and pain management  - Notify physician/advanced practitioner if interventions unsuccessful or patient reports new pain  Outcome: Progressing     Problem: SAFETY ADULT  Goal: Patient will remain free of falls  Description: INTERVENTIONS:  - Educate patient/family on patient safety including physical limitations  - Instruct patient to call for assistance with activity   - Consult OT/PT to assist with strengthening/mobility   - Keep Call bell within reach  - Keep bed low and locked with side rails adjusted as appropriate  - Keep care items and personal belongings within reach  - Initiate and maintain comfort rounds  - Apply yellow socks and bracelet for high fall risk patients  - Consider moving patient to room near nurses station  Outcome: Progressing  Goal: Maintain or return to baseline ADL function  Description: INTERVENTIONS:  - Educate patient/family on patient safety including physical limitations  - Instruct patient to call for assistance with activity   - Consult OT/PT to assist with strengthening/mobility   - Keep Call bell within reach  - Keep bed low and locked with side rails adjusted as appropriate  - Keep care items and personal belongings within reach  - Initiate and maintain comfort rounds  - Apply yellow socks and bracelet for high fall risk patients  - Consider moving patient to room near nurses station  Outcome: Progressing  Goal: Maintains/Returns to pre admission functional level  Description: INTERVENTIONS:  - Perform BMAT or MOVE assessment daily.   - Set and communicate daily mobility goal to care team and patient/family/caregiver.    - Collaborate with rehabilitation services on mobility goals if consulted  - Out of bed for toileting  - Record patient progress and toleration of activity level   Outcome: Progressing     Problem: DISCHARGE PLANNING  Goal: Discharge to home or other facility with appropriate resources  Description: INTERVENTIONS:  - Identify barriers to discharge w/patient and caregiver  - Arrange for needed discharge resources and transportation as appropriate  - Identify discharge learning needs (meds, wound care, etc.)  - Arrange for interpretive services to assist at discharge as needed  - Refer to Case Management Department for coordinating discharge planning if the patient needs post-hospital services based on physician/advanced practitioner order or complex needs related to functional status, cognitive ability, or social support system  Outcome: Progressing     Problem: Knowledge Deficit  Goal: Patient/family/caregiver demonstrates understanding of disease process, treatment plan, medications, and discharge instructions  Description: Complete learning assessment and assess knowledge base.   Interventions:  - Provide teaching at level of understanding  - Provide teaching via preferred learning methods  Outcome: Progressing     Problem: SAFETY,RESTRAINT: NV/NON-SELF DESTRUCTIVE BEHAVIOR  Goal: Remains free of harm/injury (restraint for non violent/non self-detsructive behavior)  Description: INTERVENTIONS:  - Instruct patient/family regarding restraint use   - Assess and monitor physiologic and psychological status   - Provide interventions and comfort measures to meet assessed patient needs   - Identify and implement measures to help patient regain control  - Assess readiness for release of restraint   Outcome: Progressing  Goal: Returns to optimal restraint-free functioning  Description: INTERVENTIONS:  - Assess the patient's behavior and symptoms that indicate continued need for restraint  - Identify and implement measures to help patient regain control  - Assess readiness for release of restraint   Outcome: Progressing     Problem: Prexisting or High Potential for Compromised Skin Integrity  Goal: Skin integrity is maintained or improved  Description: INTERVENTIONS:  - Identify patients at risk for skin breakdown  - Assess and monitor skin integrity  - Assess and monitor nutrition and hydration status  - Monitor labs   - Assess for incontinence   - Turn and reposition patient  - Assist with mobility/ambulation  - Relieve pressure over bony prominences  - Avoid friction and shearing  - Provide appropriate hygiene as needed including keeping skin clean and dry  - Evaluate need for skin moisturizer/barrier cream  - Collaborate with interdisciplinary team   - Patient/family teaching  - Consider wound care consult   Outcome: Progressing     Problem: Nutrition/Hydration-ADULT  Goal: Nutrient/Hydration intake appropriate for improving, restoring or maintaining nutritional needs  Description: Monitor and assess patient's nutrition/hydration status for malnutrition. Collaborate with interdisciplinary team and initiate plan and interventions as ordered. Monitor patient's weight and dietary intake as ordered or per policy. Utilize nutrition screening tool and intervene as necessary. Determine patient's food preferences and provide high-protein, high-caloric foods as appropriate.      INTERVENTIONS:  - Monitor oral intake, urinary output, labs, and treatment plans  - Assess nutrition and hydration status and recommend course of action  - Evaluate amount of meals eaten  - Assist patient with eating if necessary   - Allow adequate time for meals  - Recommend/ encourage appropriate diets, oral nutritional supplements, and vitamin/mineral supplements  - Order, calculate, and assess calorie counts as needed  - Recommend, monitor, and adjust tube feedings and TPN/PPN based on assessed needs  - Assess need for intravenous fluids  - Provide specific nutrition/hydration education as appropriate  - Include patient/family/caregiver in decisions related to nutrition  Outcome: Progressing

## 2023-09-06 NOTE — PROGRESS NOTES
Follow up Consultation    Nephrology   Jalyn Cantu 28 y.o. female MRN: [id number]  Unit/Bed#: [place] [facility] [address] [facility] 217-01 Encounter: [id number]      Physician Requesting Consult: Winter Catalan MD        ASSESSMENT/PLAN:   70-year-old female with significant psychiatric issues including schizoaffective disorder and bipolar disorder presented with worsening mental status and lethargy. Nephrology consulted for lithium toxicity. 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 Initial urine osmolality 103 and urine sodium 20 as of 9/1/2023   Repeat urine osmolality on 9/5/2023 prior to DDAVP 10 mcg intranasal was 85 with urine sodium of 16  Urine osmolality on 9/5/2023 approximately 6 hours post DDAVP slightly increased to 93  At this time it is very difficult to say if patient has CDI or NDI however more likely to be NDI especially in light of slight increase in urine osmolality to 147 after being started on amiloride along with stability and serum sodium levels  Unable to get accurate urine outputs or urine osmolalities secondary to patient's underlying cognitive disorder  Started amiloride 5 mg p.o. twice daily from 9/6/2023 we will continue for now  I do not believe the patient would sit still for brain MRI to rule out CDI etiology  Get strict I's and O's. Hypokalemia:  Most recent serum potassium 5.0 mEq  Avoid further supplementation for now  Did get IV supplementation and initiation of amiloride on 9/6.   BMP in a.m. Acid-base:    Most recent bicarb at 30, alkalosis improving    H/H/anemia:  most recent hemoglobin at 14.5 g/dL  maintain hemoglobin greater than 8 grams/deciliter    Blood pressure/normotensive:  current medications: None  recommendations: Start on amiloride 5 mg p.o. twice daily  Optimize hemodynamics. Maintain MAP > 65mmHg  Avoid BP fluctuations. Other medical problems:  PMetabolic encephalopathy, schizoaffective disorder/intellectual disability:  Management per primary team.  No longer on lithium. On Depakote      Thanks for the consult  Will continue to follow  Please call with questions/ concerns. Above-mentioned orders and Plan we will continue on amiloride 5 mg p.o. twice daily and if electrolytes stable tomorrow then okay for discharge from renal standpoint if medically cleared. were discussed with the team in depth and they agree in person. [address] [place] [address] Osito Garcia MD, JOSH, 2023, 10:47 AM              Objective :   Patient seen and examined in her room no overnight events hemodynamically stable remains afebrile status post amiloride x2 doses yesterday urine output 3.2 L documented still remains with one-to-one. PHYSICAL EXAM  /81   Pulse 84   Temp 98.6 °F (37 °C) (Axillary)   Resp 19   Ht 5' 4" (1.626 m)   Wt 50.3 kg (110 lb 14.3 oz)   SpO2 98%   BMI 19.03 kg/m²   Temp (24hrs), Av.8 °F (36.6 °C), Min:96.9 °F (36.1 °C), Max:98.6 °F (37 °C)        Intake/Output Summary (Last 24 hours) at 2023 1047  Last data filed at 2023 0951  Gross per 24 hour   Intake 4000 ml   Output 3200 ml   Net 800 ml       I/O last 24 hours: In: 36 [P.O.:3480; I.V.:1000]  Out: 3200 [Urine:3200]      Current Weight: Weight - Scale: 50.3 kg (110 lb 14.3 oz)  First Weight: Weight - Scale: 52.9 kg (116 lb 10 oz)  Physical Exam  Vitals and nursing note reviewed. Constitutional:       General: She is not in acute distress. Appearance: Normal appearance. She is normal weight.  She is not ill-appearing, toxic-appearing or diaphoretic. HENT:      Head: Normocephalic and atraumatic. Mouth/Throat:      Pharynx: No oropharyngeal exudate. Eyes:      General: No scleral icterus. Conjunctiva/sclera: Conjunctivae normal.   Cardiovascular:      Rate and Rhythm: Normal rate. Heart sounds: No friction rub. Pulmonary:      Effort: No respiratory distress. Breath sounds: Normal breath sounds. No stridor. Abdominal:      General: There is no distension. Palpations: Abdomen is soft. Tenderness: There is no abdominal tenderness. Musculoskeletal:         General: No swelling. Cervical back: Normal range of motion. No rigidity. Skin:     General: Skin is warm. Coloration: Skin is not jaundiced. Neurological:      General: No focal deficit present. Mental Status: She is alert. Mental status is at baseline. Psychiatric:         Mood and Affect: Mood normal.         Behavior: Behavior normal.             Review of Systems   Unable to perform ROS: Psychiatric disorder   Constitutional: Negative for fatigue and fever. Respiratory: Negative for cough and shortness of breath. Cardiovascular: Negative for leg swelling. Gastrointestinal: Negative for diarrhea. Genitourinary: Negative for dysuria. Musculoskeletal: Negative for back pain. Neurological: Negative for headaches. Psychiatric/Behavioral: Negative for agitation. All other systems reviewed and are negative. Scheduled Meds:  Current Facility-Administered Medications   Medication Dose Route Frequency Provider Last Rate   • chlorhexidine  15 mL Mouth/Throat Q12H [facility] Michelle Powell PA-C     • divalproex sodium  1,000 mg Oral Q12H [facility] Daisy Gonzalez PA-C     • mirtazapine  15 mg Oral HS Michelle Powell PA-C     • OLANZapine  5 mg Oral Daily Yara Thompson MD     • OLANZapine  2.5 mg Intramuscular Q3H PRN Yara Thompson MD     • propranolol  10 mg Oral TID Michelle Powell PA-C         PRN Meds:. • OLANZapine    Continuous Infusions:       Invasive Devices: Invasive Devices     Peripheral Intravenous Line  Duration           Peripheral IV 09/07/23 Left Antecubital <1 day                  LABORATORY:    Results from last 7 days   Lab Units 09/07/23  0500 09/06/23  1114 09/05/23  1757 09/05/23  0557 09/04/23  0857 09/03/23  1956 09/03/23  0910 09/02/23  0842 09/02/23  0512 09/01/23  1241 09/01/23  0530   WBC Thousand/uL  --   --   --   --   --   --   --   --  12.93*  --  11.21*   HEMOGLOBIN g/dL  --   --   --   --   --   --   --   --  14.5  --  14.4   HEMATOCRIT %  --   --   --   --   --   --   --   --  45.9  --  45.8   PLATELETS Thousands/uL  --   --   --   --   --   --   --   --  130*  --  145*   POTASSIUM mmol/L 5.0 3.4* 3.3* 4.3 3.5 3.2* 3.7   < >  --    < > 3.9   CHLORIDE mmol/L 108 107 110* 115* 108 110* 115*   < >  --    < > 124*   CO2 mmol/L 30 33* 32 33* 35* 36* 36*   < >  --    < > 34*   BUN mg/dL 5 5 6 7 5 6 9   < >  --    < > 4*   CREATININE mg/dL 0.48* 0.37* 0.33* 0.37* 0.45* 0.47* 0.50*   < >  --    < > 0.45*   CALCIUM mg/dL 9.5 8.8 8.7 9.4 9.0 9.2 9.7   < >  --    < > 10.3*   MAGNESIUM mg/dL  --   --   --   --   --   --   --   --  2.6  --  3.1*   PHOSPHORUS mg/dL 2.7  --   --   --   --   --   --   --  3.3  --  2.1*    < > = values in this interval not displayed. rest all reviewed    RADIOLOGY:  CT head wo contrast   Final Result by Andres Majano MD (09/06 1911)      No acute intracranial abnormality. Workstation performed: [id number]         CT abdomen pelvis with contrast   Final Result by Dennie Lacrosse, MD (08/30 6898)      1. Top-normal fluid-filled loops of hyperemic small bowel in the right lower quadrant, which could represent a nonspecific enteritis in the appropriate clinical context. 2.  Nonobstructing 2 mm calculus in the interpolar right kidney. The study was marked in EPIC for significant notification.          Workstation performed: [id number]         CT head without contrast   Final Result by Bailey Brizuela MD (08/30 1433)      No acute intracranial abnormality. Workstation performed: [id number]           Rest all reviewed    Portions of the record may have been created with voice recognition software. Occasional wrong word or "sound a like" substitutions may have occurred due to the inherent limitations of voice recognition software. Read the chart carefully and recognize, using context, where substitutions have occurred. If you have any questions, please contact the dictating provider.

## 2023-09-06 NOTE — PLAN OF CARE
Problem: PHYSICAL THERAPY ADULT  Goal: Performs mobility at highest level of function for planned discharge setting. See evaluation for individualized goals. Description: Treatment/Interventions: Functional transfer training, LE strengthening/ROM, Therapeutic exercise, Endurance training, Patient/family training, Cognitive reorientation, Equipment eval/education, Bed mobility, Gait training, Spoke to nursing, Elevations          See flowsheet documentation for full assessment, interventions and recommendations. Note: Prognosis: Good  Problem List: Impaired balance, Impaired judgement, Decreased safety awareness, Decreased cognition, Pain  Assessment: Renetta Egan is a 28 y.o. female admitted to [address] [place] [address] on 8/30/2023 for Lithium toxicity. PT was consulted and pt was seen on 9/6/2023 for mobility assessment and d/c planning. Pt presents with cognitive impairment, 1:1 supervision, multiple lines, telemetry, increased risk for falls and pain. Pt is currently functioning at a supervision assistance x1 level for bed mobility, supervision assistance x1 level for transfers, and min assistance x1 level for ambulation with no assistive device. Pt demonstrated impaired balance, gait deviations, need for physical assist and decreased safety awareness upon initial evaluation. Progressed from min Ax1 to supervision levle for bed mobility. Completed transfers without physical assist or cues, but supervision needed for impulsivity for increased safety. Pt demonstrated increased unsteadiness on feet with ambulation, requiring min A/HHA x1 to decrease sway. Pt may benefit from AD (RW vs SPC) in future sessions. Pt tolerated activity without adverse reaction, though session limited due to increased agitation and for safety 2* inability to follow commands consistently. At baseline, pt was independent with all mobility and ADLs without AD.   Pt will benefit from continued skilled IP PT to address the above mentioned impairments  in order to maximize recovery and increase functional independence when completing mobility and ADLs. At this time PT recommendations for d/c are home with OPPT vs no needs pending progress. Barriers to Discharge: None     PT Discharge Recommendation: Home with outpatient rehabilitation    See flowsheet documentation for full assessment.

## 2023-09-06 NOTE — PHYSICAL THERAPY NOTE
PHYSICAL THERAPY EVALUATION          Patient Name: Waldemar Blizzard  [id number] Date: 9/6/2023 09/06/23 1031   Note Type   Note type Evaluation   Pain Assessment   Pain Assessment Tool FLACC   Pain Location/Orientation Location: Abdomen   Hospital Pain Intervention(s) Ambulation/increased activity;Repositioned   Pain Rating: FLACC (Rest) - Face 0   Pain Rating: FLACC (Rest) - Legs 0   Pain Rating: FLACC (Rest) - Activity 0   Pain Rating: FLACC (Rest) - Cry 0   Pain Rating: FLACC (Rest) - Consolability 0   Score: FLACC (Rest) 0   Pain Rating: FLACC (Activity) - Face 1   Pain Rating: FLACC (Activity) - Legs 0   Pain Rating: FLACC (Activity) - Activity 0   Pain Rating: FLACC (Activity) - Cry 1   Pain Rating: FLACC (Activity) - Consolability 0   Score: FLACC (Activity) 2   Restrictions/Precautions   Other Precautions 1:1;Cognitive; Chair Alarm; Bed Alarm; Fall Risk;Pain;Multiple lines;Telemetry; Impulsive  (level 2 step down)   Home Living   Type of Home House   Prior Function   Level of [place] Independent with functional mobility; Independent with ADLs; Needs assistance with IADLS   Lives With Family  (parents)   Receives Help From Family  (supportive sister)   IADLs Family/Friend/Other provides transportation; Family/Friend/Other provides medication management   Comments Hx per chart review. Pt is an unreliable historian due to impaired cognition. indpendent for mobility and ADLs at baseline, wo use of AD. Recently PTA has been requiring more assist.   General   Additional Pertinent History Admitted 8/30/23 for acute encephalopathy 2/2 lithium toxicity. OOB to chair orders. PMHx sig for intellectual disability, and schizaoffective disorder.    Family/Caregiver Present No   Cognition   Overall Cognitive Status Impaired   Arousal/Participation Responsive   Orientation Level Unable to assess   Memory Decreased recall of precautions   Following Commands Follows one step commands inconsistently   Comments intellectual diability, mood disorder. Pt not cooperative with evaluation adn inconsistently following commands. Labile moods and becomes aggitatated throughout session. RLE Assessment   RLE Assessment WFL   LLE Assessment   LLE Assessment WFL   Bed Mobility   Supine to Sit 5  Supervision   Additional items Other;Impulsive  (low boy bed)   Sit to Supine 5  Supervision   Additional items Impulsive; Other  (low boy bed)   Additional Comments Initally min Ax1, progressing to supervision with following trials. Transfers   Sit to Stand 5  Supervision   Additional items Impulsive   Stand to Sit 5  Supervision   Additional items Impulsive   Ambulation/Elevation   Gait pattern Forward Flexion;Narrow VARUN   Gait Assistance 4  Minimal assist  (HHA)   Additional items Assist x 1   Assistive Device None  (HHA x1)   Distance 250'   Balance   Static Sitting Fair +   Dynamic Sitting Fair   Static Standing Fair -   Dynamic Standing Fair -   Ambulatory Poor +   Endurance Deficit   Endurance Deficit No   Activity Tolerance   Activity Tolerance Treatment limited secondary to agitation; Other (Comment)  (unsteadiness, pt safety)   Medical Staff Made Aware PT Alina   Nurse Made Aware Nicole Galaviz RN   Assessment   Prognosis Good   Problem List Impaired balance; Impaired judgement;Decreased safety awareness;Decreased cognition;Pain   Assessment Cait Grider is a 28 y.o. female admitted to [address] [place] [address] on 8/30/2023 for Lithium toxicity. PT was consulted and pt was seen on 9/6/2023 for mobility assessment and d/c planning. Pt presents with cognitive impairment, 1:1 supervision, multiple lines, telemetry, increased risk for falls and pain. Pt is currently functioning at a supervision assistance x1 level for bed mobility, supervision assistance x1 level for transfers, and min assistance x1 level for ambulation with no assistive device.  Pt demonstrated impaired balance, gait deviations, need for physical assist and decreased safety awareness upon initial evaluation. Progressed from min Ax1 to supervision levle for bed mobility. Completed transfers without physical assist or cues, but supervision needed for impulsivity for increased safety. Pt demonstrated increased unsteadiness on feet with ambulation, requiring min A/HHA x1 to decrease sway. Pt may benefit from AD (RW vs SPC) in future sessions. Pt tolerated activity without adverse reaction, though session limited due to increased agitation and for safety 2* inability to follow commands consistently. At baseline, pt was independent with all mobility and ADLs without AD. Pt will benefit from continued skilled IP PT to address the above mentioned impairments  in order to maximize recovery and increase functional independence when completing mobility and ADLs. At this time PT recommendations for d/c are home with OPPT vs no needs pending progress. Barriers to Discharge None   Goals   Patient Goals none stated 2* cognition   STG Expiration Date 09/16/23   Short Term Goal #1 (1) Pt will perform bed mobility with mod I demonstrating appropriate form 100% of the time to promote improved functional mobility. (2) Perform all transfers with mod I and appropriate technique 100% of the time to promote ability to safely negotiate home environment. (3) Ambulate at least 250' with LRAD and supervision to promote safe negotiation of home environment. (4) Improve overall strength and balance 1/2 grade to optimize ability to perform functional mobility, ADLs, IADLs and demonstrate reduced fall risk. (5) Increase activity tolerance to 45 minutes to promote endurance for completion of functional tasks. (6) Negotiate stairs with most appropriate technique and supervision to promote safe mobility in home environment. (7) Continue PT for ongoing pt/family education, DME needs, and d/c planning to promote highest level of function in least restricted environment.    Plan   Treatment/Interventions Functional transfer training;LE strengthening/ROM; Therapeutic exercise; Endurance training;Patient/family training;Cognitive reorientation;Equipment eval/education; Bed mobility;Gait training;Spoke to nursing;Elevations   PT Frequency 2-3x/wk   Recommendation   PT Discharge Recommendation Home with outpatient rehabilitation   Additional Comments vs no needs pending progress   AM-PAC Basic Mobility Inpatient   Turning in Flat Bed Without Bedrails 3   Lying on Back to Sitting on Edge of Flat Bed Without Bedrails 3   Moving Bed to Chair 3   Standing Up From Chair Using Arms 3   Walk in Room 3   Climb 3-5 Stairs With Railing 3   Basic Mobility Inpatient Raw Score   18    The patient's AM-PAC Basic Mobility Inpatient Short Form Raw Score is 18. A Raw score of greater than or equal to 16 suggests the patient may benefit from discharge to home. Please also refer to the recommendation of the Physical Therapist for safe discharge planning. Basic Mobility Standardized Score 41.05   Highest Level Of Mobility   JH-HLM Goal 6: Walk 10 steps or more   JH-HLM Achieved 8: Walk 250 feet ot more   End of Consult   Patient Position at End of Consult Supine; All needs within reach  (1:1 present)       Factors contributing to complexity  History: co-morbidities, social background, fall risk, cognitive status  Examination of body systems: MSK (strength, ROM) neuromuscular (sensation, balance, gait, transfers, motor function), functional (am-pac), communication (cognition, orientation)  Clinical presentation: unstable  Complexity: high    Terrall Madeline, SPT

## 2023-09-07 LAB
ANION GAP SERPL CALCULATED.3IONS-SCNC: 6 MMOL/L
BUN SERPL-MCNC: 5 MG/DL (ref 5–25)
CALCIUM SERPL-MCNC: 9.5 MG/DL (ref 8.4–10.2)
CHLORIDE SERPL-SCNC: 108 MMOL/L (ref 96–108)
CO2 SERPL-SCNC: 30 MMOL/L (ref 21–32)
CREAT SERPL-MCNC: 0.48 MG/DL (ref 0.6–1.3)
GFR SERPL CREATININE-BSD FRML MDRD: 128 ML/MIN/1.73SQ M
GLUCOSE SERPL-MCNC: 84 MG/DL (ref 65–140)
OSMOLALITY UR: 147 MMOL/KG
PHOSPHATE SERPL-MCNC: 2.7 MG/DL (ref 2.7–4.5)
POTASSIUM SERPL-SCNC: 5 MMOL/L (ref 3.5–5.3)
SODIUM SERPL-SCNC: 144 MMOL/L (ref 135–147)

## 2023-09-07 PROCEDURE — 83935 ASSAY OF URINE OSMOLALITY: CPT | Performed by: INTERNAL MEDICINE

## 2023-09-07 PROCEDURE — 99232 SBSQ HOSP IP/OBS MODERATE 35: CPT | Performed by: INTERNAL MEDICINE

## 2023-09-07 PROCEDURE — 80048 BASIC METABOLIC PNL TOTAL CA: CPT | Performed by: INTERNAL MEDICINE

## 2023-09-07 PROCEDURE — 84100 ASSAY OF PHOSPHORUS: CPT | Performed by: INTERNAL MEDICINE

## 2023-09-07 RX ORDER — AMILORIDE HYDROCHLORIDE 5 MG/1
5 TABLET ORAL 2 TIMES DAILY
Status: DISCONTINUED | OUTPATIENT
Start: 2023-09-07 | End: 2023-09-08 | Stop reason: HOSPADM

## 2023-09-07 RX ADMIN — PROPRANOLOL HYDROCHLORIDE 10 MG: 10 TABLET ORAL at 09:46

## 2023-09-07 RX ADMIN — AMILORIDE HYDROCLORIDE 5 MG: 5 TABLET ORAL at 21:12

## 2023-09-07 RX ADMIN — DIVALPROEX SODIUM 1000 MG: 125 CAPSULE, COATED PELLETS ORAL at 21:13

## 2023-09-07 RX ADMIN — AMILORIDE HYDROCLORIDE 5 MG: 5 TABLET ORAL at 11:46

## 2023-09-07 RX ADMIN — CHLORHEXIDINE GLUCONATE 15 ML: 1.2 RINSE ORAL at 09:47

## 2023-09-07 RX ADMIN — DIVALPROEX SODIUM 1000 MG: 125 CAPSULE, COATED PELLETS ORAL at 09:46

## 2023-09-07 RX ADMIN — PROPRANOLOL HYDROCHLORIDE 10 MG: 10 TABLET ORAL at 21:15

## 2023-09-07 RX ADMIN — OLANZAPINE 5 MG: 5 TABLET, ORALLY DISINTEGRATING ORAL at 09:47

## 2023-09-07 RX ADMIN — MIRTAZAPINE 15 MG: 15 TABLET, FILM COATED ORAL at 21:12

## 2023-09-07 NOTE — PROGRESS NOTES
[facility]  Progress Note  Name: Jurgen Oswald  MRN: [id number]  Unit/Bed#: [facility] -01 I Date of Admission: 8/30/2023   Date of Service: 9/7/2023 I Hospital Day: 8    Assessment/Plan   * Acute encephalopathy 2/2 lithium toxicity  Assessment & Plan  · She has a history of schizoaffective disorder, intellectual disability, bipolar disorder, presented with worsening encephalopathy, lethargy for the past two weeks. · As per patient's sister, at her baseline she is generally very active, alert, able to maintain conversation, generally oriented despite having intellectual disability. · Patients family reports symptoms started approximately two weeks when Clozaril was discontinued for abnormal white blood cell count. At home dose of lithium 750 mg twice daily. · Patient underwent hemodialysis x2 last treatment on 8/31/2023  · Toxicology and nephrology consultations and recommendations appreciated  · Psychiatry input appreciated  · Lithium levels improved  · Continue aggressive IV hydration   · as needed Zyprexa    Hypernatremia  Assessment & Plan  · Secondary to lithium induced diabetes insipidus  · Nephrology follow-up appreciated  · Sodium improved with amiloride trial, plan to continue amiloride 5mg bid  · Monitor BMP    Unspecified mood (affective) disorder ([address])  Assessment & Plan  · Continue home medications  · Psychiatry evaluation appreciated   · Zyprexa 5 mg daily, continue Depakote 1 g every 12 hours  · Repeat valproic acid level 65  · Outpatient follow-up with psychiatry on discharge  · Monitor off lithium    Intellectual disability  Assessment & Plan  · Patient has profound intellectual disability; lives at home with parents, is usually oriented, does walk around.   According to family patient does have shouting outburst  · Continue supportive care; monitor off lithium; correct metabolic deficiencies  · As needed Zyprexa for acute agitation                 VTE Pharmacologic Prophylaxis:   Pharmacologic: Early ambulation    Patient Centered Rounds: I have performed bedside rounds with nursing staff today. Discussions with Specialists or Other Care Team Provider: Nephrologist Dr. Cadence Hull    Education and Discussions with Family / Patient: Updated patient's sister    Time Spent for Care: More than 50% of total time spent on counseling and coordination of care as described above. Current Length of Stay: 8 day(s)    Current Patient Status: Inpatient   Certification Statement: The patient will continue to require additional inpatient hospital stay due to Hypernatremia    Discharge Plan / Estimated Discharge Date: 24h    Code Status: Level 1 - Full Code      Subjective:   Patient seen and examined at bedside, comfortable, denies any pain. Energetic and hyper. Objective:     Vitals:   Temp (24hrs), Av.8 °F (36.6 °C), Min:96.9 °F (36.1 °C), Max:98.6 °F (37 °C)    Temp:  [96.9 °F (36.1 °C)-98.6 °F (37 °C)] 98.6 °F (37 °C)  HR:  [76-87] 84  Resp:  [16-19] 19  BP: ()/(52-81) 114/81  Body mass index is 19.03 kg/m². Input and Output Summary (last 24 hours): Intake/Output Summary (Last 24 hours) at 2023 1156  Last data filed at 2023 0951  Gross per 24 hour   Intake 3000 ml   Output 3200 ml   Net -200 ml       Physical Exam:    Constitutional: Patient is in no acute distress  HEENT:  Normocephalic, atraumatic  Cardiovascular: Normal S1S2, RRR, No murmurs/rubs/gallops appreciated. Pulmonary:  Bilateral air entry, No rhonchi/rales/wheezing appreciated  Abdominal: Soft, Bowel sounds present, Non-tender, Non-distended  Extremities:  No cyanosis, clubbing or edema.    Neurological: Awake, alert  Skin:  Warm, dry    Additional Data:     Labs:    Results from last 7 days   Lab Units 23  0512   WBC Thousand/uL 12.93*   HEMOGLOBIN g/dL 14.5   HEMATOCRIT % 45.9   PLATELETS Thousands/uL 130*   NEUTROS PCT % 71   LYMPHS PCT % 14   MONOS PCT % 14*   EOS PCT % 1     Results from last 7 days   Lab Units 09/07/23  0500 09/01/23  1241 09/01/23  0530   POTASSIUM mmol/L 5.0   < > 3.9   CHLORIDE mmol/L 108   < > 124*   CO2 mmol/L 30   < > 34*   BUN mg/dL 5   < > 4*   CREATININE mg/dL 0.48*   < > 0.45*   CALCIUM mg/dL 9.5   < > 10.3*   ALK PHOS U/L  --   --  114*   ALT U/L  --   --  14   AST U/L  --   --  14    < > = values in this interval not displayed. I Have Reviewed All Lab Data Listed Above.     Invasive Devices     Peripheral Intravenous Line  Duration           Peripheral IV 09/07/23 Left Antecubital <1 day                     Recent Cultures (last 7 days):           Last 24 Hours Medication List:   Current Facility-Administered Medications   Medication Dose Route Frequency Provider Last Rate   • AMILoride  5 mg Oral BID Alejandrina Viera MD     • chlorhexidine  15 mL Mouth/Throat Q12H [facility] Holland Atkinson PA-C     • divalproex sodium  1,000 mg Oral Q12H [facility] Roz Torrez PA-C     • mirtazapine  15 mg Oral HS Holland Atkinson PA-C     • OLANZapine  5 mg Oral Daily Elsa Cash MD     • OLANZapine  2.5 mg Intramuscular Q3H PRN Elsa Cash MD     • propranolol  10 mg Oral TID Holland Atkinson PA-C          Today, Patient Was Seen By: Elsa Cash MD

## 2023-09-07 NOTE — PLAN OF CARE
Problem: Potential for Falls  Goal: Patient will remain free of falls  Description: INTERVENTIONS:  - Educate patient/family on patient safety including physical limitations  - Instruct patient to call for assistance with activity   - Consult OT/PT to assist with strengthening/mobility   - Keep Call bell within reach  - Keep bed low and locked with side rails adjusted as appropriate  - Keep care items and personal belongings within reach  - Initiate and maintain comfort rounds  - Apply yellow socks and bracelet for high fall risk patients  - Consider moving patient to room near nurses station  Outcome: Progressing     Problem: MOBILITY - ADULT  Goal: Maintain or return to baseline ADL function  Description: INTERVENTIONS:  - Educate patient/family on patient safety including physical limitations  - Instruct patient to call for assistance with activity   - Consult OT/PT to assist with strengthening/mobility   - Keep Call bell within reach  - Keep bed low and locked with side rails adjusted as appropriate  - Keep care items and personal belongings within reach  - Initiate and maintain comfort rounds  - Apply yellow socks and bracelet for high fall risk patients  - Consider moving patient to room near nurses station  Outcome: Progressing  Goal: Maintains/Returns to pre admission functional level  Description: INTERVENTIONS:  - Perform BMAT or MOVE assessment daily.   - Set and communicate daily mobility goal to care team and patient/family/caregiver.    - Collaborate with rehabilitation services on mobility goals if consulted  - Ambulate patient 3 times a day  - Out of bed to chair 2 times a day   - Out of bed for meals 2 times a day  - Out of bed for toileting  - Record patient progress and toleration of activity level   Outcome: Progressing     Problem: NEUROSENSORY - ADULT  Goal: Achieves maximal functionality and self care  Description: INTERVENTIONS  - Monitor swallowing and airway patency with patient fatigue and changes in neurological status  - Encourage and assist patient to increase activity and self care.    - Encourage visually impaired, hearing impaired and aphasic patients to use assistive/communication devices  Outcome: Progressing  Goal: Achieves stable or improved neurological status  Description: INTERVENTIONS  - Monitor and report changes in neurological status  - Monitor vital signs such as temperature, blood pressure, glucose, and any other labs ordered   - Initiate measures to prevent increased intracranial pressure  - Monitor for seizure activity and implement precautions if appropriate      Outcome: Progressing     Problem: CARDIOVASCULAR - ADULT  Goal: Maintains optimal cardiac output and hemodynamic stability  Description: INTERVENTIONS:  - Monitor I/O, vital signs and rhythm  - Monitor for S/S and trends of decreased cardiac output  - Administer and titrate ordered vasoactive medications to optimize hemodynamic stability  - Assess quality of pulses, skin color and temperature  - Assess for signs of decreased coronary artery perfusion  - Instruct patient to report change in severity of symptoms  Outcome: Progressing  Goal: Absence of cardiac dysrhythmias or at baseline rhythm  Description: INTERVENTIONS:  - Continuous cardiac monitoring, vital signs, obtain 12 lead EKG if ordered  - Administer antiarrhythmic and heart rate control medications as ordered  - Monitor electrolytes and administer replacement therapy as ordered  Outcome: Progressing     Problem: GASTROINTESTINAL - ADULT  Goal: Maintains or returns to baseline bowel function  Description: INTERVENTIONS:  - Assess bowel function  - Encourage oral fluids to ensure adequate hydration  - Administer IV fluids if ordered to ensure adequate hydration  - Administer ordered medications as needed  - Encourage mobilization and activity  - Consider nutritional services referral to assist patient with adequate nutrition and appropriate food choices  Outcome: Progressing  Goal: Maintains adequate nutritional intake  Description: INTERVENTIONS:  - Monitor percentage of each meal consumed  - Identify factors contributing to decreased intake, treat as appropriate  - Assist with meals as needed  - Monitor I&O, weight, and lab values if indicated  - Obtain nutrition services referral as needed  Outcome: Progressing     Problem: GENITOURINARY - ADULT  Goal: Maintains or returns to baseline urinary function  Description: INTERVENTIONS:  - Assess urinary function  - Encourage oral fluids to ensure adequate hydration if ordered  - Administer IV fluids as ordered to ensure adequate hydration  - Administer ordered medications as needed  - Offer frequent toileting  - Follow urinary retention protocol if ordered  Outcome: Progressing     Problem: METABOLIC, FLUID AND ELECTROLYTES - ADULT  Goal: Electrolytes maintained within normal limits  Description: INTERVENTIONS:  - Monitor labs and assess patient for signs and symptoms of electrolyte imbalances  - Administer electrolyte replacement as ordered  - Monitor response to electrolyte replacements, including repeat lab results as appropriate  - Instruct patient on fluid and nutrition as appropriate  Outcome: Progressing  Goal: Fluid balance maintained  Description: INTERVENTIONS:  - Monitor labs   - Monitor I/O and WT  - Instruct patient on fluid and nutrition as appropriate  - Assess for signs & symptoms of volume excess or deficit  Outcome: Progressing     Problem: SKIN/TISSUE INTEGRITY - ADULT  Goal: Skin Integrity remains intact(Skin Breakdown Prevention)  Description: Assess:  -Perform [place] assessment every q 12 hr  -Clean and moisturize skin every day  -Inspect skin when repositioning, toileting, and assisting with ADLS  -Assess under medical devices such   -Assess extremities for adequate circulation and sensation     Bed Management:  -Have minimal linens on bed & keep smooth, unwrinkled  -Change linens as needed when moist or perspiring      Toileting:  -Offer bedside commode      Activity:  -Mobilize patient 3 times a day  -Encourage activity and walks on unit  -Encourage or provide ROM exercises       Skin Care:  -Avoid use of baby powder, tape, friction and shearing, hot water or constrictive clothing  -Relieve pressure over bony prominences  -Do not massage red bony areas    Next Steps:  -Teach patient strategies to minimize risks     Outcome: Progressing     Problem: MUSCULOSKELETAL - ADULT  Goal: Maintain or return mobility to safest level of function  Description: INTERVENTIONS:  - Assess patient's ability to carry out ADLs; assess patient's baseline for ADL function and identify physical deficits which impact ability to perform ADLs (bathing, care of mouth/teeth, toileting, grooming, dressing, etc.)  - Assess/evaluate cause of self-care deficits   - Assess range of motion  - Assess patient's mobility  - Assess patient's need for assistive devices and provide as appropriate  - Encourage maximum independence but intervene and supervise when necessary  - Involve family in performance of ADLs  - Assess for home care needs following discharge   - Consider OT consult to assist with ADL evaluation and planning for discharge  - Provide patient education as appropriate  Outcome: Progressing     Problem: PAIN - ADULT  Goal: Verbalizes/displays adequate comfort level or baseline comfort level  Description: Interventions:  - Encourage patient to monitor pain and request assistance  - Assess pain using appropriate pain scale  - Administer analgesics based on type and severity of pain and evaluate response  - Implement non-pharmacological measures as appropriate and evaluate response  - Consider cultural and social influences on pain and pain management  - Notify physician/advanced practitioner if interventions unsuccessful or patient reports new pain  Outcome: Progressing     Problem: SAFETY ADULT  Goal: Patient will remain free of falls  Description: INTERVENTIONS:  - Educate patient/family on patient safety including physical limitations  - Instruct patient to call for assistance with activity   - Consult OT/PT to assist with strengthening/mobility   - Keep Call bell within reach  - Keep bed low and locked with side rails adjusted as appropriate  - Keep care items and personal belongings within reach  - Initiate and maintain comfort rounds  - Apply yellow socks and bracelet for high fall risk patients  - Consider moving patient to room near nurses station  Outcome: Progressing  Goal: Maintain or return to baseline ADL function  Description: INTERVENTIONS:  - Educate patient/family on patient safety including physical limitations  - Instruct patient to call for assistance with activity   - Consult OT/PT to assist with strengthening/mobility   - Keep Call bell within reach  - Keep bed low and locked with side rails adjusted as appropriate  - Keep care items and personal belongings within reach  - Initiate and maintain comfort rounds  - Apply yellow socks and bracelet for high fall risk patients  - Consider moving patient to room near nurses station  Outcome: Progressing  Goal: Maintains/Returns to pre admission functional level  Description: INTERVENTIONS:  - Perform BMAT or MOVE assessment daily.   - Set and communicate daily mobility goal to care team and patient/family/caregiver.    - Collaborate with rehabilitation services on mobility goals if consulted  - Ambulate patient 3 times a day  - Out of bed to chair 2 times a day   - Out of bed for meals 2 times a day  - Out of bed for toileting  - Record patient progress and toleration of activity level   Outcome: Progressing     Problem: DISCHARGE PLANNING  Goal: Discharge to home or other facility with appropriate resources  Description: INTERVENTIONS:  - Identify barriers to discharge w/patient and caregiver  - Arrange for needed discharge resources and transportation as appropriate  - Identify discharge learning needs (meds, wound care, etc.)  - Arrange for interpretive services to assist at discharge as needed  - Refer to Case Management Department for coordinating discharge planning if the patient needs post-hospital services based on physician/advanced practitioner order or complex needs related to functional status, cognitive ability, or social support system  Outcome: Progressing     Problem: Knowledge Deficit  Goal: Patient/family/caregiver demonstrates understanding of disease process, treatment plan, medications, and discharge instructions  Description: Complete learning assessment and assess knowledge base.   Interventions:  - Provide teaching at level of understanding  - Provide teaching via preferred learning methods  Outcome: Progressing     Problem: SAFETY,RESTRAINT: NV/NON-SELF DESTRUCTIVE BEHAVIOR  Goal: Remains free of harm/injury (restraint for non violent/non self-detsructive behavior)  Description: INTERVENTIONS:  - Instruct patient/family regarding restraint use   - Assess and monitor physiologic and psychological status   - Provide interventions and comfort measures to meet assessed patient needs   - Identify and implement measures to help patient regain control  - Assess readiness for release of restraint   Outcome: Progressing  Goal: Returns to optimal restraint-free functioning  Description: INTERVENTIONS:  - Assess the patient's behavior and symptoms that indicate continued need for restraint  - Identify and implement measures to help patient regain control  - Assess readiness for release of restraint   Outcome: Progressing     Problem: Prexisting or High Potential for Compromised Skin Integrity  Goal: Skin integrity is maintained or improved  Description: INTERVENTIONS:  - Identify patients at risk for skin breakdown  - Assess and monitor skin integrity  - Assess and monitor nutrition and hydration status  - Monitor labs   - Assess for incontinence   - Turn and reposition patient  - Assist with mobility/ambulation  - Relieve pressure over bony prominences  - Avoid friction and shearing  - Provide appropriate hygiene as needed including keeping skin clean and dry  - Evaluate need for skin moisturizer/barrier cream  - Collaborate with interdisciplinary team   - Patient/family teaching  - Consider wound care consult   Outcome: Progressing     Problem: Nutrition/Hydration-ADULT  Goal: Nutrient/Hydration intake appropriate for improving, restoring or maintaining nutritional needs  Description: Monitor and assess patient's nutrition/hydration status for malnutrition. Collaborate with interdisciplinary team and initiate plan and interventions as ordered. Monitor patient's weight and dietary intake as ordered or per policy. Utilize nutrition screening tool and intervene as necessary. Determine patient's food preferences and provide high-protein, high-caloric foods as appropriate.      INTERVENTIONS:  - Monitor oral intake, urinary output, labs, and treatment plans  - Assess nutrition and hydration status and recommend course of action  - Evaluate amount of meals eaten  - Assist patient with eating if necessary   - Allow adequate time for meals  - Recommend/ encourage appropriate diets, oral nutritional supplements, and vitamin/mineral supplements  - Order, calculate, and assess calorie counts as needed  - Recommend, monitor, and adjust tube feedings and TPN/PPN based on assessed needs  - Assess need for intravenous fluids  - Provide specific nutrition/hydration education as appropriate  - Include patient/family/caregiver in decisions related to nutrition  Outcome: Progressing

## 2023-09-07 NOTE — PROGRESS NOTES
Follow up Consultation    Nephrology   Patricio Chan 28 y.o. female MRN: [id number]  Unit/Bed#: [facility] 2 [id number] [facility] 217-01 Encounter: [id number]      Physician Requesting Consult: Deepak Currie MD        ASSESSMENT/PLAN:   69-year-old female with significant psychiatric issues including schizoaffective disorder and bipolar disorder presented with worsening mental status and lethargy. Nephrology consulted for lithium toxicity. 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 if still in the hospital  Initial urine osmolality 103 and urine sodium 20 as of 9/1/2023   Repeat urine osmolality on 9/5/2023 prior to DDAVP 10 mcg intranasal was 85 with urine sodium of 16  Urine osmolality on 9/5/2023 approximately 6 hours post DDAVP slightly increased to 93  Initially it is very difficult to say if patient has CDI or NDI however more likely to be NDI especially in light of slight increase in urine osmolality to 147 after being started on amiloride along with stability and serum sodium levels  Unable to get accurate urine outputs or urine osmolalities secondary to patient's underlying cognitive disorder  Started amiloride 5 mg p.o. twice daily from 9/6/2023 we will continue for now and upon discharge  I do not believe the patient would sit still for brain MRI to rule out CDI etiology  Get strict I's and O's.   Stable for discharge from renal standpoint medically cleared order for blood work for 2023 as well as outpatient office follow-up    Hypokalemia:  Most recent serum potassium 4.8 mEq  Avoid further supplementation for now  Did get IV supplementation and initiation of amiloride on . BMP in a.m. if still in the hospital    Acid-base:    Most recent bicarb at 31, alkalosis stable    H/H/anemia:  most recent hemoglobin at 14.5 g/dL  maintain hemoglobin greater than 8 grams/deciliter    Blood pressure/normotensive:  current medications: Amiloride 5 mg p.o. twice daily  recommendations: No changes at this time  Optimize hemodynamics. Maintain MAP > 65mmHg  Avoid BP fluctuations. Other medical problems:  PMetabolic encephalopathy, schizoaffective disorder/intellectual disability:  Management per primary team.  No longer on lithium. On Depakote      Thanks for the consult  Will continue to follow  Please call with questions/ concerns. Above-mentioned orders and Plan we will continue on amiloride 5 mg p.o. twice daily stable for discharge from renal standpoint medically cleared outpatient follow-up ordered for lab work 2023 were discussed with the team in depth and they agree. Marilou Zamarripa MD, FASN, 2023, 7:28 AM              Objective :   Patient seen and examined in her room no overnight events urine output 600 cc plus blood pressure stable remains afebrile one-to-one at bedside overall much more cooperative and alert today happy when I mention she may be going home possibly today      PHYSICAL EXAM  /78   Pulse 78   Temp 97.5 °F (36.4 °C)   Resp 16   Ht 5' 4" (1.626 m)   Wt 50 kg (110 lb 3.7 oz)   SpO2 100%   BMI 18.92 kg/m²   Temp (24hrs), Av.8 °F (37.1 °C), Min:97.5 °F (36.4 °C), Max:99.8 °F (37.7 °C)        Intake/Output Summary (Last 24 hours) at 2023 0728  Last data filed at 2023 1430  Gross per 24 hour   Intake 780 ml   Output 600 ml   Net 180 ml       I/O last 24 hours:   In: 780 [P.O.:780]  Out: 600 [Urine:600]      Current Weight: Weight - Scale: 50 kg (110 lb 3.7 oz)  First Weight: Weight - Scale: 52.9 kg (116 lb 10 oz)  Physical Exam  Vitals and nursing note reviewed. Constitutional:       General: She is not in acute distress. Appearance: Normal appearance. She is normal weight. She is not ill-appearing, toxic-appearing or diaphoretic. HENT:      Head: Normocephalic and atraumatic. Mouth/Throat:      Pharynx: No oropharyngeal exudate. Eyes:      General: No scleral icterus. Conjunctiva/sclera: Conjunctivae normal.   Cardiovascular:      Rate and Rhythm: Normal rate. Heart sounds: No friction rub. Pulmonary:      Effort: No respiratory distress. Breath sounds: Normal breath sounds. No stridor. No wheezing. Abdominal:      General: There is no distension. Palpations: Abdomen is soft. Tenderness: There is no abdominal tenderness. Musculoskeletal:         General: No swelling. Cervical back: Normal range of motion. No rigidity. Skin:     Coloration: Skin is not jaundiced. Neurological:      General: No focal deficit present. Mental Status: She is alert. Mental status is at baseline. She is disoriented. Psychiatric:         Mood and Affect: Mood normal.         Behavior: Behavior normal.             Review of Systems   Constitutional: Negative for fatigue. Respiratory: Negative for cough and shortness of breath. Cardiovascular: Negative for leg swelling. Gastrointestinal: Negative for constipation. Genitourinary: Negative for dysuria. Musculoskeletal: Negative for back pain. Neurological: Negative for headaches. Psychiatric/Behavioral: Negative for agitation. All other systems reviewed and are negative.       Scheduled Meds:  Current Facility-Administered Medications   Medication Dose Route Frequency Provider Last Rate   • AMILoride  5 mg Oral BID Alejandrina Rothman MD     • divalproex sodium  1,000 mg Oral Q12H 199 [facility] CHON     • mirtazapine  15 mg Oral HS Mirza Arciniega PA-C     • OLANZapine  5 mg Oral Daily Pratima Barber MD     • OLANZapine  2.5 mg Intramuscular Q3H PRN Pratima Barber MD     • propranolol  10 mg Oral TID Mirza Arciniega PA-C         PRN Meds:.•  OLANZapine    Continuous Infusions:       Invasive Devices: Invasive Devices     Peripheral Intravenous Line  Duration           Peripheral IV 09/07/23 Left Antecubital <1 day                  LABORATORY:    Results from last 7 days   Lab Units 09/08/23  0608 09/07/23  0500 09/06/23  1114 09/05/23  1757 09/05/23  0557 09/04/23  0857 09/03/23  1956 09/02/23  0842 09/02/23  0512   WBC Thousand/uL  --   --   --   --   --   --   --   --  12.93*   HEMOGLOBIN g/dL  --   --   --   --   --   --   --   --  14.5   HEMATOCRIT %  --   --   --   --   --   --   --   --  45.9   PLATELETS Thousands/uL  --   --   --   --   --   --   --   --  130*   POTASSIUM mmol/L 4.8 5.0 3.4* 3.3* 4.3 3.5 3.2*   < >  --    CHLORIDE mmol/L 105 108 107 110* 115* 108 110*   < >  --    CO2 mmol/L 31 30 33* 32 33* 35* 36*   < >  --    BUN mg/dL 13 5 5 6 7 5 6   < >  --    CREATININE mg/dL 0.52* 0.48* 0.37* 0.33* 0.37* 0.45* 0.47*   < >  --    CALCIUM mg/dL 9.0 9.5 8.8 8.7 9.4 9.0 9.2   < >  --    MAGNESIUM mg/dL  --   --   --   --   --   --   --   --  2.6   PHOSPHORUS mg/dL  --  2.7  --   --   --   --   --   --  3.3    < > = values in this interval not displayed. rest all reviewed    RADIOLOGY:  CT head wo contrast   Final Result by Brandt Peabody, MD (09/06 1911)      No acute intracranial abnormality. Workstation performed: [id number]         CT abdomen pelvis with contrast   Final Result by Penny Oakes MD (08/30 1784)      1. Top-normal fluid-filled loops of hyperemic small bowel in the right lower quadrant, which could represent a nonspecific enteritis in the appropriate clinical context. 2.  Nonobstructing 2 mm calculus in the interpolar right kidney.       The study was marked in EPIC for significant notification. Workstation performed: [id number]         CT head without contrast   Final Result by Tyrel Villalta MD (08/30 1433)      No acute intracranial abnormality. Workstation performed: [id number]           Rest all reviewed    Portions of the record may have been created with voice recognition software. Occasional wrong word or "sound a like" substitutions may have occurred due to the inherent limitations of voice recognition software. Read the chart carefully and recognize, using context, where substitutions have occurred. If you have any questions, please contact the dictating provider. Dr. Mckee

## 2023-09-07 NOTE — PLAN OF CARE
Problem: Potential for Falls  Goal: Patient will remain free of falls  Description: INTERVENTIONS:  - Educate patient/family on patient safety including physical limitations  - Instruct patient to call for assistance with activity   - Consult OT/PT to assist with strengthening/mobility   - Keep Call bell within reach  - Keep bed low and locked with side rails adjusted as appropriate  - Keep care items and personal belongings within reach  - Initiate and maintain comfort rounds  - Apply yellow socks and bracelet for high fall risk patients  - Consider moving patient to room near nurses station  Outcome: Progressing     Problem: MOBILITY - ADULT  Goal: Maintain or return to baseline ADL function  Description: INTERVENTIONS:  - Educate patient/family on patient safety including physical limitations  - Instruct patient to call for assistance with activity   - Consult OT/PT to assist with strengthening/mobility   - Keep Call bell within reach  - Keep bed low and locked with side rails adjusted as appropriate  - Keep care items and personal belongings within reach  - Initiate and maintain comfort rounds  - Apply yellow socks and bracelet for high fall risk patients  - Consider moving patient to room near nurses station  Outcome: Progressing  Goal: Maintains/Returns to pre admission functional level  Description: INTERVENTIONS:  - Perform BMAT or MOVE assessment daily.   - Set and communicate daily mobility goal to care team and patient/family/caregiver. - Collaborate with rehabilitation services on mobility goals if consulted  - Perform Range of Motion 3 times a day. - Reposition patient every 2 hours.   - Dangle patient 3 times a day  - Stand patient 3 times a day  - Ambulate patient 3 times a day  - Out of bed to chair 3 times a day   - Out of bed for meals 3 times a day  - Out of bed for toileting  - Record patient progress and toleration of activity level   Outcome: Progressing     Problem: NEUROSENSORY - ADULT  Goal: Achieves maximal functionality and self care  Description: INTERVENTIONS  - Monitor swallowing and airway patency with patient fatigue and changes in neurological status  - Encourage and assist patient to increase activity and self care.    - Encourage visually impaired, hearing impaired and aphasic patients to use assistive/communication devices  Outcome: Progressing  Goal: Achieves stable or improved neurological status  Description: INTERVENTIONS  - Monitor and report changes in neurological status  - Monitor vital signs such as temperature, blood pressure, glucose, and any other labs ordered   - Initiate measures to prevent increased intracranial pressure  - Monitor for seizure activity and implement precautions if appropriate      Outcome: Progressing     Problem: CARDIOVASCULAR - ADULT  Goal: Maintains optimal cardiac output and hemodynamic stability  Description: INTERVENTIONS:  - Monitor I/O, vital signs and rhythm  - Monitor for S/S and trends of decreased cardiac output  - Administer and titrate ordered vasoactive medications to optimize hemodynamic stability  - Assess quality of pulses, skin color and temperature  - Assess for signs of decreased coronary artery perfusion  - Instruct patient to report change in severity of symptoms  Outcome: Progressing  Goal: Absence of cardiac dysrhythmias or at baseline rhythm  Description: INTERVENTIONS:  - Continuous cardiac monitoring, vital signs, obtain 12 lead EKG if ordered  - Administer antiarrhythmic and heart rate control medications as ordered  - Monitor electrolytes and administer replacement therapy as ordered  Outcome: Progressing     Problem: GASTROINTESTINAL - ADULT  Goal: Maintains or returns to baseline bowel function  Description: INTERVENTIONS:  - Assess bowel function  - Encourage oral fluids to ensure adequate hydration  - Administer IV fluids if ordered to ensure adequate hydration  - Administer ordered medications as needed  - Encourage mobilization and activity  - Consider nutritional services referral to assist patient with adequate nutrition and appropriate food choices  Outcome: Progressing  Goal: Maintains adequate nutritional intake  Description: INTERVENTIONS:  - Monitor percentage of each meal consumed  - Identify factors contributing to decreased intake, treat as appropriate  - Assist with meals as needed  - Monitor I&O, weight, and lab values if indicated  - Obtain nutrition services referral as needed  Outcome: Progressing     Problem: GENITOURINARY - ADULT  Goal: Maintains or returns to baseline urinary function  Description: INTERVENTIONS:  - Assess urinary function  - Encourage oral fluids to ensure adequate hydration if ordered  - Administer IV fluids as ordered to ensure adequate hydration  - Administer ordered medications as needed  - Offer frequent toileting  - Follow urinary retention protocol if ordered  Outcome: Progressing     Problem: METABOLIC, FLUID AND ELECTROLYTES - ADULT  Goal: Electrolytes maintained within normal limits  Description: INTERVENTIONS:  - Monitor labs and assess patient for signs and symptoms of electrolyte imbalances  - Administer electrolyte replacement as ordered  - Monitor response to electrolyte replacements, including repeat lab results as appropriate  - Instruct patient on fluid and nutrition as appropriate  Outcome: Progressing  Goal: Fluid balance maintained  Description: INTERVENTIONS:  - Monitor labs   - Monitor I/O and WT  - Instruct patient on fluid and nutrition as appropriate  - Assess for signs & symptoms of volume excess or deficit  Outcome: Progressing     Problem: SKIN/TISSUE INTEGRITY - ADULT  Goal: Skin Integrity remains intact(Skin Breakdown Prevention)  Description: Assess:  -Perform [place] assessment every q 12 hr  -Clean and moisturize skin every day  -Inspect skin when repositioning, toileting, and assisting with ADLS  -Assess under medical devices such   -Assess extremities for adequate circulation and sensation     Bed Management:  -Have minimal linens on bed & keep smooth, unwrinkled  -Change linens as needed when moist or perspiring      Toileting:  -Offer bedside commode      Activity:  -Mobilize patient 3 times a day  -Encourage activity and walks on unit  -Encourage or provide ROM exercises       Skin Care:  -Avoid use of baby powder, tape, friction and shearing, hot water or constrictive clothing  -Relieve pressure over bony prominences  -Do not massage red bony areas    Next Steps:  -Teach patient strategies to minimize risks     Outcome: Progressing     Problem: MUSCULOSKELETAL - ADULT  Goal: Maintain or return mobility to safest level of function  Description: INTERVENTIONS:  - Assess patient's ability to carry out ADLs; assess patient's baseline for ADL function and identify physical deficits which impact ability to perform ADLs (bathing, care of mouth/teeth, toileting, grooming, dressing, etc.)  - Assess/evaluate cause of self-care deficits   - Assess range of motion  - Assess patient's mobility  - Assess patient's need for assistive devices and provide as appropriate  - Encourage maximum independence but intervene and supervise when necessary  - Involve family in performance of ADLs  - Assess for home care needs following discharge   - Consider OT consult to assist with ADL evaluation and planning for discharge  - Provide patient education as appropriate  Outcome: Progressing     Problem: PAIN - ADULT  Goal: Verbalizes/displays adequate comfort level or baseline comfort level  Description: Interventions:  - Encourage patient to monitor pain and request assistance  - Assess pain using appropriate pain scale  - Administer analgesics based on type and severity of pain and evaluate response  - Implement non-pharmacological measures as appropriate and evaluate response  - Consider cultural and social influences on pain and pain management  - Notify physician/advanced practitioner if interventions unsuccessful or patient reports new pain  Outcome: Progressing     Problem: SAFETY ADULT  Goal: Patient will remain free of falls  Description: INTERVENTIONS:  - Educate patient/family on patient safety including physical limitations  - Instruct patient to call for assistance with activity   - Consult OT/PT to assist with strengthening/mobility   - Keep Call bell within reach  - Keep bed low and locked with side rails adjusted as appropriate  - Keep care items and personal belongings within reach  - Initiate and maintain comfort rounds  - Apply yellow socks and bracelet for high fall risk patients  - Consider moving patient to room near nurses station  Outcome: Progressing  Goal: Maintain or return to baseline ADL function  Description: INTERVENTIONS:  - Educate patient/family on patient safety including physical limitations  - Instruct patient to call for assistance with activity   - Consult OT/PT to assist with strengthening/mobility   - Keep Call bell within reach  - Keep bed low and locked with side rails adjusted as appropriate  - Keep care items and personal belongings within reach  - Initiate and maintain comfort rounds  - Apply yellow socks and bracelet for high fall risk patients  - Consider moving patient to room near nurses station  Outcome: Progressing  Goal: Maintains/Returns to pre admission functional level  Description: INTERVENTIONS:  - Perform BMAT or MOVE assessment daily.   - Set and communicate daily mobility goal to care team and patient/family/caregiver. - Collaborate with rehabilitation services on mobility goals if consulted  - Perform Range of Motion 3 times a day. - Reposition patient every 2 hours.   - Dangle patient 3 times a day  - Stand patient 3 times a day  - Ambulate patient 3 times a day  - Out of bed to chair 3 times a day   - Out of bed for meals 3 times a day  - Out of bed for toileting  - Record patient progress and toleration of activity level   Outcome: Progressing     Problem: DISCHARGE PLANNING  Goal: Discharge to home or other facility with appropriate resources  Description: INTERVENTIONS:  - Identify barriers to discharge w/patient and caregiver  - Arrange for needed discharge resources and transportation as appropriate  - Identify discharge learning needs (meds, wound care, etc.)  - Arrange for interpretive services to assist at discharge as needed  - Refer to Case Management Department for coordinating discharge planning if the patient needs post-hospital services based on physician/advanced practitioner order or complex needs related to functional status, cognitive ability, or social support system  Outcome: Progressing     Problem: Knowledge Deficit  Goal: Patient/family/caregiver demonstrates understanding of disease process, treatment plan, medications, and discharge instructions  Description: Complete learning assessment and assess knowledge base.   Interventions:  - Provide teaching at level of understanding  - Provide teaching via preferred learning methods  Outcome: Progressing     Problem: SAFETY,RESTRAINT: NV/NON-SELF DESTRUCTIVE BEHAVIOR  Goal: Remains free of harm/injury (restraint for non violent/non self-detsructive behavior)  Description: INTERVENTIONS:  - Instruct patient/family regarding restraint use   - Assess and monitor physiologic and psychological status   - Provide interventions and comfort measures to meet assessed patient needs   - Identify and implement measures to help patient regain control  - Assess readiness for release of restraint   Outcome: Progressing  Goal: Returns to optimal restraint-free functioning  Description: INTERVENTIONS:  - Assess the patient's behavior and symptoms that indicate continued need for restraint  - Identify and implement measures to help patient regain control  - Assess readiness for release of restraint   Outcome: Progressing     Problem: Prexisting or High Potential for Compromised Skin Integrity  Goal: Skin integrity is maintained or improved  Description: INTERVENTIONS:  - Identify patients at risk for skin breakdown  - Assess and monitor skin integrity  - Assess and monitor nutrition and hydration status  - Monitor labs   - Assess for incontinence   - Turn and reposition patient  - Assist with mobility/ambulation  - Relieve pressure over bony prominences  - Avoid friction and shearing  - Provide appropriate hygiene as needed including keeping skin clean and dry  - Evaluate need for skin moisturizer/barrier cream  - Collaborate with interdisciplinary team   - Patient/family teaching  - Consider wound care consult   Outcome: Progressing     Problem: Nutrition/Hydration-ADULT  Goal: Nutrient/Hydration intake appropriate for improving, restoring or maintaining nutritional needs  Description: Monitor and assess patient's nutrition/hydration status for malnutrition. Collaborate with interdisciplinary team and initiate plan and interventions as ordered. Monitor patient's weight and dietary intake as ordered or per policy. Utilize nutrition screening tool and intervene as necessary. Determine patient's food preferences and provide high-protein, high-caloric foods as appropriate.      INTERVENTIONS:  - Monitor oral intake, urinary output, labs, and treatment plans  - Assess nutrition and hydration status and recommend course of action  - Evaluate amount of meals eaten  - Assist patient with eating if necessary   - Allow adequate time for meals  - Recommend/ encourage appropriate diets, oral nutritional supplements, and vitamin/mineral supplements  - Order, calculate, and assess calorie counts as needed  - Recommend, monitor, and adjust tube feedings and TPN/PPN based on assessed needs  - Assess need for intravenous fluids  - Provide specific nutrition/hydration education as appropriate  - Include patient/family/caregiver in decisions related to nutrition  Outcome: Progressing

## 2023-09-07 NOTE — CASE MANAGEMENT
Case Management Discharge Planning Note    Patient name August Fierro  Location [place] 2 /[place] 2 Dennis Felix* MRN [id number]  : 1990 Date 2023       Current Admission Date: 2023  Current Admission Diagnosis:Acute encephalopathy 2/2 lithium toxicity   Patient Active Problem List    Diagnosis Date Noted   • Hypernatremia 2023   • Acute encephalopathy 2/2 lithium toxicity 2023   • Dysuria 2023   • Vaginal lesion 2023   • Medical clearance for psychiatric admission 2023   • Unspecified mood (affective) disorder ([address]) 2023   • Psychosis ([address]) 2023   • Hyperlipidemia, mixed 2022   • Impaired ability to use community resources due to language barrier 2022   • Family history of cardiovascular disorder 2022   • Intellectual disability 10/25/2021   • Chronic schizoaffective disorder ([address]) 10/25/2021      LOS (days): 8  Geometric Mean LOS (GMLOS) (days):   Days to GMLOS:     OBJECTIVE:  Risk of Unplanned Readmission Score: 18.51         Current admission status: Inpatient   Preferred Pharmacy:   [address] [place] [id number]  Phone: [phone] Fax: [phone] - [place], [address] [place] [address]  Phone: [phone] Fax: [phone]    Primary Care Provider: No primary care provider on file. Primary Insurance: [facility]  Secondary Insurance:     DISCHARGE DETAILS:     Additional Comments: Patient reviewed during care coordination rounds with Dr. Ivana Bosworth who informed that pt's discharge is pending Nephrology clearance, possibly another 24 hours. Plan remains with return home with parents as caregivers and outpatient follow up. CM department to remain available.

## 2023-09-07 NOTE — UTILIZATION REVIEW
Continued Stay Review    Date: 9/7                        Current Patient Class: IP  Current Level of Care: MS    HPI:33 y.o. female initially admitted on 8/30 acute encephalopathy d/t Lithium toxicity    Assessment/Plan:   No further dialysis treatments. Lithium levels improved and remains off Lithium at this time. Has not used Zyprexa since 9/6. Off IV fluids. Sodium WNL. Had elevated creat today. Started on BID Amiloride 5 mg 9/6. No further K repletion. Had some on 9/6 d/t start of Amiloride. No deficits on exam today.      Vital Signs:   09/07/23 09:43:34 -- 84 -- 114/81 92 -- -- --   09/07/23 08:49:25 -- 82 19 91/52 65 -- -- --   09/06/23 21:08:52 --  -- -- 107/69 82 -- -- Sitting   09/06/23 15:00:43 98.6 °F (37 °C) 76 18 102/66 78 -- -- Sitting   09/06/23 1200 96.9 °F (36.1 °C) Abnormal  87 16 99/60 73 -- -- Lying   09/06/23 07:14:21 97.7 °F (36.5 °C) 67 16 97/68 78 98 % -- Lying   09/05/23 2000 --  84 -- 108/73 85 100 % -- --   09/05/23 15:45:49 99.1 °F (37.3 °C) -- -- 105/72 83 -- -- --   09/05/23 15:42:31 99.1 °F (37.3 °C) -- 17 105/72 83 -- -- --   09/05/23 1100 -- 110 Abnormal  -- 116/76 89 99 % -- --   09/05/23 10:56:38 97.5 °F (36.4 °C) 103 14 116/76 89 99 % -- --   09/05/23 07:15:07 97.8 °F (36.6 °C) -- -- 97/69 78 -- -- --   09/05/23 07:13:53 97.8 °F (36.6 °C) -- 21 149/127 Abnormal  134 -- -- --   09/05/23 05:54:08 -- -- -- 112/73 86 -- -- --     Pertinent Labs/Diagnostic Results:       Results from last 7 days   Lab Units 09/02/23  0512 09/01/23  0530   WBC Thousand/uL 12.93* 11.21*   HEMOGLOBIN g/dL 14.5 14.4   HEMATOCRIT % 45.9 45.8   PLATELETS Thousands/uL 130* 145*   NEUTROS ABS Thousands/µL 9.11* 8.93*         Results from last 7 days   Lab Units 09/07/23  0500 09/06/23  1114 09/05/23  1757 09/05/23  0557 09/04/23  0857 09/02/23  0842 09/02/23  0512 09/01/23  0619 09/01/23  0530   SODIUM mmol/L 144 145 146 152* 147   < >  --    < > 161*   POTASSIUM mmol/L 5.0 3.4* 3.3* 4.3 3.5   < >  -- < > 3.9   CHLORIDE mmol/L 108 107 110* 115* 108   < >  --    < > 124*   CO2 mmol/L 30 33* 32 33* 35*   < >  --    < > 34*   ANION GAP mmol/L 6 5 4 4 4   < >  --    < > 3   BUN mg/dL 5 5 6 7 5   < >  --    < > 4*   CREATININE mg/dL 0.48* 0.37* 0.33* 0.37* 0.45*   < >  --    < > 0.45*   EGFR ml/min/1.73sq m 128 139 145 139 131   < >  --    < > 131   CALCIUM mg/dL 9.5 8.8 8.7 9.4 9.0   < >  --    < > 10.3*   MAGNESIUM mg/dL  --   --   --   --   --   --  2.6  --  3.1*   PHOSPHORUS mg/dL 2.7  --   --   --   --   --  3.3  --  2.1*    < > = values in this interval not displayed.      Results from last 7 days   Lab Units 09/01/23  0530   AST U/L 14   ALT U/L 14   ALK PHOS U/L 114*   TOTAL PROTEIN g/dL 5.9*   ALBUMIN g/dL 3.5   TOTAL BILIRUBIN mg/dL 0.29         Results from last 7 days   Lab Units 09/07/23  0500 09/06/23  1114 09/05/23  1757 09/05/23  0557 09/04/23  0857 09/03/23  1956 09/03/23  0910 09/03/23  0340 09/02/23  2123 09/02/23  1636 09/02/23  0842 09/01/23  2350   GLUCOSE RANDOM mg/dL 84 121 95 87 106 135 150* 102 91 421* 229* 105     Results from last 7 days   Lab Units 09/06/23  1114   OSMOLALITY, SERUM mmol/*         Results from last 7 days   Lab Units 09/07/23  0046 09/06/23  1114 09/05/23  2146 09/05/23  1306 09/01/23  1657   OSMOLALITY, SERUM mmol/KG  --  304*  --   --   --    OSMO UR mmol/*  --  93* 85* 103*     Results from last 7 days   Lab Units 09/05/23  1306 09/01/23  1657   SODIUM UR  16 20     Medications:   Scheduled Medications:  AMILoride, 5 mg, Oral, BID  chlorhexidine, 15 mL, Mouth/Throat, Q12H COLT  divalproex sodium, 1,000 mg, Oral, Q12H COLT  mirtazapine, 15 mg, Oral, HS  OLANZapine, 5 mg, Oral, Daily  propranolol, 10 mg, Oral, TID      Continuous IV Infusions:     PRN Meds:  OLANZapine, 2.5 mg, Intramuscular, Q3H PRN    Discharge Plan: home     Network Utilization Review Department  ATTENTION: Please call with any questions or concerns to [phone] and carefully listen to the prompts so that you are directed to the right person. All voicemails are confidential.  Nataly Alma all requests for admission clinical reviews, approved or denied determinations and any other requests to dedicated fax number below belonging to the campus where the patient is receiving treatment.  List of dedicated fax numbers for the Facilities:  [place] DENIALS (Administrative/Medical Necessity) [phone] [address] [facility] (Maternity/NICU/Pediatrics) [phone]   [address] [place] [address] Drive [phone]   [facility] [address] [facility] [phone]   [facility] [id number] [place] [address] [phone]   [address] [facility] [address] [facility] [address] [place] [address] Nn [phone]

## 2023-09-07 NOTE — ASSESSMENT & PLAN NOTE
· Secondary to lithium induced diabetes insipidus  · Nephrology follow-up appreciated  · Sodium improved with amiloride trial, plan to continue amiloride 5mg bid  · Monitor BMP

## 2023-09-08 ENCOUNTER — TELEPHONE (OUTPATIENT)
Dept: NEPHROLOGY | Facility: CLINIC | Age: 33
End: 2023-09-08

## 2023-09-08 VITALS
HEIGHT: 64 IN | HEART RATE: 78 BPM | DIASTOLIC BLOOD PRESSURE: 78 MMHG | WEIGHT: 110.23 LBS | TEMPERATURE: 97.5 F | RESPIRATION RATE: 16 BRPM | BODY MASS INDEX: 18.82 KG/M2 | SYSTOLIC BLOOD PRESSURE: 112 MMHG | OXYGEN SATURATION: 100 %

## 2023-09-08 DIAGNOSIS — E87.0 HYPERNATREMIA: Primary | ICD-10-CM

## 2023-09-08 LAB
ANION GAP SERPL CALCULATED.3IONS-SCNC: 4 MMOL/L
BUN SERPL-MCNC: 13 MG/DL (ref 5–25)
CALCIUM SERPL-MCNC: 9 MG/DL (ref 8.4–10.2)
CHLORIDE SERPL-SCNC: 105 MMOL/L (ref 96–108)
CO2 SERPL-SCNC: 31 MMOL/L (ref 21–32)
CREAT SERPL-MCNC: 0.52 MG/DL (ref 0.6–1.3)
GFR SERPL CREATININE-BSD FRML MDRD: 125 ML/MIN/1.73SQ M
GLUCOSE SERPL-MCNC: 85 MG/DL (ref 65–140)
POTASSIUM SERPL-SCNC: 4.8 MMOL/L (ref 3.5–5.3)
SODIUM SERPL-SCNC: 140 MMOL/L (ref 135–147)

## 2023-09-08 PROCEDURE — 80048 BASIC METABOLIC PNL TOTAL CA: CPT | Performed by: INTERNAL MEDICINE

## 2023-09-08 PROCEDURE — 99239 HOSP IP/OBS DSCHRG MGMT >30: CPT | Performed by: INTERNAL MEDICINE

## 2023-09-08 PROCEDURE — 99232 SBSQ HOSP IP/OBS MODERATE 35: CPT | Performed by: INTERNAL MEDICINE

## 2023-09-08 RX ORDER — DIVALPROEX SODIUM 500 MG/1
1000 TABLET, DELAYED RELEASE ORAL EVERY 12 HOURS SCHEDULED
Qty: 120 TABLET | Refills: 0 | Status: SHIPPED | OUTPATIENT
Start: 2023-09-08 | End: 2023-09-08 | Stop reason: SDUPTHER

## 2023-09-08 RX ORDER — AMILORIDE HYDROCHLORIDE 5 MG/1
5 TABLET ORAL 2 TIMES DAILY
Qty: 60 TABLET | Refills: 0 | Status: SHIPPED | OUTPATIENT
Start: 2023-09-08

## 2023-09-08 RX ORDER — DIVALPROEX SODIUM 500 MG/1
1000 TABLET, DELAYED RELEASE ORAL EVERY 12 HOURS SCHEDULED
Qty: 120 TABLET | Refills: 0 | Status: SHIPPED | OUTPATIENT
Start: 2023-09-08 | End: 2023-11-07

## 2023-09-08 RX ORDER — OLANZAPINE 5 MG/1
5 TABLET, ORALLY DISINTEGRATING ORAL DAILY
Qty: 30 TABLET | Refills: 0 | Status: SHIPPED | OUTPATIENT
Start: 2023-09-09 | End: 2023-09-08

## 2023-09-08 RX ORDER — AMILORIDE HYDROCHLORIDE 5 MG/1
5 TABLET ORAL 2 TIMES DAILY
Qty: 60 TABLET | Refills: 0 | Status: SHIPPED | OUTPATIENT
Start: 2023-09-08 | End: 2023-09-08 | Stop reason: SDUPTHER

## 2023-09-08 RX ORDER — OLANZAPINE 5 MG/1
5 TABLET ORAL
Qty: 30 TABLET | Refills: 0 | Status: SHIPPED | OUTPATIENT
Start: 2023-09-08

## 2023-09-08 RX ORDER — OLANZAPINE 5 MG/1
5 TABLET, ORALLY DISINTEGRATING ORAL DAILY
Qty: 30 TABLET | Refills: 0 | Status: SHIPPED | OUTPATIENT
Start: 2023-09-09 | End: 2023-09-08 | Stop reason: SDUPTHER

## 2023-09-08 RX ADMIN — OLANZAPINE 5 MG: 5 TABLET, ORALLY DISINTEGRATING ORAL at 08:42

## 2023-09-08 RX ADMIN — DIVALPROEX SODIUM 1000 MG: 125 CAPSULE, COATED PELLETS ORAL at 08:42

## 2023-09-08 RX ADMIN — AMILORIDE HYDROCLORIDE 5 MG: 5 TABLET ORAL at 08:42

## 2023-09-08 RX ADMIN — PROPRANOLOL HYDROCHLORIDE 10 MG: 10 TABLET ORAL at 08:42

## 2023-09-08 NOTE — PLAN OF CARE
Problem: Potential for Falls  Goal: Patient will remain free of falls  Description: INTERVENTIONS:  - Educate patient/family on patient safety including physical limitations  - Instruct patient to call for assistance with activity   - Consult OT/PT to assist with strengthening/mobility   - Keep Call bell within reach  - Keep bed low and locked with side rails adjusted as appropriate  - Keep care items and personal belongings within reach  - Initiate and maintain comfort rounds  - Apply yellow socks and bracelet for high fall risk patients  - Consider moving patient to room near nurses station  Outcome: Progressing     Problem: MOBILITY - ADULT  Goal: Maintain or return to baseline ADL function  Description: INTERVENTIONS:  - Educate patient/family on patient safety including physical limitations  - Instruct patient to call for assistance with activity   - Consult OT/PT to assist with strengthening/mobility   - Keep Call bell within reach  - Keep bed low and locked with side rails adjusted as appropriate  - Keep care items and personal belongings within reach  - Initiate and maintain comfort rounds  - Apply yellow socks and bracelet for high fall risk patients  - Consider moving patient to room near nurses station  Outcome: Progressing  Goal: Maintains/Returns to pre admission functional level  Description: INTERVENTIONS:  - Perform BMAT or MOVE assessment daily.   - Set and communicate daily mobility goal to care team and patient/family/caregiver. - Collaborate with rehabilitation services on mobility goals if consulted  - Perform Range of Motion 3 times a day. - Reposition patient every 2 hours.   - Dangle patient 3 times a day  - Stand patient 3 times a day  - Ambulate patient 3 times a day  - Out of bed to chair 3 times a day   - Out of bed for meals 3 times a day  - Out of bed for toileting  - Record patient progress and toleration of activity level   Outcome: Progressing     Problem: NEUROSENSORY - ADULT  Goal: Achieves maximal functionality and self care  Description: INTERVENTIONS  - Monitor swallowing and airway patency with patient fatigue and changes in neurological status  - Encourage and assist patient to increase activity and self care.    - Encourage visually impaired, hearing impaired and aphasic patients to use assistive/communication devices  Outcome: Progressing  Goal: Achieves stable or improved neurological status  Description: INTERVENTIONS  - Monitor and report changes in neurological status  - Monitor vital signs such as temperature, blood pressure, glucose, and any other labs ordered   - Initiate measures to prevent increased intracranial pressure  - Monitor for seizure activity and implement precautions if appropriate      Outcome: Progressing     Problem: CARDIOVASCULAR - ADULT  Goal: Maintains optimal cardiac output and hemodynamic stability  Description: INTERVENTIONS:  - Monitor I/O, vital signs and rhythm  - Monitor for S/S and trends of decreased cardiac output  - Administer and titrate ordered vasoactive medications to optimize hemodynamic stability  - Assess quality of pulses, skin color and temperature  - Assess for signs of decreased coronary artery perfusion  - Instruct patient to report change in severity of symptoms  Outcome: Progressing  Goal: Absence of cardiac dysrhythmias or at baseline rhythm  Description: INTERVENTIONS:  - Continuous cardiac monitoring, vital signs, obtain 12 lead EKG if ordered  - Administer antiarrhythmic and heart rate control medications as ordered  - Monitor electrolytes and administer replacement therapy as ordered  Outcome: Progressing     Problem: CARDIOVASCULAR - ADULT  Goal: Maintains optimal cardiac output and hemodynamic stability  Description: INTERVENTIONS:  - Monitor I/O, vital signs and rhythm  - Monitor for S/S and trends of decreased cardiac output  - Administer and titrate ordered vasoactive medications to optimize hemodynamic stability  - Assess quality of pulses, skin color and temperature  - Assess for signs of decreased coronary artery perfusion  - Instruct patient to report change in severity of symptoms  Outcome: Progressing  Goal: Absence of cardiac dysrhythmias or at baseline rhythm  Description: INTERVENTIONS:  - Continuous cardiac monitoring, vital signs, obtain 12 lead EKG if ordered  - Administer antiarrhythmic and heart rate control medications as ordered  - Monitor electrolytes and administer replacement therapy as ordered  Outcome: Progressing     Problem: GASTROINTESTINAL - ADULT  Goal: Maintains or returns to baseline bowel function  Description: INTERVENTIONS:  - Assess bowel function  - Encourage oral fluids to ensure adequate hydration  - Administer IV fluids if ordered to ensure adequate hydration  - Administer ordered medications as needed  - Encourage mobilization and activity  - Consider nutritional services referral to assist patient with adequate nutrition and appropriate food choices  Outcome: Progressing  Goal: Maintains adequate nutritional intake  Description: INTERVENTIONS:  - Monitor percentage of each meal consumed  - Identify factors contributing to decreased intake, treat as appropriate  - Assist with meals as needed  - Monitor I&O, weight, and lab values if indicated  - Obtain nutrition services referral as needed  Outcome: Progressing     Problem: GENITOURINARY - ADULT  Goal: Maintains or returns to baseline urinary function  Description: INTERVENTIONS:  - Assess urinary function  - Encourage oral fluids to ensure adequate hydration if ordered  - Administer IV fluids as ordered to ensure adequate hydration  - Administer ordered medications as needed  - Offer frequent toileting  - Follow urinary retention protocol if ordered  Outcome: Progressing     Problem: METABOLIC, FLUID AND ELECTROLYTES - ADULT  Goal: Electrolytes maintained within normal limits  Description: INTERVENTIONS:  - Monitor labs and assess patient for signs and symptoms of electrolyte imbalances  - Administer electrolyte replacement as ordered  - Monitor response to electrolyte replacements, including repeat lab results as appropriate  - Instruct patient on fluid and nutrition as appropriate  Outcome: Progressing  Goal: Fluid balance maintained  Description: INTERVENTIONS:  - Monitor labs   - Monitor I/O and WT  - Instruct patient on fluid and nutrition as appropriate  - Assess for signs & symptoms of volume excess or deficit  Outcome: Progressing     Problem: SKIN/TISSUE INTEGRITY - ADULT  Goal: Skin Integrity remains intact(Skin Breakdown Prevention)  Description: Assess:  -Perform [place] assessment every q 12 hr  -Clean and moisturize skin every day  -Inspect skin when repositioning, toileting, and assisting with ADLS  -Assess under medical devices such   -Assess extremities for adequate circulation and sensation     Bed Management:  -Have minimal linens on bed & keep smooth, unwrinkled  -Change linens as needed when moist or perspiring      Toileting:  -Offer bedside commode      Activity:  -Mobilize patient 3 times a day  -Encourage activity and walks on unit  -Encourage or provide ROM exercises       Skin Care:  -Avoid use of baby powder, tape, friction and shearing, hot water or constrictive clothing  -Relieve pressure over bony prominences  -Do not massage red bony areas    Next Steps:  -Teach patient strategies to minimize risks     Outcome: Progressing     Problem: MUSCULOSKELETAL - ADULT  Goal: Maintain or return mobility to safest level of function  Description: INTERVENTIONS:  - Assess patient's ability to carry out ADLs; assess patient's baseline for ADL function and identify physical deficits which impact ability to perform ADLs (bathing, care of mouth/teeth, toileting, grooming, dressing, etc.)  - Assess/evaluate cause of self-care deficits   - Assess range of motion  - Assess patient's mobility  - Assess patient's need for assistive devices and provide as appropriate  - Encourage maximum independence but intervene and supervise when necessary  - Involve family in performance of ADLs  - Assess for home care needs following discharge   - Consider OT consult to assist with ADL evaluation and planning for discharge  - Provide patient education as appropriate  Outcome: Progressing     Problem: PAIN - ADULT  Goal: Verbalizes/displays adequate comfort level or baseline comfort level  Description: Interventions:  - Encourage patient to monitor pain and request assistance  - Assess pain using appropriate pain scale  - Administer analgesics based on type and severity of pain and evaluate response  - Implement non-pharmacological measures as appropriate and evaluate response  - Consider cultural and social influences on pain and pain management  - Notify physician/advanced practitioner if interventions unsuccessful or patient reports new pain  Outcome: Progressing     Problem: SAFETY ADULT  Goal: Patient will remain free of falls  Description: INTERVENTIONS:  - Educate patient/family on patient safety including physical limitations  - Instruct patient to call for assistance with activity   - Consult OT/PT to assist with strengthening/mobility   - Keep Call bell within reach  - Keep bed low and locked with side rails adjusted as appropriate  - Keep care items and personal belongings within reach  - Initiate and maintain comfort rounds  - Apply yellow socks and bracelet for high fall risk patients  - Consider moving patient to room near nurses station  Outcome: Progressing  Goal: Maintain or return to baseline ADL function  Description: INTERVENTIONS:  - Educate patient/family on patient safety including physical limitations  - Instruct patient to call for assistance with activity   - Consult OT/PT to assist with strengthening/mobility   - Keep Call bell within reach  - Keep bed low and locked with side rails adjusted as appropriate  - Keep care items and personal belongings within reach  - Initiate and maintain comfort rounds  - Apply yellow socks and bracelet for high fall risk patients  - Consider moving patient to room near nurses station  Outcome: Progressing  Goal: Maintains/Returns to pre admission functional level  Description: INTERVENTIONS:  - Perform BMAT or MOVE assessment daily.   - Set and communicate daily mobility goal to care team and patient/family/caregiver. - Collaborate with rehabilitation services on mobility goals if consulted  - Perform Range of Motion 3 times a day. - Reposition patient every 2 hours. - Dangle patient 3 times a day  - Stand patient 3 times a day  - Ambulate patient 3 times a day  - Out of bed to chair 3 times a day   - Out of bed for meals 3 times a day  - Out of bed for toileting  - Record patient progress and toleration of activity level   Outcome: Progressing     Problem: DISCHARGE PLANNING  Goal: Discharge to home or other facility with appropriate resources  Description: INTERVENTIONS:  - Identify barriers to discharge w/patient and caregiver  - Arrange for needed discharge resources and transportation as appropriate  - Identify discharge learning needs (meds, wound care, etc.)  - Arrange for interpretive services to assist at discharge as needed  - Refer to Case Management Department for coordinating discharge planning if the patient needs post-hospital services based on physician/advanced practitioner order or complex needs related to functional status, cognitive ability, or social support system  Outcome: Progressing     Problem: Knowledge Deficit  Goal: Patient/family/caregiver demonstrates understanding of disease process, treatment plan, medications, and discharge instructions  Description: Complete learning assessment and assess knowledge base.   Interventions:  - Provide teaching at level of understanding  - Provide teaching via preferred learning methods  Outcome: Progressing     Problem: SAFETY,RESTRAINT: NV/NON-SELF DESTRUCTIVE BEHAVIOR  Goal: Remains free of harm/injury (restraint for non violent/non self-detsructive behavior)  Description: INTERVENTIONS:  - Instruct patient/family regarding restraint use   - Assess and monitor physiologic and psychological status   - Provide interventions and comfort measures to meet assessed patient needs   - Identify and implement measures to help patient regain control  - Assess readiness for release of restraint   Outcome: Progressing  Goal: Returns to optimal restraint-free functioning  Description: INTERVENTIONS:  - Assess the patient's behavior and symptoms that indicate continued need for restraint  - Identify and implement measures to help patient regain control  - Assess readiness for release of restraint   Outcome: Progressing     Problem: Prexisting or High Potential for Compromised Skin Integrity  Goal: Skin integrity is maintained or improved  Description: INTERVENTIONS:  - Identify patients at risk for skin breakdown  - Assess and monitor skin integrity  - Assess and monitor nutrition and hydration status  - Monitor labs   - Assess for incontinence   - Turn and reposition patient  - Assist with mobility/ambulation  - Relieve pressure over bony prominences  - Avoid friction and shearing  - Provide appropriate hygiene as needed including keeping skin clean and dry  - Evaluate need for skin moisturizer/barrier cream  - Collaborate with interdisciplinary team   - Patient/family teaching  - Consider wound care consult   Outcome: Progressing     Problem: Nutrition/Hydration-ADULT  Goal: Nutrient/Hydration intake appropriate for improving, restoring or maintaining nutritional needs  Description: Monitor and assess patient's nutrition/hydration status for malnutrition. Collaborate with interdisciplinary team and initiate plan and interventions as ordered. Monitor patient's weight and dietary intake as ordered or per policy.  Utilize nutrition screening tool and intervene as necessary. Determine patient's food preferences and provide high-protein, high-caloric foods as appropriate.      INTERVENTIONS:  - Monitor oral intake, urinary output, labs, and treatment plans  - Assess nutrition and hydration status and recommend course of action  - Evaluate amount of meals eaten  - Assist patient with eating if necessary   - Allow adequate time for meals  - Recommend/ encourage appropriate diets, oral nutritional supplements, and vitamin/mineral supplements  - Order, calculate, and assess calorie counts as needed  - Recommend, monitor, and adjust tube feedings and TPN/PPN based on assessed needs  - Assess need for intravenous fluids  - Provide specific nutrition/hydration education as appropriate  - Include patient/family/caregiver in decisions related to nutrition  Outcome: Progressing

## 2023-09-08 NOTE — TELEPHONE ENCOUNTER
----- Message from Mary Veliz MD sent at 9/8/2023  7:30 AM EDT -----  Regarding: hosp dc  Please schedule the patient for hospital discharge follow-up for nephrogenic diabetes insipidus to be seen in 4weeks. Please send the patient orders for renal function panel on 9/12/2023 and then monthly until seen in the office. Patient usually follows up with no nephrologist as an outpatient.     Thanks  moses

## 2023-09-08 NOTE — DISCHARGE SUMMARY
[facility]  Discharge- Ana M Alexander 1990, 28 y.o. female MRN: [id number]  Unit/Bed#: [address] [place] [address]01 Encounter: [id number]  Primary Care Provider: No primary care provider on file. Date and time admitted to hospital: 8/30/2023 10:57 AM    * Acute encephalopathy 2/2 lithium toxicity  Assessment & Plan  · She has a history of schizoaffective disorder, intellectual disability, bipolar disorder, presented with worsening encephalopathy, lethargy for the past two weeks. · As per patient's sister, at her baseline she is generally very active, alert, able to maintain conversation, generally oriented despite having intellectual disability. · Patients family reports symptoms started approximately two weeks when Clozaril was discontinued for abnormal white blood cell count. At home dose of lithium 750 mg twice daily. · Patient underwent hemodialysis x2 last treatment on 8/31/2023  · Toxicology and nephrology consultations and recommendations appreciated  · Psychiatry input appreciated  · Lithium levels improved    Hypernatremia  Assessment & Plan  · Secondary to lithium induced diabetes insipidus  · Nephrology follow-up appreciated  · Sodium improved with amiloride trial, plan to continue amiloride 5mg bid  · Outpatient follow-up with nephrology and repeat blood work next week    Unspecified mood (affective) disorder ([address])  Assessment & Plan  · Continue home medications  · Psychiatry evaluation appreciated   · Zyprexa 5 mg daily, continue Depakote 1 g every 12 hours  · Repeat valproic acid level 65  · Outpatient follow-up with psychiatry on discharge  · Monitor off lithium    Intellectual disability  Assessment & Plan  · Patient has profound intellectual disability; lives at home with parents, is usually oriented, does walk around.   According to family patient does have shouting outburst  · Continue supportive care; monitor off lithium        Transition of Care Discharge Summary - [facility]    Patient Information: Navi Patton 28 y.o. female MRN: [id number]  Unit/Bed#: [address] Encounter: [id number]    Discharging Physician / Practitioner: Gustabo Cranker, MD  PCP: No primary care provider on file. Admission Date: 8/30/2023  Discharge Date: 09/08/23    Disposition:      Other: home      Reason for Admission: metabolic encephalopathy    Discharge Diagnoses:     Principal Problem:    Acute encephalopathy 2/2 lithium toxicity  Active Problems:    Intellectual disability    Unspecified mood (affective) disorder (HCC)    Hypernatremia  Resolved Problems:    * No resolved hospital problems. *      Consultations During Hospital Stay:  · IP CONSULT TO TOXICOLOGY  · IP CONSULT TO NEPHROLOGY  · IP CONSULT TO CASE MANAGEMENT  · IP CONSULT TO PSYCHIATRY  · IP CONSULT TO PSYCHIATRY      Procedures Performed:     · none    Medication Adjustments and Discharge Medications:  · Medication Dosing Tapers - Please refer to Discharge Medication List for details on any medication dosing tapers (if applicable to patient). · Discharge Medication List: See after visit summary for reconciled discharge medications. Wound Care Recommendations:  When applicable, please see wound care section of After Visit Summary. Diet Recommendations at Discharge:  Diet -        Diet Orders   (From admission, onward)             Start     Ordered    09/07/23 1559  Dietary nutrition supplements  Once        Question Answer Comment   Select Supplement: Ensure Plus High Protein Vanilla    Frequency Breakfast        09/07/23 1558    09/07/23 1559  Dietary nutrition supplements  Once        Question Answer Comment   Select Supplement: Ensure Plus High Protein Strawberry    Frequency Dinner        09/07/23 1558    09/02/23 0039  Diet Mineral Restriction;  Sodium 2 GM  Diet effective now        References:    Adult Nutrition Support Algorithm    RD Therapeutic Diet Order Protocol   Question Answer Comment Diet Type Mineral Restriction    Mineral Restriction Sodium 2 GM    RD to adjust diet per protocol? Yes        09/02/23 0038              Fluid Restriction - No Fluid Restriction at Discharge. Significant Findings / Test Results:     CT abdomen pelvis with contrast    Result Date: 8/30/2023  Impression: 1. Top-normal fluid-filled loops of hyperemic small bowel in the right lower quadrant, which could represent a nonspecific enteritis in the appropriate clinical context. 2.  Nonobstructing 2 mm calculus in the interpolar right kidney. The study was marked in EPIC for significant notification. Workstation performed: [id number]     CT head without contrast    Result Date: 8/30/2023  · Impression: No acute intracranial abnormality. Workstation performed: [id number]       Hospital Course:     David Huff is a 28 y.o. female patient who originally presented to the hospital on 8/30/2023 due to confusion and worsening encephalopathy. Has history of schizoaffective disorder, intellectual disability, bipolar disorder. She was found to have lithium toxicity underwent hemodialysis x2, toxicology and nephrology closely followed. Patient was also followed by psychiatry, lithium was discontinued. She also had hyponatremia secondary to lithium induced diabetes insipidus. Sodium was improving she was started on amiloride which she will continue. She will follow with nephrology outpatient and get repeat blood work next week. Was also started on olanzapine for agitation, she will follow with psychiatry outpatient. Barney plan of care in depth with patient and sister    Please see above problem list for further details. Condition at Discharge: good     Discharge Day Visit / Exam:     Subjective: Patient was seen and examined at bedside, in a very happy energetic mood. Was happy to hear that she is going to be discharged.     Vitals: Blood Pressure: 112/78 (09/08/23 0725)  Pulse: 78 (09/08/23 0725)  Temperature: 97.5 °F (36.4 °C) (09/08/23 0725)  Temp Source: Oral (09/07/23 2115)  Respirations: 16 (09/08/23 0725)  Height: 5' 4" (162.6 cm) (08/30/23 1815)  Weight - Scale: 50 kg (110 lb 3.7 oz) (09/08/23 0600)  SpO2: 100 % (09/08/23 0725)    Physical Exam:    Constitutional: Patient is in no acute distress  HEENT:  Normocephalic, atraumatic  Cardiovascular: Normal S1S2, RRR, No murmurs/rubs/gallops appreciated. Pulmonary:  Bilateral air entry, No rhonchi/rales/wheezing appreciated  Abdominal: Soft, Bowel sounds present, Non-tender, Non-distended  Extremities:  No cyanosis, clubbing or edema. Neurological: awake, alert    Discharge instructions/Information to patient and family:   See after visit summary section titled Discharge Instructions for information provided to patient and family. Planned Readmission: no     Discharge Statement:  I spent 35 minutes discharging the patient. This time was spent on the day of discharge. I had direct contact with the patient on the day of discharge. Greater than 50% of the total time was spent examining patient, answering all patient questions, arranging and discussing plan of care with patient as well as directly providing post-discharge instructions. Additional time then spent on discharge activities.     ** Please Note: This note has been constructed using a voice recognition system **

## 2023-09-08 NOTE — NURSING NOTE
Discussed discharge instructions with patient's mother via [id number] [address] . All questions answered at time of discharge. Patient's mother verbalized understanding of discharge instructions. No iv in place. Patient left with all belongings to home.

## 2023-09-08 NOTE — PLAN OF CARE
Problem: Potential for Falls  Goal: Patient will remain free of falls  Description: INTERVENTIONS:  - Educate patient/family on patient safety including physical limitations  - Instruct patient to call for assistance with activity   - Consult OT/PT to assist with strengthening/mobility   - Keep Call bell within reach  - Keep bed low and locked with side rails adjusted as appropriate  - Keep care items and personal belongings within reach  - Initiate and maintain comfort rounds  - Apply yellow socks and bracelet for high fall risk patients  - Consider moving patient to room near nurses station  Outcome: Progressing     Problem: MOBILITY - ADULT  Goal: Maintain or return to baseline ADL function  Description: INTERVENTIONS:  - Educate patient/family on patient safety including physical limitations  - Instruct patient to call for assistance with activity   - Consult OT/PT to assist with strengthening/mobility   - Keep Call bell within reach  - Keep bed low and locked with side rails adjusted as appropriate  - Keep care items and personal belongings within reach  - Initiate and maintain comfort rounds  - Apply yellow socks and bracelet for high fall risk patients  - Consider moving patient to room near nurses station  Outcome: Progressing  Goal: Maintains/Returns to pre admission functional level  Description: INTERVENTIONS:  - Perform BMAT or MOVE assessment daily.   - Set and communicate daily mobility goal to care team and patient/family/caregiver. - Collaborate with rehabilitation services on mobility goals if consulted  - Perform Range of Motion 4 times a day. - Reposition patient every 2 hours.   - Dangle patient 3 times a day  - Stand patient 3 times a day  - Ambulate patient 3 times a day  - Out of bed to chair 3 times a day   - Out of bed for meals 3 times a day  - Out of bed for toileting  - Record patient progress and toleration of activity level   Outcome: Progressing     Problem: NEUROSENSORY - ADULT  Goal: Achieves maximal functionality and self care  Description: INTERVENTIONS  - Monitor swallowing and airway patency with patient fatigue and changes in neurological status  - Encourage and assist patient to increase activity and self care.    - Encourage visually impaired, hearing impaired and aphasic patients to use assistive/communication devices  Outcome: Progressing  Goal: Achieves stable or improved neurological status  Description: INTERVENTIONS  - Monitor and report changes in neurological status  - Monitor vital signs such as temperature, blood pressure, glucose, and any other labs ordered   - Initiate measures to prevent increased intracranial pressure  - Monitor for seizure activity and implement precautions if appropriate      Outcome: Progressing     Problem: CARDIOVASCULAR - ADULT  Goal: Maintains optimal cardiac output and hemodynamic stability  Description: INTERVENTIONS:  - Monitor I/O, vital signs and rhythm  - Monitor for S/S and trends of decreased cardiac output  - Administer and titrate ordered vasoactive medications to optimize hemodynamic stability  - Assess quality of pulses, skin color and temperature  - Assess for signs of decreased coronary artery perfusion  - Instruct patient to report change in severity of symptoms  Outcome: Progressing  Goal: Absence of cardiac dysrhythmias or at baseline rhythm  Description: INTERVENTIONS:  - Continuous cardiac monitoring, vital signs, obtain 12 lead EKG if ordered  - Administer antiarrhythmic and heart rate control medications as ordered  - Monitor electrolytes and administer replacement therapy as ordered  Outcome: Progressing     Problem: GASTROINTESTINAL - ADULT  Goal: Maintains or returns to baseline bowel function  Description: INTERVENTIONS:  - Assess bowel function  - Encourage oral fluids to ensure adequate hydration  - Administer IV fluids if ordered to ensure adequate hydration  - Administer ordered medications as needed  - Encourage mobilization and activity  - Consider nutritional services referral to assist patient with adequate nutrition and appropriate food choices  Outcome: Progressing  Goal: Maintains adequate nutritional intake  Description: INTERVENTIONS:  - Monitor percentage of each meal consumed  - Identify factors contributing to decreased intake, treat as appropriate  - Assist with meals as needed  - Monitor I&O, weight, and lab values if indicated  - Obtain nutrition services referral as needed  Outcome: Progressing     Problem: GENITOURINARY - ADULT  Goal: Maintains or returns to baseline urinary function  Description: INTERVENTIONS:  - Assess urinary function  - Encourage oral fluids to ensure adequate hydration if ordered  - Administer IV fluids as ordered to ensure adequate hydration  - Administer ordered medications as needed  - Offer frequent toileting  - Follow urinary retention protocol if ordered  Outcome: Progressing     Problem: METABOLIC, FLUID AND ELECTROLYTES - ADULT  Goal: Electrolytes maintained within normal limits  Description: INTERVENTIONS:  - Monitor labs and assess patient for signs and symptoms of electrolyte imbalances  - Administer electrolyte replacement as ordered  - Monitor response to electrolyte replacements, including repeat lab results as appropriate  - Instruct patient on fluid and nutrition as appropriate  Outcome: Progressing  Goal: Fluid balance maintained  Description: INTERVENTIONS:  - Monitor labs   - Monitor I/O and WT  - Instruct patient on fluid and nutrition as appropriate  - Assess for signs & symptoms of volume excess or deficit  Outcome: Progressing     Problem: SKIN/TISSUE INTEGRITY - ADULT  Goal: Skin Integrity remains intact(Skin Breakdown Prevention)  Description: Assess:  -Perform [place] assessment every q 12 hr  -Clean and moisturize skin every day  -Inspect skin when repositioning, toileting, and assisting with ADLS  -Assess under medical devices such   -Assess extremities for adequate circulation and sensation     Bed Management:  -Have minimal linens on bed & keep smooth, unwrinkled  -Change linens as needed when moist or perspiring      Toileting:  -Offer bedside commode      Activity:  -Mobilize patient 3 times a day  -Encourage activity and walks on unit  -Encourage or provide ROM exercises       Skin Care:  -Avoid use of baby powder, tape, friction and shearing, hot water or constrictive clothing  -Relieve pressure over bony prominences  -Do not massage red bony areas    Next Steps:  -Teach patient strategies to minimize risks     Outcome: Progressing     Problem: MUSCULOSKELETAL - ADULT  Goal: Maintain or return mobility to safest level of function  Description: INTERVENTIONS:  - Assess patient's ability to carry out ADLs; assess patient's baseline for ADL function and identify physical deficits which impact ability to perform ADLs (bathing, care of mouth/teeth, toileting, grooming, dressing, etc.)  - Assess/evaluate cause of self-care deficits   - Assess range of motion  - Assess patient's mobility  - Assess patient's need for assistive devices and provide as appropriate  - Encourage maximum independence but intervene and supervise when necessary  - Involve family in performance of ADLs  - Assess for home care needs following discharge   - Consider OT consult to assist with ADL evaluation and planning for discharge  - Provide patient education as appropriate  Outcome: Progressing     Problem: PAIN - ADULT  Goal: Verbalizes/displays adequate comfort level or baseline comfort level  Description: Interventions:  - Encourage patient to monitor pain and request assistance  - Assess pain using appropriate pain scale  - Administer analgesics based on type and severity of pain and evaluate response  - Implement non-pharmacological measures as appropriate and evaluate response  - Consider cultural and social influences on pain and pain management  - Notify physician/advanced practitioner if interventions unsuccessful or patient reports new pain  Outcome: Progressing     Problem: SAFETY ADULT  Goal: Patient will remain free of falls  Description: INTERVENTIONS:  - Educate patient/family on patient safety including physical limitations  - Instruct patient to call for assistance with activity   - Consult OT/PT to assist with strengthening/mobility   - Keep Call bell within reach  - Keep bed low and locked with side rails adjusted as appropriate  - Keep care items and personal belongings within reach  - Initiate and maintain comfort rounds  - Apply yellow socks and bracelet for high fall risk patients  - Consider moving patient to room near nurses station  Outcome: Progressing  Goal: Maintain or return to baseline ADL function  Description: INTERVENTIONS:  - Educate patient/family on patient safety including physical limitations  - Instruct patient to call for assistance with activity   - Consult OT/PT to assist with strengthening/mobility   - Keep Call bell within reach  - Keep bed low and locked with side rails adjusted as appropriate  - Keep care items and personal belongings within reach  - Initiate and maintain comfort rounds  - Apply yellow socks and bracelet for high fall risk patients  - Consider moving patient to room near nurses station  Outcome: Progressing  Goal: Maintains/Returns to pre admission functional level  Description: INTERVENTIONS:  - Perform BMAT or MOVE assessment daily.   - Set and communicate daily mobility goal to care team and patient/family/caregiver. - Collaborate with rehabilitation services on mobility goals if consulted  - Perform Range of Motion 4 times a day. - Reposition patient every 2 hours.   - Dangle patient 3 times a day  - Stand patient 3 times a day  - Ambulate patient 3 times a day  - Out of bed to chair 3 times a day   - Out of bed for meals 3 times a day  - Out of bed for toileting  - Record patient progress and toleration of activity level   Outcome: Progressing     Problem: DISCHARGE PLANNING  Goal: Discharge to home or other facility with appropriate resources  Description: INTERVENTIONS:  - Identify barriers to discharge w/patient and caregiver  - Arrange for needed discharge resources and transportation as appropriate  - Identify discharge learning needs (meds, wound care, etc.)  - Arrange for interpretive services to assist at discharge as needed  - Refer to Case Management Department for coordinating discharge planning if the patient needs post-hospital services based on physician/advanced practitioner order or complex needs related to functional status, cognitive ability, or social support system  Outcome: Progressing     Problem: Knowledge Deficit  Goal: Patient/family/caregiver demonstrates understanding of disease process, treatment plan, medications, and discharge instructions  Description: Complete learning assessment and assess knowledge base.   Interventions:  - Provide teaching at level of understanding  - Provide teaching via preferred learning methods  Outcome: Progressing     Problem: SAFETY,RESTRAINT: NV/NON-SELF DESTRUCTIVE BEHAVIOR  Goal: Remains free of harm/injury (restraint for non violent/non self-detsructive behavior)  Description: INTERVENTIONS:  - Instruct patient/family regarding restraint use   - Assess and monitor physiologic and psychological status   - Provide interventions and comfort measures to meet assessed patient needs   - Identify and implement measures to help patient regain control  - Assess readiness for release of restraint   Outcome: Progressing  Goal: Returns to optimal restraint-free functioning  Description: INTERVENTIONS:  - Assess the patient's behavior and symptoms that indicate continued need for restraint  - Identify and implement measures to help patient regain control  - Assess readiness for release of restraint   Outcome: Progressing     Problem: Prexisting or High Potential for Compromised Skin Integrity  Goal: Skin integrity is maintained or improved  Description: INTERVENTIONS:  - Identify patients at risk for skin breakdown  - Assess and monitor skin integrity  - Assess and monitor nutrition and hydration status  - Monitor labs   - Assess for incontinence   - Turn and reposition patient  - Assist with mobility/ambulation  - Relieve pressure over bony prominences  - Avoid friction and shearing  - Provide appropriate hygiene as needed including keeping skin clean and dry  - Evaluate need for skin moisturizer/barrier cream  - Collaborate with interdisciplinary team   - Patient/family teaching  - Consider wound care consult   Outcome: Progressing     Problem: Nutrition/Hydration-ADULT  Goal: Nutrient/Hydration intake appropriate for improving, restoring or maintaining nutritional needs  Description: Monitor and assess patient's nutrition/hydration status for malnutrition. Collaborate with interdisciplinary team and initiate plan and interventions as ordered. Monitor patient's weight and dietary intake as ordered or per policy. Utilize nutrition screening tool and intervene as necessary. Determine patient's food preferences and provide high-protein, high-caloric foods as appropriate.      INTERVENTIONS:  - Monitor oral intake, urinary output, labs, and treatment plans  - Assess nutrition and hydration status and recommend course of action  - Evaluate amount of meals eaten  - Assist patient with eating if necessary   - Allow adequate time for meals  - Recommend/ encourage appropriate diets, oral nutritional supplements, and vitamin/mineral supplements  - Order, calculate, and assess calorie counts as needed  - Recommend, monitor, and adjust tube feedings and TPN/PPN based on assessed needs  - Assess need for intravenous fluids  - Provide specific nutrition/hydration education as appropriate  - Include patient/family/caregiver in decisions related to nutrition  Outcome: Progressing

## 2023-09-11 NOTE — UTILIZATION REVIEW
NOTIFICATION OF ADMISSION DISCHARGE   This is a Notification of Discharge from [facility]. Please be advised that this patient has been discharge from our facility. Below you will find the admission and discharge date and time including the patient’s disposition. UTILIZATION REVIEW CONTACT:  Cortney Chapman  Utilization   Network Utilization Review Department  Phone: [phone] carefully listen to the prompts. All voicemails are confidential.  Email: [email or web address]     ADMISSION INFORMATION  PRESENTATION DATE: 8/30/2023 10:57 AM  OBERVATION ADMISSION DATE:   INPATIENT ADMISSION DATE: 8/30/23  3:19 PM   DISCHARGE DATE: 9/8/2023  1:08 PM   DISPOSITION:Home/Self Care    IMPORTANT INFORMATION:  Send all requests for admission clinical reviews, approved or denied determinations and any other requests to dedicated fax number below belonging to the campus where the patient is receiving treatment.  List of dedicated fax numbers:  [place] DENIALS (Administrative/Medical Necessity) [phone] [address] [facility] (Maternity/NICU/Pediatrics) [phone]   [facility] [phone]   [facility] [phone] [address] [phone]   [address] [place] [address] [phone]   [facility] [phone]   [address] [place] [address] [address] [facility] [phone]   [address] [facility] [phone]   [address] [facility] [phone] [address] [facility] [address] [facility] [phone]

## 2023-09-12 ENCOUNTER — LAB (OUTPATIENT)
Dept: LAB | Facility: HOSPITAL | Age: 33
End: 2023-09-12
Payer: COMMERCIAL

## 2023-09-12 ENCOUNTER — TELEPHONE (OUTPATIENT)
Dept: NEPHROLOGY | Facility: CLINIC | Age: 33
End: 2023-09-12

## 2023-09-12 DIAGNOSIS — F39 UNSPECIFIED MOOD (AFFECTIVE) DISORDER (HCC): ICD-10-CM

## 2023-09-12 DIAGNOSIS — T56.891A LITHIUM TOXICITY: ICD-10-CM

## 2023-09-12 DIAGNOSIS — E87.0 HYPERNATREMIA: Primary | ICD-10-CM

## 2023-09-12 DIAGNOSIS — N25.1 NDI (NEPHROGENIC DIABETES INSIPIDUS) (HCC): ICD-10-CM

## 2023-09-12 DIAGNOSIS — E87.0 HYPERNATREMIA: ICD-10-CM

## 2023-09-12 LAB
ALBUMIN SERPL BCP-MCNC: 4.2 G/DL (ref 3.5–5)
ANION GAP SERPL CALCULATED.3IONS-SCNC: 10 MMOL/L
BASOPHILS # BLD AUTO: 0.02 THOUSANDS/ÂΜL (ref 0–0.1)
BASOPHILS NFR BLD AUTO: 0 % (ref 0–1)
BUN SERPL-MCNC: 8 MG/DL (ref 5–25)
CALCIUM SERPL-MCNC: 9.8 MG/DL (ref 8.4–10.2)
CHLORIDE SERPL-SCNC: 103 MMOL/L (ref 96–108)
CO2 SERPL-SCNC: 28 MMOL/L (ref 21–32)
CREAT SERPL-MCNC: 0.48 MG/DL (ref 0.6–1.3)
EOSINOPHIL # BLD AUTO: 0.09 THOUSAND/ÂΜL (ref 0–0.61)
EOSINOPHIL NFR BLD AUTO: 1 % (ref 0–6)
ERYTHROCYTE [DISTWIDTH] IN BLOOD BY AUTOMATED COUNT: 15.7 % (ref 11.6–15.1)
GFR SERPL CREATININE-BSD FRML MDRD: 128 ML/MIN/1.73SQ M
GLUCOSE P FAST SERPL-MCNC: 91 MG/DL (ref 65–99)
HCT VFR BLD AUTO: 43.6 % (ref 34.8–46.1)
HGB BLD-MCNC: 13.7 G/DL (ref 11.5–15.4)
IMM GRANULOCYTES # BLD AUTO: 0.02 THOUSAND/UL (ref 0–0.2)
IMM GRANULOCYTES NFR BLD AUTO: 0 % (ref 0–2)
LYMPHOCYTES # BLD AUTO: 2.43 THOUSANDS/ÂΜL (ref 0.6–4.47)
LYMPHOCYTES NFR BLD AUTO: 37 % (ref 14–44)
MCH RBC QN AUTO: 29.5 PG (ref 26.8–34.3)
MCHC RBC AUTO-ENTMCNC: 31.4 G/DL (ref 31.4–37.4)
MCV RBC AUTO: 94 FL (ref 82–98)
MONOCYTES # BLD AUTO: 0.54 THOUSAND/ÂΜL (ref 0.17–1.22)
MONOCYTES NFR BLD AUTO: 8 % (ref 4–12)
NEUTROPHILS # BLD AUTO: 3.55 THOUSANDS/ÂΜL (ref 1.85–7.62)
NEUTS SEG NFR BLD AUTO: 54 % (ref 43–75)
NRBC BLD AUTO-RTO: 0 /100 WBCS
PHOSPHATE SERPL-MCNC: 4.4 MG/DL (ref 2.7–4.5)
PLATELET # BLD AUTO: 433 THOUSANDS/UL (ref 149–390)
PMV BLD AUTO: 10.1 FL (ref 8.9–12.7)
POTASSIUM SERPL-SCNC: 4.6 MMOL/L (ref 3.5–5.3)
RBC # BLD AUTO: 4.65 MILLION/UL (ref 3.81–5.12)
SODIUM SERPL-SCNC: 141 MMOL/L (ref 135–147)
WBC # BLD AUTO: 6.65 THOUSAND/UL (ref 4.31–10.16)

## 2023-09-12 PROCEDURE — 85025 COMPLETE CBC W/AUTO DIFF WBC: CPT

## 2023-09-12 PROCEDURE — 36415 COLL VENOUS BLD VENIPUNCTURE: CPT

## 2023-09-12 PROCEDURE — 80069 RENAL FUNCTION PANEL: CPT

## 2023-09-12 NOTE — TELEPHONE ENCOUNTER
----- Message from DORA KNIGHT MD sent at 9/12/2023  2:26 PM EDT -----  Please let the patients caregiver know that most recent lab work in terms of renal parameters and sodium level are stable. Will discuss further at the upcoming visit, let me know if they have any questions or concerns.     Thanks

## 2023-09-12 NOTE — RESULT ENCOUNTER NOTE
Please let the patients caregiver know that most recent lab work in terms of renal parameters and sodium level are stable. Will discuss further at the upcoming visit, let me know if they have any questions or concerns.     Thanks

## 2023-09-13 NOTE — TELEPHONE ENCOUNTER
Lm for Patients daughter Dean Paulino, labs mailed due in 1 month and also a script due prior to Jan follow up once scheduled. I asked that she call back to schedule when she has a chance.

## 2023-09-14 ENCOUNTER — TRANSITIONAL CARE MANAGEMENT (OUTPATIENT)
Dept: FAMILY MEDICINE CLINIC | Facility: CLINIC | Age: 33
End: 2023-09-14

## 2023-09-14 NOTE — TELEPHONE ENCOUNTER
Patient has been scheduled on 1/8/24 with Dr Brandon Nair in [place]. Pt advised to have lab work repeated in a month.

## 2023-09-14 NOTE — TELEPHONE ENCOUNTER
Pt's daughter called back and pt has been scheduled on 1/8/24 with Dr Nicole Tran in [place]. I advised her to take her pt for lab work next month and she understood.

## 2023-09-20 ENCOUNTER — OFFICE VISIT (OUTPATIENT)
Dept: FAMILY MEDICINE CLINIC | Facility: CLINIC | Age: 33
End: 2023-09-20
Payer: COMMERCIAL

## 2023-09-20 VITALS
HEIGHT: 64 IN | DIASTOLIC BLOOD PRESSURE: 74 MMHG | TEMPERATURE: 97.1 F | BODY MASS INDEX: 19.84 KG/M2 | HEART RATE: 92 BPM | SYSTOLIC BLOOD PRESSURE: 118 MMHG | WEIGHT: 116.2 LBS | RESPIRATION RATE: 16 BRPM

## 2023-09-20 DIAGNOSIS — N25.1 DIABETES INSIPIDUS, NEPHROGENIC (HCC): ICD-10-CM

## 2023-09-20 DIAGNOSIS — F39 UNSPECIFIED MOOD (AFFECTIVE) DISORDER (HCC): Primary | ICD-10-CM

## 2023-09-20 DIAGNOSIS — T56.891A LITHIUM TOXICITY, ACCIDENTAL OR UNINTENTIONAL, INITIAL ENCOUNTER: ICD-10-CM

## 2023-09-20 DIAGNOSIS — F25.9 CHRONIC SCHIZOAFFECTIVE DISORDER (HCC): ICD-10-CM

## 2023-09-20 DIAGNOSIS — E87.0 HYPERNATREMIA: ICD-10-CM

## 2023-09-20 DIAGNOSIS — F25.9 SCHIZOAFFECTIVE DISORDER, UNSPECIFIED TYPE (HCC): ICD-10-CM

## 2023-09-20 PROCEDURE — 99495 TRANSJ CARE MGMT MOD F2F 14D: CPT | Performed by: NURSE PRACTITIONER

## 2023-09-20 RX ORDER — OLANZAPINE 5 MG/1
10 TABLET ORAL
Qty: 60 TABLET | Refills: 0 | Status: SHIPPED | OUTPATIENT
Start: 2023-09-20

## 2023-09-20 RX ORDER — CLONAZEPAM 1 MG/1
TABLET ORAL
COMMUNITY
Start: 2023-09-18

## 2023-09-20 NOTE — ASSESSMENT & PLAN NOTE
Secondary to lithium use. Not currently on lithium  Has nephrology follow up in January. They are ordering and monitoring labs.

## 2023-09-20 NOTE — ASSESSMENT & PLAN NOTE
Currently on amiloride 5mg bid.  Mother aware that lab worked ordered by nephrology is due October 13

## 2023-09-20 NOTE — PROGRESS NOTES
Assessment & Plan     1. Unspecified mood (affective) disorder ([address] )  Assessment & Plan:  Patient did have psychiatry follow up  She is now on zyprexa 10mg. depakote and Remeron       2. Schizoaffective disorder, unspecified type ([address] [facility])  Assessment & Plan:  As above      3. Lithium toxicity, accidental or unintentional, initial encounter  Assessment & Plan:  Patient no longer taking lithium. Had follow up with psychiatry       4. Chronic schizoaffective disorder [facility])  Assessment & Plan:  Plan as above. Follows with psychiatry     Orders:  -     OLANZapine (ZyPREXA) 5 mg tablet; Take 2 tablets (10 mg total) by mouth daily at bedtime    5. Diabetes insipidus, nephrogenic [facility])  Assessment & Plan:  Secondary to lithium use. Not currently on lithium  Has nephrology follow up in January. They are ordering and monitoring labs. 6. Hypernatremia  Assessment & Plan:  Currently on amiloride 5mg bid. Mother aware that lab worked ordered by nephrology is due October 13           Subjective     Transitional Care Management Review:   Balaji Looney is a 28 y.o. female here for TCM follow up. During the TCM phone call patient stated:  TCM Call     Date and time call was made  9/14/2023  [facility] care reviewed  Records reviewed    Patient was hospitialized at  [facility]    Date of Admission  08/30/23    Date of discharge  09/08/23    Diagnosis  acute encephalopathy    Disposition  Home    Were the patients medications reviewed and updated  Yes    Current Symptoms  None      TCM Call     Post hospital issues  None    Should patient be enrolled in anticoag monitoring? No    Scheduled for follow up?   Yes    Patients specialists  Other (comment)    Did you obtain your prescribed medications  Yes    Do you need help managing your prescriptions or medications  No    Is transportation to your appointment needed  No    I have advised the patient to call PCP with any new or worsening symptoms  Oriana Mariee Alexis MA        8/30-9/8 admission for acute mental status change. She was found to have lithium toxicity needed dialysis and IV fluids to reverse. Psych and nephrology were consulted. Lithium was discontinued. She was started on amiloride which helped her sodium levels. Mom reports health wise is good her psychiatric issues sade till ongoing because of medication changes and need for behavior modification. Review of Systems   Eyes: Negative for visual disturbance. Respiratory: Negative for chest tightness and shortness of breath. Cardiovascular: Negative for chest pain. Gastrointestinal: Negative for constipation. Genitourinary: Negative for difficulty urinating. Neurological: Negative for dizziness, light-headedness and headaches. Psychiatric/Behavioral: Positive for agitation and behavioral problems. Negative for dysphoric mood and sleep disturbance. The patient is hyperactive. The patient is not nervous/anxious. Objective     /74 (BP Location: Right arm, Patient Position: Sitting, Cuff Size: Adult)   Pulse 92   Temp (!) 97.1 °F (36.2 °C) (Temporal)   Resp 16   Ht 5' 4" (1.626 m)   Wt 52.7 kg (116 lb 3.2 oz)   BMI 19.95 kg/m²      Physical Exam  Vitals and nursing note reviewed. Constitutional:       General: She is not in acute distress. Appearance: Normal appearance. She is not ill-appearing or diaphoretic. HENT:      Head: Normocephalic and atraumatic. Right Ear: External ear normal.      Left Ear: External ear normal.   Eyes:      Extraocular Movements: Extraocular movements intact. Conjunctiva/sclera: Conjunctivae normal.   Cardiovascular:      Rate and Rhythm: Normal rate and regular rhythm. Heart sounds: Normal heart sounds, S1 normal and S2 normal. No murmur heard. Pulmonary:      Effort: Pulmonary effort is normal.      Breath sounds: Normal breath sounds. Skin:     Coloration: Skin is not pale.    Neurological:      Mental Status: She is alert and oriented to person, place, and time. Psychiatric:         Attention and Perception: She is inattentive. Mood and Affect: Mood normal.         Speech: Speech is rapid and pressured, slurred and tangential.         Behavior: Behavior is hyperactive. Thought Content: Thought content normal.         Judgment: Judgment is impulsive.        Medications have been reviewed by provider in current encounter    [employer], [address] [place] [address]

## 2023-09-28 ENCOUNTER — TELEPHONE (OUTPATIENT)
Dept: LAB | Facility: HOSPITAL | Age: 33
End: 2023-09-28

## 2023-10-09 ENCOUNTER — TELEPHONE (OUTPATIENT)
Dept: NEPHROLOGY | Facility: CLINIC | Age: 33
End: 2023-10-09

## 2023-10-09 DIAGNOSIS — N25.1 NDI (NEPHROGENIC DIABETES INSIPIDUS) (HCC): ICD-10-CM

## 2023-10-09 RX ORDER — AMILORIDE HYDROCHLORIDE 5 MG/1
5 TABLET ORAL 2 TIMES DAILY
Qty: 60 TABLET | Refills: 3 | Status: SHIPPED | OUTPATIENT
Start: 2023-10-09

## 2023-10-09 NOTE — TELEPHONE ENCOUNTER
I spoke to Madie Kawasaki she is aware to continue Amiloride until next set of labs are completed. We will call her if anything changes at that time.

## 2023-10-09 NOTE — TELEPHONE ENCOUNTER
Received call from pt sister Nicole Eason wanting to be advised if pt needs to continue taking Amiloride 5 mg 2x day. Pt ran out 2 days ago, if she can be advised if pt needs to refill medication if so can it please be sent to [id number] [facility].

## 2023-10-11 ENCOUNTER — APPOINTMENT (OUTPATIENT)
Dept: LAB | Facility: HOSPITAL | Age: 33
End: 2023-10-11
Payer: COMMERCIAL

## 2023-10-12 ENCOUNTER — TELEPHONE (OUTPATIENT)
Dept: NEPHROLOGY | Facility: CLINIC | Age: 33
End: 2023-10-12

## 2023-10-12 NOTE — TELEPHONE ENCOUNTER
----- Message from DORA KNIGHT MD sent at 10/12/2023  7:59 AM EDT -----  Please let the patient know that most recent lab work in terms of renal parameters are stable. No medication changes for now. If the patient is comfortable with following up with her PCP on the current medicine it is okay for them to continue follow-up with them versus having to come and see us in the office otherwise I am happy to see them at their scheduled appointment in January and to have blood work before that visit  Will discuss further at the upcoming visit, let me know if they have any questions or concerns.     Thanks

## 2023-10-12 NOTE — TELEPHONE ENCOUNTER
TRAN in english/Gabonese for Evelin advising renal function is stable. If patient would like to continue care with PCP that is okay per  if she would like to continue care with us that is also fine we can order repeat labs for January.

## 2024-01-08 ENCOUNTER — APPOINTMENT (OUTPATIENT)
Dept: LAB | Facility: HOSPITAL | Age: 34
End: 2024-01-08
Payer: COMMERCIAL

## 2024-01-08 ENCOUNTER — OFFICE VISIT (OUTPATIENT)
Dept: NEPHROLOGY | Facility: CLINIC | Age: 34
End: 2024-01-08
Payer: COMMERCIAL

## 2024-01-08 VITALS
HEART RATE: 104 BPM | HEIGHT: 64 IN | SYSTOLIC BLOOD PRESSURE: 138 MMHG | BODY MASS INDEX: 19.95 KG/M2 | DIASTOLIC BLOOD PRESSURE: 88 MMHG | OXYGEN SATURATION: 96 %

## 2024-01-08 DIAGNOSIS — N25.1 DIABETES INSIPIDUS, NEPHROGENIC (HCC): Primary | ICD-10-CM

## 2024-01-08 DIAGNOSIS — E87.0 HYPERNATREMIA: ICD-10-CM

## 2024-01-08 DIAGNOSIS — F25.9 CHRONIC SCHIZOAFFECTIVE DISORDER (HCC): ICD-10-CM

## 2024-01-08 DIAGNOSIS — N25.1 NDI (NEPHROGENIC DIABETES INSIPIDUS) (HCC): ICD-10-CM

## 2024-01-08 DIAGNOSIS — N18.1 CKD (CHRONIC KIDNEY DISEASE) STAGE 1, GFR 90 ML/MIN OR GREATER: ICD-10-CM

## 2024-01-08 PROBLEM — T56.891A LITHIUM TOXICITY: Status: RESOLVED | Noted: 2023-08-30 | Resolved: 2024-01-08

## 2024-01-08 PROCEDURE — 99213 OFFICE O/P EST LOW 20 MIN: CPT | Performed by: INTERNAL MEDICINE

## 2024-01-08 NOTE — PROGRESS NOTES
Nephrology Follow up Consultation  Evelin Anderson 33 y.o. female MRN: [id number]   ?         BACKGROUND:  Evelin Anderson is a 33 y.o.female who was referred by Fazal Joyce MD for evaluation of Hospital Follow-up (/)  .      ASSESSMENT / PLAN:   33 y.o. female with pmh of multiple co-morbidities including schizoaffective disorder, intellectual disability and bipolar disorder status post recent hospitalization with lithium toxicity presents to the office for follow-up.      CKD stage 1/?nephrogenic diabetes insipidus/hypernatremia:  -Patient has had an episode of lithium toxicity and needed dialysis x 2 treatment on August 31, 2023 subsequent to that no longer on lithium.  -  after review of records In [facility] as well as Care everywhere patient has a baseline creatinine of 0.3-0.6 mg/dL. Most recent labs show a Creatinine of 0.42 mg/dL. Renal function remains stable.   -Treatment patient remained incontinent to hard to test but was tested with DDAVP there was slight increase in urine osmolality and there was concern that she may likely have some component of nephrogenic diabetes insipidus and was started on amiloride which led to stability of her sodium levels as well as her urine output.  -Most recent serum sodium stable at 142 mEq from 1/8/2024  -Most recent serum potassium stable at 3.9 mEq from 1/8/2024  -Confirmed with the patient's mom multiple times she reports that patient has not been on amiloride for more than a month that someone had called her to discontinue it.  I reassured her from based on nephrology notes a phone call documentation patient was sent a refill and was to continue it however since she has been off of it now and all parameters and electrolytes remained stable we can continue to hold off for now patient can continue follow-up with PCP.  - Acid base and lytes stable.  - Clinically the patient appears to be euvolemic  - Recommend to avoid use of NSAIDs, nephrotoxins. Caution  advised with regards to exposure to IV contrast dye.   - Discussed with the patient caregiver in depth for renal status, including the possible etiologies for CKD.     Blood pressure:  BP Readings from Last 3 Encounters:   01/08/24 138/88   09/20/23 118/74   09/08/23 112/78     - Patient is on no medications  - Goal BP of < 130/80 based on age and comorbidities  - Instructed to follow low sodium (2gm)diet.    Hemoglobin:  - Goal Hb of 10-12 g/dL  - Most recent labs suggestive of 13.7 g/dL.     Schizoaffective disorder/intellectual disability:  - Management as per primary team  -On Remeron, Zyprexa, Klonopin    Nutrition:  - Encouraged patient to follow a renal diet comprising of moderate potassium, low phosphorus and protein restriction to 0.8gm/kg.  - Will check serum albumin with next blood work.     Followup:  - Patient is to follow-up with PCP.    Alejandrina Friedman MD, [facility], 1/8/2024, 2:55 PM             SUBJECTIVE: 33 y.o. female presents to the office for follow-up status post hospitalization at which time there were concerns for nephrogenic DI and patient was started on amiloride, was stable on it however patient's mom reports that patient has been off of it for more than a month someone called her and told her to stop taking it I did discuss with her that based on last nephrology documentation patient was supposed to continue it and refills were sent over to the pharmacy.  However at this time since electrolytes are all stable and patient has not been on the medication it is okay for patient to discontinue and continue to hold off on the amiloride.  Patient can continue follow-up with her PCP for routine care.      Review of Systems   Constitutional:  Negative for chills and fatigue.   HENT:  Negative for congestion.    Cardiovascular:  Negative for leg swelling.   Gastrointestinal:  Negative for constipation.   Genitourinary:  Negative for dysuria.   Musculoskeletal:  Negative for back pain.   Neurological:   "Negative for dizziness and headaches.   Psychiatric/Behavioral:  Negative for agitation. The patient is hyperactive.    All other systems reviewed and are negative.      PAST MEDICAL HISTORY:  Past Medical History:   Diagnosis Date   • Psychiatric disorder        PROBLEM LIST    Patient Active Problem List   Diagnosis   • Medical clearance for psychiatric admission   • Intellectual disability   • Unspecified mood (affective) disorder (HCC)   • Psychosis (HCC)   • Dysuria   • Vaginal lesion   • Chronic schizoaffective disorder (HCC)   • Impaired ability to use community resources due to language barrier   • Hyperlipidemia, mixed   • Family history of cardiovascular disorder   • Hypernatremia   • Diabetes insipidus, nephrogenic (HCC)   • CKD (chronic kidney disease) stage 1, GFR 90 ml/min or greater       PAST SURGICAL HISTORY:  History reviewed. No pertinent surgical history.    SOCIAL HISTORY :   reports that she has quit smoking. Her smoking use included cigarettes. She has a 5.0 pack-year smoking history. She has never used smokeless tobacco. She reports current alcohol use of about 3.0 standard drinks of alcohol per week. She reports current drug use. Frequency: 1.00 time per week. Drug: Marijuana.    FAMILY HISTORY:  History reviewed. No pertinent family history.    ALLERGIES:  Allergies   Allergen Reactions   • Lithium Other (See Comments)           PHYSICAL EXAM:  Vitals:    01/08/24 1429   BP: 138/88   BP Location: Left arm   Patient Position: Sitting   Cuff Size: Adult   Pulse: 104   SpO2: 96%   Height: 5' 4\" (1.626 m)     Body mass index is 19.95 kg/m².    Physical Exam  Vitals reviewed.   Constitutional:       General: She is not in acute distress.     Appearance: Normal appearance. She is normal weight. She is not ill-appearing, toxic-appearing or diaphoretic.   HENT:      Head: Normocephalic and atraumatic.      Mouth/Throat:      Pharynx: No oropharyngeal exudate.   Eyes:      General: No scleral " icterus.  Cardiovascular:      Rate and Rhythm: Normal rate.   Pulmonary:      Effort: No respiratory distress.      Breath sounds: Normal breath sounds. No stridor.   Abdominal:      Palpations: There is no mass.      Tenderness: There is no abdominal tenderness. There is no right CVA tenderness or left CVA tenderness.   Musculoskeletal:         General: No swelling.      Cervical back: Normal range of motion. No rigidity.   Skin:     Coloration: Skin is not jaundiced.   Neurological:      General: No focal deficit present.      Mental Status: She is alert. Mental status is at baseline.   Psychiatric:         Mood and Affect: Mood normal.         Behavior: Behavior normal.         LABORATORY DATA:     Results from last 6 Months   Lab Units 01/08/24  0848 10/11/23  1428 09/12/23  0942 09/08/23  0608 09/07/23  0500 09/02/23  0842 09/02/23  0512 09/01/23  1241 09/01/23  0530 08/31/23  0350   WBC Thousand/uL  --   --  6.65  --   --   --  12.93*  --  11.21* 15.57*   HEMOGLOBIN g/dL  --   --  13.7  --   --   --  14.5  --  14.4 13.4   HEMATOCRIT %  --   --  43.6  --   --   --  45.9  --  45.8 41.7   PLATELETS Thousands/uL  --   --  433*  --   --   --  130*  --  145* 134*   POTASSIUM mmol/L 3.9 4.0 4.6   < > 5.0   < >  --    < > 3.9 3.5   CHLORIDE mmol/L 103 103 103   < > 108   < >  --    < > 124* 105   CO2 mmol/L 30 28 28   < > 30   < >  --    < > 34* 29   BUN mg/dL 8 8 8   < > 5   < >  --    < > 4* 5   CREATININE mg/dL 0.42* 0.51* 0.48*   < > 0.48*   < >  --    < > 0.45* 0.59*   CALCIUM mg/dL 9.8 9.5 9.8   < > 9.5   < >  --    < > 10.3* 8.4   MAGNESIUM mg/dL  --   --   --   --   --   --  2.6  --  3.1* 2.3   PHOSPHORUS mg/dL  --   --  4.4  --  2.7  --  3.3  --  2.1* 3.2    < > = values in this interval not displayed.          rest all reviewed    RADIOLOGY:  No orders to display     Rest all reviewed        MEDICATIONS:    Current Outpatient Medications:   •  clonazePAM (KlonoPIN) 1 mg tablet, Take 2 mg by mouth 2 (two)  "times a day, Disp: , Rfl:   •  divalproex sodium (DEPAKOTE) 500 mg DR tablet, Take 2 tablets (1,000 mg total) by mouth every 12 (twelve) hours, Disp: 120 tablet, Rfl: 0  •  OLANZapine (ZyPREXA) 5 mg tablet, Take 2 tablets (10 mg total) by mouth daily at bedtime, Disp: 60 tablet, Rfl: 0  •  mirtazapine (REMERON) 15 mg tablet, Take 1 tablet (15 mg total) by mouth daily at bedtime (Patient not taking: Reported on 1/8/2024), Disp: 30 tablet, Rfl: 0  •  propranolol (INDERAL) 10 mg tablet, Take 1 tablet (10 mg total) by mouth 3 (three) times a day, Disp: 90 tablet, Rfl: 1          Portions of the record may have been created with voice recognition software. Occasional wrong word or \"sound a like\" substitutions may have occurred due to the inherent limitations of voice recognition software. Read the chart carefully and recognize, using context, where substitutions have occurred.If you have any questions, please contact the dictating provider.      "

## 2024-02-12 ENCOUNTER — APPOINTMENT (OUTPATIENT)
Dept: LAB | Facility: CLINIC | Age: 34
End: 2024-02-12
Payer: COMMERCIAL

## 2024-04-08 ENCOUNTER — APPOINTMENT (OUTPATIENT)
Dept: LAB | Facility: CLINIC | Age: 34
End: 2024-04-08
Payer: COMMERCIAL

## 2024-04-08 DIAGNOSIS — Z79.899 ENCOUNTER FOR LONG-TERM (CURRENT) USE OF OTHER MEDICATIONS: ICD-10-CM

## 2024-04-08 PROCEDURE — 80165 DIPROPYLACETIC ACID FREE: CPT

## 2024-04-08 PROCEDURE — 36415 COLL VENOUS BLD VENIPUNCTURE: CPT

## 2024-04-10 LAB — VALPROATE FREE SERPL-MCNC: 18.3 UG/ML (ref 6–22)

## 2024-06-25 ENCOUNTER — OFFICE VISIT (OUTPATIENT)
Dept: FAMILY MEDICINE CLINIC | Facility: CLINIC | Age: 34
End: 2024-06-25
Payer: COMMERCIAL

## 2024-06-25 VITALS
HEIGHT: 64 IN | DIASTOLIC BLOOD PRESSURE: 80 MMHG | HEART RATE: 108 BPM | BODY MASS INDEX: 27.31 KG/M2 | WEIGHT: 160 LBS | OXYGEN SATURATION: 98 % | SYSTOLIC BLOOD PRESSURE: 120 MMHG

## 2024-06-25 DIAGNOSIS — Z12.4 SCREENING FOR CERVICAL CANCER: ICD-10-CM

## 2024-06-25 DIAGNOSIS — F79 INTELLECTUAL DISABILITY: ICD-10-CM

## 2024-06-25 DIAGNOSIS — E78.2 HYPERLIPIDEMIA, MIXED: ICD-10-CM

## 2024-06-25 DIAGNOSIS — N25.1 DIABETES INSIPIDUS, NEPHROGENIC (HCC): ICD-10-CM

## 2024-06-25 DIAGNOSIS — F25.9 CHRONIC SCHIZOAFFECTIVE DISORDER (HCC): ICD-10-CM

## 2024-06-25 DIAGNOSIS — E87.0 HYPERNATREMIA: ICD-10-CM

## 2024-06-25 DIAGNOSIS — Z00.00 ANNUAL PHYSICAL EXAM: Primary | ICD-10-CM

## 2024-06-25 PROBLEM — R30.0 DYSURIA: Status: RESOLVED | Noted: 2023-06-19 | Resolved: 2024-06-25

## 2024-06-25 PROBLEM — N89.8 VAGINAL LESION: Status: RESOLVED | Noted: 2023-06-19 | Resolved: 2024-06-25

## 2024-06-25 PROBLEM — Z00.8 MEDICAL CLEARANCE FOR PSYCHIATRIC ADMISSION: Status: RESOLVED | Noted: 2023-06-11 | Resolved: 2024-06-25

## 2024-06-25 PROCEDURE — 99395 PREV VISIT EST AGE 18-39: CPT | Performed by: NURSE PRACTITIONER

## 2024-06-25 PROCEDURE — 99214 OFFICE O/P EST MOD 30 MIN: CPT | Performed by: NURSE PRACTITIONER

## 2024-06-25 RX ORDER — ALPRAZOLAM 2 MG/1
2 TABLET ORAL 2 TIMES DAILY PRN
COMMUNITY
Start: 2024-06-13

## 2024-06-25 NOTE — PROGRESS NOTES
Adult Annual Physical  Name: Evelin Anderson      : 1990      MRN: [id number]  Encounter Provider: PERLA Wheatley  Encounter Date: 2024   Encounter department: [facility]    Assessment & Plan   1. Annual physical exam  Assessment & Plan:  Routine health labs ordered for patient   Orders:  -     Lipid Panel with Direct LDL reflex; Future  -     CBC and differential; Future  -     Comprehensive metabolic panel  -     TSH, 3rd generation with Free T4 reflex; Future  2. Chronic schizoaffective disorder (HCC)  Assessment & Plan:  Patient follows with psychiatry. She is on depakote, xanax new and zyprexa   3. Intellectual disability  Assessment & Plan:  Patient lives at home with parents. Needs full care for direction and safety.   She does still follow with psychiatry   4. Diabetes insipidus, nephrogenic (HCC)  Assessment & Plan:  Patient has lithium toxicity. Has since been off this. Was on amiloride but not taking. Followed with nephrology. Recent solidum was normal. Follow up as needed with them. I will routinely monitor levels   5. Hyperlipidemia, mixed  Assessment & Plan:  Routine health labs ordered to monitor this. She is not on medications but is on zyprexa that can raise cholesterol   6. Hypernatremia  Assessment & Plan:  Patient no longer taking amiloride. Was followed with nephrology. Recent sodium was February was 145  7. Screening for cervical cancer  -     Ambulatory Referral to Obstetrics / Gynecology; Future    Immunizations and preventive care screenings were discussed with patient today. Appropriate education was printed on patient's after visit summary.    Counseling:  Dental Health: discussed importance of regular tooth brushing, flossing, and dental visits.  Injury prevention: discussed safety/seat belts, safety helmets, smoke detectors, carbon dioxide detectors, and smoking near bedding or upholstery.  Sexual health: discussed sexually transmitted diseases,  "partner selection, use of condoms, avoidance of unintended pregnancy, and contraceptive alternatives.      Depression Screening and Follow-up Plan: Patient was screened for depression during today's encounter. They screened negative with a PHQ-2 score of 1.        History of Present Illness   {Disappearing Hyperlinks I Encounters * My Last Note * Since Last Visit * History :18160}  Adult Annual Physical:  Patient presents for annual physical. Patient presents today with her mom   They have no concerns. Mom states kidney doctor and insurance recommended she be routine followed up here for check ups   Mom reports that there was recent change to xanax by psychiatry because the lorazepam was no longer effective.   Patient was following with gyn in [place] but she was told that since she is not sexually active doesn't need to see them- mom not sure when last pap was. .     Diet and Physical Activity:  - Diet/Nutrition: well balanced diet.  - Exercise: walking.    Depression Screening:  - PHQ-2 Score: 1    General Health:  - Sleep: sleeps well.  - Hearing: normal hearing bilateral ears.  - Vision: no vision problems.  - Dental: regular dental visits and no dental visits for > 1 year. mom has to brush her teeth but she doesnt brush her teeth as good    /GYN Health:    - Contraception:. no periods      Review of Systems   Eyes:  Negative for visual disturbance.   Cardiovascular: Negative.    Musculoskeletal:  Negative for arthralgias.   Neurological:  Negative for dizziness and headaches.   Psychiatric/Behavioral:  Positive for behavioral problems. Negative for sleep disturbance. The patient is hyperactive.          Objective   {Disappearing Hyperlinks   Review Vitals * Enter New Vitals * Results Review * Labs * Imaging * Cardiology * Procedures * Lung Cancer Screening :27546}  /80 (BP Location: Left arm, Patient Position: Sitting, Cuff Size: Standard)   Pulse (!) 108   Ht 5' 4\" (1.626 m)   Wt 72.6 kg (160 lb)  "  SpO2 98%   BMI 27.46 kg/m²     Physical Exam  Vitals and nursing note reviewed.   Constitutional:       General: She is not in acute distress.     Appearance: Normal appearance. She is normal weight. She is not ill-appearing or diaphoretic.   HENT:      Head: Normocephalic and atraumatic.      Right Ear: External ear normal.      Left Ear: External ear normal.   Eyes:      Extraocular Movements: Extraocular movements intact.      Conjunctiva/sclera: Conjunctivae normal.   Cardiovascular:      Rate and Rhythm: Normal rate and regular rhythm.      Heart sounds: Normal heart sounds, S1 normal and S2 normal. No murmur heard.  Pulmonary:      Effort: Pulmonary effort is normal.      Breath sounds: Normal breath sounds.   Abdominal:      General: Abdomen is flat. Bowel sounds are normal.      Palpations: Abdomen is soft.      Tenderness: There is no abdominal tenderness.   Skin:     Coloration: Skin is not pale.   Neurological:      Mental Status: She is alert. Mental status is at baseline.   Psychiatric:         Attention and Perception: She is inattentive.         Mood and Affect: Mood normal.         Behavior: Behavior is hyperactive.         Thought Content: Thought content normal.         Judgment: Judgment is impulsive.       Administrative Statements {Disappearing Hyperlinks I  [id number]}

## 2024-06-28 PROBLEM — Z00.00 ANNUAL PHYSICAL EXAM: Status: ACTIVE | Noted: 2024-06-28

## 2024-06-28 NOTE — ASSESSMENT & PLAN NOTE
Routine health labs ordered to monitor this. She is not on medications but is on zyprexa that can raise cholesterol

## 2024-06-28 NOTE — ASSESSMENT & PLAN NOTE
Patient has lithium toxicity. Has since been off this. Was on amiloride but not taking. Followed with nephrology. Recent solidum was normal. Follow up as needed with them. I will routinely monitor levels

## 2024-06-28 NOTE — ASSESSMENT & PLAN NOTE
Patient lives at home with parents. Needs full care for direction and safety.   She does still follow with psychiatry

## 2024-06-28 NOTE — ASSESSMENT & PLAN NOTE
Patient no longer taking amiloride. Was followed with nephrology. Recent sodium was February was 145

## 2024-07-24 ENCOUNTER — HOSPITAL ENCOUNTER (EMERGENCY)
Facility: HOSPITAL | Age: 34
Discharge: HOME | End: 2024-07-24
Attending: EMERGENCY MEDICINE
Payer: COMMERCIAL

## 2024-07-24 VITALS
SYSTOLIC BLOOD PRESSURE: 149 MMHG | HEART RATE: 104 BPM | DIASTOLIC BLOOD PRESSURE: 95 MMHG | WEIGHT: 159.39 LBS | OXYGEN SATURATION: 97 % | RESPIRATION RATE: 20 BRPM | TEMPERATURE: 99.3 F | BODY MASS INDEX: 27.36 KG/M2

## 2024-07-24 DIAGNOSIS — R04.0 RIGHT-SIDED NOSEBLEED: ICD-10-CM

## 2024-07-24 DIAGNOSIS — H60.92 LEFT OTITIS EXTERNA: Primary | ICD-10-CM
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
## 2024-07-24 PROCEDURE — 99283 EMERGENCY DEPT VISIT LOW MDM: CPT

## 2024-07-24 PROCEDURE — 99284 EMERGENCY DEPT VISIT MOD MDM: CPT | Performed by: PHYSICIAN ASSISTANT

## 2024-07-24 RX ORDER — OFLOXACIN 3 MG/ML
10 SOLUTION AURICULAR (OTIC) 2 TIMES DAILY
Qty: 5 ML | Refills: 0 | Status: SHIPPED | OUTPATIENT
Start: 2024-07-24

## 2024-07-25 NOTE — ED PROVIDER NOTES
History  Chief Complaint   Patient presents with    Earache     Pt used cotton ear swab and now feels like there's a part still inside, L ear.     Fall     Pt fell yesterday and hit nose. Care giver states that she had a little bit of blood. No LOC       34-year-old female presenting with her sister who is her caretaker for evaluation of 2 complaints.  Patient's caregiver reports the patient has a history of schizophrenia bipolar disorder and developmental delays and had a fall yesterday which is common for the patient she reportedly did not lose consciousness and had a small nosebleed yesterday which spontaneously resolved and had no seizure-like activity, altered mental status or changes in behavior or other complaints patient's caregiver voicing concerns regarding the nosebleed.  Additionally patient is complaining of left-sided ear pain and discharge, to nursing staff she reported a foreign body sensation after Q-tip was inserted into the ear however to this provider she denies any foreign bodies in the ear and reports only pain and discharge from the ear itself.  No fevers or chills reported no abnormal behavior, normal p.o. intake and no other complaints offered.  Patient's caretaker requesting information regarding placement options however was not looking for inpatient hospitalization or placement at this time however reported in the future she may bring the patient back for psychiatric placement/group home placement as she is currently aging and taking care of her parents as well.      Earache  Associated symptoms: no abdominal pain, no congestion, no fever, no rash, no rhinorrhea, no sore throat and no vomiting    Fall  Associated symptoms: no abdominal pain, no chest pain, no nausea and no vomiting        Prior to Admission Medications   Prescriptions Last Dose Informant Patient Reported? Taking?   ALPRAZolam (XANAX) 2 MG tablet  Mother Yes No   Sig: Take 2 mg by mouth 2 (two) times a day as needed for  anxiety   OLANZapine (ZyPREXA) 5 mg tablet  Mother No No   Sig: Take 2 tablets (10 mg total) by mouth daily at bedtime   divalproex sodium (DEPAKOTE) 500 mg DR tablet  Self, Mother No No   Sig: Take 2 tablets (1,000 mg total) by mouth every 12 (twelve) hours   propranolol (INDERAL) 10 mg tablet  Mother, Self No No   Sig: Take 1 tablet (10 mg total) by mouth 3 (three) times a day      Facility-Administered Medications: None       Past Medical History:   Diagnosis Date    Psychiatric disorder        History reviewed. No pertinent surgical history.    Family History   Problem Relation Age of Onset    Mental illness Father      I have reviewed and agree with the history as documented.    E-Cigarette/Vaping    E-Cigarette Use Never User      E-Cigarette/Vaping Substances    Nicotine No     THC No     Flavoring No     Other No      Social History     Tobacco Use    Smoking status: Former     Current packs/day: 0.50     Average packs/day: 0.5 packs/day for 10.0 years (5.0 ttl pk-yrs)     Types: Cigarettes    Smokeless tobacco: Never   Vaping Use    Vaping status: Never Used   Substance Use Topics    Alcohol use: Yes     Alcohol/week: 3.0 standard drinks of alcohol     Types: 3 Glasses of wine per week     Comment: sips of wine    Drug use: Yes     Frequency: 1.0 times per week     Types: Marijuana       Review of Systems   Constitutional:  Negative for chills, fatigue and fever.   HENT:  Positive for ear pain and nosebleeds. Negative for congestion, rhinorrhea and sore throat.    Eyes:  Negative for redness.   Respiratory:  Negative for chest tightness and shortness of breath.    Cardiovascular:  Negative for chest pain and palpitations.   Gastrointestinal:  Negative for abdominal pain, nausea and vomiting.   Genitourinary:  Negative for dysuria and hematuria.   Musculoskeletal: Negative.    Skin:  Negative for rash.   Neurological:  Negative for dizziness, syncope, light-headedness and numbness.       Physical  Exam  Physical Exam  Vitals and nursing note reviewed.   Constitutional:       Appearance: She is well-developed.   HENT:      Head: Normocephalic.      Comments: Exam is negative for hemotympanum no septal hematoma visualized. No  Signs of basilar skull fracture including periorbital ecchymosis and periauricular ecchymosis.  No crepitus, deformities, depressions of the skull were palpated.     Left Ear: Drainage and swelling present.      Ears:      Comments: Swelling of the left external ear with white exudate/discharge is noted.  Tympanic membrane is partially visualized there is no hemotympanum noted, swelling does partially occlude visualization of the inferior aspect of the tympanic membrane.     Nose:      Comments: Some dried blood is noted to the right nare.  No septal hematomas noted.  No NESSA auricular or periorbital ecchymosis is noted.  No crepitus.  No swelling.  No deformities.  No external signs of trauma.  Eyes:      General: No scleral icterus.  Cardiovascular:      Rate and Rhythm: Normal rate and regular rhythm.   Pulmonary:      Effort: Pulmonary effort is normal.      Breath sounds: Normal breath sounds. No stridor.   Abdominal:      General: There is no distension.      Palpations: Abdomen is soft.      Tenderness: There is no abdominal tenderness.   Musculoskeletal:         General: Normal range of motion.   Skin:     General: Skin is warm and dry.      Capillary Refill: Capillary refill takes less than 2 seconds.   Neurological:      Mental Status: She is alert and oriented to person, place, and time.   Psychiatric:         Mood and Affect: Mood and affect normal.         Vital Signs  ED Triage Vitals [07/24/24 2016]   Temperature Pulse Respirations Blood Pressure SpO2   99.3 °F (37.4 °C) 104 20 149/95 97 %      Temp Source Heart Rate Source Patient Position - Orthostatic VS BP Location FiO2 (%)   Tympanic Monitor Lying Left arm --      Pain Score       --           Vitals:    07/24/24 2016    BP: 149/95   Pulse: 104   Patient Position - Orthostatic VS: Lying         Visual Acuity      ED Medications  Medications - No data to display    Diagnostic Studies  Results Reviewed       None                   No orders to display              Procedures  Procedures         ED Course                                 SBIRT 20yo+      Flowsheet Row Most Recent Value   Initial Alcohol Screen: US AUDIT-C     1. How often do you have a drink containing alcohol? 0 Filed at: 07/24/2024 2017   2. How many drinks containing alcohol do you have on a typical day you are drinking?  0 Filed at: 07/24/2024 2017   3b. FEMALE Any Age, or MALE 65+: How often do you have 4 or more drinks on one occassion? 0 Filed at: 07/24/2024 2017   Audit-C Score 0 Filed at: 07/24/2024 2017   NISA: How many times in the past year have you...    Used an illegal drug or used a prescription medication for non-medical reasons? Never Filed at: 07/24/2024 2017                      Medical Decision Making  34-year-old female presenting with caretaker for evaluation after a fall which occurred yesterday patient's caretaker reports the patient is not on any blood thinners and had no loss of consciousness as per [place] head CT rules there is no indication for CT imaging at this time, small amount of dried blood noted to the right nare with no septal hematoma and no crepitus no facial instability or evidence of ocular entrapment to warrant facial CT imaging for concerning facial fractures.  Patient also concerned about left ear pain, swelling and discharge consistent with otitis externa is noted, otherwise exam is without abnormalities.  Patient's caretaker also concerned about potential placement in the future and was advised at the point where she would like the patient placed in a group home or psychiatrically admitted she should return and voiced these concerns.  At this time patient's caretaker declines this option and reports the patient is at her  "baseline with no hallucinations suicidal behavior or homicidal behavior.    Strict return to ED precautions discussed. Patient and/or family members verbalizes understanding and agrees with plan. Patient is stable for discharge     Portions of the record may have been created with voice recognition software. Occasional wrong word or \"sound a like\" substitutions may have occurred due to the inherent limitations of voice recognition software. Read the chart carefully and recognize, using context, where substitutions have occurred.                 Disposition  Final diagnoses:   Left otitis externa   Fall from slip, trip, or stumble, initial encounter   Right-sided nosebleed     Time reflects when diagnosis was documented in both MDM as applicable and the Disposition within this note       Time User Action Codes Description Comment    7/24/2024  8:29 PM Falguni Mclaughlin [H60.92] Left otitis externa     7/24/2024  8:29 PM Falguni Mclaughlin [W01.0XXA] Fall from slip, trip, or stumble, initial encounter     7/24/2024  8:29 PM Falguni Mclaughlin [R04.0] Right-sided nosebleed           ED Disposition       ED Disposition   Discharge    Condition   Good    Date/Time   Wed Jul 24, 2024 2029    Comment   Evelin Anderson discharge to home/self care.                   Follow-up Information       Follow up With Specialties Details Why Contact Info Additional Information    [facility], Nurse Practitioner   [address] [address] [phone]       [facility] Emergency Medicine  When you desire placement or admission for psychiatric concerns [address] [facility] [address] [phone] [facility]            Patient's Medications   Discharge Prescriptions    OFLOXACIN (FLOXIN) 0.3 % OTIC SOLUTION    Administer 10 drops into the left ear 2 (two) times a day       " Start Date: 7/24/2024 End Date: --       Order Dose: 10 drops       Quantity: 5 mL    Refills: 0       No discharge procedures on file.    PDMP Review         Value Time User    PDMP Reviewed  Yes 9/1/2023  4:32 AM [facility] Crawford            ED Provider  Electronically Signed by             Falguni Mclaughlin PA-C  07/24/24 2031

## 2024-09-02 NOTE — PROGRESS NOTES
"[employer]  #[id number] used for this encounter; pertinent history provided by patient's mother    Subjective      Evelin Anderson is a 34 y.o. female who presents for annual well woman exam. Patient has an intellectual disability, is cared for by her parents.    GYN:  Denies vaginal discharge, labial erythema or lesions, dyspareunia.  Menses are irregular, spotting when she does have one  Patient has never been sexually active    OB:   female    :  Denies dysuria, urinary frequency or urgency.  Denies hematuria, flank pain, incontinence.  Denies constipation, diarrhea    Breast:  Denies breast mass, skin changes, dimpling, reddening, nipple retraction.  Denies breast discharge.  Patient does not have a family history of breast, endometrial, or ovarian ca.     General:  ETOH use: occasionally  Tobacco use: former  Recreational drug use: marijuana    Screening:  Cervical cancer: never had pap smear    Review of Systems  Pertinent items are noted in HPI.      Objective      /62 (BP Location: Left arm, Patient Position: Sitting, Cuff Size: Standard)   Ht 5' 4\" (1.626 m)   Wt 70.9 kg (156 lb 6.4 oz)   LMP  (LMP Unknown)   BMI 26.85 kg/m²     Physical Exam  Exam conducted with a chaperone present.   Constitutional:       Appearance: Normal appearance.   HENT:      Head: Normocephalic and atraumatic.   Cardiovascular:      Rate and Rhythm: Normal rate.   Pulmonary:      Effort: Pulmonary effort is normal. No respiratory distress.   Chest:   Breasts:     Right: Normal. No inverted nipple, mass or tenderness.      Left: Normal. No inverted nipple, mass or tenderness.   Abdominal:      Palpations: Abdomen is soft.      Tenderness: There is no abdominal tenderness.   Genitourinary:     General: Normal vulva.      Vagina: No vaginal discharge.      Comments: Normal appearing vaginal mucosa; normal appearing cervix; uterus normal in size and contour; no adnexal masses palpated    Musculoskeletal:         " General: Normal range of motion.   Skin:     General: Skin is warm and dry.   Neurological:      Mental Status: She is alert.            Assessment     35 yo presents today for her annual exam     Plan   - Pap smear obtained, although patient poorly tolerant of exam, likely insufficient sample; discussed with patient and mother that due to her never being sexually active, low risk of developing HPV or cervical neoplasia

## 2024-09-04 ENCOUNTER — OFFICE VISIT (OUTPATIENT)
Dept: OBGYN CLINIC | Facility: CLINIC | Age: 34
End: 2024-09-04
Payer: COMMERCIAL

## 2024-09-04 VITALS
DIASTOLIC BLOOD PRESSURE: 62 MMHG | HEIGHT: 64 IN | BODY MASS INDEX: 26.7 KG/M2 | WEIGHT: 156.4 LBS | SYSTOLIC BLOOD PRESSURE: 112 MMHG

## 2024-09-04 DIAGNOSIS — Z12.4 SCREENING FOR CERVICAL CANCER: ICD-10-CM

## 2024-09-04 DIAGNOSIS — Z01.419 WOMEN'S ANNUAL ROUTINE GYNECOLOGICAL EXAMINATION: Primary | ICD-10-CM

## 2024-09-04 PROBLEM — Z00.00 ANNUAL PHYSICAL EXAM: Status: RESOLVED | Noted: 2024-06-28 | Resolved: 2024-09-04

## 2024-09-04 PROCEDURE — 99385 PREV VISIT NEW AGE 18-39: CPT | Performed by: OBSTETRICS & GYNECOLOGY

## 2024-09-04 PROCEDURE — G0476 HPV COMBO ASSAY CA SCREEN: HCPCS | Performed by: OBSTETRICS & GYNECOLOGY

## 2024-09-04 PROCEDURE — G0145 SCR C/V CYTO,THINLAYER,RESCR: HCPCS | Performed by: OBSTETRICS & GYNECOLOGY

## 2024-09-05 LAB
HPV HR 12 DNA CVX QL NAA+PROBE: NEGATIVE
HPV16 DNA CVX QL NAA+PROBE: NEGATIVE
HPV18 DNA CVX QL NAA+PROBE: NEGATIVE

## 2024-09-10 LAB
LAB AP GYN PRIMARY INTERPRETATION: NORMAL
Lab: NORMAL

## 2024-09-25 ENCOUNTER — APPOINTMENT (OUTPATIENT)
Dept: LAB | Facility: HOSPITAL | Age: 34
End: 2024-09-25
Payer: COMMERCIAL

## 2024-09-25 DIAGNOSIS — Z00.00 ANNUAL PHYSICAL EXAM: ICD-10-CM

## 2024-09-25 LAB
ALBUMIN SERPL BCG-MCNC: 4.4 G/DL (ref 3.5–5)
ALP SERPL-CCNC: 45 U/L (ref 34–104)
ALT SERPL W P-5'-P-CCNC: 22 U/L (ref 7–52)
ANION GAP SERPL CALCULATED.3IONS-SCNC: 8 MMOL/L (ref 4–13)
AST SERPL W P-5'-P-CCNC: 25 U/L (ref 13–39)
BASOPHILS # BLD AUTO: 0.02 THOUSANDS/ΜL (ref 0–0.1)
BASOPHILS NFR BLD AUTO: 0 % (ref 0–1)
BILIRUB SERPL-MCNC: 0.34 MG/DL (ref 0.2–1)
BUN SERPL-MCNC: 13 MG/DL (ref 5–25)
CALCIUM SERPL-MCNC: 9.8 MG/DL (ref 8.4–10.2)
CHLORIDE SERPL-SCNC: 105 MMOL/L (ref 96–108)
CHOLEST SERPL-MCNC: 256 MG/DL
CO2 SERPL-SCNC: 31 MMOL/L (ref 21–32)
CREAT SERPL-MCNC: 0.52 MG/DL (ref 0.6–1.3)
EOSINOPHIL # BLD AUTO: 0.06 THOUSAND/ΜL (ref 0–0.61)
EOSINOPHIL NFR BLD AUTO: 1 % (ref 0–6)
ERYTHROCYTE [DISTWIDTH] IN BLOOD BY AUTOMATED COUNT: 13.3 % (ref 11.6–15.1)
GFR SERPL CREATININE-BSD FRML MDRD: 124 ML/MIN/1.73SQ M
GLUCOSE P FAST SERPL-MCNC: 89 MG/DL (ref 65–99)
HCT VFR BLD AUTO: 42.3 % (ref 34.8–46.1)
HDLC SERPL-MCNC: 41 MG/DL
HGB BLD-MCNC: 14.5 G/DL (ref 11.5–15.4)
IMM GRANULOCYTES # BLD AUTO: 0.01 THOUSAND/UL (ref 0–0.2)
IMM GRANULOCYTES NFR BLD AUTO: 0 % (ref 0–2)
LDLC SERPL CALC-MCNC: 160 MG/DL (ref 0–100)
LYMPHOCYTES # BLD AUTO: 2.04 THOUSANDS/ΜL (ref 0.6–4.47)
LYMPHOCYTES NFR BLD AUTO: 35 % (ref 14–44)
MCH RBC QN AUTO: 30 PG (ref 26.8–34.3)
MCHC RBC AUTO-ENTMCNC: 34.3 G/DL (ref 31.4–37.4)
MCV RBC AUTO: 87 FL (ref 82–98)
MONOCYTES # BLD AUTO: 0.45 THOUSAND/ΜL (ref 0.17–1.22)
MONOCYTES NFR BLD AUTO: 8 % (ref 4–12)
NEUTROPHILS # BLD AUTO: 3.18 THOUSANDS/ΜL (ref 1.85–7.62)
NEUTS SEG NFR BLD AUTO: 56 % (ref 43–75)
NRBC BLD AUTO-RTO: 0 /100 WBCS
PLATELET # BLD AUTO: 170 THOUSANDS/UL (ref 149–390)
PMV BLD AUTO: 9.5 FL (ref 8.9–12.7)
POTASSIUM SERPL-SCNC: 4 MMOL/L (ref 3.5–5.3)
PROT SERPL-MCNC: 7.5 G/DL (ref 6.4–8.4)
RBC # BLD AUTO: 4.84 MILLION/UL (ref 3.81–5.12)
SODIUM SERPL-SCNC: 144 MMOL/L (ref 135–147)
TRIGL SERPL-MCNC: 274 MG/DL
TSH SERPL DL<=0.05 MIU/L-ACNC: 2.42 UIU/ML (ref 0.45–4.5)
WBC # BLD AUTO: 5.76 THOUSAND/UL (ref 4.31–10.16)

## 2024-09-25 PROCEDURE — 85025 COMPLETE CBC W/AUTO DIFF WBC: CPT

## 2024-09-25 PROCEDURE — 80061 LIPID PANEL: CPT

## 2024-09-25 PROCEDURE — 84443 ASSAY THYROID STIM HORMONE: CPT

## 2024-10-09 DIAGNOSIS — E78.2 HYPERLIPIDEMIA, MIXED: Primary | ICD-10-CM

## 2024-10-09 RX ORDER — ROSUVASTATIN CALCIUM 10 MG/1
10 TABLET, COATED ORAL DAILY
Qty: 100 TABLET | Refills: 3 | Status: SHIPPED | OUTPATIENT
Start: 2024-10-09

## 2024-10-17 ENCOUNTER — PATIENT MESSAGE (OUTPATIENT)
Dept: FAMILY MEDICINE CLINIC | Facility: CLINIC | Age: 34
End: 2024-10-17

## 2024-10-23 ENCOUNTER — TELEPHONE (OUTPATIENT)
Dept: FAMILY MEDICINE CLINIC | Facility: CLINIC | Age: 34
End: 2024-10-23

## 2024-10-23 NOTE — TELEPHONE ENCOUNTER
Spoke to patient sister, patient has scheduled nurse visit for Monday 10/28 at 1pm for PPD placement per [facility] recommendation.

## 2024-10-28 ENCOUNTER — CLINICAL SUPPORT (OUTPATIENT)
Dept: FAMILY MEDICINE CLINIC | Facility: CLINIC | Age: 34
End: 2024-10-28
Payer: COMMERCIAL

## 2024-10-28 DIAGNOSIS — Z11.1 ENCOUNTER FOR TUBERCULIN SKIN TEST: Primary | ICD-10-CM

## 2024-10-28 PROCEDURE — 86580 TB INTRADERMAL TEST: CPT

## 2024-10-30 ENCOUNTER — CLINICAL SUPPORT (OUTPATIENT)
Dept: FAMILY MEDICINE CLINIC | Facility: CLINIC | Age: 34
End: 2024-10-30

## 2024-10-30 DIAGNOSIS — Z11.1 ENCOUNTER FOR PPD SKIN TEST READING: Primary | ICD-10-CM

## 2024-10-30 NOTE — PROGRESS NOTES
PPD Reading Note  PPD read and results entered in [employer].  Result: 0 mm induration.  Interpretation: negative  If test not read within 48-72 hours of initial placement, patient advised to repeat in other arm 1-3 weeks after this test.  Allergic reaction: no

## 2024-11-29 ENCOUNTER — APPOINTMENT (OUTPATIENT)
Dept: LAB | Facility: HOSPITAL | Age: 34
End: 2024-11-29

## 2024-11-29 ENCOUNTER — HOSPITAL ENCOUNTER (EMERGENCY)
Facility: HOSPITAL | Age: 34
Discharge: HOME | End: 2024-11-29
Attending: EMERGENCY MEDICINE
Payer: COMMERCIAL

## 2024-11-29 VITALS
OXYGEN SATURATION: 98 % | RESPIRATION RATE: 20 BRPM | TEMPERATURE: 98.8 F | DIASTOLIC BLOOD PRESSURE: 94 MMHG | WEIGHT: 150.79 LBS | SYSTOLIC BLOOD PRESSURE: 171 MMHG | BODY MASS INDEX: 25.88 KG/M2 | HEART RATE: 104 BPM

## 2024-11-29 DIAGNOSIS — H60.311 ACUTE DIFFUSE OTITIS EXTERNA OF RIGHT EAR: Primary | ICD-10-CM

## 2024-11-29 PROCEDURE — 99284 EMERGENCY DEPT VISIT MOD MDM: CPT | Performed by: EMERGENCY MEDICINE

## 2024-11-29 PROCEDURE — 99282 EMERGENCY DEPT VISIT SF MDM: CPT

## 2024-11-29 RX ORDER — CIPROFLOXACIN AND DEXAMETHASONE 3; 1 MG/ML; MG/ML
4 SUSPENSION/ DROPS AURICULAR (OTIC) 2 TIMES DAILY
Qty: 2 ML | Refills: 0 | Status: SHIPPED | OUTPATIENT
Start: 2024-11-29 | End: 2024-12-04

## 2024-11-29 NOTE — ED PROVIDER NOTES
Time reflects when diagnosis was documented in both MDM as applicable and the Disposition within this note       Time User Action Codes Description Comment    11/29/2024 11:29 AM Storm Fady Add [H60.311] Acute diffuse otitis externa of right ear           ED Disposition       ED Disposition   Discharge    Condition   Stable    Date/Time   Fri Nov 29, 2024 11:29 AM    Comment   Evelin Anderson discharge to home/self care.                   Assessment & Plan       Medical Decision Making  33-year-old female with right ear pain found to have otitis externa, ear wick placed.  Ciprodex prescribed.    Risk  Prescription drug management.             Medications - No data to display    ED Risk Strat Scores                           SBIRT 20yo+      Flowsheet Row Most Recent Value   Initial Alcohol Screen: US AUDIT-C     1. How often do you have a drink containing alcohol? 0 Filed at: 11/29/2024 1100   Audit-C Score 0 Filed at: 11/29/2024 1100   NISA: How many times in the past year have you...    Used an illegal drug or used a prescription medication for non-medical reasons? Never Filed at: 11/29/2024 1100                            History of Present Illness       Chief Complaint   Patient presents with    Earache     Right, x2 days       Past Medical History:   Diagnosis Date    Psychiatric disorder       History reviewed. No pertinent surgical history.   Family History   Problem Relation Age of Onset    Mental illness Father       Social History     Tobacco Use    Smoking status: Former     Current packs/day: 0.50     Average packs/day: 0.5 packs/day for 10.0 years (5.0 ttl pk-yrs)     Types: Cigarettes    Smokeless tobacco: Never   Vaping Use    Vaping status: Never Used   Substance Use Topics    Alcohol use: Yes     Alcohol/week: 3.0 standard drinks of alcohol     Types: 3 Glasses of wine per week     Comment: sips of wine    Drug use: Yes     Frequency: 1.0 times per week     Types: Marijuana      E-Cigarette/Vaping     E-Cigarette Use Never User       E-Cigarette/Vaping Substances    Nicotine No     THC No     Flavoring No     Other No       I have reviewed and agree with the history as documented.     34-year-old female presenting emerged department with Right ear pain ongoing for 2 days.  No nausea vomiting.  No fever or chills.        Review of Systems   Constitutional:  Negative for chills and fever.   HENT:  Positive for ear pain. Negative for sore throat.    Eyes:  Negative for pain and visual disturbance.   Respiratory:  Negative for cough and shortness of breath.    Cardiovascular:  Negative for chest pain and palpitations.   Gastrointestinal:  Negative for abdominal pain and vomiting.   Genitourinary:  Negative for dysuria and hematuria.   Musculoskeletal:  Negative for arthralgias and back pain.   Skin:  Negative for color change and rash.   Neurological:  Negative for seizures and syncope.   All other systems reviewed and are negative.          Objective       ED Triage Vitals [11/29/24 1059]   Temperature Pulse Blood Pressure Respirations SpO2 Patient Position - Orthostatic VS   98.8 °F (37.1 °C) 104 (!) 171/94 20 98 % Lying      Temp Source Heart Rate Source BP Location FiO2 (%) Pain Score    Oral Monitor Left arm -- --      Vitals      Date and Time Temp Pulse SpO2 Resp BP Pain Score FACES Pain Rating User   11/29/24 1059 98.8 °F (37.1 °C) 104 98 % 20 171/94 -- -- [place]            Physical Exam  Vitals and nursing note reviewed.   Constitutional:       General: She is not in acute distress.     Appearance: She is well-developed.   HENT:      Head: Normocephalic and atraumatic.      Ears:      Comments: Right ear canal diffusely edematous with purulent drainage, TM not visible due to amount of edema and drainage  Eyes:      Conjunctiva/sclera: Conjunctivae normal.   Pulmonary:      Effort: Pulmonary effort is normal. No respiratory distress.   Musculoskeletal:         General: No swelling.      Cervical back: Neck  supple.   Skin:     General: Skin is warm and dry.   Neurological:      Mental Status: She is alert.   Psychiatric:         Mood and Affect: Mood normal.         Results Reviewed       None            No orders to display       Procedures    ED Medication and Procedure Management   Prior to Admission Medications   Prescriptions Last Dose Informant Patient Reported? Taking?   ALPRAZolam (XANAX) 2 MG tablet  Mother Yes No   Sig: Take 2 mg by mouth 2 (two) times a day as needed for anxiety   OLANZapine (ZyPREXA) 5 mg tablet  Mother No No   Sig: Take 2 tablets (10 mg total) by mouth daily at bedtime   divalproex sodium (DEPAKOTE) 500 mg DR tablet  Self, Mother No No   Sig: Take 2 tablets (1,000 mg total) by mouth every 12 (twelve) hours   ofloxacin (FLOXIN) 0.3 % otic solution   No No   Sig: Administer 10 drops into the left ear 2 (two) times a day   propranolol (INDERAL) 10 mg tablet  Mother, Self No No   Sig: Take 1 tablet (10 mg total) by mouth 3 (three) times a day   rosuvastatin (CRESTOR) 10 MG tablet   No No   Sig: Take 1 tablet (10 mg total) by mouth daily      Facility-Administered Medications: None     Patient's Medications   Discharge Prescriptions    CIPROFLOXACIN-DEXAMETHASONE (CIPRODEX) OTIC SUSPENSION    Administer 4 drops into both ears 2 (two) times a day for 5 days       Start Date: 11/29/2024End Date: 12/4/2024       Order Dose: 4 drops       Quantity: 2 mL    Refills: 0     No discharge procedures on file.  ED SEPSIS DOCUMENTATION   Time reflects when diagnosis was documented in both MDM as applicable and the Disposition within this note       Time User Action Codes Description Comment    11/29/2024 11:29 AM Fady Inman Add [H60.311] Acute diffuse otitis externa of right ear                  Fady Inman MD  11/29/24 1146

## 2025-01-11 ENCOUNTER — APPOINTMENT (OUTPATIENT)
Dept: LAB | Facility: HOSPITAL | Age: 35
End: 2025-01-11
Payer: COMMERCIAL

## 2025-01-11 DIAGNOSIS — Z79.899 ENCOUNTER FOR LONG-TERM (CURRENT) USE OF MEDICATIONS: ICD-10-CM

## 2025-01-11 LAB
ALBUMIN SERPL BCG-MCNC: 4.4 G/DL (ref 3.5–5)
ALP SERPL-CCNC: 38 U/L (ref 34–104)
ALT SERPL W P-5'-P-CCNC: 11 U/L (ref 7–52)
ANION GAP SERPL CALCULATED.3IONS-SCNC: 7 MMOL/L (ref 4–13)
AST SERPL W P-5'-P-CCNC: 15 U/L (ref 13–39)
BILIRUB SERPL-MCNC: 0.36 MG/DL (ref 0.2–1)
BUN SERPL-MCNC: 12 MG/DL (ref 5–25)
CALCIUM SERPL-MCNC: 9.8 MG/DL (ref 8.4–10.2)
CHLORIDE SERPL-SCNC: 102 MMOL/L (ref 96–108)
CO2 SERPL-SCNC: 33 MMOL/L (ref 21–32)
CREAT SERPL-MCNC: 0.53 MG/DL (ref 0.6–1.3)
EST. AVERAGE GLUCOSE BLD GHB EST-MCNC: 111 MG/DL
GFR SERPL CREATININE-BSD FRML MDRD: 123 ML/MIN/1.73SQ M
GLUCOSE SERPL-MCNC: 81 MG/DL (ref 65–140)
HBA1C MFR BLD: 5.5 %
POTASSIUM SERPL-SCNC: 4 MMOL/L (ref 3.5–5.3)
PROT SERPL-MCNC: 7.3 G/DL (ref 6.4–8.4)
SODIUM SERPL-SCNC: 142 MMOL/L (ref 135–147)

## 2025-01-11 PROCEDURE — 83036 HEMOGLOBIN GLYCOSYLATED A1C: CPT

## 2025-01-11 PROCEDURE — 80165 DIPROPYLACETIC ACID FREE: CPT

## 2025-01-11 PROCEDURE — 80053 COMPREHEN METABOLIC PANEL: CPT

## 2025-01-11 PROCEDURE — 36415 COLL VENOUS BLD VENIPUNCTURE: CPT

## 2025-01-13 LAB — VALPROATE FREE SERPL-MCNC: 20.4 UG/ML (ref 6–22)

## 2025-06-26 ENCOUNTER — OFFICE VISIT (OUTPATIENT)
Dept: FAMILY MEDICINE CLINIC | Facility: CLINIC | Age: 35
End: 2025-06-26
Payer: COMMERCIAL

## 2025-06-26 VITALS
OXYGEN SATURATION: 97 % | WEIGHT: 124.2 LBS | DIASTOLIC BLOOD PRESSURE: 90 MMHG | SYSTOLIC BLOOD PRESSURE: 130 MMHG | TEMPERATURE: 96.7 F | BODY MASS INDEX: 21.21 KG/M2 | HEART RATE: 78 BPM | HEIGHT: 64 IN | RESPIRATION RATE: 16 BRPM

## 2025-06-26 DIAGNOSIS — Z00.00 ANNUAL PHYSICAL EXAM: Primary | ICD-10-CM

## 2025-06-26 DIAGNOSIS — F79 INTELLECTUAL DISABILITY: ICD-10-CM

## 2025-06-26 DIAGNOSIS — E78.2 HYPERLIPIDEMIA, MIXED: ICD-10-CM

## 2025-06-26 PROCEDURE — 99213 OFFICE O/P EST LOW 20 MIN: CPT | Performed by: NURSE PRACTITIONER

## 2025-06-26 PROCEDURE — 99395 PREV VISIT EST AGE 18-39: CPT | Performed by: NURSE PRACTITIONER

## 2025-06-26 RX ORDER — DIVALPROEX SODIUM 250 MG/1
250 TABLET, DELAYED RELEASE ORAL EVERY 8 HOURS SCHEDULED
COMMUNITY
Start: 2025-06-26

## 2025-06-26 RX ORDER — OLANZAPINE 10 MG/1
1 TABLET, FILM COATED ORAL 2 TIMES DAILY
COMMUNITY
Start: 2025-05-24

## 2025-06-26 NOTE — PATIENT INSTRUCTIONS
"Patient Education     Routine physical for adults   The Basics   Written by the doctors and editors at [facility]   What is a physical? -- A physical is a routine visit, or \"check-up,\" with your doctor. You might also hear it called a \"wellness visit\" or \"preventive visit.\"  During each visit, the doctor will:   Ask about your physical and mental health   Ask about your habits, behaviors, and lifestyle   Do an exam   Give you vaccines if needed   Talk to you about any medicines you take   Give advice about your health   Answer your questions  Getting regular check-ups is an important part of taking care of your health. It can help your doctor find and treat any problems you have. But it's also important for preventing health problems.  A routine physical is different from a \"sick visit.\" A sick visit is when you see a doctor because of a health concern or problem. Since physicals are scheduled ahead of time, you can think about what you want to ask the doctor.  How often should I get a physical? -- It depends on your age and health. In general, for people age 21 years and older:   If you are younger than 50 years, you might be able to get a physical every 3 years.   If you are 50 years or older, your doctor might recommend a physical every year.  If you have an ongoing health condition, like diabetes or high blood pressure, your doctor will probably want to see you more often.  What happens during a physical? -- In general, each visit will include:   Physical exam - The doctor or nurse will check your height, weight, heart rate, and blood pressure. They will also look at your eyes and ears. They will ask about how you are feeling and whether you have any symptoms that bother you.   Medicines - It's a good idea to bring a list of all the medicines you take to each doctor visit. Your doctor will talk to you about your medicines and answer any questions. Tell them if you are having any side effects that bother you. You " "should also tell them if you are having trouble paying for any of your medicines.   Habits and behaviors - This includes:   Your diet   Your exercise habits   Whether you smoke, drink alcohol, or use drugs   Whether you are sexually active   Whether you feel safe at home  Your doctor will talk to you about things you can do to improve your health and lower your risk of health problems. They will also offer help and support. For example, if you want to quit smoking, they can give you advice and might prescribe medicines. If you want to improve your diet or get more physical activity, they can help you with this, too.   Lab tests, if needed - The tests you get will depend on your age and situation. For example, your doctor might want to check your:   Cholesterol   Blood sugar   Iron level   Vaccines - The recommended vaccines will depend on your age, health, and what vaccines you already had. Vaccines are very important because they can prevent certain serious or deadly infections.   Discussion of screening - \"Screening\" means checking for diseases or other health problems before they cause symptoms. Your doctor can recommend screening based on your age, risk, and preferences. This might include tests to check for:   Cancer, such as breast, prostate, cervical, ovarian, colorectal, prostate, lung, or skin cancer   Sexually transmitted infections, such as chlamydia and gonorrhea   Mental health conditions like depression and anxiety  Your doctor will talk to you about the different types of screening tests. They can help you decide which screenings to have. They can also explain what the results might mean.   Answering questions - The physical is a good time to ask the doctor or nurse questions about your health. If needed, they can refer you to other doctors or specialists, too.  Adults older than 65 years often need other care, too. As you get older, your doctor will talk to you about:   How to prevent falling at " home   Hearing or vision tests   Memory testing   How to take your medicines safely   Making sure that you have the help and support you need at home  All topics are updated as new evidence becomes available and our peer review process is complete.  This topic retrieved from [facility] on: May 02, 2024.  Topic 046427 Version 1.0  Release: 32.4.3 - C32.122  © 2024 UpToDate, Inc. and/or its affiliates. All rights reserved.  Consumer Information Use and Disclaimer   Disclaimer: This generalized information is a limited summary of diagnosis, treatment, and/or medication information. It is not meant to be comprehensive and should be used as a tool to help the user understand and/or assess potential diagnostic and treatment options. It does NOT include all information about conditions, treatments, medications, side effects, or risks that may apply to a specific patient. It is not intended to be medical advice or a substitute for the medical advice, diagnosis, or treatment of a health care provider based on the health care provider's examination and assessment of a patient's specific and unique circumstances. Patients must speak with a health care provider for complete information about their health, medical questions, and treatment options, including any risks or benefits regarding use of medications. This information does not endorse any treatments or medications as safe, effective, or approved for treating a specific patient. UpToDate, Inc. and its affiliates disclaim any warranty or liability relating to this information or the use thereof.The use of this information is governed by the Terms of Use, available at https://www.[employer].com/en/know/clinical-effectiveness-terms. 2024© UpToDate, Inc. and its affiliates and/or licensors. All rights reserved.  Copyright   © 2024 UpToDate, Inc. and/or its affiliates. All rights reserved.

## 2025-06-26 NOTE — PROGRESS NOTES
Adult Annual Physical  Name: Evelin Anderson      : 1990      MRN: [id number]  Encounter Provider: PERLA Wheatley  Encounter Date: 2025   Encounter department: [facility]    :  Assessment & Plan  Annual physical exam  Routine health labs ordered     Orders:  •  Comprehensive metabolic panel  •  CBC and differential; Future  •  TSH, 3rd generation with Free T4 reflex; Future    Hyperlipidemia, mixed  Patient is due for labs  She is on crestor 10mg   Orders:  •  Lipid Panel with Direct LDL reflex; Future  •  Comprehensive metabolic panel  •  Hemoglobin A1C    Intellectual disability  Continues supportive care with her mom            Preventive Screenings:  - Diabetes Screening: screening up-to-date and orders placed  - Cholesterol Screening: screening not indicated, has hyperlipidemia and orders placed   - Hepatitis C screening: screening up-to-date   - HIV screening: screening not indicated   - Cervical cancer screening: screening up-to-date   - Colon cancer screening: screening not indicated   - Lung cancer screening: screening not indicated     Counseling/Anticipatory Guidance:    - Dental health: discussed importance of regular tooth brushing, flossing, and dental visits.   - Diet: discussed recommendations for a healthy/well-balanced diet.   - Exercise: the importance of regular exercise/physical activity was discussed. Recommend exercise 3-5 times per week for at least 30 minutes.   - Injury prevention: discussed safety/seat belts, safety helmets, smoke detectors, carbon monoxide detectors, and smoking near bedding or upholstery.       Depression Screening and Follow-up Plan: Patient was screened for depression during today's encounter. They screened negative with a PHQ-2 score of 0.          History of Present Illness     Adult Annual Physical:  Patient presents for annual physical.     Diet and Physical Activity:  - Diet/Nutrition: no special diet.  - Exercise: no  "formal exercise.    Depression Screening:  - PHQ-2 Score: 0    General Health:  - Sleep: sleeps well.  - Hearing: normal hearing bilateral ears.  - Vision: no vision problems.  - Dental: regular dental visits.    /GYN Health:    - Menopause: postmenopausal.     Review of Systems   Constitutional:  Negative for appetite change and fever.   Respiratory:  Negative for chest tightness and shortness of breath.    Cardiovascular:  Negative for chest pain, palpitations and leg swelling.   Gastrointestinal:  Negative for abdominal pain.   Genitourinary:  Negative for difficulty urinating.   Musculoskeletal:  Negative for arthralgias and myalgias.   Skin:  Negative for wound.   Neurological:  Negative for dizziness, light-headedness and headaches.   Psychiatric/Behavioral:  Negative for dysphoric mood and sleep disturbance. The patient is not nervous/anxious.          Objective   /90 (BP Location: Left arm, Patient Position: Sitting, Cuff Size: Adult)   Pulse 78   Temp (!) 96.7 °F (35.9 °C) (Temporal)   Resp 16   Ht 5' 4\" (1.626 m)   Wt 56.3 kg (124 lb 3.2 oz)   SpO2 97%   BMI 21.32 kg/m²     Physical Exam  Vitals and nursing note reviewed.   Constitutional:       Appearance: Normal appearance.   HENT:      Head: Normocephalic and atraumatic.      Right Ear: Tympanic membrane normal.      Left Ear: Tympanic membrane normal.   Neck:      Thyroid: No thyromegaly.     Cardiovascular:      Rate and Rhythm: Normal rate and regular rhythm.      Heart sounds: S1 normal and S2 normal.   Pulmonary:      Effort: Pulmonary effort is normal.      Breath sounds: No wheezing or rhonchi.   Abdominal:      General: Abdomen is flat.      Palpations: Abdomen is soft.     Musculoskeletal:      Right lower leg: No edema.      Left lower leg: No edema.     Neurological:      Mental Status: She is alert.     Psychiatric:         Attention and Perception: She is inattentive.         Speech: Speech is rapid and pressured and " tangential.         Behavior: Behavior is hyperactive. Behavior is cooperative.         Cognition and Memory: Cognition is impaired.         Judgment: Judgment is impulsive.

## 2025-06-26 NOTE — ASSESSMENT & PLAN NOTE
Patient is due for labs  She is on crestor 10mg   Orders:  •  Lipid Panel with Direct LDL reflex; Future  •  Comprehensive metabolic panel  •  Hemoglobin A1C

## 2025-07-28 ENCOUNTER — APPOINTMENT (OUTPATIENT)
Dept: LAB | Facility: HOSPITAL | Age: 35
End: 2025-07-28
Payer: COMMERCIAL

## 2025-07-28 DIAGNOSIS — Z00.00 ANNUAL PHYSICAL EXAM: ICD-10-CM

## 2025-07-28 LAB
ALBUMIN SERPL BCG-MCNC: 3.9 G/DL (ref 3.5–5)
ALP SERPL-CCNC: 35 U/L (ref 34–104)
ALT SERPL W P-5'-P-CCNC: 9 U/L (ref 7–52)
ANION GAP SERPL CALCULATED.3IONS-SCNC: 8 MMOL/L (ref 4–13)
AST SERPL W P-5'-P-CCNC: 20 U/L (ref 13–39)
BASOPHILS # BLD AUTO: 0.01 THOUSANDS/ÂΜL (ref 0–0.1)
BASOPHILS NFR BLD AUTO: 0 % (ref 0–1)
BILIRUB SERPL-MCNC: 0.33 MG/DL (ref 0.2–1)
BUN SERPL-MCNC: 11 MG/DL (ref 5–25)
CALCIUM SERPL-MCNC: 9.4 MG/DL (ref 8.4–10.2)
CHLORIDE SERPL-SCNC: 103 MMOL/L (ref 96–108)
CO2 SERPL-SCNC: 34 MMOL/L (ref 21–32)
CREAT SERPL-MCNC: 0.6 MG/DL (ref 0.6–1.3)
EOSINOPHIL # BLD AUTO: 0.04 THOUSAND/ÂΜL (ref 0–0.61)
EOSINOPHIL NFR BLD AUTO: 1 % (ref 0–6)
ERYTHROCYTE [DISTWIDTH] IN BLOOD BY AUTOMATED COUNT: 14.2 % (ref 11.6–15.1)
EST. AVERAGE GLUCOSE BLD GHB EST-MCNC: 103 MG/DL
GFR SERPL CREATININE-BSD FRML MDRD: 117 ML/MIN/1.73SQ M
GLUCOSE P FAST SERPL-MCNC: 82 MG/DL (ref 65–99)
HBA1C MFR BLD: 5.2 %
HCT VFR BLD AUTO: 39.2 % (ref 34.8–46.1)
HGB BLD-MCNC: 12.7 G/DL (ref 11.5–15.4)
IMM GRANULOCYTES # BLD AUTO: 0 THOUSAND/UL (ref 0–0.2)
IMM GRANULOCYTES NFR BLD AUTO: 0 % (ref 0–2)
LYMPHOCYTES # BLD AUTO: 2.28 THOUSANDS/ÂΜL (ref 0.6–4.47)
LYMPHOCYTES NFR BLD AUTO: 57 % (ref 14–44)
MCH RBC QN AUTO: 28.7 PG (ref 26.8–34.3)
MCHC RBC AUTO-ENTMCNC: 32.4 G/DL (ref 31.4–37.4)
MCV RBC AUTO: 89 FL (ref 82–98)
MONOCYTES # BLD AUTO: 0.38 THOUSAND/ÂΜL (ref 0.17–1.22)
MONOCYTES NFR BLD AUTO: 9 % (ref 4–12)
NEUTROPHILS # BLD AUTO: 1.34 THOUSANDS/ÂΜL (ref 1.85–7.62)
NEUTS SEG NFR BLD AUTO: 33 % (ref 43–75)
NRBC BLD AUTO-RTO: 0 /100 WBCS
PLATELET # BLD AUTO: 149 THOUSANDS/UL (ref 149–390)
PMV BLD AUTO: 8.6 FL (ref 8.9–12.7)
POTASSIUM SERPL-SCNC: 3.9 MMOL/L (ref 3.5–5.3)
PROT SERPL-MCNC: 6.9 G/DL (ref 6.4–8.4)
RBC # BLD AUTO: 4.43 MILLION/UL (ref 3.81–5.12)
SODIUM SERPL-SCNC: 145 MMOL/L (ref 135–147)
TSH SERPL DL<=0.05 MIU/L-ACNC: 3.73 UIU/ML (ref 0.45–4.5)
WBC # BLD AUTO: 4.05 THOUSAND/UL (ref 4.31–10.16)

## 2025-07-28 PROCEDURE — 85025 COMPLETE CBC W/AUTO DIFF WBC: CPT

## 2025-07-28 PROCEDURE — 84443 ASSAY THYROID STIM HORMONE: CPT
